# Patient Record
Sex: FEMALE | Race: WHITE | Employment: OTHER | URBAN - METROPOLITAN AREA
[De-identification: names, ages, dates, MRNs, and addresses within clinical notes are randomized per-mention and may not be internally consistent; named-entity substitution may affect disease eponyms.]

---

## 2024-01-03 NOTE — DISCHARGE INSTRUCTIONS
PT per TKA protocol  CPM 0-70 degrees increase as tolerated 2 hours/shift for 3 weeks  Weightbear as tolerated with walker  Keep incision clean and dry  Ok to shower with aquacel dressing  Dressing to remain on knee for 7 days after surgery then ok to leave incision open to air  Frequent icing and elevation of the lower leg  Aspirin 81 mg to be continued 1 tablet twice daily for 3 weeks then 1 tablet once daily for 3 weeks  Follow up in 2-3 weeks    Paula Hollingsworth PA-C  1/3/2024  6:07 AM

## 2024-01-12 NOTE — H&P
Chief Complaint   right knee pre-op   History of Present Illness   Enma returns for preoperative assessment of right knee. Pain is 4-5/10 and she takes no medication. Here today to discuss surgery. Date of last x-ray was 02/03/2023. Both knees hurt but her right is the more chronic and consistent source of pain. She limps. Her QOL has diminished. She has been treating lymphedema w/compression stockings, elevation and PT with Dana Bernal. Her sister who lives in [place] is prepared to come into town to help her for a while after surgery.   *Prior Hx DVT 1999 following GYN surgery   Past Medical History - reviewed   Arthritis   Asthma   Blood clots   Bleeding disease   Diabetes   Hypertension   Surgical History - reviewed   Arthroscopic knee   Social History - reviewed   Hand dominance: right   Occupation: retired   Risk Factors - reviewed   Patient had a flu shot in the last 12 months? no   The patient is 65 years or older and has had a pneumonia vaccine: no   Patient has an implant none   Tobacco status: never smoker   Alcohol use: non drinker   Does patient exercise? no   In the past 12 months, have you fallen? yes   Have you had cervical cancer screening in the last 12 months ? no   Have you had breast cancer screening in the last 12 months? no   Have you had colorectal cancer screening in the last 12 months? no   Current Medications (including meds started today):   rosuvastatin 10 mg tablet (rosuvastatin) TAKE ONE TABLET BY MOUTH EVERY DAY AT BEDTIME   torsemide 20 mg tablet (torsemide) TAKE TWO TABLETS BY MOUTH DAILY IN THE MORNING   sumatriptan succinate 100 mg tablet (sumatriptan succinate) TAKE 1 TABLET BY MOUTH ONSET OF HEADACHE; IF NO RELIEF MAY REPEAT 1 TABLET IN 2 HOURS. MAX OF 2 TABLETS IN 24 HOURS   furosemide 40 mg tablet (furosemide) TAKE ONE TABLET BY MOUTH DAILY   metoprolol succinate 25 mg tablet extended release 24 hr (metoprolol succinate) TAKE ONE TABLET BY MOUTH EVERY DAY AT BEDTIME FOR

## 2024-01-18 ENCOUNTER — ANESTHESIA EVENT (OUTPATIENT)
Dept: OPERATING ROOM | Age: 69
End: 2024-01-18
Payer: MEDICARE

## 2024-01-18 NOTE — ANESTHESIA PRE PROCEDURE
Department of Anesthesiology  Preprocedure Note       Name:  Enma Fischer   Age:  68 y.o.  :  1955                                          MRN:  [id number]         Date:  2024      Surgeon: Surgeon(s):  Alberto Campbell MD    Procedure: Procedure(s):  ROBOTIC LISA ASSISTED RIGHT KNEE TOTAL ARTHROPLASTY   +++ELOISA+++  ++PNB++    Medications prior to admission:   Prior to Admission medications    Medication Sig Start Date End Date Taking? Authorizing Provider   traMADol (ULTRAM) 50 MG tablet Take 1 tablet by mouth daily. 23   Luis Griffiths MD   rizatriptan (MAXALT) 10 MG tablet TAKE 1 TABLET BY MOUTH AT ONSET OF HEADACHE; IF NO RELIEF  REPEAT 1 TABLET IN 2 HOURS.  MAX=3 TABLETS/DAY 23   Luis Griffiths MD   calcium carbonate (OSCAL) 500 MG TABS tablet Take 1 tablet by mouth 2 times daily    Luis Griffiths MD   albuterol sulfate (PROAIR RESPICLICK) 108 (90 Base) MCG/ACT aerosol powder inhalation Inhale into the lungs as needed for Wheezing or Shortness of Breath    Luis Griffiths MD   meloxicam (MOBIC) 7.5 MG tablet TAKE ONE TABLET BY MOUTH EVERY DAY 22   Luis Griffiths MD   torsemide (DEMADEX) 20 MG tablet 1 tablet in the morning. 22   Luis Griffiths MD   diclofenac sodium (VOLTAREN) 1 % GEL APPLY 4 GRAMS TOPICALLY EVERY 6 HOURS AS NEEDED FOR KNEE PAIN. APPLY TO SINGLE KNEE  ANKLE  FOOT (INCLUDES SOLE  TOES  TOP OF FOOT) 22   Luis Griffiths MD   betamethasone valerate (VALISONE) 0.1 % ointment APPLY ONE APPLICATION TOPICALLY EVERY 12 HOURS 22   Luis Griffiths MD   spironolactone (ALDACTONE) 25 MG tablet TAKE ONE TABLET BY MOUTH DAILY 22   Timur Sanz DO   metoprolol succinate (TOPROL XL) 25 MG extended release tablet Take 1 tablet by mouth daily 3/23/21   Luis Griffiths MD   nystatin (MYCOSTATIN) 293088 UNIT/GM powder Apply topically 3 times daily as needed 21   Luis Griffiths MD

## 2024-01-22 ASSESSMENT — PROMIS GLOBAL HEALTH SCALE
IN GENERAL, HOW WOULD YOU RATE YOUR PHYSICAL HEALTH [ON A SCALE OF 1 (POOR) TO 5 (EXCELLENT)]?: 3
HOW IS THE PROMIS V1.1 BEING ADMINISTERED?: 2
SUM OF RESPONSES TO QUESTIONS 2, 4, 5, & 10: 12
WHO IS THE PERSON COMPLETING THE PROMIS V1.1 SURVEY?: 1
IN THE PAST 7 DAYS, HOW WOULD YOU RATE YOUR FATIGUE ON AVERAGE [ON A SCALE FROM 1 (NONE) TO 5 (VERY SEVERE)]?: 2
IN THE PAST 7 DAYS, HOW OFTEN HAVE YOU BEEN BOTHERED BY EMOTIONAL PROBLEMS, SUCH AS FEELING ANXIOUS, DEPRESSED, OR IRRITABLE [ON A SCALE FROM 1 (NEVER) TO 5 (ALWAYS)]?: 3
IN GENERAL, WOULD YOU SAY YOUR HEALTH IS...[ON A SCALE OF 1 (POOR) TO 5 (EXCELLENT)]: 3
SUM OF RESPONSES TO QUESTIONS 3, 6, 7, & 8: 15
IN GENERAL, WOULD YOU SAY YOUR QUALITY OF LIFE IS...[ON A SCALE OF 1 (POOR) TO 5 (EXCELLENT)]: 3
TO WHAT EXTENT ARE YOU ABLE TO CARRY OUT YOUR EVERYDAY PHYSICAL ACTIVITIES SUCH AS WALKING, CLIMBING STAIRS, CARRYING GROCERIES, OR MOVING A CHAIR [ON A SCALE OF 1 (NOT AT ALL) TO 5 (COMPLETELY)]?: 3
IN GENERAL, HOW WOULD YOU RATE YOUR MENTAL HEALTH, INCLUDING YOUR MOOD AND YOUR ABILITY TO THINK [ON A SCALE OF 1 (POOR) TO 5 (EXCELLENT)]?: 3
IN GENERAL, HOW WOULD YOU RATE YOUR SATISFACTION WITH YOUR SOCIAL ACTIVITIES AND RELATIONSHIPS [ON A SCALE OF 1 (POOR) TO 5 (EXCELLENT)]?: 3
IN GENERAL, PLEASE RATE HOW WELL YOU CARRY OUT YOUR USUAL SOCIAL ACTIVITIES (INCLUDES ACTIVITIES AT HOME, AT WORK, AND IN YOUR COMMUNITY, AND RESPONSIBILITIES AS A PARENT, CHILD, SPOUSE, EMPLOYEE, FRIEND, ETC) [ON A SCALE OF 1 (POOR) TO 5 (EXCELLENT)]?: 3
IN THE PAST 7 DAYS, HOW WOULD YOU RATE YOUR PAIN ON AVERAGE [ON A SCALE FROM 0 (NO PAIN) TO 10 (WORST IMAGINABLE PAIN)]?: 7

## 2024-01-22 ASSESSMENT — KOOS JR
GOING UP OR DOWN STAIRS: 2
KOOS JR TOTAL INTERVAL SCORE: 44.905
TWISING OR PIVOTING ON KNEE: 2
STANDING UPRIGHT: 2
HOW SEVERE IS YOUR KNEE STIFFNESS AFTER FIRST WAKING IN MORNING: 3
STRAIGHTENING KNEE FULLY: 2
RISING FROM SITTING: 2
BENDING TO THE FLOOR TO PICK UP OBJECT: 4

## 2024-01-30 ENCOUNTER — ANESTHESIA (OUTPATIENT)
Dept: OPERATING ROOM | Age: 69
End: 2024-01-30
Payer: MEDICARE

## 2024-01-30 ENCOUNTER — APPOINTMENT (OUTPATIENT)
Dept: GENERAL RADIOLOGY | Age: 69
End: 2024-01-30
Attending: ORTHOPAEDIC SURGERY
Payer: MEDICARE

## 2024-01-30 ENCOUNTER — HOSPITAL ENCOUNTER (OUTPATIENT)
Age: 69
Setting detail: OBSERVATION
Discharge: HOME HEALTH | End: 2024-01-31
Attending: ORTHOPAEDIC SURGERY | Admitting: ORTHOPAEDIC SURGERY
Payer: MEDICARE

## 2024-01-30 DIAGNOSIS — Z01.818 PREOPERATIVE TESTING: Primary | ICD-10-CM

## 2024-01-30 DIAGNOSIS — M17.11 OSTEOARTHRITIS OF RIGHT KNEE, UNSPECIFIED OSTEOARTHRITIS TYPE: ICD-10-CM

## 2024-01-30 DIAGNOSIS — G89.18 POST-OP PAIN: ICD-10-CM

## 2024-01-30 LAB
GLUCOSE BLD-MCNC: 133 MG/DL (ref 74–99)
GLUCOSE BLD-MCNC: 146 MG/DL (ref 74–99)
GLUCOSE BLD-MCNC: 220 MG/DL (ref 74–99)

## 2024-01-30 PROCEDURE — 6370000000 HC RX 637 (ALT 250 FOR IP): Performed by: PHYSICIAN ASSISTANT

## 2024-01-30 PROCEDURE — 2500000003 HC RX 250 WO HCPCS: Performed by: PHYSICIAN ASSISTANT

## 2024-01-30 PROCEDURE — 6360000002 HC RX W HCPCS: Performed by: ORTHOPAEDIC SURGERY

## 2024-01-30 PROCEDURE — 6360000002 HC RX W HCPCS: Performed by: NURSE ANESTHETIST, CERTIFIED REGISTERED

## 2024-01-30 PROCEDURE — 2709999900 HC NON-CHARGEABLE SUPPLY: Performed by: ORTHOPAEDIC SURGERY

## 2024-01-30 PROCEDURE — 6360000002 HC RX W HCPCS: Performed by: ANESTHESIOLOGY

## 2024-01-30 PROCEDURE — 2720000010 HC SURG SUPPLY STERILE: Performed by: ORTHOPAEDIC SURGERY

## 2024-01-30 PROCEDURE — 94640 AIRWAY INHALATION TREATMENT: CPT

## 2024-01-30 PROCEDURE — G0378 HOSPITAL OBSERVATION PER HR: HCPCS

## 2024-01-30 PROCEDURE — 3700000000 HC ANESTHESIA ATTENDED CARE: Performed by: ORTHOPAEDIC SURGERY

## 2024-01-30 PROCEDURE — 2580000003 HC RX 258: Performed by: PHYSICIAN ASSISTANT

## 2024-01-30 PROCEDURE — 2580000003 HC RX 258: Performed by: NURSE ANESTHETIST, CERTIFIED REGISTERED

## 2024-01-30 PROCEDURE — 2500000003 HC RX 250 WO HCPCS: Performed by: ORTHOPAEDIC SURGERY

## 2024-01-30 PROCEDURE — C1776 JOINT DEVICE (IMPLANTABLE): HCPCS | Performed by: ORTHOPAEDIC SURGERY

## 2024-01-30 PROCEDURE — 6370000000 HC RX 637 (ALT 250 FOR IP): Performed by: ORTHOPAEDIC SURGERY

## 2024-01-30 PROCEDURE — 2580000003 HC RX 258: Performed by: ORTHOPAEDIC SURGERY

## 2024-01-30 PROCEDURE — 3600000005 HC SURGERY LEVEL 5 BASE: Performed by: ORTHOPAEDIC SURGERY

## 2024-01-30 PROCEDURE — 6360000002 HC RX W HCPCS: Performed by: PHYSICIAN ASSISTANT

## 2024-01-30 PROCEDURE — 3700000001 HC ADD 15 MINUTES (ANESTHESIA): Performed by: ORTHOPAEDIC SURGERY

## 2024-01-30 PROCEDURE — 64447 NJX AA&/STRD FEMORAL NRV IMG: CPT | Performed by: ANESTHESIOLOGY

## 2024-01-30 PROCEDURE — 82962 GLUCOSE BLOOD TEST: CPT

## 2024-01-30 PROCEDURE — 7100000000 HC PACU RECOVERY - FIRST 15 MIN: Performed by: ORTHOPAEDIC SURGERY

## 2024-01-30 PROCEDURE — 7100000001 HC PACU RECOVERY - ADDTL 15 MIN: Performed by: ORTHOPAEDIC SURGERY

## 2024-01-30 PROCEDURE — C1713 ANCHOR/SCREW BN/BN,TIS/BN: HCPCS | Performed by: ORTHOPAEDIC SURGERY

## 2024-01-30 PROCEDURE — 3600000015 HC SURGERY LEVEL 5 ADDTL 15MIN: Performed by: ORTHOPAEDIC SURGERY

## 2024-01-30 PROCEDURE — 73560 X-RAY EXAM OF KNEE 1 OR 2: CPT

## 2024-01-30 PROCEDURE — 6370000000 HC RX 637 (ALT 250 FOR IP)

## 2024-01-30 DEVICE — FULL DOSE BONE CEMENT, 10 PACK CATALOG NUMBER IS 6191-1-010
Type: IMPLANTABLE DEVICE | Site: KNEE | Status: FUNCTIONAL
Brand: SIMPLEX

## 2024-01-30 DEVICE — PRIMARY TIBIAL BASEPLATE
Type: IMPLANTABLE DEVICE | Site: KNEE | Status: FUNCTIONAL
Brand: TRIATHLON

## 2024-01-30 DEVICE — POSTERIOR STABILIZED FEMORAL
Type: IMPLANTABLE DEVICE | Site: KNEE | Status: FUNCTIONAL
Brand: TRIATHLON

## 2024-01-30 DEVICE — ASYMMETRIC PATELLA
Type: IMPLANTABLE DEVICE | Site: KNEE | Status: FUNCTIONAL
Brand: TRIATHLON

## 2024-01-30 DEVICE — TIBIAL BEARING INSERT - PS
Type: IMPLANTABLE DEVICE | Site: KNEE | Status: FUNCTIONAL
Brand: TRIATHLON

## 2024-01-30 RX ORDER — DEXTROSE MONOHYDRATE 100 MG/ML
INJECTION, SOLUTION INTRAVENOUS CONTINUOUS PRN
Status: DISCONTINUED | OUTPATIENT
Start: 2024-01-30 | End: 2024-01-31 | Stop reason: HOSPADM

## 2024-01-30 RX ORDER — SODIUM CHLORIDE 9 MG/ML
INJECTION, SOLUTION INTRAVENOUS CONTINUOUS PRN
Status: DISCONTINUED | OUTPATIENT
Start: 2024-01-30 | End: 2024-01-30 | Stop reason: SDUPTHER

## 2024-01-30 RX ORDER — FENTANYL CITRATE 50 UG/ML
INJECTION, SOLUTION INTRAMUSCULAR; INTRAVENOUS PRN
Status: DISCONTINUED | OUTPATIENT
Start: 2024-01-30 | End: 2024-01-30 | Stop reason: SDUPTHER

## 2024-01-30 RX ORDER — HYDRALAZINE HYDROCHLORIDE 20 MG/ML
10 INJECTION INTRAMUSCULAR; INTRAVENOUS
Status: DISCONTINUED | OUTPATIENT
Start: 2024-01-30 | End: 2024-01-30 | Stop reason: HOSPADM

## 2024-01-30 RX ORDER — SODIUM CHLORIDE 0.9 % (FLUSH) 0.9 %
5-40 SYRINGE (ML) INJECTION EVERY 12 HOURS SCHEDULED
Status: DISCONTINUED | OUTPATIENT
Start: 2024-01-30 | End: 2024-01-30 | Stop reason: HOSPADM

## 2024-01-30 RX ORDER — ROSUVASTATIN CALCIUM 20 MG/1
20 TABLET, COATED ORAL DAILY
Status: DISCONTINUED | OUTPATIENT
Start: 2024-01-31 | End: 2024-01-31 | Stop reason: HOSPADM

## 2024-01-30 RX ORDER — OXYCODONE HYDROCHLORIDE 5 MG/1
2.5 TABLET ORAL EVERY 4 HOURS PRN
Status: DISCONTINUED | OUTPATIENT
Start: 2024-01-30 | End: 2024-01-31 | Stop reason: HOSPADM

## 2024-01-30 RX ORDER — BUPIVACAINE HYDROCHLORIDE 7.5 MG/ML
INJECTION, SOLUTION INTRASPINAL PRN
Status: DISCONTINUED | OUTPATIENT
Start: 2024-01-30 | End: 2024-01-30 | Stop reason: SDUPTHER

## 2024-01-30 RX ORDER — OXYCODONE HYDROCHLORIDE AND ACETAMINOPHEN 5; 325 MG/1; MG/1
1 TABLET ORAL EVERY 4 HOURS PRN
Status: DISCONTINUED | OUTPATIENT
Start: 2024-01-30 | End: 2024-01-31 | Stop reason: HOSPADM

## 2024-01-30 RX ORDER — SODIUM CHLORIDE 9 MG/ML
INJECTION, SOLUTION INTRAVENOUS PRN
Status: DISCONTINUED | OUTPATIENT
Start: 2024-01-30 | End: 2024-01-30 | Stop reason: HOSPADM

## 2024-01-30 RX ORDER — SODIUM CHLORIDE 9 MG/ML
INJECTION, SOLUTION INTRAVENOUS PRN
Status: DISCONTINUED | OUTPATIENT
Start: 2024-01-30 | End: 2024-01-31 | Stop reason: HOSPADM

## 2024-01-30 RX ORDER — MORPHINE SULFATE 2 MG/ML
2 INJECTION, SOLUTION INTRAMUSCULAR; INTRAVENOUS
Status: DISCONTINUED | OUTPATIENT
Start: 2024-01-30 | End: 2024-01-31 | Stop reason: HOSPADM

## 2024-01-30 RX ORDER — ONDANSETRON 2 MG/ML
4 INJECTION INTRAMUSCULAR; INTRAVENOUS
Status: DISCONTINUED | OUTPATIENT
Start: 2024-01-30 | End: 2024-01-30 | Stop reason: HOSPADM

## 2024-01-30 RX ORDER — DOCUSATE SODIUM 100 MG/1
100 CAPSULE, LIQUID FILLED ORAL 2 TIMES DAILY PRN
Status: DISCONTINUED | OUTPATIENT
Start: 2024-01-30 | End: 2024-01-31 | Stop reason: HOSPADM

## 2024-01-30 RX ORDER — ONDANSETRON 2 MG/ML
4 INJECTION INTRAMUSCULAR; INTRAVENOUS EVERY 6 HOURS PRN
Status: DISCONTINUED | OUTPATIENT
Start: 2024-01-30 | End: 2024-01-31 | Stop reason: HOSPADM

## 2024-01-30 RX ORDER — DEXAMETHASONE SODIUM PHOSPHATE 4 MG/ML
INJECTION, SOLUTION INTRA-ARTICULAR; INTRALESIONAL; INTRAMUSCULAR; INTRAVENOUS; SOFT TISSUE PRN
Status: DISCONTINUED | OUTPATIENT
Start: 2024-01-30 | End: 2024-01-30 | Stop reason: SDUPTHER

## 2024-01-30 RX ORDER — ACETAMINOPHEN 325 MG/1
650 TABLET ORAL EVERY 4 HOURS PRN
Status: DISCONTINUED | OUTPATIENT
Start: 2024-01-30 | End: 2024-01-31 | Stop reason: HOSPADM

## 2024-01-30 RX ORDER — ROPIVACAINE HYDROCHLORIDE 5 MG/ML
30 INJECTION, SOLUTION EPIDURAL; INFILTRATION; PERINEURAL
Status: COMPLETED | OUTPATIENT
Start: 2024-01-30 | End: 2024-01-30

## 2024-01-30 RX ORDER — DEXAMETHASONE SODIUM PHOSPHATE 10 MG/ML
8 INJECTION, SOLUTION INTRAMUSCULAR; INTRAVENOUS ONCE
Status: DISCONTINUED | OUTPATIENT
Start: 2024-01-30 | End: 2024-01-30 | Stop reason: HOSPADM

## 2024-01-30 RX ORDER — ALBUTEROL SULFATE 2.5 MG/3ML
2.5 SOLUTION RESPIRATORY (INHALATION)
Status: DISCONTINUED | OUTPATIENT
Start: 2024-01-30 | End: 2024-01-31 | Stop reason: HOSPADM

## 2024-01-30 RX ORDER — MEPERIDINE HYDROCHLORIDE 25 MG/ML
12.5 INJECTION INTRAMUSCULAR; INTRAVENOUS; SUBCUTANEOUS EVERY 5 MIN PRN
Status: DISCONTINUED | OUTPATIENT
Start: 2024-01-30 | End: 2024-01-30 | Stop reason: HOSPADM

## 2024-01-30 RX ORDER — SODIUM CHLORIDE 0.9 % (FLUSH) 0.9 %
5-40 SYRINGE (ML) INJECTION EVERY 12 HOURS SCHEDULED
Status: DISCONTINUED | OUTPATIENT
Start: 2024-01-30 | End: 2024-01-31 | Stop reason: HOSPADM

## 2024-01-30 RX ORDER — ONDANSETRON 2 MG/ML
INJECTION INTRAMUSCULAR; INTRAVENOUS PRN
Status: DISCONTINUED | OUTPATIENT
Start: 2024-01-30 | End: 2024-01-30 | Stop reason: SDUPTHER

## 2024-01-30 RX ORDER — METOPROLOL SUCCINATE 25 MG/1
25 TABLET, EXTENDED RELEASE ORAL DAILY
Status: DISCONTINUED | OUTPATIENT
Start: 2024-01-30 | End: 2024-01-31 | Stop reason: HOSPADM

## 2024-01-30 RX ORDER — SODIUM CHLORIDE 0.9 % (FLUSH) 0.9 %
5-40 SYRINGE (ML) INJECTION PRN
Status: DISCONTINUED | OUTPATIENT
Start: 2024-01-30 | End: 2024-01-30 | Stop reason: HOSPADM

## 2024-01-30 RX ORDER — INSULIN LISPRO 100 [IU]/ML
0-8 INJECTION, SOLUTION INTRAVENOUS; SUBCUTANEOUS
Status: DISCONTINUED | OUTPATIENT
Start: 2024-01-30 | End: 2024-01-31 | Stop reason: HOSPADM

## 2024-01-30 RX ORDER — LANOLIN ALCOHOL/MO/W.PET/CERES
3 CREAM (GRAM) TOPICAL NIGHTLY PRN
Status: DISCONTINUED | OUTPATIENT
Start: 2024-01-30 | End: 2024-01-31 | Stop reason: HOSPADM

## 2024-01-30 RX ORDER — LABETALOL HYDROCHLORIDE 5 MG/ML
10 INJECTION, SOLUTION INTRAVENOUS
Status: DISCONTINUED | OUTPATIENT
Start: 2024-01-30 | End: 2024-01-30 | Stop reason: HOSPADM

## 2024-01-30 RX ORDER — POLYETHYLENE GLYCOL 3350 17 G/17G
17 POWDER, FOR SOLUTION ORAL DAILY PRN
Status: DISCONTINUED | OUTPATIENT
Start: 2024-01-30 | End: 2024-01-31 | Stop reason: HOSPADM

## 2024-01-30 RX ORDER — CALCIUM CARBONATE 500(1250)
500 TABLET ORAL 2 TIMES DAILY
Status: DISCONTINUED | OUTPATIENT
Start: 2024-01-30 | End: 2024-01-31 | Stop reason: HOSPADM

## 2024-01-30 RX ORDER — PROPOFOL 10 MG/ML
INJECTION, EMULSION INTRAVENOUS CONTINUOUS PRN
Status: DISCONTINUED | OUTPATIENT
Start: 2024-01-30 | End: 2024-01-30 | Stop reason: SDUPTHER

## 2024-01-30 RX ORDER — MIDAZOLAM HYDROCHLORIDE 2 MG/2ML
2 INJECTION, SOLUTION INTRAMUSCULAR; INTRAVENOUS
Status: DISCONTINUED | OUTPATIENT
Start: 2024-01-30 | End: 2024-01-30 | Stop reason: HOSPADM

## 2024-01-30 RX ORDER — PROMETHAZINE HYDROCHLORIDE 25 MG/1
12.5 TABLET ORAL EVERY 6 HOURS PRN
Status: DISCONTINUED | OUTPATIENT
Start: 2024-01-30 | End: 2024-01-31 | Stop reason: HOSPADM

## 2024-01-30 RX ORDER — HYDRALAZINE HYDROCHLORIDE 20 MG/ML
10 INJECTION INTRAMUSCULAR; INTRAVENOUS EVERY 6 HOURS PRN
Status: DISCONTINUED | OUTPATIENT
Start: 2024-01-30 | End: 2024-01-31 | Stop reason: HOSPADM

## 2024-01-30 RX ORDER — SODIUM CHLORIDE 9 MG/ML
INJECTION, SOLUTION INTRAVENOUS CONTINUOUS
Status: DISCONTINUED | OUTPATIENT
Start: 2024-01-30 | End: 2024-01-31 | Stop reason: HOSPADM

## 2024-01-30 RX ORDER — ACETAMINOPHEN 500 MG
1000 TABLET ORAL ONCE
Status: COMPLETED | OUTPATIENT
Start: 2024-01-30 | End: 2024-01-30

## 2024-01-30 RX ORDER — IPRATROPIUM BROMIDE AND ALBUTEROL SULFATE 2.5; .5 MG/3ML; MG/3ML
1 SOLUTION RESPIRATORY (INHALATION)
Status: DISCONTINUED | OUTPATIENT
Start: 2024-01-30 | End: 2024-01-30 | Stop reason: HOSPADM

## 2024-01-30 RX ORDER — ASPIRIN 81 MG/1
81 TABLET ORAL 2 TIMES DAILY
Status: DISCONTINUED | OUTPATIENT
Start: 2024-01-31 | End: 2024-01-31 | Stop reason: HOSPADM

## 2024-01-30 RX ORDER — MIDAZOLAM HYDROCHLORIDE 2 MG/2ML
0.5 INJECTION, SOLUTION INTRAMUSCULAR; INTRAVENOUS PRN
Status: DISCONTINUED | OUTPATIENT
Start: 2024-01-30 | End: 2024-01-30 | Stop reason: HOSPADM

## 2024-01-30 RX ORDER — INSULIN LISPRO 100 [IU]/ML
0-4 INJECTION, SOLUTION INTRAVENOUS; SUBCUTANEOUS NIGHTLY
Status: DISCONTINUED | OUTPATIENT
Start: 2024-01-30 | End: 2024-01-31 | Stop reason: HOSPADM

## 2024-01-30 RX ORDER — FENTANYL CITRATE 50 UG/ML
25 INJECTION, SOLUTION INTRAMUSCULAR; INTRAVENOUS PRN
Status: DISCONTINUED | OUTPATIENT
Start: 2024-01-30 | End: 2024-01-30 | Stop reason: HOSPADM

## 2024-01-30 RX ORDER — GABAPENTIN 100 MG/1
100 CAPSULE ORAL 3 TIMES DAILY
Status: DISCONTINUED | OUTPATIENT
Start: 2024-01-30 | End: 2024-01-31 | Stop reason: HOSPADM

## 2024-01-30 RX ORDER — SODIUM CHLORIDE 0.9 % (FLUSH) 0.9 %
5-40 SYRINGE (ML) INJECTION PRN
Status: DISCONTINUED | OUTPATIENT
Start: 2024-01-30 | End: 2024-01-31 | Stop reason: HOSPADM

## 2024-01-30 RX ORDER — ROSUVASTATIN CALCIUM 10 MG/1
10 TABLET, COATED ORAL DAILY
Status: DISCONTINUED | OUTPATIENT
Start: 2024-01-30 | End: 2024-01-30

## 2024-01-30 RX ORDER — CALCIUM CHLORIDE 100 MG/ML
INJECTION INTRAVENOUS; INTRAVENTRICULAR PRN
Status: DISCONTINUED | OUTPATIENT
Start: 2024-01-30 | End: 2024-01-30 | Stop reason: ALTCHOICE

## 2024-01-30 RX ORDER — SODIUM CHLORIDE 9 MG/ML
INJECTION, SOLUTION INTRAVENOUS CONTINUOUS
Status: DISCONTINUED | OUTPATIENT
Start: 2024-01-30 | End: 2024-01-30

## 2024-01-30 RX ORDER — CELECOXIB 100 MG/1
100 CAPSULE ORAL ONCE
Status: COMPLETED | OUTPATIENT
Start: 2024-01-30 | End: 2024-01-30

## 2024-01-30 RX ADMIN — ACETAMINOPHEN 1000 MG: 500 TABLET ORAL at 13:15

## 2024-01-30 RX ADMIN — OXYCODONE 2.5 MG: 5 TABLET ORAL at 22:09

## 2024-01-30 RX ADMIN — TRANEXAMIC ACID 1000 MG: 100 INJECTION, SOLUTION INTRAVENOUS at 17:44

## 2024-01-30 RX ADMIN — ROPIVACAINE HYDROCHLORIDE 30 ML: 5 INJECTION EPIDURAL; INFILTRATION; PERINEURAL at 13:50

## 2024-01-30 RX ADMIN — OXYCODONE HYDROCHLORIDE AND ACETAMINOPHEN 1 TABLET: 5; 325 TABLET ORAL at 22:09

## 2024-01-30 RX ADMIN — GABAPENTIN 100 MG: 100 CAPSULE ORAL at 21:12

## 2024-01-30 RX ADMIN — BUPIVACAINE HYDROCHLORIDE IN DEXTROSE 1.5 ML: 7.5 INJECTION, SOLUTION SUBARACHNOID at 14:56

## 2024-01-30 RX ADMIN — MIDAZOLAM HYDROCHLORIDE 2 MG: 1 INJECTION, SOLUTION INTRAMUSCULAR; INTRAVENOUS at 13:44

## 2024-01-30 RX ADMIN — SODIUM CHLORIDE: 9 INJECTION, SOLUTION INTRAVENOUS at 15:48

## 2024-01-30 RX ADMIN — DEXAMETHASONE SODIUM PHOSPHATE 8 MG: 4 INJECTION, SOLUTION INTRAMUSCULAR; INTRAVENOUS at 14:49

## 2024-01-30 RX ADMIN — FENTANYL CITRATE 25 MCG: 50 INJECTION, SOLUTION INTRAMUSCULAR; INTRAVENOUS at 14:56

## 2024-01-30 RX ADMIN — ONDANSETRON 4 MG: 2 INJECTION INTRAMUSCULAR; INTRAVENOUS at 14:49

## 2024-01-30 RX ADMIN — TRANEXAMIC ACID 1000 MG: 100 INJECTION, SOLUTION INTRAVENOUS at 14:59

## 2024-01-30 RX ADMIN — METOPROLOL SUCCINATE 25 MG: 25 TABLET, EXTENDED RELEASE ORAL at 21:12

## 2024-01-30 RX ADMIN — PROPOFOL 40 MG: 10 INJECTION, EMULSION INTRAVENOUS at 15:00

## 2024-01-30 RX ADMIN — SODIUM CHLORIDE: 9 INJECTION, SOLUTION INTRAVENOUS at 14:32

## 2024-01-30 RX ADMIN — CELECOXIB 100 MG: 100 CAPSULE ORAL at 13:14

## 2024-01-30 RX ADMIN — PROPOFOL 100 MCG/KG/MIN: 10 INJECTION, EMULSION INTRAVENOUS at 14:59

## 2024-01-30 RX ADMIN — FENTANYL CITRATE 75 MCG: 50 INJECTION, SOLUTION INTRAMUSCULAR; INTRAVENOUS at 14:49

## 2024-01-30 RX ADMIN — ALBUTEROL SULFATE 2.5 MG: 2.5 SOLUTION RESPIRATORY (INHALATION) at 21:17

## 2024-01-30 RX ADMIN — MEPERIDINE HYDROCHLORIDE 12.5 MG: 25 INJECTION, SOLUTION INTRAMUSCULAR; INTRAVENOUS; SUBCUTANEOUS at 17:48

## 2024-01-30 RX ADMIN — SODIUM CHLORIDE, PRESERVATIVE FREE 10 ML: 5 INJECTION INTRAVENOUS at 21:15

## 2024-01-30 RX ADMIN — CALCIUM 500 MG: 500 TABLET ORAL at 21:12

## 2024-01-30 RX ADMIN — WATER 2000 MG: 1 INJECTION INTRAMUSCULAR; INTRAVENOUS; SUBCUTANEOUS at 14:49

## 2024-01-30 ASSESSMENT — PAIN SCALES - GENERAL: PAINLEVEL_OUTOF10: 9

## 2024-01-30 ASSESSMENT — PAIN - FUNCTIONAL ASSESSMENT
PAIN_FUNCTIONAL_ASSESSMENT: ACTIVITIES ARE NOT PREVENTED
PAIN_FUNCTIONAL_ASSESSMENT: ACTIVITIES ARE NOT PREVENTED
PAIN_FUNCTIONAL_ASSESSMENT: 0-10
PAIN_FUNCTIONAL_ASSESSMENT: NONE - DENIES PAIN
PAIN_FUNCTIONAL_ASSESSMENT: 0-10
PAIN_FUNCTIONAL_ASSESSMENT: NONE - DENIES PAIN

## 2024-01-30 ASSESSMENT — PAIN DESCRIPTION - ORIENTATION: ORIENTATION: RIGHT

## 2024-01-30 ASSESSMENT — PAIN DESCRIPTION - DESCRIPTORS
DESCRIPTORS: DISCOMFORT
DESCRIPTORS: ACHING;DISCOMFORT

## 2024-01-30 ASSESSMENT — PAIN DESCRIPTION - LOCATION: LOCATION: KNEE

## 2024-01-30 NOTE — ANESTHESIA PROCEDURE NOTES
Spinal Block    Patient location during procedure: OR  Reason for block: primary anesthetic and at surgeon's request  Staffing  Performed: resident/CRNA and other anesthesia staff   Resident/CRNA: Corey Robins APRN - JUVENCIO  Other anesthesia staff: Sunni Tyson RN  Performed by: Corey Robins APRN - CRNA  Authorized by: Dianne Lemus, DO    Spinal Block  Patient position: sitting  Prep: Betadine and site prepped and draped  Patient monitoring: cardiac monitor, continuous pulse ox, continuous capnometry and frequent blood pressure checks  Approach: midline  Location: L4/L5  Provider prep: mask, sterile gloves and sterile gown  Needle  Needle type: Pencan   Needle gauge: 25 G  Needle length: 3.5 in  Assessment  Sensory level: T6  Swirl obtained: Yes  CSF: clear  Attempts: 1  Hemodynamics: stable  Preanesthetic Checklist  Completed: patient identified, IV checked, site marked, risks and benefits discussed, surgical/procedural consents, equipment checked, pre-op evaluation, timeout performed, anesthesia consent given, oxygen available, monitors applied/VS acknowledged, fire risk safety assessment completed and verbalized and blood product R/B/A discussed and consented

## 2024-01-30 NOTE — ANESTHESIA POSTPROCEDURE EVALUATION
Department of Anesthesiology  Postprocedure Note    Patient: Mignon Fischer  MRN: [id number]  YOB: 1955  Date of evaluation: 1/30/2024    Procedure Summary       Date: 01/30/24 Room / Location: [address]    Anesthesia Start: 1432 Anesthesia Stop: 1721    Procedure: ROBOTIC LISA ASSISTED RIGHT KNEE TOTAL ARTHROPLASTY   +++ELOISA+++  ++PNB++ (Right: Knee) Diagnosis:       Osteoarthritis of right knee, unspecified osteoarthritis type      (Osteoarthritis of right knee, unspecified osteoarthritis type [M17.11])    Surgeons: Alberto Campbell MD Responsible Provider: Dianne Lemus DO    Anesthesia Type: general, regional, spinal ASA Status: 3            Anesthesia Type: No value filed.    Dominick Phase I: Dominick Score: 9    Dominick Phase II:      Anesthesia Post Evaluation    Patient location during evaluation: PACU  Patient participation: complete - patient participated  Level of consciousness: awake  Airway patency: patent  Nausea & Vomiting: no nausea and no vomiting  Cardiovascular status: hemodynamically stable  Respiratory status: acceptable  Hydration status: stable  Pain management: adequate    No notable events documented.

## 2024-01-30 NOTE — INTERVAL H&P NOTE
Update History & Physical    H&P Update    The patient's history and physical was reviewed with the patient and there were no significant changes.  Dr Campbell examined the patient and there were no significant changes from the previous history and physical.      Electronically signed by MARLEY Landry on 1/30/2024 at 12:51 PM  1/30/2024  12:51 PM

## 2024-01-31 VITALS
DIASTOLIC BLOOD PRESSURE: 55 MMHG | OXYGEN SATURATION: 98 % | RESPIRATION RATE: 16 BRPM | SYSTOLIC BLOOD PRESSURE: 112 MMHG | BODY MASS INDEX: 40.22 KG/M2 | HEART RATE: 68 BPM | WEIGHT: 227 LBS | TEMPERATURE: 98 F | HEIGHT: 63 IN

## 2024-01-31 LAB
ANION GAP SERPL CALCULATED.3IONS-SCNC: 12 MMOL/L (ref 7–16)
BASOPHILS # BLD: 0.01 K/UL (ref 0–0.2)
BASOPHILS NFR BLD: 0 % (ref 0–2)
BUN SERPL-MCNC: 18 MG/DL (ref 6–23)
CALCIUM SERPL-MCNC: 9.1 MG/DL (ref 8.6–10.2)
CHLORIDE SERPL-SCNC: 103 MMOL/L (ref 98–107)
CO2 SERPL-SCNC: 22 MMOL/L (ref 22–29)
CREAT SERPL-MCNC: 0.8 MG/DL (ref 0.5–1)
EOSINOPHIL # BLD: 0 K/UL (ref 0.05–0.5)
EOSINOPHILS RELATIVE PERCENT: 0 % (ref 0–6)
ERYTHROCYTE [DISTWIDTH] IN BLOOD BY AUTOMATED COUNT: 12.7 % (ref 11.5–15)
GFR SERPL CREATININE-BSD FRML MDRD: >60 ML/MIN/1.73M2
GLUCOSE BLD-MCNC: 193 MG/DL (ref 74–99)
GLUCOSE BLD-MCNC: 229 MG/DL (ref 74–99)
GLUCOSE SERPL-MCNC: 200 MG/DL (ref 74–99)
HBA1C MFR BLD: 6.7 % (ref 4–5.6)
HCT VFR BLD AUTO: 34.8 % (ref 34–48)
HGB BLD-MCNC: 11.3 G/DL (ref 11.5–15.5)
IMM GRANULOCYTES # BLD AUTO: 0.07 K/UL (ref 0–0.58)
IMM GRANULOCYTES NFR BLD: 1 % (ref 0–5)
LYMPHOCYTES NFR BLD: 0.65 K/UL (ref 1.5–4)
LYMPHOCYTES RELATIVE PERCENT: 6 % (ref 20–42)
MCH RBC QN AUTO: 30.3 PG (ref 26–35)
MCHC RBC AUTO-ENTMCNC: 32.5 G/DL (ref 32–34.5)
MCV RBC AUTO: 93.3 FL (ref 80–99.9)
MONOCYTES NFR BLD: 0.47 K/UL (ref 0.1–0.95)
MONOCYTES NFR BLD: 4 % (ref 2–12)
NEUTROPHILS NFR BLD: 90 % (ref 43–80)
NEUTS SEG NFR BLD: 10.58 K/UL (ref 1.8–7.3)
PLATELET # BLD AUTO: 263 K/UL (ref 130–450)
PMV BLD AUTO: 9.1 FL (ref 7–12)
POTASSIUM SERPL-SCNC: 4.9 MMOL/L (ref 3.5–5)
RBC # BLD AUTO: 3.73 M/UL (ref 3.5–5.5)
SODIUM SERPL-SCNC: 137 MMOL/L (ref 132–146)
WBC OTHER # BLD: 11.8 K/UL (ref 4.5–11.5)

## 2024-01-31 PROCEDURE — 2580000003 HC RX 258: Performed by: PHYSICIAN ASSISTANT

## 2024-01-31 PROCEDURE — 6360000002 HC RX W HCPCS: Performed by: PHYSICIAN ASSISTANT

## 2024-01-31 PROCEDURE — 83036 HEMOGLOBIN GLYCOSYLATED A1C: CPT

## 2024-01-31 PROCEDURE — 80048 BASIC METABOLIC PNL TOTAL CA: CPT

## 2024-01-31 PROCEDURE — 6370000000 HC RX 637 (ALT 250 FOR IP): Performed by: PHYSICIAN ASSISTANT

## 2024-01-31 PROCEDURE — 97530 THERAPEUTIC ACTIVITIES: CPT

## 2024-01-31 PROCEDURE — G0378 HOSPITAL OBSERVATION PER HR: HCPCS

## 2024-01-31 PROCEDURE — 94640 AIRWAY INHALATION TREATMENT: CPT

## 2024-01-31 PROCEDURE — 2700000000 HC OXYGEN THERAPY PER DAY

## 2024-01-31 PROCEDURE — 6370000000 HC RX 637 (ALT 250 FOR IP): Performed by: INTERNAL MEDICINE

## 2024-01-31 PROCEDURE — 97161 PT EVAL LOW COMPLEX 20 MIN: CPT

## 2024-01-31 PROCEDURE — 85025 COMPLETE CBC W/AUTO DIFF WBC: CPT

## 2024-01-31 PROCEDURE — 82962 GLUCOSE BLOOD TEST: CPT

## 2024-01-31 PROCEDURE — 97165 OT EVAL LOW COMPLEX 30 MIN: CPT

## 2024-01-31 PROCEDURE — 36415 COLL VENOUS BLD VENIPUNCTURE: CPT

## 2024-01-31 RX ORDER — ASPIRIN 81 MG/1
81 TABLET ORAL 2 TIMES DAILY
Qty: 60 TABLET | Refills: 0 | Status: SHIPPED | OUTPATIENT
Start: 2024-01-31 | End: 2024-01-31

## 2024-01-31 RX ORDER — ASPIRIN 81 MG/1
81 TABLET ORAL 2 TIMES DAILY
Qty: 60 TABLET | Refills: 0
Start: 2024-01-31

## 2024-01-31 RX ORDER — SPIRONOLACTONE 25 MG/1
25 TABLET ORAL DAILY
Status: DISCONTINUED | OUTPATIENT
Start: 2024-01-31 | End: 2024-01-31 | Stop reason: HOSPADM

## 2024-01-31 RX ORDER — ROSUVASTATIN CALCIUM 10 MG/1
20 TABLET, COATED ORAL DAILY
Qty: 30 TABLET | Refills: 0 | Status: SHIPPED
Start: 2024-01-31

## 2024-01-31 RX ORDER — OXYCODONE AND ACETAMINOPHEN 7.5; 325 MG/1; MG/1
1 TABLET ORAL
Qty: 42 TABLET | Refills: 0 | Status: SHIPPED | OUTPATIENT
Start: 2024-01-31 | End: 2024-02-07

## 2024-01-31 RX ORDER — LOSARTAN POTASSIUM 50 MG/1
50 TABLET ORAL DAILY
Status: DISCONTINUED | OUTPATIENT
Start: 2024-01-31 | End: 2024-01-31 | Stop reason: HOSPADM

## 2024-01-31 RX ORDER — TORSEMIDE 20 MG/1
20 TABLET ORAL DAILY
Status: DISCONTINUED | OUTPATIENT
Start: 2024-01-31 | End: 2024-01-31 | Stop reason: HOSPADM

## 2024-01-31 RX ORDER — PSEUDOEPHEDRINE HCL 30 MG
100 TABLET ORAL 2 TIMES DAILY PRN
Qty: 28 CAPSULE | Refills: 0 | Status: SHIPPED | OUTPATIENT
Start: 2024-01-31 | End: 2024-02-14

## 2024-01-31 RX ADMIN — ROSUVASTATIN CALCIUM 20 MG: 20 TABLET, FILM COATED ORAL at 10:10

## 2024-01-31 RX ADMIN — WATER 1000 MG: 1 INJECTION INTRAMUSCULAR; INTRAVENOUS; SUBCUTANEOUS at 06:37

## 2024-01-31 RX ADMIN — SPIRONOLACTONE 25 MG: 25 TABLET, FILM COATED ORAL at 10:15

## 2024-01-31 RX ADMIN — OXYCODONE 2.5 MG: 5 TABLET ORAL at 06:48

## 2024-01-31 RX ADMIN — TORSEMIDE 20 MG: 20 TABLET ORAL at 10:15

## 2024-01-31 RX ADMIN — OXYCODONE HYDROCHLORIDE AND ACETAMINOPHEN 1 TABLET: 5; 325 TABLET ORAL at 06:48

## 2024-01-31 RX ADMIN — ALBUTEROL SULFATE 2.5 MG: 2.5 SOLUTION RESPIRATORY (INHALATION) at 13:07

## 2024-01-31 RX ADMIN — LOSARTAN POTASSIUM 50 MG: 50 TABLET, FILM COATED ORAL at 10:15

## 2024-01-31 RX ADMIN — METFORMIN HYDROCHLORIDE 500 MG: 500 TABLET ORAL at 10:15

## 2024-01-31 RX ADMIN — INSULIN LISPRO 2 UNITS: 100 INJECTION, SOLUTION INTRAVENOUS; SUBCUTANEOUS at 11:17

## 2024-01-31 RX ADMIN — WATER 1000 MG: 1 INJECTION INTRAMUSCULAR; INTRAVENOUS; SUBCUTANEOUS at 01:10

## 2024-01-31 RX ADMIN — OXYCODONE HYDROCHLORIDE AND ACETAMINOPHEN 1 TABLET: 5; 325 TABLET ORAL at 02:22

## 2024-01-31 RX ADMIN — GABAPENTIN 100 MG: 100 CAPSULE ORAL at 10:10

## 2024-01-31 RX ADMIN — OXYCODONE 2.5 MG: 5 TABLET ORAL at 02:21

## 2024-01-31 RX ADMIN — CALCIUM 500 MG: 500 TABLET ORAL at 10:10

## 2024-01-31 RX ADMIN — ASPIRIN 81 MG: 81 TABLET, COATED ORAL at 10:10

## 2024-01-31 RX ADMIN — METOPROLOL SUCCINATE 25 MG: 25 TABLET, EXTENDED RELEASE ORAL at 10:10

## 2024-01-31 RX ADMIN — ALBUTEROL SULFATE 2.5 MG: 2.5 SOLUTION RESPIRATORY (INHALATION) at 10:00

## 2024-01-31 RX ADMIN — OXYCODONE HYDROCHLORIDE AND ACETAMINOPHEN 1 TABLET: 5; 325 TABLET ORAL at 11:18

## 2024-01-31 RX ADMIN — OXYCODONE 2.5 MG: 5 TABLET ORAL at 11:17

## 2024-01-31 RX ADMIN — ACETAMINOPHEN 650 MG: 325 TABLET ORAL at 12:59

## 2024-01-31 ASSESSMENT — PAIN DESCRIPTION - DESCRIPTORS
DESCRIPTORS: ACHING;DISCOMFORT;BURNING;SORE
DESCRIPTORS: ACHING;DISCOMFORT;SORE

## 2024-01-31 ASSESSMENT — PAIN DESCRIPTION - LOCATION
LOCATION: KNEE
LOCATION: KNEE

## 2024-01-31 ASSESSMENT — PAIN SCALES - GENERAL
PAINLEVEL_OUTOF10: 7
PAINLEVEL_OUTOF10: 9
PAINLEVEL_OUTOF10: 7

## 2024-01-31 ASSESSMENT — PAIN DESCRIPTION - ORIENTATION
ORIENTATION: RIGHT
ORIENTATION: RIGHT

## 2024-01-31 NOTE — DISCHARGE SUMMARY
Discharge Summary     Patient ID:  Mignon Fischer  [id number]  68 y.o.  1955    Admit date: 1/30/2024    Discharge date and time: 1/31/24    Admitting Physician: Alberto Campbell MD     Discharge Physician: same    Admission Diagnoses: right Knee Osteoarthritis    Discharge Diagnoses:     Admission Condition: good    Discharged Condition: good    Procedure and Date: right Total Knee Arthroplasty     Hospital Course: A right total knee arthroplasty was performed on 1/30/2024.  Following this procedure the patient was admitted for an overnight postoperative hospital stay.  During this admission, DVT prophylaxis was followed using bilateral ICDs, CHRISSIE hose, and daily aspirin. A regional block was used for post-operative pain control along with the use of IV/oral pain medications.  Patient was seen daily by PT.  Discharge plans were discussed with the patient with the aid of .     Consults: Patient's PCP was consulted for medical management.    Significant Diagnostic Studies: WBC 11.8   H&H 11.3/34.8    Discharge Exam:  Dressing c/d/i.  Calf soft, non-tender. Mild LE edema.  5+ DF, PF, EHL motor function.  2+ DP and PT pulses.    Disposition: home    Patient Instructions:   Per med rec, percocet for pain, 81 mg ASA For DVT prophylaxis  Activity: WBAT with walker  Diet: regular diet  Wound Care: aquacel dressing x 1 week    Follow-up with Dr. Alberto Campbell in 2 weeks.    Signed:  Paula Hollingsworth PA-C  1/31/2024  6:56 AM

## 2024-01-31 NOTE — PROGRESS NOTES
Received a call from patients sister Janet Sandhu, requesting for an update. She is not listed as any contacts. Patient was asked if this person can receive any information/updates, patient denied and only requested that Janet Sandhu was told that the patient was doing well.

## 2024-01-31 NOTE — PROGRESS NOTES
Paged MARLEY Vázquez of patients need of modification of Rouvastatin 10 mg to 20 mg. See new orders.

## 2024-01-31 NOTE — CARE COORDINATION
Met with patient about diagnosis and discharge plan of care. POD#1 right TKA. Pt seen in ortho class. Lives alone in ranch home, 4 steps in with rails. Has family to help. Pt has wheeled walker, tub shower and standard commode. CPM delivered by [employer]. [facility] following and orders completed. PCP is dr Leiva. Will follow-sonia

## 2024-01-31 NOTE — CARE COORDINATION
Internal Medicine On-call Care Coordination Note    I was notified of new consult by nursing staff for patient. Per conversation she is admitted for right total knee replacement, we are consulted for medical management    I placed home medication orders, with the exception of torsemide, spironolactone, losartan, and metformin pending AM labs tomorrow. Ordered SSI and PRN hydralazine for coverage in the meantime    Dr. Flores or his coverage will see the patient tomorrow for consult.    Electronically signed by Casey Adame PA-C on 1/30/2024 at 7:36 PM

## 2024-01-31 NOTE — PROGRESS NOTES
Department of Surgery   Surgical Service- Orthopaedics  Physician Assistant TKA Progress Note      Mignon Fischer  1/31/2024  6:23 AM    SUBJECTIVE:  POD #1 s/p right TKA. Patient is doing well, no complaints, pain controlled.     OBJECTIVE:      VITALS:  /75   Pulse 68   Temp 97.4 °F (36.3 °C) (Oral)   Resp 16   Ht 1.588 m (5' 2.5\")   Wt 103 kg (227 lb)   SpO2 99%   BMI 40.86 kg/m²     Physical Exam: Dressing c/d/i.  Calf soft, non-tender. Mild LE edema.  5+ DF, PF, EHL motor function.  2+ DP and PT pulses.     Data:  CBC with Differential:    Lab Results   Component Value Date/Time    WBC 11.8 01/31/2024 03:50 AM    RBC 3.73 01/31/2024 03:50 AM    HGB 11.3 01/31/2024 03:50 AM    HCT 34.8 01/31/2024 03:50 AM     01/31/2024 03:50 AM    MCV 93.3 01/31/2024 03:50 AM    MCH 30.3 01/31/2024 03:50 AM    MCHC 32.5 01/31/2024 03:50 AM    RDW 12.7 01/31/2024 03:50 AM    LYMPHOPCT 6 01/31/2024 03:50 AM    MONOPCT 4 01/31/2024 03:50 AM    EOSPCT 1 10/06/2010 04:15 PM    BASOPCT 0 01/31/2024 03:50 AM    MONOSABS 0.47 01/31/2024 03:50 AM    LYMPHSABS 0.65 01/31/2024 03:50 AM    EOSABS 0.00 01/31/2024 03:50 AM    BASOSABS 0.01 01/31/2024 03:50 AM     BMP:    Lab Results   Component Value Date/Time     01/31/2024 03:50 AM    K 4.9 01/31/2024 03:50 AM    K 4.3 08/07/2020 11:42 AM     01/31/2024 03:50 AM    CO2 22 01/31/2024 03:50 AM    BUN 18 01/31/2024 03:50 AM    LABALBU 4.1 08/07/2020 11:42 AM    LABALBU 4.6 10/06/2010 04:15 PM    CREATININE 0.8 01/31/2024 03:50 AM    CALCIUM 9.1 01/31/2024 03:50 AM    GFRAA >60 03/10/2021 08:47 AM    LABGLOM >60 01/31/2024 03:50 AM    GLUCOSE 200 01/31/2024 03:50 AM    GLUCOSE 97 10/06/2010 04:15 PM         ASSESSMENT: POD #1 s/p right TKA     PLAN:   Discharge home w/ home PT and HHC  Start 81 mg ASA for DVT prophylaxis this AM  Pain medications as ordered  Discharge instructions reviewed and entered into chart  Follow up at [facility] 2 weeks    Paula Hollingsworth

## 2024-01-31 NOTE — PROGRESS NOTES
Occupational Therapy    OCCUPATIONAL THERAPY INITIAL EVALUATION    [facility]   [address], [place]         Date:2024                                                  Patient Name: Mignon Fischer    MRN: [id number]    : 1955    Room: [address] [place] [address]      Evaluating OT: Jessica Winkler OTR/L   [id number]      Referring Provider:Paula Hollingsworth PA     Specific Provider Orders/Date:OT eval and treat 2024      Diagnosis:  Osteoarthritis of right knee, unspecified osteoarthritis type [M17.11]  Primary osteoarthritis of right knee [M17.11]    Surgery: R TKA 2024     Pertinent Medical History: brain concussion, DM, lymphedema,      Precautions:  Fall Risk, WBAT R LE     Assessment of current deficits    [x] Functional mobility  [x]ADLs  [x] Strength               []Cognition    [x] Functional transfers   [x] IADLs         [x] Safety Awareness   [x]Endurance    [] Fine Coordination              [x] Balance      [] Vision/perception   []Sensation     []Gross Motor Coordination  [] ROM  [] Delirium                   [] Motor Control     OT PLAN OF CARE   OT POC based on physician orders, patient diagnosis and results of clinical assessment    Frequency/Duration  1-2 days/wk for 2 weeks PRN   Specific OT Treatment Interventions to include:   ADL retraining/adapted techniques and AE recommendations to increase functional independence within precautions                    Energy conservation techniques to improve tolerance for selfcare routine   Functional transfer/mobility training/DME recommendations for increased independence, safety and fall prevention         Patient/family education to increase safety and functional independence             Environmental modifications for safe mobility and completion of ADLs                             Therapeutic activity to improve functional performance during ADLs.

## 2024-01-31 NOTE — OP NOTE
[place] [address]                                OPERATIVE REPORT    PATIENT NAME: MARLENI SESAY                 :        1955  MED REC NO:   [id number]                            ROOM:       [facility]  ACCOUNT NO:   [id number]                           ADMIT DATE: 2024  PROVIDER:     Alberto Campbell MD    DATE OF PROCEDURE:  2024    PREOPERATIVE DIAGNOSIS:  Right knee osteoarthritis with flexion  contracture.    POSTOPERATIVE DIAGNOSIS:  Right knee osteoarthritis with flexion  contracture.    OPERATIVE PROCEDURE:  Right total knee arthroplasty - Zay  robotic-assisted, [id number].    SURGEON:  Alberto Campbell MD.    FIRST ASSISTANT:  Paula Hollingsworth PA-C and _____.    ANESTHESIA:  Spinal plus regional.    OPERATIVE INDICATION:  This is a 68-year-old female who presents with  progressive right knee pain, motion restriction, and deterioration in  gait.  She has failed extended conservative management and now presents  for elective total knee arthroplasty.    DESCRIPTION OF PROCEDURE:  A spinal anesthetic was administered  following placement of a regional nerve block.  The patient was given a  single dose of IV antibiotic as well as tranexamic acid.  She was then  positioned supine and the affected leg was prepped and draped in the  usual standard fashion.  The knee was sealed with Ioban.  The leg was  then exsanguinated with Esmarch and a tourniquet inflated to 300 mmHg.   A straight midline skin incision was made followed by a standard median  parapatellar arthrotomy.  A portion of the fat pad was removed and a  soft tissue sleeve was lifted off the anteromedial tibia over to and  including release of the deep MCL and the upper half of the superficial  MCL to account for her varus deformity.  The patella was then everted  and the knee flexed up.  This revealed severe tricompartmental DJD with  _____

## 2024-01-31 NOTE — PLAN OF CARE
Problem: Chronic Conditions and Co-morbidities  Goal: Patient's chronic conditions and co-morbidity symptoms are monitored and maintained or improved  1/31/2024 0327 by Karen Manzo RN  Outcome: Progressing  1/30/2024 1843 by Millicent Perkins RN  Outcome: Progressing     Problem: Discharge Planning  Goal: Discharge to home or other facility with appropriate resources  1/31/2024 0327 by Karen Manzo RN  Outcome: Progressing  1/30/2024 1843 by Millicent Perkins RN  Outcome: Progressing     Problem: Pain  Goal: Verbalizes/displays adequate comfort level or baseline comfort level  1/31/2024 0327 by Karen Manzo RN  Outcome: Progressing  1/30/2024 1843 by Millicent Perkins RN  Outcome: Progressing     Problem: Safety - Adult  Goal: Free from fall injury  1/31/2024 0327 by Karen Manzo RN  Outcome: Progressing  1/30/2024 1843 by Millicent Perkins RN  Outcome: Progressing     Problem: ABCDS Injury Assessment  Goal: Absence of physical injury  Outcome: Progressing

## 2024-01-31 NOTE — ACP (ADVANCE CARE PLANNING)
Advance Care Planning   Healthcare Decision Maker:    Primary Decision Maker: Ricardo Fischer - Brother/Sister - [phone]    Click here to complete Healthcare Decision Makers including selection of the Healthcare Decision Maker Relationship (ie \"Primary\").

## 2024-01-31 NOTE — PROGRESS NOTES
P Quality Flow/Interdisciplinary Rounds Progress Note        Quality Flow Rounds held on January 31, 2024    Disciplines Attending:  Bedside Nurse, , , and Nursing Unit Leadership    Mignon Fischer was admitted on 1/30/2024 11:55 AM    Anticipated Discharge Date:       Disposition:    Braxton Score:  Braxton Scale Score: 20    Readmission Risk              Risk of Unplanned Readmission:  0           Discussed patient goal for the day, patient clinical progression, and barriers to discharge.  The following Goal(s) of the Day/Commitment(s) have been identified:   PT/OT, discharge planning - home today      Cezar Mckeon RN  January 31, 2024

## 2024-01-31 NOTE — PROGRESS NOTES
Physical Therapy  Facility/Department: [address]  Physical Therapy Initial Assessment    Name: Mignon Fischer  : 1955  MRN: [id number]  Date of Service: 2024      Patient Diagnosis(es): The primary encounter diagnosis was Preoperative testing. Diagnoses of Osteoarthritis of right knee, unspecified osteoarthritis type and Post-op pain were also pertinent to this visit.  Past Medical History:  has a past medical history of Brain concussion, Diabetes mellitus (HCC), Double vision with both eyes open, Dyspnea on exertion, Hx of blood clots, Hyperlipemia, Hypertension, Knee pain, Lymphedema, Pancreatitis, and Vitreous floaters of both eyes.  Past Surgical History:  has a past surgical history that includes Appendectomy; Cholecystectomy; Eye surgery; Colonoscopy (2016); Upper gastrointestinal endoscopy (10/2016); Gallbladder surgery (); Knee arthroscopy (Right, ); eye surgery; and Total knee arthroplasty (Right, 2024).         Requires PT Follow-Up: Yes     Evaluating Therapist: Kari De Leon PT     Referring Provider:      Paula Hollingsworth PA       PT order : PT eval and treat     Room #: 725   DIAGNOSIS: OA R knee s/p TKA     PRECAUTIONS: falls, WBAT with walker R LE     Social:  Pt lives alone  in a  1  floor plan  3  steps and  1  rails to enter.  Prior to admission pt walked with  no AD. Has walker and cane      Initial Evaluation  Date:  2024  Treatment      Short Term/ Long Term   Goals   Was pt agreeable to Eval/treatment?  Yes      Does pt have pain?  Manageable  R knee pain      Bed Mobility  Rolling:  NT   Supine to sit: S/ SBA   Sit to supine:  NT   Scooting:  independent in sit    Independent    Transfers Sit to stand:  Supervision to CGA , depending on surface   Stand to sit:  SBA   Stand pivot:  NT    S/I    Ambulation    200  feet  x 2 with  ww  with  S/SBA    200 feet with   ww  with  S/I        Stair negotiation: ascended and descended 4  steps

## 2024-01-31 NOTE — CONSULTS
Internal Medicine Consultation    Name: Mignon Fischer  : 1955  Primary Care Physician: Sandy Leiva MD  Admission date: 2024  Date of service: 2024   Reason for consultation: Post-op medical management    History of Present Illness  Mignon is a 68 y.o. female who presents for a right knee replacement. The surgery was 24. It well and there were no reported complications. The patient pain is controlled. The patient has worked with physical therapy. The plan is for the patient to go home with [facility]. The patient is using the incentive spirometer. She has no other complaints at this time including no abdominal pain, nausea, vomiting, chest pain, dyspnea.     There are no family or friends at bedside. The history is provided by the patient. She is felt to be a good historian.    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)    Vitreous floaters of both eyes        Past Surgical History:   Procedure Laterality Date    APPENDECTOMY      CHOLECYSTECTOMY      COLONOSCOPY  2016    EYE SURGERY      EYE SURGERY      GALLBLADDER SURGERY      KNEE ARTHROSCOPY Right 2008    UPPER GASTROINTESTINAL ENDOSCOPY  10/2016       Family History  Family History   Problem Relation Age of Onset    Heart Disease Mother         silent MI     Diabetes Mother     Heart Disease Father         3 coronary stents    Cancer Sister         breast     Hypertension Brother     Cancer Sister         breast         Social History  Patient lives at home with alone but her brother will help her.   Employment: none  Illicit drug use- denies  TOBACCO:   reports that she has never smoked. She has never used smokeless tobacco.  ETOH:   reports no

## 2024-02-03 NOTE — ANESTHESIA PROCEDURE NOTES
Peripheral Block    Patient location during procedure: procedure area  Reason for block: post-op pain management and at surgeon's request  Start time: 1/30/2024 1:42 PM  End time: 1/30/2024 1:50 PM  Staffing  Performed: anesthesiologist   Anesthesiologist: Dianne Lemus DO  Performed by: Dianne Lemus DO  Authorized by: Dianne Lemus DO    Preanesthetic Checklist  Completed: patient identified, IV checked, site marked, risks and benefits discussed, surgical/procedural consents, equipment checked, pre-op evaluation, timeout performed, anesthesia consent given, oxygen available and monitors applied/VS acknowledged  Peripheral Block   Patient position: supine  Prep: ChloraPrep  Provider prep: mask and sterile gloves  Patient monitoring: cardiac monitor, frequent blood pressure checks and IV access  Block type: Femoral  Adductor canal  Laterality: right  Injection technique: single-shot  Guidance: ultrasound guided  Local infiltration: ropivacaine  Infiltration strength: 0.5 %  Local infiltration: ropivacaine  Dose: 30 mL    Needle   Needle type: combined needle/nerve stimulator   Needle gauge: 22 G  Needle localization: ultrasound guidance  Needle length: 10 cm  Assessment   Injection assessment: negative aspiration for heme, no paresthesia on injection, local visualized surrounding nerve on ultrasound and no intravascular symptoms  Paresthesia pain: none  Slow fractionated injection: yes  Hemodynamics: stable  Real-time US image taken/store: yes  Outcomes: uncomplicated and patient tolerated procedure well

## 2024-02-03 NOTE — ADDENDUM NOTE
Addendum  created 02/03/24 1427 by Dianne Lemus DO    Child order released for a procedure order, Clinical Note Signed, Intraprocedure Blocks edited, SmartForm saved

## 2024-02-05 LAB — SURGICAL PATHOLOGY REPORT: NORMAL

## 2024-04-16 ENCOUNTER — APPOINTMENT (OUTPATIENT)
Dept: GENERAL RADIOLOGY | Age: 69
End: 2024-04-16
Payer: MEDICARE

## 2024-04-16 ENCOUNTER — HOSPITAL ENCOUNTER (EMERGENCY)
Age: 69
Discharge: HOME | End: 2024-04-16
Attending: EMERGENCY MEDICINE
Payer: MEDICARE

## 2024-04-16 ENCOUNTER — APPOINTMENT (OUTPATIENT)
Dept: CT IMAGING | Age: 69
End: 2024-04-16
Payer: MEDICARE

## 2024-04-16 VITALS
SYSTOLIC BLOOD PRESSURE: 120 MMHG | HEART RATE: 69 BPM | DIASTOLIC BLOOD PRESSURE: 80 MMHG | OXYGEN SATURATION: 97 % | WEIGHT: 228 LBS | RESPIRATION RATE: 16 BRPM | TEMPERATURE: 98.2 F | HEIGHT: 63 IN | BODY MASS INDEX: 40.4 KG/M2

## 2024-04-16 DIAGNOSIS — M25.512 ACUTE PAIN OF LEFT SHOULDER: ICD-10-CM
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
## 2024-04-16 DIAGNOSIS — S80.02XA CONTUSION OF LEFT KNEE, INITIAL ENCOUNTER: ICD-10-CM

## 2024-04-16 LAB
ALBUMIN SERPL-MCNC: 4.4 G/DL (ref 3.5–5.2)
ALP SERPL-CCNC: 84 U/L (ref 35–104)
ALT SERPL-CCNC: 17 U/L (ref 0–32)
ANION GAP SERPL CALCULATED.3IONS-SCNC: 13 MMOL/L (ref 7–16)
AST SERPL-CCNC: 23 U/L (ref 0–31)
BASOPHILS # BLD: 0.02 K/UL (ref 0–0.2)
BASOPHILS NFR BLD: 0 % (ref 0–2)
BILIRUB SERPL-MCNC: 0.4 MG/DL (ref 0–1.2)
BUN SERPL-MCNC: 28 MG/DL (ref 6–23)
CALCIUM SERPL-MCNC: 9.7 MG/DL (ref 8.6–10.2)
CHLORIDE SERPL-SCNC: 98 MMOL/L (ref 98–107)
CO2 SERPL-SCNC: 27 MMOL/L (ref 22–29)
CREAT SERPL-MCNC: 1 MG/DL (ref 0.5–1)
EKG ATRIAL RATE: 83 BPM
EKG P AXIS: 59 DEGREES
EKG P-R INTERVAL: 170 MS
EKG Q-T INTERVAL: 378 MS
EKG QRS DURATION: 78 MS
EKG QTC CALCULATION (BAZETT): 444 MS
EKG R AXIS: -6 DEGREES
EKG T AXIS: 15 DEGREES
EKG VENTRICULAR RATE: 83 BPM
EOSINOPHIL # BLD: 0.02 K/UL (ref 0.05–0.5)
EOSINOPHILS RELATIVE PERCENT: 0 % (ref 0–6)
ERYTHROCYTE [DISTWIDTH] IN BLOOD BY AUTOMATED COUNT: 13 % (ref 11.5–15)
GFR SERPL CREATININE-BSD FRML MDRD: 62 ML/MIN/1.73M2
GLUCOSE SERPL-MCNC: 169 MG/DL (ref 74–99)
HCT VFR BLD AUTO: 38.8 % (ref 34–48)
HGB BLD-MCNC: 12.8 G/DL (ref 11.5–15.5)
IMM GRANULOCYTES # BLD AUTO: 0.05 K/UL (ref 0–0.58)
IMM GRANULOCYTES NFR BLD: 1 % (ref 0–5)
LYMPHOCYTES NFR BLD: 1.29 K/UL (ref 1.5–4)
LYMPHOCYTES RELATIVE PERCENT: 13 % (ref 20–42)
MCH RBC QN AUTO: 30.1 PG (ref 26–35)
MCHC RBC AUTO-ENTMCNC: 33 G/DL (ref 32–34.5)
MCV RBC AUTO: 91.3 FL (ref 80–99.9)
MONOCYTES NFR BLD: 0.56 K/UL (ref 0.1–0.95)
MONOCYTES NFR BLD: 5 % (ref 2–12)
NEUTROPHILS NFR BLD: 81 % (ref 43–80)
NEUTS SEG NFR BLD: 8.38 K/UL (ref 1.8–7.3)
PLATELET # BLD AUTO: 286 K/UL (ref 130–450)
PMV BLD AUTO: 8.9 FL (ref 7–12)
POTASSIUM SERPL-SCNC: 4.2 MMOL/L (ref 3.5–5)
PROT SERPL-MCNC: 7.7 G/DL (ref 6.4–8.3)
RBC # BLD AUTO: 4.25 M/UL (ref 3.5–5.5)
SODIUM SERPL-SCNC: 138 MMOL/L (ref 132–146)
TROPONIN I SERPL HS-MCNC: 12 NG/L (ref 0–9)
TROPONIN I SERPL HS-MCNC: 12 NG/L (ref 0–9)
WBC OTHER # BLD: 10.3 K/UL (ref 4.5–11.5)

## 2024-04-16 PROCEDURE — 93005 ELECTROCARDIOGRAM TRACING: CPT | Performed by: STUDENT IN AN ORGANIZED HEALTH CARE EDUCATION/TRAINING PROGRAM

## 2024-04-16 PROCEDURE — 85025 COMPLETE CBC W/AUTO DIFF WBC: CPT

## 2024-04-16 PROCEDURE — 72125 CT NECK SPINE W/O DYE: CPT

## 2024-04-16 PROCEDURE — 80053 COMPREHEN METABOLIC PANEL: CPT

## 2024-04-16 PROCEDURE — 93010 ELECTROCARDIOGRAM REPORT: CPT | Performed by: INTERNAL MEDICINE

## 2024-04-16 PROCEDURE — 96374 THER/PROPH/DIAG INJ IV PUSH: CPT

## 2024-04-16 PROCEDURE — 6360000002 HC RX W HCPCS: Performed by: EMERGENCY MEDICINE

## 2024-04-16 PROCEDURE — 73030 X-RAY EXAM OF SHOULDER: CPT

## 2024-04-16 PROCEDURE — 73562 X-RAY EXAM OF KNEE 3: CPT

## 2024-04-16 PROCEDURE — 70450 CT HEAD/BRAIN W/O DYE: CPT

## 2024-04-16 PROCEDURE — 71045 X-RAY EXAM CHEST 1 VIEW: CPT

## 2024-04-16 PROCEDURE — 84484 ASSAY OF TROPONIN QUANT: CPT

## 2024-04-16 PROCEDURE — 6360000002 HC RX W HCPCS: Performed by: STUDENT IN AN ORGANIZED HEALTH CARE EDUCATION/TRAINING PROGRAM

## 2024-04-16 PROCEDURE — 99285 EMERGENCY DEPT VISIT HI MDM: CPT

## 2024-04-16 PROCEDURE — 96376 TX/PRO/DX INJ SAME DRUG ADON: CPT

## 2024-04-16 PROCEDURE — 2580000003 HC RX 258: Performed by: STUDENT IN AN ORGANIZED HEALTH CARE EDUCATION/TRAINING PROGRAM

## 2024-04-16 RX ORDER — MORPHINE SULFATE 2 MG/ML
2 INJECTION, SOLUTION INTRAMUSCULAR; INTRAVENOUS ONCE
Status: COMPLETED | OUTPATIENT
Start: 2024-04-16 | End: 2024-04-16

## 2024-04-16 RX ORDER — 0.9 % SODIUM CHLORIDE 0.9 %
1000 INTRAVENOUS SOLUTION INTRAVENOUS ONCE
Status: COMPLETED | OUTPATIENT
Start: 2024-04-16 | End: 2024-04-16

## 2024-04-16 RX ORDER — ONDANSETRON 2 MG/ML
4 INJECTION INTRAMUSCULAR; INTRAVENOUS PRN
Status: DISCONTINUED | OUTPATIENT
Start: 2024-04-16 | End: 2024-04-16 | Stop reason: HOSPADM

## 2024-04-16 RX ADMIN — MORPHINE SULFATE 2 MG: 2 INJECTION, SOLUTION INTRAMUSCULAR; INTRAVENOUS at 13:07

## 2024-04-16 RX ADMIN — SODIUM CHLORIDE 1000 ML: 9 INJECTION, SOLUTION INTRAVENOUS at 13:09

## 2024-04-16 RX ADMIN — MORPHINE SULFATE 2 MG: 2 INJECTION, SOLUTION INTRAMUSCULAR; INTRAVENOUS at 15:13

## 2024-04-16 ASSESSMENT — LIFESTYLE VARIABLES
HOW OFTEN DO YOU HAVE A DRINK CONTAINING ALCOHOL: NEVER
HOW MANY STANDARD DRINKS CONTAINING ALCOHOL DO YOU HAVE ON A TYPICAL DAY: PATIENT DOES NOT DRINK

## 2024-04-16 ASSESSMENT — PAIN SCALES - GENERAL
PAINLEVEL_OUTOF10: 8
PAINLEVEL_OUTOF10: 8
PAINLEVEL_OUTOF10: 3

## 2024-04-16 ASSESSMENT — PAIN DESCRIPTION - LOCATION: LOCATION: KNEE

## 2024-04-16 ASSESSMENT — PAIN DESCRIPTION - ORIENTATION: ORIENTATION: LEFT

## 2024-04-16 ASSESSMENT — PAIN DESCRIPTION - DESCRIPTORS: DESCRIPTORS: ACHING

## 2024-04-16 NOTE — ED PROVIDER NOTES
[facility] EMERGENCY DEPARTMENT  EMERGENCY DEPARTMENT ENCOUNTER      Pt Name: Mignon Fischer  MRN: [id number]  Birthdate 1955  Date of evaluation: 4/16/2024  Provider: Theron Grimes DO  PCP: Sandy Leiva MD  Note Started: 11:57 AM EDT 4/16/24    CHIEF COMPLAINT       Chief Complaint   Patient presents with    Fall     Reports dizzy spells ongoing for a few weeks, fell today, hit head, -thinners, -LOC    Shoulder Pain     Left shoulder pain from fall    Knee Pain     Left knee pain, ongoing for weeks but worse after the fall        HISTORY OF PRESENT ILLNESS: 1 or more Elements   History From: Patient  Limitations to history : None    Mignon Fischer is a 68 y.o. female who presents to the ED due to a fall.  Patient states that she has been dealing with intermittent lightheadedness over the past several weeks which has been attempting to be managed by her primary care physiatrist.  She says that she became lightheaded today and fell to the ground.  She denies any loss of consciousness during this incident.  She states that she did hit the front of her head and her bilateral knees.  She endorses some pain to her left shoulder as well.  She is neurovascular intact in all 4 extremities and has no focal deficits on examination.  Denies any chest pain or shortness of breath.  She is alert and oriented x 4.    Nursing Notes were all reviewed and agreed with or any disagreements were addressed in the HPI.    REVIEW OF SYSTEMS :    Positives and Pertinent negatives as per HPI.     PAST MEDICAL HISTORY/Chronic Conditions Affecting Care    has a past medical history of Brain concussion, Diabetes mellitus (HCC), Double vision with both eyes open, Dyspnea on exertion, blood clots, Hyperlipemia, Hypertension, Knee pain, Lymphedema, Pancreatitis (07/20/1999), and Vitreous floaters of both eyes.     SURGICAL HISTORY     Past Surgical History:   Procedure Laterality Date

## 2024-04-16 NOTE — ED NOTES
Patient presents to ED via EMS for a fall that happened today, reports ongoing dizziness for several weeks. She states she did hit her head today, denies LOC or thinners, reports left shoulder pain and bilateral knee pain from the fall. Patient denies chest pain, SOB, fevers/chills at this time. A&ox4.

## 2024-05-15 ENCOUNTER — APPOINTMENT (OUTPATIENT)
Dept: CT IMAGING | Age: 69
End: 2024-05-15
Payer: MEDICARE

## 2024-05-15 ENCOUNTER — HOSPITAL ENCOUNTER (EMERGENCY)
Age: 69
Discharge: ANOTHER HOSPITAL | End: 2024-05-17
Attending: EMERGENCY MEDICINE
Payer: MEDICARE

## 2024-05-15 ENCOUNTER — APPOINTMENT (OUTPATIENT)
Dept: GENERAL RADIOLOGY | Age: 69
End: 2024-05-15
Payer: MEDICARE

## 2024-05-15 ENCOUNTER — APPOINTMENT (OUTPATIENT)
Dept: MRI IMAGING | Age: 69
End: 2024-05-15
Payer: MEDICARE

## 2024-05-15 DIAGNOSIS — R26.2 UNABLE TO AMBULATE: ICD-10-CM

## 2024-05-15 DIAGNOSIS — R53.1 GENERAL WEAKNESS: Primary | ICD-10-CM

## 2024-05-15 DIAGNOSIS — R25.3 FASCICULATIONS OF MUSCLE: ICD-10-CM

## 2024-05-15 PROBLEM — R29.898 LOWER EXTREMITY WEAKNESS: Status: ACTIVE | Noted: 2024-05-15

## 2024-05-15 LAB
ALBUMIN SERPL-MCNC: 4.2 G/DL (ref 3.5–5.2)
ALP SERPL-CCNC: 87 U/L (ref 35–104)
ALT SERPL-CCNC: 20 U/L (ref 0–32)
ANION GAP SERPL CALCULATED.3IONS-SCNC: 11 MMOL/L (ref 7–16)
AST SERPL-CCNC: 20 U/L (ref 0–31)
BACTERIA URNS QL MICRO: ABNORMAL
BASOPHILS # BLD: 0.03 K/UL (ref 0–0.2)
BASOPHILS NFR BLD: 0 % (ref 0–2)
BILIRUB SERPL-MCNC: 0.3 MG/DL (ref 0–1.2)
BILIRUB UR QL STRIP: NEGATIVE
BUN SERPL-MCNC: 23 MG/DL (ref 6–23)
CALCIUM SERPL-MCNC: 9.5 MG/DL (ref 8.6–10.2)
CHLORIDE SERPL-SCNC: 101 MMOL/L (ref 98–107)
CK SERPL-CCNC: 119 U/L (ref 20–180)
CLARITY UR: CLEAR
CO2 SERPL-SCNC: 30 MMOL/L (ref 22–29)
COLOR UR: YELLOW
CREAT SERPL-MCNC: 1.2 MG/DL (ref 0.5–1)
EOSINOPHIL # BLD: 0.11 K/UL (ref 0.05–0.5)
EOSINOPHILS RELATIVE PERCENT: 2 % (ref 0–6)
ERYTHROCYTE [DISTWIDTH] IN BLOOD BY AUTOMATED COUNT: 13.3 % (ref 11.5–15)
GFR, ESTIMATED: 49 ML/MIN/1.73M2
GLUCOSE SERPL-MCNC: 136 MG/DL (ref 74–99)
GLUCOSE UR STRIP-MCNC: NEGATIVE MG/DL
HCT VFR BLD AUTO: 38.7 % (ref 34–48)
HGB BLD-MCNC: 12.6 G/DL (ref 11.5–15.5)
HGB UR QL STRIP.AUTO: NEGATIVE
IMM GRANULOCYTES # BLD AUTO: 0.03 K/UL (ref 0–0.58)
IMM GRANULOCYTES NFR BLD: 0 % (ref 0–5)
INR PPP: 1
KETONES UR STRIP-MCNC: NEGATIVE MG/DL
LACTATE BLDV-SCNC: 1.9 MMOL/L (ref 0.5–2.2)
LEUKOCYTE ESTERASE UR QL STRIP: ABNORMAL
LIPASE SERPL-CCNC: 16 U/L (ref 13–60)
LYMPHOCYTES NFR BLD: 1.86 K/UL (ref 1.5–4)
LYMPHOCYTES RELATIVE PERCENT: 28 % (ref 20–42)
MCH RBC QN AUTO: 30.2 PG (ref 26–35)
MCHC RBC AUTO-ENTMCNC: 32.6 G/DL (ref 32–34.5)
MCV RBC AUTO: 92.8 FL (ref 80–99.9)
MONOCYTES NFR BLD: 0.65 K/UL (ref 0.1–0.95)
MONOCYTES NFR BLD: 10 % (ref 2–12)
NEUTROPHILS NFR BLD: 60 % (ref 43–80)
NEUTS SEG NFR BLD: 4.02 K/UL (ref 1.8–7.3)
NITRITE UR QL STRIP: NEGATIVE
PH UR STRIP: 6 [PH] (ref 5–9)
PLATELET # BLD AUTO: 265 K/UL (ref 130–450)
PMV BLD AUTO: 9.1 FL (ref 7–12)
POTASSIUM SERPL-SCNC: 4.3 MMOL/L (ref 3.5–5)
PROT SERPL-MCNC: 7.2 G/DL (ref 6.4–8.3)
PROT UR STRIP-MCNC: NEGATIVE MG/DL
PROTHROMBIN TIME: 11 SEC (ref 9.3–12.4)
RBC # BLD AUTO: 4.17 M/UL (ref 3.5–5.5)
RBC #/AREA URNS HPF: ABNORMAL /HPF
SARS-COV-2 RDRP RESP QL NAA+PROBE: NOT DETECTED
SODIUM SERPL-SCNC: 142 MMOL/L (ref 132–146)
SP GR UR STRIP: 1.01 (ref 1–1.03)
SPECIMEN DESCRIPTION: NORMAL
TROPONIN I SERPL HS-MCNC: 14 NG/L (ref 0–9)
UROBILINOGEN UR STRIP-ACNC: 0.2 EU/DL (ref 0–1)
WBC #/AREA URNS HPF: ABNORMAL /HPF
WBC OTHER # BLD: 6.7 K/UL (ref 4.5–11.5)

## 2024-05-15 PROCEDURE — 87088 URINE BACTERIA CULTURE: CPT

## 2024-05-15 PROCEDURE — 99285 EMERGENCY DEPT VISIT HI MDM: CPT

## 2024-05-15 PROCEDURE — 73521 X-RAY EXAM HIPS BI 2 VIEWS: CPT

## 2024-05-15 PROCEDURE — 70553 MRI BRAIN STEM W/O & W/DYE: CPT

## 2024-05-15 PROCEDURE — 93005 ELECTROCARDIOGRAM TRACING: CPT | Performed by: EMERGENCY MEDICINE

## 2024-05-15 PROCEDURE — 73564 X-RAY EXAM KNEE 4 OR MORE: CPT

## 2024-05-15 PROCEDURE — 87086 URINE CULTURE/COLONY COUNT: CPT

## 2024-05-15 PROCEDURE — 80053 COMPREHEN METABOLIC PANEL: CPT

## 2024-05-15 PROCEDURE — 81001 URINALYSIS AUTO W/SCOPE: CPT

## 2024-05-15 PROCEDURE — 72131 CT LUMBAR SPINE W/O DYE: CPT

## 2024-05-15 PROCEDURE — 84484 ASSAY OF TROPONIN QUANT: CPT

## 2024-05-15 PROCEDURE — 83605 ASSAY OF LACTIC ACID: CPT

## 2024-05-15 PROCEDURE — 71045 X-RAY EXAM CHEST 1 VIEW: CPT

## 2024-05-15 PROCEDURE — 85025 COMPLETE CBC W/AUTO DIFF WBC: CPT

## 2024-05-15 PROCEDURE — 83690 ASSAY OF LIPASE: CPT

## 2024-05-15 PROCEDURE — 51701 INSERT BLADDER CATHETER: CPT

## 2024-05-15 PROCEDURE — 72158 MRI LUMBAR SPINE W/O & W/DYE: CPT

## 2024-05-15 PROCEDURE — A9579 GAD-BASE MR CONTRAST NOS,1ML: HCPCS | Performed by: RADIOLOGY

## 2024-05-15 PROCEDURE — 6360000004 HC RX CONTRAST MEDICATION: Performed by: RADIOLOGY

## 2024-05-15 PROCEDURE — 82550 ASSAY OF CK (CPK): CPT

## 2024-05-15 PROCEDURE — 72125 CT NECK SPINE W/O DYE: CPT

## 2024-05-15 PROCEDURE — 85610 PROTHROMBIN TIME: CPT

## 2024-05-15 PROCEDURE — 70450 CT HEAD/BRAIN W/O DYE: CPT

## 2024-05-15 PROCEDURE — 87635 SARS-COV-2 COVID-19 AMP PRB: CPT

## 2024-05-15 RX ORDER — TRAMADOL HYDROCHLORIDE 50 MG/1
50 TABLET ORAL 2 TIMES DAILY PRN
Status: CANCELLED | OUTPATIENT
Start: 2024-05-15

## 2024-05-15 RX ORDER — ROSUVASTATIN CALCIUM 20 MG/1
20 TABLET, COATED ORAL DAILY
Status: CANCELLED | OUTPATIENT
Start: 2024-05-16

## 2024-05-15 RX ORDER — DEXTROSE MONOHYDRATE 100 MG/ML
INJECTION, SOLUTION INTRAVENOUS CONTINUOUS PRN
Status: CANCELLED | OUTPATIENT
Start: 2024-05-15

## 2024-05-15 RX ORDER — LOSARTAN POTASSIUM 50 MG/1
50 TABLET ORAL DAILY
Status: CANCELLED | OUTPATIENT
Start: 2024-05-16

## 2024-05-15 RX ORDER — POTASSIUM CHLORIDE 20 MEQ/1
40 TABLET, EXTENDED RELEASE ORAL PRN
Status: CANCELLED | OUTPATIENT
Start: 2024-05-15

## 2024-05-15 RX ORDER — METOPROLOL SUCCINATE 25 MG/1
25 TABLET, EXTENDED RELEASE ORAL DAILY
Status: CANCELLED | OUTPATIENT
Start: 2024-05-16

## 2024-05-15 RX ORDER — SODIUM CHLORIDE 0.9 % (FLUSH) 0.9 %
10 SYRINGE (ML) INJECTION EVERY 12 HOURS SCHEDULED
Status: CANCELLED | OUTPATIENT
Start: 2024-05-15

## 2024-05-15 RX ORDER — MAGNESIUM SULFATE IN WATER 40 MG/ML
2000 INJECTION, SOLUTION INTRAVENOUS PRN
Status: CANCELLED | OUTPATIENT
Start: 2024-05-15

## 2024-05-15 RX ORDER — CYCLOBENZAPRINE HCL 10 MG
10 TABLET ORAL 3 TIMES DAILY PRN
Status: ON HOLD | COMMUNITY

## 2024-05-15 RX ORDER — TRAMADOL HYDROCHLORIDE 50 MG/1
50 TABLET ORAL 2 TIMES DAILY PRN
Status: ON HOLD | COMMUNITY

## 2024-05-15 RX ORDER — TORSEMIDE 20 MG/1
20 TABLET ORAL DAILY
Status: CANCELLED | OUTPATIENT
Start: 2024-05-16

## 2024-05-15 RX ORDER — SENNOSIDES A AND B 8.6 MG/1
1 TABLET, FILM COATED ORAL DAILY PRN
Status: CANCELLED | OUTPATIENT
Start: 2024-05-15

## 2024-05-15 RX ORDER — GLUCAGON 1 MG/ML
1 KIT INJECTION PRN
Status: CANCELLED | OUTPATIENT
Start: 2024-05-15

## 2024-05-15 RX ORDER — SPIRONOLACTONE 25 MG/1
25 TABLET ORAL DAILY
Status: CANCELLED | OUTPATIENT
Start: 2024-05-16

## 2024-05-15 RX ORDER — SUMATRIPTAN 50 MG/1
100 TABLET, FILM COATED ORAL ONCE
Status: CANCELLED | OUTPATIENT
Start: 2024-05-15 | End: 2024-05-15

## 2024-05-15 RX ORDER — INSULIN LISPRO 100 [IU]/ML
0-8 INJECTION, SOLUTION INTRAVENOUS; SUBCUTANEOUS
Status: CANCELLED | OUTPATIENT
Start: 2024-05-16

## 2024-05-15 RX ORDER — ALBUTEROL SULFATE 2.5 MG/3ML
2.5 SOLUTION RESPIRATORY (INHALATION) EVERY 4 HOURS PRN
Status: CANCELLED | OUTPATIENT
Start: 2024-05-15

## 2024-05-15 RX ORDER — SODIUM CHLORIDE 9 MG/ML
INJECTION, SOLUTION INTRAVENOUS PRN
Status: CANCELLED | OUTPATIENT
Start: 2024-05-15

## 2024-05-15 RX ORDER — PREGABALIN 75 MG/1
75 CAPSULE ORAL 2 TIMES DAILY
Status: CANCELLED | OUTPATIENT
Start: 2024-05-15

## 2024-05-15 RX ORDER — SODIUM CHLORIDE 0.9 % (FLUSH) 0.9 %
10 SYRINGE (ML) INJECTION PRN
Status: CANCELLED | OUTPATIENT
Start: 2024-05-15

## 2024-05-15 RX ORDER — ACETAMINOPHEN 325 MG/1
650 TABLET ORAL EVERY 6 HOURS PRN
Status: CANCELLED | OUTPATIENT
Start: 2024-05-15

## 2024-05-15 RX ORDER — POTASSIUM CHLORIDE 7.45 MG/ML
10 INJECTION INTRAVENOUS PRN
Status: CANCELLED | OUTPATIENT
Start: 2024-05-15

## 2024-05-15 RX ORDER — ENOXAPARIN SODIUM 100 MG/ML
30 INJECTION SUBCUTANEOUS 2 TIMES DAILY
Status: CANCELLED | OUTPATIENT
Start: 2024-05-15

## 2024-05-15 RX ORDER — ONDANSETRON 2 MG/ML
4 INJECTION INTRAMUSCULAR; INTRAVENOUS EVERY 6 HOURS PRN
Status: CANCELLED | OUTPATIENT
Start: 2024-05-15

## 2024-05-15 RX ORDER — CYCLOBENZAPRINE HCL 10 MG
10 TABLET ORAL 3 TIMES DAILY PRN
Status: CANCELLED | OUTPATIENT
Start: 2024-05-15

## 2024-05-15 RX ORDER — ACETAMINOPHEN 650 MG/1
650 SUPPOSITORY RECTAL EVERY 6 HOURS PRN
Status: CANCELLED | OUTPATIENT
Start: 2024-05-15

## 2024-05-15 RX ORDER — ASPIRIN 81 MG/1
81 TABLET ORAL 2 TIMES DAILY
Status: CANCELLED | OUTPATIENT
Start: 2024-05-15

## 2024-05-15 RX ORDER — ONDANSETRON 4 MG/1
4 TABLET, ORALLY DISINTEGRATING ORAL EVERY 8 HOURS PRN
Status: CANCELLED | OUTPATIENT
Start: 2024-05-15

## 2024-05-15 RX ORDER — INSULIN LISPRO 100 [IU]/ML
0-4 INJECTION, SOLUTION INTRAVENOUS; SUBCUTANEOUS NIGHTLY
Status: CANCELLED | OUTPATIENT
Start: 2024-05-15

## 2024-05-15 RX ORDER — PREGABALIN 75 MG/1
75 CAPSULE ORAL 2 TIMES DAILY
Status: ON HOLD | COMMUNITY

## 2024-05-15 RX ADMIN — GADOTERIDOL 20 ML: 279.3 INJECTION, SOLUTION INTRAVENOUS at 23:17

## 2024-05-15 ASSESSMENT — LIFESTYLE VARIABLES
HOW MANY STANDARD DRINKS CONTAINING ALCOHOL DO YOU HAVE ON A TYPICAL DAY: PATIENT DOES NOT DRINK
HOW OFTEN DO YOU HAVE A DRINK CONTAINING ALCOHOL: NEVER

## 2024-05-15 ASSESSMENT — ENCOUNTER SYMPTOMS: BACK PAIN: 1

## 2024-05-15 NOTE — ED PROVIDER NOTES
are moist.      Pharynx: Oropharynx is clear. No posterior oropharyngeal erythema.   Eyes:      Extraocular Movements: Extraocular movements intact.      Pupils: Pupils are equal, round, and reactive to light.   Cardiovascular:      Rate and Rhythm: Normal rate and regular rhythm.      Pulses: Normal pulses.      Heart sounds: No murmur heard.  Pulmonary:      Effort: Pulmonary effort is normal.      Breath sounds: No wheezing or rhonchi.   Chest:      Chest wall: No tenderness.   Abdominal:      General: Bowel sounds are normal.      Tenderness: There is no abdominal tenderness. There is no right CVA tenderness, left CVA tenderness or guarding.   Musculoskeletal:         General: No swelling or deformity.      Cervical back: Normal range of motion and neck supple. No muscular tenderness.   Skin:     General: Skin is warm and dry.      Capillary Refill: Capillary refill takes less than 2 seconds.   Neurological:      General: No focal deficit present.      Mental Status: She is alert and oriented to person, place, and time.      Motor: No weakness.      Comments: B/l 2/5 weakness lower leg reflexes decreased   Psychiatric:         Mood and Affect: Mood normal.             DIAGNOSTIC RESULTS   LABS:    Labs Reviewed   COMPREHENSIVE METABOLIC PANEL - Abnormal; Notable for the following components:       Result Value    CO2 30 (*)     Glucose 136 (*)     Creatinine 1.2 (*)     Est, Glom Filt Rate 49 (*)     All other components within normal limits   TROPONIN - Abnormal; Notable for the following components:    Troponin, High Sensitivity 14 (*)     All other components within normal limits   URINALYSIS WITH MICROSCOPIC - Abnormal; Notable for the following components:    Leukocyte Esterase, Urine SMALL (*)     WBC, UA 6 TO 9 (*)     RBC, UA 3 to 5 (*)     Bacteria, UA 1+ (*)     All other components within normal limits   COVID-19, RAPID   CULTURE, URINE   CBC WITH AUTO DIFFERENTIAL   LIPASE   LACTIC ACID   PROTIME-INR

## 2024-05-15 NOTE — ED TRIAGE NOTES
Department of Emergency Medicine    FIRST PROVIDER TRIAGE NOTE             Independent MLP           5/15/24  3:50 PM EDT    Date of Encounter: 5/15/24   MRN: [id number]    Vitals:    05/15/24 1545   BP: (!) 108/43   Pulse: 72   Resp: 16   Temp: 98.1 °F (36.7 °C)   TempSrc: Oral   SpO2: 100%      HPI: Mignon Fischer is a 68 y.o. female who presents to the ED for Extremity Weakness (Weakness x1 week, unable to ambulate and per home physical therapy said to come in) and Fall (Denies hitting head, on baby aspirin)     ROS: Negative for cp, sob, or headache.    Physical Exam:   Gen Appearance/Constitutional: alert  CV: regular rate     Initial Plan of Care: All treatment areas with department are currently occupied.     Plan to order/Initiate the following while awaiting opening in ED: Triage evaluation.    Provider-Patient relationship only established for Provider In Triage (PIT).  Full assessment, HPI and examination not performed, therefore, it is not yet possible to state whether or not an emergency medical condition exists    Initial Plan of Care: Initiate Treatment-Testing, Proceed toTreatment Area When Bed Available for ED Attending/MLP to Continue Care  Secondary to high volume, low staffing, and/or boarding- patient to await bed availability.    This ends my PIT-Patient relationship.  Care of patient relinquished after triage    Electronically signed by Maggie Valadez PA-C   DD: 5/15/24

## 2024-05-16 LAB
ANION GAP SERPL CALCULATED.3IONS-SCNC: 9 MMOL/L (ref 7–16)
BASOPHILS # BLD: 0.03 K/UL (ref 0–0.2)
BASOPHILS NFR BLD: 0 % (ref 0–2)
BUN SERPL-MCNC: 21 MG/DL (ref 6–23)
CALCIUM SERPL-MCNC: 9.5 MG/DL (ref 8.6–10.2)
CHLORIDE SERPL-SCNC: 103 MMOL/L (ref 98–107)
CO2 SERPL-SCNC: 31 MMOL/L (ref 22–29)
CREAT SERPL-MCNC: 0.9 MG/DL (ref 0.5–1)
EKG ATRIAL RATE: 68 BPM
EKG P AXIS: 27 DEGREES
EKG P-R INTERVAL: 162 MS
EKG Q-T INTERVAL: 384 MS
EKG QRS DURATION: 82 MS
EKG QTC CALCULATION (BAZETT): 408 MS
EKG R AXIS: -3 DEGREES
EKG T AXIS: 18 DEGREES
EKG VENTRICULAR RATE: 68 BPM
EOSINOPHIL # BLD: 0.18 K/UL (ref 0.05–0.5)
EOSINOPHILS RELATIVE PERCENT: 3 % (ref 0–6)
ERYTHROCYTE [DISTWIDTH] IN BLOOD BY AUTOMATED COUNT: 13.4 % (ref 11.5–15)
GFR, ESTIMATED: 68 ML/MIN/1.73M2
GLUCOSE BLD-MCNC: 102 MG/DL (ref 74–99)
GLUCOSE SERPL-MCNC: 103 MG/DL (ref 74–99)
HBA1C MFR BLD: 7.2 % (ref 4–5.6)
HCT VFR BLD AUTO: 36.1 % (ref 34–48)
HGB BLD-MCNC: 11.7 G/DL (ref 11.5–15.5)
IMM GRANULOCYTES # BLD AUTO: 0.06 K/UL (ref 0–0.58)
IMM GRANULOCYTES NFR BLD: 1 % (ref 0–5)
LYMPHOCYTES NFR BLD: 2.24 K/UL (ref 1.5–4)
LYMPHOCYTES RELATIVE PERCENT: 32 % (ref 20–42)
MCH RBC QN AUTO: 30.2 PG (ref 26–35)
MCHC RBC AUTO-ENTMCNC: 32.4 G/DL (ref 32–34.5)
MCV RBC AUTO: 93.3 FL (ref 80–99.9)
MONOCYTES NFR BLD: 0.66 K/UL (ref 0.1–0.95)
MONOCYTES NFR BLD: 10 % (ref 2–12)
NEUTROPHILS NFR BLD: 54 % (ref 43–80)
NEUTS SEG NFR BLD: 3.79 K/UL (ref 1.8–7.3)
PLATELET # BLD AUTO: 241 K/UL (ref 130–450)
PMV BLD AUTO: 9.4 FL (ref 7–12)
POTASSIUM SERPL-SCNC: 4.2 MMOL/L (ref 3.5–5)
RBC # BLD AUTO: 3.87 M/UL (ref 3.5–5.5)
SODIUM SERPL-SCNC: 143 MMOL/L (ref 132–146)
WBC OTHER # BLD: 7 K/UL (ref 4.5–11.5)

## 2024-05-16 PROCEDURE — 83036 HEMOGLOBIN GLYCOSYLATED A1C: CPT

## 2024-05-16 PROCEDURE — 82962 GLUCOSE BLOOD TEST: CPT

## 2024-05-16 PROCEDURE — 85025 COMPLETE CBC W/AUTO DIFF WBC: CPT

## 2024-05-16 PROCEDURE — 6370000000 HC RX 637 (ALT 250 FOR IP): Performed by: STUDENT IN AN ORGANIZED HEALTH CARE EDUCATION/TRAINING PROGRAM

## 2024-05-16 PROCEDURE — 80048 BASIC METABOLIC PNL TOTAL CA: CPT

## 2024-05-16 PROCEDURE — 93010 ELECTROCARDIOGRAM REPORT: CPT | Performed by: INTERNAL MEDICINE

## 2024-05-16 RX ORDER — SPIRONOLACTONE 25 MG/1
25 TABLET ORAL NIGHTLY
Status: ON HOLD | COMMUNITY

## 2024-05-16 RX ORDER — LOSARTAN POTASSIUM 50 MG/1
50 TABLET ORAL EVERY MORNING
Status: ON HOLD | COMMUNITY

## 2024-05-16 RX ORDER — SUMATRIPTAN 100 MG/1
100 TABLET, FILM COATED ORAL PRN
Status: ON HOLD | COMMUNITY
End: 2024-05-17

## 2024-05-16 RX ORDER — ASPIRIN 81 MG/1
81 TABLET, CHEWABLE ORAL DAILY
COMMUNITY
End: 2024-05-16 | Stop reason: ALTCHOICE

## 2024-05-16 RX ORDER — MECLIZINE HCL 12.5 MG/1
12.5 TABLET ORAL EVERY 8 HOURS PRN
Status: ON HOLD | COMMUNITY
Start: 2024-04-05

## 2024-05-16 RX ORDER — TRAMADOL HYDROCHLORIDE 50 MG/1
50 TABLET ORAL ONCE
Status: COMPLETED | OUTPATIENT
Start: 2024-05-16 | End: 2024-05-16

## 2024-05-16 RX ORDER — ONDANSETRON 4 MG/1
4 TABLET, FILM COATED ORAL EVERY 8 HOURS PRN
Status: ON HOLD | COMMUNITY
Start: 2024-04-05

## 2024-05-16 RX ORDER — ROSUVASTATIN CALCIUM 20 MG/1
20 TABLET, COATED ORAL NIGHTLY
Status: ON HOLD | COMMUNITY

## 2024-05-16 RX ADMIN — TRAMADOL HYDROCHLORIDE 50 MG: 50 TABLET, COATED ORAL at 06:50

## 2024-05-16 ASSESSMENT — PAIN DESCRIPTION - LOCATION: LOCATION: GENERALIZED

## 2024-05-16 ASSESSMENT — PAIN DESCRIPTION - DESCRIPTORS: DESCRIPTORS: ACHING

## 2024-05-16 ASSESSMENT — PAIN SCALES - GENERAL: PAINLEVEL_OUTOF10: 8

## 2024-05-16 NOTE — CARE COORDINATION
Internal Medicine On-call Care Coordination Note    I was called by the ED physician because they recommended admission for this patient and we cover their PCP.  The history as I understand it after discussion with the ED physician is as follows:    Presents with severe lower extremity weakness, muscle fasciculations and fall  Unable to ambulate  MRI brain and lumbar spine ordered    I placed admission orders.  Including:    Neurology consult  PT/OT  SSI     Dr. Berumen, or our coverage will see the patient tomorrow for H&P.    Electronically signed by DOMINICK Hanson CNP on 5/15/2024 at 9:43 PM

## 2024-05-17 ENCOUNTER — HOSPITAL ENCOUNTER (INPATIENT)
Age: 69
LOS: 17 days | Discharge: NURSING, REHAB OR LONG-TERM CARE | DRG: 472 | End: 2024-06-03
Attending: STUDENT IN AN ORGANIZED HEALTH CARE EDUCATION/TRAINING PROGRAM | Admitting: STUDENT IN AN ORGANIZED HEALTH CARE EDUCATION/TRAINING PROGRAM
Payer: MEDICARE

## 2024-05-17 VITALS
RESPIRATION RATE: 20 BRPM | OXYGEN SATURATION: 97 % | BODY MASS INDEX: 40.4 KG/M2 | TEMPERATURE: 98.1 F | DIASTOLIC BLOOD PRESSURE: 99 MMHG | SYSTOLIC BLOOD PRESSURE: 136 MMHG | HEIGHT: 63 IN | HEART RATE: 82 BPM | WEIGHT: 228 LBS

## 2024-05-17 DIAGNOSIS — M48.02 CERVICAL STENOSIS OF SPINAL CANAL: ICD-10-CM

## 2024-05-17 DIAGNOSIS — M50.20 CERVICAL HERNIATED DISC: Primary | ICD-10-CM

## 2024-05-17 PROBLEM — R29.898 BILATERAL LEG WEAKNESS: Status: ACTIVE | Noted: 2024-05-17

## 2024-05-17 LAB
ANION GAP SERPL CALCULATED.3IONS-SCNC: 7 MMOL/L (ref 7–16)
BASOPHILS # BLD: 0.03 K/UL (ref 0–0.2)
BASOPHILS NFR BLD: 0 % (ref 0–2)
BUN SERPL-MCNC: 18 MG/DL (ref 6–23)
CALCIUM SERPL-MCNC: 9.7 MG/DL (ref 8.6–10.2)
CHLORIDE SERPL-SCNC: 102 MMOL/L (ref 98–107)
CO2 SERPL-SCNC: 28 MMOL/L (ref 22–29)
CREAT SERPL-MCNC: 0.8 MG/DL (ref 0.5–1)
EOSINOPHIL # BLD: 0.22 K/UL (ref 0.05–0.5)
EOSINOPHILS RELATIVE PERCENT: 3 % (ref 0–6)
ERYTHROCYTE [DISTWIDTH] IN BLOOD BY AUTOMATED COUNT: 13.5 % (ref 11.5–15)
GFR, ESTIMATED: 79 ML/MIN/1.73M2
GLUCOSE BLD-MCNC: 120 MG/DL (ref 74–99)
GLUCOSE BLD-MCNC: 217 MG/DL (ref 74–99)
GLUCOSE SERPL-MCNC: 138 MG/DL (ref 74–99)
HCT VFR BLD AUTO: 39.1 % (ref 34–48)
HGB BLD-MCNC: 12.7 G/DL (ref 11.5–15.5)
IMM GRANULOCYTES # BLD AUTO: <0.03 K/UL (ref 0–0.58)
IMM GRANULOCYTES NFR BLD: 0 % (ref 0–5)
LYMPHOCYTES NFR BLD: 1.93 K/UL (ref 1.5–4)
LYMPHOCYTES RELATIVE PERCENT: 28 % (ref 20–42)
MCH RBC QN AUTO: 30.2 PG (ref 26–35)
MCHC RBC AUTO-ENTMCNC: 32.5 G/DL (ref 32–34.5)
MCV RBC AUTO: 93.1 FL (ref 80–99.9)
MONOCYTES NFR BLD: 0.65 K/UL (ref 0.1–0.95)
MONOCYTES NFR BLD: 10 % (ref 2–12)
NEUTROPHILS NFR BLD: 58 % (ref 43–80)
NEUTS SEG NFR BLD: 3.96 K/UL (ref 1.8–7.3)
PLATELET # BLD AUTO: 260 K/UL (ref 130–450)
PMV BLD AUTO: 9.2 FL (ref 7–12)
POTASSIUM SERPL-SCNC: 4.7 MMOL/L (ref 3.5–5)
RBC # BLD AUTO: 4.2 M/UL (ref 3.5–5.5)
SODIUM SERPL-SCNC: 137 MMOL/L (ref 132–146)
WBC OTHER # BLD: 6.8 K/UL (ref 4.5–11.5)

## 2024-05-17 PROCEDURE — 2580000003 HC RX 258: Performed by: NURSE PRACTITIONER

## 2024-05-17 PROCEDURE — 6360000002 HC RX W HCPCS: Performed by: NURSE PRACTITIONER

## 2024-05-17 PROCEDURE — 2060000000 HC ICU INTERMEDIATE R&B

## 2024-05-17 PROCEDURE — 6370000000 HC RX 637 (ALT 250 FOR IP): Performed by: EMERGENCY MEDICINE

## 2024-05-17 PROCEDURE — 80048 BASIC METABOLIC PNL TOTAL CA: CPT

## 2024-05-17 PROCEDURE — 6370000000 HC RX 637 (ALT 250 FOR IP): Performed by: NURSE PRACTITIONER

## 2024-05-17 PROCEDURE — 82962 GLUCOSE BLOOD TEST: CPT

## 2024-05-17 PROCEDURE — 85025 COMPLETE CBC W/AUTO DIFF WBC: CPT

## 2024-05-17 RX ORDER — SUMATRIPTAN 50 MG/1
100 TABLET, FILM COATED ORAL ONCE
Status: DISCONTINUED | OUTPATIENT
Start: 2024-05-17 | End: 2024-06-03 | Stop reason: HOSPADM

## 2024-05-17 RX ORDER — ENOXAPARIN SODIUM 100 MG/ML
30 INJECTION SUBCUTANEOUS 2 TIMES DAILY
Status: DISCONTINUED | OUTPATIENT
Start: 2024-05-17 | End: 2024-05-22

## 2024-05-17 RX ORDER — CYCLOBENZAPRINE HCL 10 MG
10 TABLET ORAL 3 TIMES DAILY PRN
Status: DISCONTINUED | OUTPATIENT
Start: 2024-05-17 | End: 2024-06-03 | Stop reason: HOSPADM

## 2024-05-17 RX ORDER — SENNOSIDES A AND B 8.6 MG/1
1 TABLET, FILM COATED ORAL DAILY PRN
Status: DISCONTINUED | OUTPATIENT
Start: 2024-05-17 | End: 2024-05-27

## 2024-05-17 RX ORDER — SODIUM CHLORIDE 0.9 % (FLUSH) 0.9 %
10 SYRINGE (ML) INJECTION EVERY 12 HOURS SCHEDULED
Status: DISCONTINUED | OUTPATIENT
Start: 2024-05-17 | End: 2024-05-25 | Stop reason: SDUPTHER

## 2024-05-17 RX ORDER — ASPIRIN 81 MG/1
81 TABLET ORAL 2 TIMES DAILY
Status: DISCONTINUED | OUTPATIENT
Start: 2024-05-17 | End: 2024-05-22

## 2024-05-17 RX ORDER — SODIUM CHLORIDE 9 MG/ML
INJECTION, SOLUTION INTRAVENOUS PRN
Status: DISCONTINUED | OUTPATIENT
Start: 2024-05-17 | End: 2024-06-03 | Stop reason: HOSPADM

## 2024-05-17 RX ORDER — ACETAMINOPHEN 650 MG/1
650 SUPPOSITORY RECTAL EVERY 6 HOURS PRN
Status: DISCONTINUED | OUTPATIENT
Start: 2024-05-17 | End: 2024-06-03 | Stop reason: HOSPADM

## 2024-05-17 RX ORDER — ROSUVASTATIN CALCIUM 20 MG/1
20 TABLET, COATED ORAL NIGHTLY
Status: DISCONTINUED | OUTPATIENT
Start: 2024-05-17 | End: 2024-06-03 | Stop reason: HOSPADM

## 2024-05-17 RX ORDER — INSULIN LISPRO 100 [IU]/ML
0-8 INJECTION, SOLUTION INTRAVENOUS; SUBCUTANEOUS
Status: DISCONTINUED | OUTPATIENT
Start: 2024-05-17 | End: 2024-05-20

## 2024-05-17 RX ORDER — TORSEMIDE 20 MG/1
20 TABLET ORAL DAILY
Status: DISCONTINUED | OUTPATIENT
Start: 2024-05-17 | End: 2024-06-03 | Stop reason: HOSPADM

## 2024-05-17 RX ORDER — PREGABALIN 75 MG/1
75 CAPSULE ORAL 2 TIMES DAILY
Status: DISCONTINUED | OUTPATIENT
Start: 2024-05-17 | End: 2024-06-03

## 2024-05-17 RX ORDER — ONDANSETRON 2 MG/ML
4 INJECTION INTRAMUSCULAR; INTRAVENOUS EVERY 6 HOURS PRN
Status: DISCONTINUED | OUTPATIENT
Start: 2024-05-17 | End: 2024-05-25 | Stop reason: SDUPTHER

## 2024-05-17 RX ORDER — TRAMADOL HYDROCHLORIDE 50 MG/1
50 TABLET ORAL ONCE
Status: COMPLETED | OUTPATIENT
Start: 2024-05-17 | End: 2024-05-17

## 2024-05-17 RX ORDER — SPIRONOLACTONE 25 MG/1
25 TABLET ORAL DAILY
Status: DISCONTINUED | OUTPATIENT
Start: 2024-05-17 | End: 2024-06-03 | Stop reason: HOSPADM

## 2024-05-17 RX ORDER — SODIUM CHLORIDE 0.9 % (FLUSH) 0.9 %
10 SYRINGE (ML) INJECTION PRN
Status: DISCONTINUED | OUTPATIENT
Start: 2024-05-17 | End: 2024-05-25 | Stop reason: SDUPTHER

## 2024-05-17 RX ORDER — GLUCAGON 1 MG/ML
1 KIT INJECTION PRN
Status: DISCONTINUED | OUTPATIENT
Start: 2024-05-17 | End: 2024-06-03 | Stop reason: HOSPADM

## 2024-05-17 RX ORDER — TRAMADOL HYDROCHLORIDE 50 MG/1
50 TABLET ORAL 2 TIMES DAILY PRN
Status: DISCONTINUED | OUTPATIENT
Start: 2024-05-17 | End: 2024-05-24

## 2024-05-17 RX ORDER — ALBUTEROL SULFATE 2.5 MG/3ML
2.5 SOLUTION RESPIRATORY (INHALATION) EVERY 4 HOURS PRN
Status: DISCONTINUED | OUTPATIENT
Start: 2024-05-17 | End: 2024-06-03 | Stop reason: HOSPADM

## 2024-05-17 RX ORDER — ONDANSETRON 4 MG/1
4 TABLET, ORALLY DISINTEGRATING ORAL EVERY 8 HOURS PRN
Status: DISCONTINUED | OUTPATIENT
Start: 2024-05-17 | End: 2024-05-25 | Stop reason: SDUPTHER

## 2024-05-17 RX ORDER — ACETAMINOPHEN 325 MG/1
650 TABLET ORAL EVERY 6 HOURS PRN
Status: DISCONTINUED | OUTPATIENT
Start: 2024-05-17 | End: 2024-06-03 | Stop reason: HOSPADM

## 2024-05-17 RX ORDER — METOPROLOL SUCCINATE 25 MG/1
25 TABLET, EXTENDED RELEASE ORAL DAILY
Status: DISCONTINUED | OUTPATIENT
Start: 2024-05-17 | End: 2024-06-03 | Stop reason: HOSPADM

## 2024-05-17 RX ORDER — DEXTROSE MONOHYDRATE 100 MG/ML
INJECTION, SOLUTION INTRAVENOUS CONTINUOUS PRN
Status: DISCONTINUED | OUTPATIENT
Start: 2024-05-17 | End: 2024-06-03 | Stop reason: HOSPADM

## 2024-05-17 RX ORDER — INSULIN LISPRO 100 [IU]/ML
0-4 INJECTION, SOLUTION INTRAVENOUS; SUBCUTANEOUS NIGHTLY
Status: DISCONTINUED | OUTPATIENT
Start: 2024-05-17 | End: 2024-05-20

## 2024-05-17 RX ORDER — POTASSIUM CHLORIDE 20 MEQ/1
40 TABLET, EXTENDED RELEASE ORAL PRN
Status: DISCONTINUED | OUTPATIENT
Start: 2024-05-17 | End: 2024-06-03 | Stop reason: HOSPADM

## 2024-05-17 RX ORDER — POTASSIUM CHLORIDE 7.45 MG/ML
10 INJECTION INTRAVENOUS PRN
Status: DISCONTINUED | OUTPATIENT
Start: 2024-05-17 | End: 2024-06-03 | Stop reason: HOSPADM

## 2024-05-17 RX ORDER — LOSARTAN POTASSIUM 50 MG/1
50 TABLET ORAL DAILY
Status: DISCONTINUED | OUTPATIENT
Start: 2024-05-17 | End: 2024-05-18

## 2024-05-17 RX ORDER — MAGNESIUM SULFATE IN WATER 40 MG/ML
2000 INJECTION, SOLUTION INTRAVENOUS PRN
Status: DISCONTINUED | OUTPATIENT
Start: 2024-05-17 | End: 2024-06-03 | Stop reason: HOSPADM

## 2024-05-17 RX ADMIN — TORSEMIDE 20 MG: 20 TABLET ORAL at 17:29

## 2024-05-17 RX ADMIN — ENOXAPARIN SODIUM 30 MG: 100 INJECTION SUBCUTANEOUS at 19:58

## 2024-05-17 RX ADMIN — SENNOSIDES 8.6 MG: 8.6 TABLET, FILM COATED ORAL at 19:58

## 2024-05-17 RX ADMIN — ROSUVASTATIN 20 MG: 20 TABLET, FILM COATED ORAL at 19:58

## 2024-05-17 RX ADMIN — LOSARTAN POTASSIUM 50 MG: 50 TABLET, FILM COATED ORAL at 17:29

## 2024-05-17 RX ADMIN — TRAMADOL HYDROCHLORIDE 50 MG: 50 TABLET, COATED ORAL at 01:29

## 2024-05-17 RX ADMIN — SODIUM CHLORIDE, PRESERVATIVE FREE 10 ML: 5 INJECTION INTRAVENOUS at 19:59

## 2024-05-17 RX ADMIN — ASPIRIN 81 MG: 81 TABLET, COATED ORAL at 19:59

## 2024-05-17 RX ADMIN — PREGABALIN 75 MG: 75 CAPSULE ORAL at 19:58

## 2024-05-17 RX ADMIN — SPIRONOLACTONE 25 MG: 25 TABLET ORAL at 17:29

## 2024-05-17 RX ADMIN — TRAMADOL HYDROCHLORIDE 50 MG: 50 TABLET, COATED ORAL at 11:17

## 2024-05-17 RX ADMIN — METOPROLOL SUCCINATE 25 MG: 25 TABLET, EXTENDED RELEASE ORAL at 17:29

## 2024-05-17 ASSESSMENT — PAIN SCALES - GENERAL
PAINLEVEL_OUTOF10: 4
PAINLEVEL_OUTOF10: 7
PAINLEVEL_OUTOF10: 8
PAINLEVEL_OUTOF10: 8
PAINLEVEL_OUTOF10: 0

## 2024-05-17 ASSESSMENT — PAIN DESCRIPTION - LOCATION
LOCATION: BACK

## 2024-05-17 NOTE — ED NOTES
[facility] bed requested from ext [phone] for patient comfort while she awaits for a bed down [facility].

## 2024-05-17 NOTE — PLAN OF CARE
Problem: Chronic Conditions and Co-morbidities  Goal: Patient's chronic conditions and co-morbidity symptoms are monitored and maintained or improved  Outcome: Progressing     Problem: Discharge Planning  Goal: Discharge to home or other facility with appropriate resources  Outcome: Progressing  Flowsheets (Taken 5/17/2024 5331)  Discharge to home or other facility with appropriate resources:   Identify barriers to discharge with patient and caregiver   Identify discharge learning needs (meds, wound care, etc)     Problem: Safety - Adult  Goal: Free from fall injury  Outcome: Progressing     Problem: Skin/Tissue Integrity  Goal: Absence of new skin breakdown  Description: 1.  Monitor for areas of redness and/or skin breakdown  2.  Assess vascular access sites hourly  3.  Every 4-6 hours minimum:  Change oxygen saturation probe site  4.  Every 4-6 hours:  If on nasal continuous positive airway pressure, respiratory therapy assess nares and determine need for appliance change or resting period.  Outcome: Progressing

## 2024-05-17 NOTE — ED NOTES
Assumed care of patient at 7:30. Introduced self to patient as RN. Patient awaiting transfer to [facility]. Complains of back pain /10. Call light within reach

## 2024-05-18 LAB
25(OH)D3 SERPL-MCNC: 33.3 NG/ML (ref 30–100)
ALBUMIN SERPL-MCNC: 3.6 G/DL (ref 3.5–5.2)
ALP SERPL-CCNC: 83 U/L (ref 35–104)
ALT SERPL-CCNC: 18 U/L (ref 0–32)
ANION GAP SERPL CALCULATED.3IONS-SCNC: 16 MMOL/L (ref 7–16)
AST SERPL-CCNC: 18 U/L (ref 0–31)
BASOPHILS # BLD: 0.03 K/UL (ref 0–0.2)
BASOPHILS NFR BLD: 0 % (ref 0–2)
BILIRUB SERPL-MCNC: 0.5 MG/DL (ref 0–1.2)
BUN SERPL-MCNC: 19 MG/DL (ref 6–23)
CALCIUM SERPL-MCNC: 9.3 MG/DL (ref 8.6–10.2)
CHLORIDE SERPL-SCNC: 100 MMOL/L (ref 98–107)
CO2 SERPL-SCNC: 21 MMOL/L (ref 22–29)
CREAT SERPL-MCNC: 1 MG/DL (ref 0.5–1)
EOSINOPHIL # BLD: 0.31 K/UL (ref 0.05–0.5)
EOSINOPHILS RELATIVE PERCENT: 4 % (ref 0–6)
ERYTHROCYTE [DISTWIDTH] IN BLOOD BY AUTOMATED COUNT: 13.7 % (ref 11.5–15)
FERRITIN SERPL-MCNC: 314 NG/ML
FOLATE SERPL-MCNC: 7.7 NG/ML (ref 4.8–24.2)
GFR, ESTIMATED: 61 ML/MIN/1.73M2
GLUCOSE BLD-MCNC: 122 MG/DL (ref 74–99)
GLUCOSE BLD-MCNC: 137 MG/DL (ref 74–99)
GLUCOSE BLD-MCNC: 153 MG/DL (ref 74–99)
GLUCOSE BLD-MCNC: 290 MG/DL (ref 74–99)
GLUCOSE SERPL-MCNC: 148 MG/DL (ref 74–99)
HBA1C MFR BLD: 7.3 % (ref 4–5.6)
HCT VFR BLD AUTO: 37.3 % (ref 34–48)
HGB BLD-MCNC: 12.3 G/DL (ref 11.5–15.5)
IMM GRANULOCYTES # BLD AUTO: 0.05 K/UL (ref 0–0.58)
IMM GRANULOCYTES NFR BLD: 1 % (ref 0–5)
IRON SATN MFR SERPL: 27 % (ref 15–50)
IRON SERPL-MCNC: 75 UG/DL (ref 37–145)
LYMPHOCYTES NFR BLD: 2.1 K/UL (ref 1.5–4)
LYMPHOCYTES RELATIVE PERCENT: 25 % (ref 20–42)
MAGNESIUM SERPL-MCNC: 1.7 MG/DL (ref 1.6–2.6)
MCH RBC QN AUTO: 30.4 PG (ref 26–35)
MCHC RBC AUTO-ENTMCNC: 33 G/DL (ref 32–34.5)
MCV RBC AUTO: 92.1 FL (ref 80–99.9)
MICROORGANISM SPEC CULT: ABNORMAL
MONOCYTES NFR BLD: 0.83 K/UL (ref 0.1–0.95)
MONOCYTES NFR BLD: 10 % (ref 2–12)
NEUTROPHILS NFR BLD: 60 % (ref 43–80)
NEUTS SEG NFR BLD: 5.04 K/UL (ref 1.8–7.3)
PLATELET # BLD AUTO: 299 K/UL (ref 130–450)
PMV BLD AUTO: 9.6 FL (ref 7–12)
POTASSIUM SERPL-SCNC: 3.9 MMOL/L (ref 3.5–5)
PROT SERPL-MCNC: 6.5 G/DL (ref 6.4–8.3)
RBC # BLD AUTO: 4.05 M/UL (ref 3.5–5.5)
RHEUMATOID FACT SER NEPH-ACNC: <10 IU/ML (ref 0–13)
SODIUM SERPL-SCNC: 137 MMOL/L (ref 132–146)
SPECIMEN DESCRIPTION: ABNORMAL
TIBC SERPL-MCNC: 282 UG/DL (ref 250–450)
TSH SERPL DL<=0.05 MIU/L-ACNC: 1.11 UIU/ML (ref 0.27–4.2)
VIT B12 SERPL-MCNC: 248 PG/ML (ref 211–946)
WBC OTHER # BLD: 8.4 K/UL (ref 4.5–11.5)

## 2024-05-18 PROCEDURE — 36415 COLL VENOUS BLD VENIPUNCTURE: CPT

## 2024-05-18 PROCEDURE — 82962 GLUCOSE BLOOD TEST: CPT

## 2024-05-18 PROCEDURE — 86431 RHEUMATOID FACTOR QUANT: CPT

## 2024-05-18 PROCEDURE — 80053 COMPREHEN METABOLIC PANEL: CPT

## 2024-05-18 PROCEDURE — 99222 1ST HOSP IP/OBS MODERATE 55: CPT | Performed by: PSYCHIATRY & NEUROLOGY

## 2024-05-18 PROCEDURE — 83036 HEMOGLOBIN GLYCOSYLATED A1C: CPT

## 2024-05-18 PROCEDURE — 82306 VITAMIN D 25 HYDROXY: CPT

## 2024-05-18 PROCEDURE — 86039 ANTINUCLEAR ANTIBODIES (ANA): CPT

## 2024-05-18 PROCEDURE — 85025 COMPLETE CBC W/AUTO DIFF WBC: CPT

## 2024-05-18 PROCEDURE — 83735 ASSAY OF MAGNESIUM: CPT

## 2024-05-18 PROCEDURE — 6360000002 HC RX W HCPCS: Performed by: NURSE PRACTITIONER

## 2024-05-18 PROCEDURE — 2580000003 HC RX 258: Performed by: NURSE PRACTITIONER

## 2024-05-18 PROCEDURE — 86038 ANTINUCLEAR ANTIBODIES: CPT

## 2024-05-18 PROCEDURE — 84443 ASSAY THYROID STIM HORMONE: CPT

## 2024-05-18 PROCEDURE — 83550 IRON BINDING TEST: CPT

## 2024-05-18 PROCEDURE — 97165 OT EVAL LOW COMPLEX 30 MIN: CPT

## 2024-05-18 PROCEDURE — 82607 VITAMIN B-12: CPT

## 2024-05-18 PROCEDURE — 83540 ASSAY OF IRON: CPT

## 2024-05-18 PROCEDURE — 2060000000 HC ICU INTERMEDIATE R&B

## 2024-05-18 PROCEDURE — 97530 THERAPEUTIC ACTIVITIES: CPT

## 2024-05-18 PROCEDURE — 82728 ASSAY OF FERRITIN: CPT

## 2024-05-18 PROCEDURE — 6370000000 HC RX 637 (ALT 250 FOR IP): Performed by: NURSE PRACTITIONER

## 2024-05-18 PROCEDURE — 2580000003 HC RX 258: Performed by: INTERNAL MEDICINE

## 2024-05-18 PROCEDURE — 82746 ASSAY OF FOLIC ACID SERUM: CPT

## 2024-05-18 RX ORDER — 0.9 % SODIUM CHLORIDE 0.9 %
250 INTRAVENOUS SOLUTION INTRAVENOUS ONCE
Status: COMPLETED | OUTPATIENT
Start: 2024-05-18 | End: 2024-05-18

## 2024-05-18 RX ORDER — LOSARTAN POTASSIUM 25 MG/1
25 TABLET ORAL DAILY
Status: DISCONTINUED | OUTPATIENT
Start: 2024-05-19 | End: 2024-06-03 | Stop reason: HOSPADM

## 2024-05-18 RX ORDER — LORAZEPAM 2 MG/ML
1 INJECTION INTRAMUSCULAR PRN
Status: DISCONTINUED | OUTPATIENT
Start: 2024-05-18 | End: 2024-05-24

## 2024-05-18 RX ADMIN — ROSUVASTATIN 20 MG: 20 TABLET, FILM COATED ORAL at 20:37

## 2024-05-18 RX ADMIN — SODIUM CHLORIDE, PRESERVATIVE FREE 10 ML: 5 INJECTION INTRAVENOUS at 09:01

## 2024-05-18 RX ADMIN — TORSEMIDE 20 MG: 20 TABLET ORAL at 09:01

## 2024-05-18 RX ADMIN — CYCLOBENZAPRINE 10 MG: 10 TABLET, FILM COATED ORAL at 03:20

## 2024-05-18 RX ADMIN — SODIUM CHLORIDE, PRESERVATIVE FREE 10 ML: 5 INJECTION INTRAVENOUS at 20:37

## 2024-05-18 RX ADMIN — PREGABALIN 75 MG: 75 CAPSULE ORAL at 20:37

## 2024-05-18 RX ADMIN — PREGABALIN 75 MG: 75 CAPSULE ORAL at 09:01

## 2024-05-18 RX ADMIN — METOPROLOL SUCCINATE 25 MG: 25 TABLET, EXTENDED RELEASE ORAL at 09:01

## 2024-05-18 RX ADMIN — TRAMADOL HYDROCHLORIDE 50 MG: 50 TABLET ORAL at 04:51

## 2024-05-18 RX ADMIN — LOSARTAN POTASSIUM 50 MG: 50 TABLET, FILM COATED ORAL at 09:01

## 2024-05-18 RX ADMIN — ENOXAPARIN SODIUM 30 MG: 100 INJECTION SUBCUTANEOUS at 09:01

## 2024-05-18 RX ADMIN — ASPIRIN 81 MG: 81 TABLET, COATED ORAL at 20:37

## 2024-05-18 RX ADMIN — ASPIRIN 81 MG: 81 TABLET, COATED ORAL at 09:01

## 2024-05-18 RX ADMIN — SODIUM CHLORIDE 250 ML: 9 INJECTION, SOLUTION INTRAVENOUS at 15:38

## 2024-05-18 RX ADMIN — ENOXAPARIN SODIUM 30 MG: 100 INJECTION SUBCUTANEOUS at 20:36

## 2024-05-18 RX ADMIN — SPIRONOLACTONE 25 MG: 25 TABLET ORAL at 09:01

## 2024-05-18 ASSESSMENT — PAIN DESCRIPTION - LOCATION
LOCATION: FOOT
LOCATION: FOOT;TOE (COMMENT WHICH ONE)

## 2024-05-18 ASSESSMENT — PAIN DESCRIPTION - ORIENTATION
ORIENTATION: RIGHT;LEFT
ORIENTATION: RIGHT;LEFT

## 2024-05-18 ASSESSMENT — PAIN SCALES - GENERAL
PAINLEVEL_OUTOF10: 0
PAINLEVEL_OUTOF10: 8
PAINLEVEL_OUTOF10: 0
PAINLEVEL_OUTOF10: 0
PAINLEVEL_OUTOF10: 6
PAINLEVEL_OUTOF10: 0

## 2024-05-18 ASSESSMENT — PAIN - FUNCTIONAL ASSESSMENT
PAIN_FUNCTIONAL_ASSESSMENT: ACTIVITIES ARE NOT PREVENTED
PAIN_FUNCTIONAL_ASSESSMENT: ACTIVITIES ARE NOT PREVENTED

## 2024-05-18 NOTE — PLAN OF CARE
Problem: Chronic Conditions and Co-morbidities  Goal: Patient's chronic conditions and co-morbidity symptoms are monitored and maintained or improved  5/18/2024 0146 by Sunni Maurice RN  Outcome: Progressing  5/17/2024 1855 by James Stratton RN  Outcome: Progressing     Problem: Discharge Planning  Goal: Discharge to home or other facility with appropriate resources  5/18/2024 0146 by Sunni Maurice RN  Outcome: Progressing  5/17/2024 1855 by James Stratton RN  Outcome: Progressing  Flowsheets (Taken 5/17/2024 1542)  Discharge to home or other facility with appropriate resources:   Identify barriers to discharge with patient and caregiver   Identify discharge learning needs (meds, wound care, etc)     Problem: Safety - Adult  Goal: Free from fall injury  5/18/2024 0146 by Sunni Maurice RN  Outcome: Progressing  5/17/2024 1855 by James Stratton RN  Outcome: Progressing     Problem: Skin/Tissue Integrity  Goal: Absence of new skin breakdown  Description: 1.  Monitor for areas of redness and/or skin breakdown  2.  Assess vascular access sites hourly  3.  Every 4-6 hours minimum:  Change oxygen saturation probe site  4.  Every 4-6 hours:  If on nasal continuous positive airway pressure, respiratory therapy assess nares and determine need for appliance change or resting period.  5/18/2024 0146 by Sunni Maurice RN  Outcome: Progressing  5/17/2024 1855 by James Stratton RN  Outcome: Progressing     Problem: ABCDS Injury Assessment  Goal: Absence of physical injury  Outcome: Progressing

## 2024-05-18 NOTE — H&P
Department of Internal Medicine  MD Mignon Mark Merarikira Maseun  [id number]  1955  CHIEF COMPLAINT:  Bilateral leg weakness   History Obtained From: Patient and EMR  HISTORY OF PRESENT ILLNESS:    The patient is a 68 y.o. female who presents with bilateral lower extremity weakness to [facility]..  She presented to [facility] on 5/15 with weakness.  She had right knee TKA done on January 30 and apparently said she needs on the left knee surgery as well.  She has been doing home physical therapy for the past few weeks and outpatient physical therapy before that.  She has history of DVTs.  This morning she states she is doing okay but she feels weak.  She has history of diabetes mellitus, hypertension, hyperlipidemia, lymphedema, pancreatitis, blood clots.  She denies any chest pain or palpitations.  No fever, cough or shortness of breath.  No nausea vomiting or abdominal pain.    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 ( 6.5 weeeks coma)    Vitreous floaters of both eyes      Past Surgical History:        Procedure Laterality Date    APPENDECTOMY      CHOLECYSTECTOMY      COLONOSCOPY  07/11/2016    EYE SURGERY      EYE SURGERY      GALLBLADDER SURGERY  1999    KNEE ARTHROSCOPY Right 2008    TOTAL KNEE ARTHROPLASTY Right 1/30/2024    ROBOTIC LISA ASSISTED RIGHT KNEE TOTAL ARTHROPLASTY   +++ELOISA+++  ++PNB++ performed by Alberto Campbell MD at [facility] OR    UPPER GASTROINTESTINAL ENDOSCOPY  10/2016     Meds at Home:  Medications Prior to Admission: rosuvastatin (CRESTOR) 20 MG tablet, Take 1 tablet by mouth nightly  spironolactone (ALDACTONE) 25 MG tablet, Take 1 tablet by mouth nightly  losartan (COZAAR) 50 MG tablet,

## 2024-05-18 NOTE — CONSULTS
[facility]  Neurology Consult    Date:  5/18/2024  Patient Name:  Mignon Fischer  YOB: 1955  MRN: [id number]     PCP:  Sandy Leiva MD   Referring:  Adrian Ignacio DO      Chief Complaint: Difficulty walking and leg weakness    History obtained from: Patient, brother, chart    Assessment  Mignon Fischer is a 68 y.o. female with a longstanding history of diabetes mellitus and neuropathy admitted with worsening gait and falls along with worsening numbness and weakness in her legs more than arms.     On exam she is noted to have a severe length dependent neuropathy which is involving her distal upper extremities.  This may be related to her longstanding diabetes, however, it does seem to have worsened subacutely more recently despite her hemoglobin A1c being relatively well-controlled. Additionally, she is noted to have mildly increased tone and brisk reflexes suspicious for an upper motor neuron process VS myeloneuropathy VS cervical myelopathy with superimposed length dependent polyneuropathy.    She also has symptoms suggestive of bilateral lumbosacral radiculopathy which is likely accounting for some of her pain and/or weakness in the lower extremities.  The weakness and numbness in her hands could relate to involvement by neuropathy VS bilateral carpal tunnel syndrome VS cervical spine disease.    Plan  Check following labs:  Vitamin D, vitamin B12, folate, TSH, RF, MARIO, iron panel  MRI cervical spine without contrast  The patient still admitted on May 20 would recommend tailored EMG/NCS of bilateral lower extremities and upper extremities to evaluate for neuropathy, radiculopathy, and possible bilateral carpal tunnel syndrome VS involvement of hands by neuropathy        History of Present Illness:  Mignon Fischer is a 68 y.o. right handed female presenting for evaluation of falls and gait difficulty.  The patient states that over the last s couple months

## 2024-05-19 LAB
ALBUMIN SERPL-MCNC: 3.5 G/DL (ref 3.5–5.2)
ALP SERPL-CCNC: 79 U/L (ref 35–104)
ALT SERPL-CCNC: 15 U/L (ref 0–32)
ANION GAP SERPL CALCULATED.3IONS-SCNC: 14 MMOL/L (ref 7–16)
AST SERPL-CCNC: 15 U/L (ref 0–31)
BASOPHILS # BLD: 0.04 K/UL (ref 0–0.2)
BASOPHILS NFR BLD: 0 % (ref 0–2)
BILIRUB SERPL-MCNC: 0.5 MG/DL (ref 0–1.2)
BUN SERPL-MCNC: 24 MG/DL (ref 6–23)
CALCIUM SERPL-MCNC: 9 MG/DL (ref 8.6–10.2)
CHLORIDE SERPL-SCNC: 102 MMOL/L (ref 98–107)
CO2 SERPL-SCNC: 23 MMOL/L (ref 22–29)
CREAT SERPL-MCNC: 1 MG/DL (ref 0.5–1)
EOSINOPHIL # BLD: 0.3 K/UL (ref 0.05–0.5)
EOSINOPHILS RELATIVE PERCENT: 3 % (ref 0–6)
ERYTHROCYTE [DISTWIDTH] IN BLOOD BY AUTOMATED COUNT: 13.6 % (ref 11.5–15)
GFR, ESTIMATED: 59 ML/MIN/1.73M2
GLUCOSE BLD-MCNC: 132 MG/DL (ref 74–99)
GLUCOSE BLD-MCNC: 139 MG/DL (ref 74–99)
GLUCOSE BLD-MCNC: 146 MG/DL (ref 74–99)
GLUCOSE BLD-MCNC: 179 MG/DL (ref 74–99)
GLUCOSE SERPL-MCNC: 124 MG/DL (ref 74–99)
HCT VFR BLD AUTO: 35.7 % (ref 34–48)
HGB BLD-MCNC: 11.6 G/DL (ref 11.5–15.5)
IMM GRANULOCYTES # BLD AUTO: 0.04 K/UL (ref 0–0.58)
IMM GRANULOCYTES NFR BLD: 0 % (ref 0–5)
LYMPHOCYTES NFR BLD: 2.79 K/UL (ref 1.5–4)
LYMPHOCYTES RELATIVE PERCENT: 29 % (ref 20–42)
MCH RBC QN AUTO: 30 PG (ref 26–35)
MCHC RBC AUTO-ENTMCNC: 32.5 G/DL (ref 32–34.5)
MCV RBC AUTO: 92.2 FL (ref 80–99.9)
MONOCYTES NFR BLD: 0.97 K/UL (ref 0.1–0.95)
MONOCYTES NFR BLD: 10 % (ref 2–12)
NEUTROPHILS NFR BLD: 56 % (ref 43–80)
NEUTS SEG NFR BLD: 5.36 K/UL (ref 1.8–7.3)
PLATELET # BLD AUTO: 257 K/UL (ref 130–450)
PMV BLD AUTO: 9.6 FL (ref 7–12)
POTASSIUM SERPL-SCNC: 4 MMOL/L (ref 3.5–5)
PROT SERPL-MCNC: 6.3 G/DL (ref 6.4–8.3)
RBC # BLD AUTO: 3.87 M/UL (ref 3.5–5.5)
SODIUM SERPL-SCNC: 139 MMOL/L (ref 132–146)
WBC OTHER # BLD: 9.5 K/UL (ref 4.5–11.5)

## 2024-05-19 PROCEDURE — 2580000003 HC RX 258: Performed by: INTERNAL MEDICINE

## 2024-05-19 PROCEDURE — 2580000003 HC RX 258: Performed by: STUDENT IN AN ORGANIZED HEALTH CARE EDUCATION/TRAINING PROGRAM

## 2024-05-19 PROCEDURE — 6370000000 HC RX 637 (ALT 250 FOR IP)

## 2024-05-19 PROCEDURE — 6360000002 HC RX W HCPCS: Performed by: NURSE PRACTITIONER

## 2024-05-19 PROCEDURE — 6360000002 HC RX W HCPCS: Performed by: STUDENT IN AN ORGANIZED HEALTH CARE EDUCATION/TRAINING PROGRAM

## 2024-05-19 PROCEDURE — 80053 COMPREHEN METABOLIC PANEL: CPT

## 2024-05-19 PROCEDURE — 36415 COLL VENOUS BLD VENIPUNCTURE: CPT

## 2024-05-19 PROCEDURE — 6360000002 HC RX W HCPCS: Performed by: INTERNAL MEDICINE

## 2024-05-19 PROCEDURE — 99232 SBSQ HOSP IP/OBS MODERATE 35: CPT

## 2024-05-19 PROCEDURE — 82962 GLUCOSE BLOOD TEST: CPT

## 2024-05-19 PROCEDURE — 2580000003 HC RX 258: Performed by: NURSE PRACTITIONER

## 2024-05-19 PROCEDURE — 85025 COMPLETE CBC W/AUTO DIFF WBC: CPT

## 2024-05-19 PROCEDURE — 6370000000 HC RX 637 (ALT 250 FOR IP): Performed by: INTERNAL MEDICINE

## 2024-05-19 PROCEDURE — 6370000000 HC RX 637 (ALT 250 FOR IP): Performed by: NURSE PRACTITIONER

## 2024-05-19 PROCEDURE — 2060000000 HC ICU INTERMEDIATE R&B

## 2024-05-19 RX ORDER — LANOLIN ALCOHOL/MO/W.PET/CERES
1000 CREAM (GRAM) TOPICAL DAILY
Status: DISCONTINUED | OUTPATIENT
Start: 2024-05-19 | End: 2024-06-03 | Stop reason: HOSPADM

## 2024-05-19 RX ADMIN — CEFEPIME 2000 MG: 2 INJECTION, POWDER, FOR SOLUTION INTRAVENOUS at 18:47

## 2024-05-19 RX ADMIN — PREGABALIN 75 MG: 75 CAPSULE ORAL at 20:16

## 2024-05-19 RX ADMIN — ASPIRIN 81 MG: 81 TABLET, COATED ORAL at 08:58

## 2024-05-19 RX ADMIN — ROSUVASTATIN 20 MG: 20 TABLET, FILM COATED ORAL at 20:16

## 2024-05-19 RX ADMIN — ENOXAPARIN SODIUM 30 MG: 100 INJECTION SUBCUTANEOUS at 08:56

## 2024-05-19 RX ADMIN — CYANOCOBALAMIN TAB 1000 MCG 1000 MCG: 1000 TAB at 13:40

## 2024-05-19 RX ADMIN — LOSARTAN POTASSIUM 25 MG: 25 TABLET, FILM COATED ORAL at 08:57

## 2024-05-19 RX ADMIN — METOPROLOL SUCCINATE 25 MG: 25 TABLET, EXTENDED RELEASE ORAL at 08:57

## 2024-05-19 RX ADMIN — ASPIRIN 81 MG: 81 TABLET, COATED ORAL at 20:16

## 2024-05-19 RX ADMIN — PREGABALIN 75 MG: 75 CAPSULE ORAL at 08:57

## 2024-05-19 RX ADMIN — TORSEMIDE 20 MG: 20 TABLET ORAL at 08:56

## 2024-05-19 RX ADMIN — SODIUM CHLORIDE, PRESERVATIVE FREE 10 ML: 5 INJECTION INTRAVENOUS at 20:17

## 2024-05-19 RX ADMIN — TRAMADOL HYDROCHLORIDE 50 MG: 50 TABLET ORAL at 08:57

## 2024-05-19 RX ADMIN — CEFEPIME 2000 MG: 2 INJECTION, POWDER, FOR SOLUTION INTRAVENOUS at 09:03

## 2024-05-19 RX ADMIN — ENOXAPARIN SODIUM 30 MG: 100 INJECTION SUBCUTANEOUS at 20:16

## 2024-05-19 RX ADMIN — SODIUM CHLORIDE, PRESERVATIVE FREE 10 ML: 5 INJECTION INTRAVENOUS at 08:58

## 2024-05-19 ASSESSMENT — PAIN DESCRIPTION - DESCRIPTORS
DESCRIPTORS: ACHING;DISCOMFORT;SORE
DESCRIPTORS: ACHING;DISCOMFORT;SORE

## 2024-05-19 ASSESSMENT — PAIN SCALES - GENERAL
PAINLEVEL_OUTOF10: 4
PAINLEVEL_OUTOF10: 6
PAINLEVEL_OUTOF10: 0
PAINLEVEL_OUTOF10: 3
PAINLEVEL_OUTOF10: 6

## 2024-05-19 ASSESSMENT — PAIN DESCRIPTION - LOCATION
LOCATION: NECK
LOCATION: NECK;BACK

## 2024-05-20 ENCOUNTER — APPOINTMENT (OUTPATIENT)
Dept: MRI IMAGING | Age: 69
DRG: 472 | End: 2024-05-20
Attending: STUDENT IN AN ORGANIZED HEALTH CARE EDUCATION/TRAINING PROGRAM
Payer: MEDICARE

## 2024-05-20 LAB
ALBUMIN SERPL-MCNC: 3.6 G/DL (ref 3.5–5.2)
ALP SERPL-CCNC: 76 U/L (ref 35–104)
ALT SERPL-CCNC: 12 U/L (ref 0–32)
ANA SER QL IA: NEGATIVE
ANION GAP SERPL CALCULATED.3IONS-SCNC: 15 MMOL/L (ref 7–16)
AST SERPL-CCNC: 14 U/L (ref 0–31)
BASOPHILS # BLD: 0.04 K/UL (ref 0–0.2)
BASOPHILS NFR BLD: 1 % (ref 0–2)
BILIRUB SERPL-MCNC: 0.4 MG/DL (ref 0–1.2)
BUN SERPL-MCNC: 26 MG/DL (ref 6–23)
CALCIUM SERPL-MCNC: 9.3 MG/DL (ref 8.6–10.2)
CHLORIDE SERPL-SCNC: 102 MMOL/L (ref 98–107)
CO2 SERPL-SCNC: 23 MMOL/L (ref 22–29)
CREAT SERPL-MCNC: 0.9 MG/DL (ref 0.5–1)
CRP SERPL HS-MCNC: 6 MG/L (ref 0–5)
EOSINOPHIL # BLD: 0.28 K/UL (ref 0.05–0.5)
EOSINOPHILS RELATIVE PERCENT: 4 % (ref 0–6)
ERYTHROCYTE [DISTWIDTH] IN BLOOD BY AUTOMATED COUNT: 13.5 % (ref 11.5–15)
ERYTHROCYTE [SEDIMENTATION RATE] IN BLOOD BY WESTERGREN METHOD: 57 MM/HR (ref 0–20)
GFR, ESTIMATED: 66 ML/MIN/1.73M2
GLUCOSE BLD-MCNC: 134 MG/DL (ref 74–99)
GLUCOSE BLD-MCNC: 154 MG/DL (ref 74–99)
GLUCOSE BLD-MCNC: 160 MG/DL (ref 74–99)
GLUCOSE BLD-MCNC: 182 MG/DL (ref 74–99)
GLUCOSE SERPL-MCNC: 139 MG/DL (ref 74–99)
HCT VFR BLD AUTO: 34.6 % (ref 34–48)
HCYS SERPL-SCNC: 17.2 UMOL/L (ref 0–15)
HGB BLD-MCNC: 11.4 G/DL (ref 11.5–15.5)
IMM GRANULOCYTES # BLD AUTO: 0.04 K/UL (ref 0–0.58)
IMM GRANULOCYTES NFR BLD: 1 % (ref 0–5)
LYMPHOCYTES NFR BLD: 0.9 K/UL (ref 1.5–4)
LYMPHOCYTES RELATIVE PERCENT: 13 % (ref 20–42)
MCH RBC QN AUTO: 30.3 PG (ref 26–35)
MCHC RBC AUTO-ENTMCNC: 32.9 G/DL (ref 32–34.5)
MCV RBC AUTO: 92 FL (ref 80–99.9)
MONOCYTES NFR BLD: 0.7 K/UL (ref 0.1–0.95)
MONOCYTES NFR BLD: 10 % (ref 2–12)
NEUTROPHILS NFR BLD: 71 % (ref 43–80)
NEUTS SEG NFR BLD: 4.86 K/UL (ref 1.8–7.3)
PLATELET # BLD AUTO: 224 K/UL (ref 130–450)
PMV BLD AUTO: 9.3 FL (ref 7–12)
POTASSIUM SERPL-SCNC: 3.9 MMOL/L (ref 3.5–5)
PROT SERPL-MCNC: 6.4 G/DL (ref 6.4–8.3)
RBC # BLD AUTO: 3.76 M/UL (ref 3.5–5.5)
SODIUM SERPL-SCNC: 140 MMOL/L (ref 132–146)
WBC OTHER # BLD: 6.8 K/UL (ref 4.5–11.5)

## 2024-05-20 PROCEDURE — 2580000003 HC RX 258: Performed by: NURSE PRACTITIONER

## 2024-05-20 PROCEDURE — 99232 SBSQ HOSP IP/OBS MODERATE 35: CPT | Performed by: NURSE PRACTITIONER

## 2024-05-20 PROCEDURE — 36415 COLL VENOUS BLD VENIPUNCTURE: CPT

## 2024-05-20 PROCEDURE — 72141 MRI NECK SPINE W/O DYE: CPT

## 2024-05-20 PROCEDURE — 2580000003 HC RX 258: Performed by: INTERNAL MEDICINE

## 2024-05-20 PROCEDURE — 82962 GLUCOSE BLOOD TEST: CPT

## 2024-05-20 PROCEDURE — 83921 ORGANIC ACID SINGLE QUANT: CPT

## 2024-05-20 PROCEDURE — 85652 RBC SED RATE AUTOMATED: CPT

## 2024-05-20 PROCEDURE — 6370000000 HC RX 637 (ALT 250 FOR IP): Performed by: INTERNAL MEDICINE

## 2024-05-20 PROCEDURE — 2060000000 HC ICU INTERMEDIATE R&B

## 2024-05-20 PROCEDURE — 80053 COMPREHEN METABOLIC PANEL: CPT

## 2024-05-20 PROCEDURE — 6360000002 HC RX W HCPCS: Performed by: PSYCHIATRY & NEUROLOGY

## 2024-05-20 PROCEDURE — 86140 C-REACTIVE PROTEIN: CPT

## 2024-05-20 PROCEDURE — 6370000000 HC RX 637 (ALT 250 FOR IP)

## 2024-05-20 PROCEDURE — 6370000000 HC RX 637 (ALT 250 FOR IP): Performed by: NURSE PRACTITIONER

## 2024-05-20 PROCEDURE — 6360000002 HC RX W HCPCS: Performed by: NURSE PRACTITIONER

## 2024-05-20 PROCEDURE — 6360000002 HC RX W HCPCS: Performed by: INTERNAL MEDICINE

## 2024-05-20 PROCEDURE — 83090 ASSAY OF HOMOCYSTEINE: CPT

## 2024-05-20 PROCEDURE — 85025 COMPLETE CBC W/AUTO DIFF WBC: CPT

## 2024-05-20 RX ORDER — INSULIN LISPRO 100 [IU]/ML
0-8 INJECTION, SOLUTION INTRAVENOUS; SUBCUTANEOUS
Status: DISCONTINUED | OUTPATIENT
Start: 2024-05-20 | End: 2024-06-03 | Stop reason: HOSPADM

## 2024-05-20 RX ORDER — PREDNISONE 20 MG/1
20 TABLET ORAL 2 TIMES DAILY
Status: DISCONTINUED | OUTPATIENT
Start: 2024-05-20 | End: 2024-05-22

## 2024-05-20 RX ADMIN — TRAMADOL HYDROCHLORIDE 50 MG: 50 TABLET ORAL at 10:36

## 2024-05-20 RX ADMIN — MAGNESIUM 64 MG (MAGNESIUM CHLORIDE) TABLET,DELAYED RELEASE 64 MG: at 08:51

## 2024-05-20 RX ADMIN — METOPROLOL SUCCINATE 25 MG: 25 TABLET, EXTENDED RELEASE ORAL at 08:52

## 2024-05-20 RX ADMIN — SODIUM CHLORIDE, PRESERVATIVE FREE 10 ML: 5 INJECTION INTRAVENOUS at 08:52

## 2024-05-20 RX ADMIN — CYANOCOBALAMIN TAB 1000 MCG 1000 MCG: 1000 TAB at 08:52

## 2024-05-20 RX ADMIN — ACETAMINOPHEN 650 MG: 325 TABLET ORAL at 04:09

## 2024-05-20 RX ADMIN — TORSEMIDE 20 MG: 20 TABLET ORAL at 08:51

## 2024-05-20 RX ADMIN — ASPIRIN 81 MG: 81 TABLET, COATED ORAL at 08:52

## 2024-05-20 RX ADMIN — ENOXAPARIN SODIUM 30 MG: 100 INJECTION SUBCUTANEOUS at 08:52

## 2024-05-20 RX ADMIN — SODIUM CHLORIDE, PRESERVATIVE FREE 10 ML: 5 INJECTION INTRAVENOUS at 21:07

## 2024-05-20 RX ADMIN — ENOXAPARIN SODIUM 30 MG: 100 INJECTION SUBCUTANEOUS at 21:06

## 2024-05-20 RX ADMIN — ASPIRIN 81 MG: 81 TABLET, COATED ORAL at 21:06

## 2024-05-20 RX ADMIN — PREGABALIN 75 MG: 75 CAPSULE ORAL at 08:51

## 2024-05-20 RX ADMIN — PREGABALIN 75 MG: 75 CAPSULE ORAL at 21:06

## 2024-05-20 RX ADMIN — CEFEPIME 2000 MG: 2 INJECTION, POWDER, FOR SOLUTION INTRAVENOUS at 06:33

## 2024-05-20 RX ADMIN — LORAZEPAM 1 MG: 2 INJECTION INTRAMUSCULAR; INTRAVENOUS at 19:23

## 2024-05-20 RX ADMIN — ROSUVASTATIN 20 MG: 20 TABLET, FILM COATED ORAL at 21:06

## 2024-05-20 RX ADMIN — PREDNISONE 20 MG: 20 TABLET ORAL at 21:06

## 2024-05-20 ASSESSMENT — PAIN - FUNCTIONAL ASSESSMENT: PAIN_FUNCTIONAL_ASSESSMENT: ACTIVITIES ARE NOT PREVENTED

## 2024-05-20 ASSESSMENT — PAIN SCALES - GENERAL
PAINLEVEL_OUTOF10: 0
PAINLEVEL_OUTOF10: 9
PAINLEVEL_OUTOF10: 3

## 2024-05-20 ASSESSMENT — PAIN DESCRIPTION - LOCATION: LOCATION: KNEE

## 2024-05-20 ASSESSMENT — PAIN DESCRIPTION - ORIENTATION: ORIENTATION: RIGHT

## 2024-05-20 ASSESSMENT — PAIN DESCRIPTION - DESCRIPTORS: DESCRIPTORS: CRAMPING;DISCOMFORT;THROBBING

## 2024-05-20 NOTE — ACP (ADVANCE CARE PLANNING)
Advance Care Planning   Healthcare Decision Maker:    Primary Decision Maker: Meliton Fischer - Brother/Sister - [phone]    Click here to complete Healthcare Decision Makers including selection of the Healthcare Decision Maker Relationship (ie \"Primary\").

## 2024-05-21 PROBLEM — M50.20 CERVICAL HERNIATED DISC: Status: ACTIVE | Noted: 2024-05-21

## 2024-05-21 LAB
ALBUMIN SERPL-MCNC: 3.8 G/DL (ref 3.5–5.2)
ALP SERPL-CCNC: 82 U/L (ref 35–104)
ALT SERPL-CCNC: 13 U/L (ref 0–32)
ANION GAP SERPL CALCULATED.3IONS-SCNC: 15 MMOL/L (ref 7–16)
AST SERPL-CCNC: 15 U/L (ref 0–31)
BASOPHILS # BLD: 0.02 K/UL (ref 0–0.2)
BASOPHILS NFR BLD: 0 % (ref 0–2)
BILIRUB SERPL-MCNC: 0.3 MG/DL (ref 0–1.2)
BUN SERPL-MCNC: 23 MG/DL (ref 6–23)
CALCIUM SERPL-MCNC: 9.5 MG/DL (ref 8.6–10.2)
CHLORIDE SERPL-SCNC: 98 MMOL/L (ref 98–107)
CO2 SERPL-SCNC: 23 MMOL/L (ref 22–29)
CREAT SERPL-MCNC: 0.8 MG/DL (ref 0.5–1)
EOSINOPHIL # BLD: 0.01 K/UL (ref 0.05–0.5)
EOSINOPHILS RELATIVE PERCENT: 0 % (ref 0–6)
ERYTHROCYTE [DISTWIDTH] IN BLOOD BY AUTOMATED COUNT: 13.1 % (ref 11.5–15)
GFR, ESTIMATED: 85 ML/MIN/1.73M2
GLUCOSE BLD-MCNC: 192 MG/DL (ref 74–99)
GLUCOSE BLD-MCNC: 214 MG/DL (ref 74–99)
GLUCOSE BLD-MCNC: 216 MG/DL (ref 74–99)
GLUCOSE BLD-MCNC: 260 MG/DL (ref 74–99)
GLUCOSE SERPL-MCNC: 196 MG/DL (ref 74–99)
HCT VFR BLD AUTO: 35.4 % (ref 34–48)
HGB BLD-MCNC: 12.1 G/DL (ref 11.5–15.5)
IMM GRANULOCYTES # BLD AUTO: 0.07 K/UL (ref 0–0.58)
IMM GRANULOCYTES NFR BLD: 1 % (ref 0–5)
LYMPHOCYTES NFR BLD: 0.6 K/UL (ref 1.5–4)
LYMPHOCYTES RELATIVE PERCENT: 9 % (ref 20–42)
MCH RBC QN AUTO: 30.7 PG (ref 26–35)
MCHC RBC AUTO-ENTMCNC: 34.2 G/DL (ref 32–34.5)
MCV RBC AUTO: 89.8 FL (ref 80–99.9)
MONOCYTES NFR BLD: 0.18 K/UL (ref 0.1–0.95)
MONOCYTES NFR BLD: 3 % (ref 2–12)
NEUTROPHILS NFR BLD: 87 % (ref 43–80)
NEUTS SEG NFR BLD: 5.73 K/UL (ref 1.8–7.3)
PLATELET # BLD AUTO: 264 K/UL (ref 130–450)
PMV BLD AUTO: 9.5 FL (ref 7–12)
POTASSIUM SERPL-SCNC: 4.1 MMOL/L (ref 3.5–5)
PROT SERPL-MCNC: 6.7 G/DL (ref 6.4–8.3)
RBC # BLD AUTO: 3.94 M/UL (ref 3.5–5.5)
SODIUM SERPL-SCNC: 136 MMOL/L (ref 132–146)
WBC OTHER # BLD: 6.6 K/UL (ref 4.5–11.5)

## 2024-05-21 PROCEDURE — 6370000000 HC RX 637 (ALT 250 FOR IP): Performed by: INTERNAL MEDICINE

## 2024-05-21 PROCEDURE — 6360000002 HC RX W HCPCS: Performed by: NURSE PRACTITIONER

## 2024-05-21 PROCEDURE — 6370000000 HC RX 637 (ALT 250 FOR IP): Performed by: NURSE PRACTITIONER

## 2024-05-21 PROCEDURE — 6370000000 HC RX 637 (ALT 250 FOR IP)

## 2024-05-21 PROCEDURE — 2580000003 HC RX 258: Performed by: NURSE PRACTITIONER

## 2024-05-21 PROCEDURE — 85025 COMPLETE CBC W/AUTO DIFF WBC: CPT

## 2024-05-21 PROCEDURE — 36415 COLL VENOUS BLD VENIPUNCTURE: CPT

## 2024-05-21 PROCEDURE — 80053 COMPREHEN METABOLIC PANEL: CPT

## 2024-05-21 PROCEDURE — 1200000000 HC SEMI PRIVATE

## 2024-05-21 PROCEDURE — 99222 1ST HOSP IP/OBS MODERATE 55: CPT | Performed by: NEUROLOGICAL SURGERY

## 2024-05-21 PROCEDURE — 82962 GLUCOSE BLOOD TEST: CPT

## 2024-05-21 RX ADMIN — INSULIN LISPRO 2 UNITS: 100 INJECTION, SOLUTION INTRAVENOUS; SUBCUTANEOUS at 17:32

## 2024-05-21 RX ADMIN — PREDNISONE 20 MG: 20 TABLET ORAL at 09:38

## 2024-05-21 RX ADMIN — ENOXAPARIN SODIUM 30 MG: 100 INJECTION SUBCUTANEOUS at 21:10

## 2024-05-21 RX ADMIN — ROSUVASTATIN 20 MG: 20 TABLET, FILM COATED ORAL at 21:11

## 2024-05-21 RX ADMIN — SODIUM CHLORIDE, PRESERVATIVE FREE 10 ML: 5 INJECTION INTRAVENOUS at 21:16

## 2024-05-21 RX ADMIN — TORSEMIDE 20 MG: 20 TABLET ORAL at 10:30

## 2024-05-21 RX ADMIN — PREDNISONE 20 MG: 20 TABLET ORAL at 21:11

## 2024-05-21 RX ADMIN — ENOXAPARIN SODIUM 30 MG: 100 INJECTION SUBCUTANEOUS at 09:37

## 2024-05-21 RX ADMIN — TRAMADOL HYDROCHLORIDE 50 MG: 50 TABLET ORAL at 10:30

## 2024-05-21 RX ADMIN — INSULIN LISPRO 2 UNITS: 100 INJECTION, SOLUTION INTRAVENOUS; SUBCUTANEOUS at 21:10

## 2024-05-21 RX ADMIN — MAGNESIUM 64 MG (MAGNESIUM CHLORIDE) TABLET,DELAYED RELEASE 64 MG: at 10:31

## 2024-05-21 RX ADMIN — PREGABALIN 75 MG: 75 CAPSULE ORAL at 09:38

## 2024-05-21 RX ADMIN — ASPIRIN 81 MG: 81 TABLET, COATED ORAL at 21:12

## 2024-05-21 RX ADMIN — TRAMADOL HYDROCHLORIDE 50 MG: 50 TABLET ORAL at 00:22

## 2024-05-21 RX ADMIN — METOPROLOL SUCCINATE 25 MG: 25 TABLET, EXTENDED RELEASE ORAL at 09:38

## 2024-05-21 RX ADMIN — PREGABALIN 75 MG: 75 CAPSULE ORAL at 21:11

## 2024-05-21 RX ADMIN — LOSARTAN POTASSIUM 25 MG: 25 TABLET, FILM COATED ORAL at 09:38

## 2024-05-21 RX ADMIN — ACETAMINOPHEN 650 MG: 325 TABLET ORAL at 01:47

## 2024-05-21 RX ADMIN — INSULIN LISPRO 2 UNITS: 100 INJECTION, SOLUTION INTRAVENOUS; SUBCUTANEOUS at 11:42

## 2024-05-21 RX ADMIN — CYANOCOBALAMIN TAB 1000 MCG 1000 MCG: 1000 TAB at 10:31

## 2024-05-21 RX ADMIN — ASPIRIN 81 MG: 81 TABLET, COATED ORAL at 09:38

## 2024-05-21 ASSESSMENT — PAIN DESCRIPTION - ORIENTATION
ORIENTATION: LOWER

## 2024-05-21 ASSESSMENT — PAIN DESCRIPTION - LOCATION
LOCATION: BACK

## 2024-05-21 ASSESSMENT — PAIN SCALES - GENERAL
PAINLEVEL_OUTOF10: 9
PAINLEVEL_OUTOF10: 0
PAINLEVEL_OUTOF10: 9
PAINLEVEL_OUTOF10: 8

## 2024-05-21 ASSESSMENT — PAIN - FUNCTIONAL ASSESSMENT
PAIN_FUNCTIONAL_ASSESSMENT: ACTIVITIES ARE NOT PREVENTED
PAIN_FUNCTIONAL_ASSESSMENT: PREVENTS OR INTERFERES SOME ACTIVE ACTIVITIES AND ADLS
PAIN_FUNCTIONAL_ASSESSMENT: ACTIVITIES ARE NOT PREVENTED

## 2024-05-21 ASSESSMENT — PAIN DESCRIPTION - DESCRIPTORS
DESCRIPTORS: ACHING;CRAMPING;SHARP;STABBING
DESCRIPTORS: ACHING;DISCOMFORT

## 2024-05-21 NOTE — CONSULTS
Neurosurgery Consult Note      CHIEF COMPLAINT: Reason for neurosurgical consultation is weakness and numbness of the lower extremities    HPI: Patient is a 68-year-old female with multiple medical problems including diabetes morbid obesity history of severe peripheral neuropathy who for the past 6 months has had slow steady progression of numbness and weakness in the lower extremities to the point that she struggles to ambulate in the last couple weeks approximately 6 months ago she had a right total knee replacement she denied any recent trauma, MRIs of the cervical and lumbar spine document multilevel stenosis history and physical reviewed consultations reviewed patient was counseled extensively greater than 50% of the encounter time.    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)    Vitreous floaters of both eyes      Past Surgical History:   Procedure Laterality Date    APPENDECTOMY      CHOLECYSTECTOMY      COLONOSCOPY  07/11/2016    EYE SURGERY      EYE SURGERY      GALLBLADDER SURGERY  1999    KNEE ARTHROSCOPY Right 2008    TOTAL KNEE ARTHROPLASTY Right 1/30/2024    ROBOTIC LISA ASSISTED RIGHT KNEE TOTAL ARTHROPLASTY   +++ELOISA+++  ++PNB++ performed by Alberto Campbell MD at [facility] OR    UPPER GASTROINTESTINAL ENDOSCOPY  10/2016     Lipitor [atorvastatin], Megace [megestrol acetate], Vytorin [ezetimibe-simvastatin], and Zocor [simvastatin]  Prior to Admission medications    Medication Sig Start Date End Date Taking? Authorizing Provider   rosuvastatin (CRESTOR) 20 MG tablet Take 1 tablet by mouth nightly    Provider, MD Luis   spironolactone (ALDACTONE) 25 MG tablet Take 1 tablet by mouth nightly    Provider,

## 2024-05-21 NOTE — CONSULTS
NEUROSURGERY INPATIENT CONSULT NOTE    Chief Complaint: Difficulty walking, neck and low back pain    HPI:   Mignon Fischer is a 68 y.o.  female who presents for evaluation of difficulty walking and leg weakness. Patient states she had a right knee replacement at the beginning of the year and was doing well working with therapy until about 2 weeks ago when she states she \"started to feel weaker in her legs\". She states her leg kept giving out and she started to have frequent falls. She admits to associated numbness and weakness in her arms and legs. She admits to associated low back and neck soreness, but denies any radiation of this pain. She denies any bowel or bladder incontinence.     MRI Cervical spine with severe stenosis at C6-C7 and moderate stenosis at C5-C6 which is why Neurosurgery was consulted.     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)    Vitreous floaters of both eyes      Past Surgical History:   Procedure Laterality Date    APPENDECTOMY      CHOLECYSTECTOMY      COLONOSCOPY  07/11/2016    EYE SURGERY      EYE SURGERY      GALLBLADDER SURGERY  1999    KNEE ARTHROSCOPY Right 2008    TOTAL KNEE ARTHROPLASTY Right 1/30/2024    ROBOTIC LISA ASSISTED RIGHT KNEE TOTAL ARTHROPLASTY   +++ELOISA+++  ++PNB++ performed by Alberto Campbell MD at [facility] OR    UPPER GASTROINTESTINAL ENDOSCOPY  10/2016      Family History   Problem Relation Age of Onset    Heart Disease Mother         silent MI     Diabetes Mother     Heart Disease Father         3 coronary stents    Cancer Sister         breast     Hypertension Brother     Cancer Sister         breast        Medications:   Current Facility-Administered Medications

## 2024-05-22 PROBLEM — Z01.818 PRE-OP EVALUATION: Status: ACTIVE | Noted: 2024-05-22

## 2024-05-22 LAB
ALBUMIN SERPL-MCNC: 3.8 G/DL (ref 3.5–5.2)
ALP SERPL-CCNC: 80 U/L (ref 35–104)
ALT SERPL-CCNC: 13 U/L (ref 0–32)
ANION GAP SERPL CALCULATED.3IONS-SCNC: 15 MMOL/L (ref 7–16)
AST SERPL-CCNC: 14 U/L (ref 0–31)
BASOPHILS # BLD: 0.02 K/UL (ref 0–0.2)
BASOPHILS NFR BLD: 0 % (ref 0–2)
BILIRUB SERPL-MCNC: 0.2 MG/DL (ref 0–1.2)
BUN SERPL-MCNC: 24 MG/DL (ref 6–23)
CALCIUM SERPL-MCNC: 9.2 MG/DL (ref 8.6–10.2)
CHLORIDE SERPL-SCNC: 100 MMOL/L (ref 98–107)
CO2 SERPL-SCNC: 23 MMOL/L (ref 22–29)
CREAT SERPL-MCNC: 0.8 MG/DL (ref 0.5–1)
EKG ATRIAL RATE: 68 BPM
EKG P AXIS: 4 DEGREES
EKG P-R INTERVAL: 176 MS
EKG Q-T INTERVAL: 394 MS
EKG QRS DURATION: 86 MS
EKG QTC CALCULATION (BAZETT): 418 MS
EKG R AXIS: -13 DEGREES
EKG T AXIS: 6 DEGREES
EKG VENTRICULAR RATE: 68 BPM
EOSINOPHIL # BLD: 0.01 K/UL (ref 0.05–0.5)
EOSINOPHILS RELATIVE PERCENT: 0 % (ref 0–6)
ERYTHROCYTE [DISTWIDTH] IN BLOOD BY AUTOMATED COUNT: 13.2 % (ref 11.5–15)
GFR, ESTIMATED: 83 ML/MIN/1.73M2
GLUCOSE BLD-MCNC: 224 MG/DL (ref 74–99)
GLUCOSE BLD-MCNC: 237 MG/DL (ref 74–99)
GLUCOSE BLD-MCNC: 250 MG/DL (ref 74–99)
GLUCOSE BLD-MCNC: 255 MG/DL (ref 74–99)
GLUCOSE SERPL-MCNC: 204 MG/DL (ref 74–99)
HCT VFR BLD AUTO: 35.2 % (ref 34–48)
HGB BLD-MCNC: 12.1 G/DL (ref 11.5–15.5)
IMM GRANULOCYTES # BLD AUTO: 0.08 K/UL (ref 0–0.58)
IMM GRANULOCYTES NFR BLD: 1 % (ref 0–5)
LYMPHOCYTES NFR BLD: 0.9 K/UL (ref 1.5–4)
LYMPHOCYTES RELATIVE PERCENT: 11 % (ref 20–42)
MAGNESIUM SERPL-MCNC: 2.2 MG/DL (ref 1.6–2.6)
MCH RBC QN AUTO: 30.9 PG (ref 26–35)
MCHC RBC AUTO-ENTMCNC: 34.4 G/DL (ref 32–34.5)
MCV RBC AUTO: 90 FL (ref 80–99.9)
MONOCYTES NFR BLD: 0.36 K/UL (ref 0.1–0.95)
MONOCYTES NFR BLD: 4 % (ref 2–12)
NEUTROPHILS NFR BLD: 84 % (ref 43–80)
NEUTS SEG NFR BLD: 7.2 K/UL (ref 1.8–7.3)
PLATELET # BLD AUTO: 283 K/UL (ref 130–450)
PMV BLD AUTO: 9.5 FL (ref 7–12)
POTASSIUM SERPL-SCNC: 3.9 MMOL/L (ref 3.5–5)
PROT SERPL-MCNC: 6.9 G/DL (ref 6.4–8.3)
RBC # BLD AUTO: 3.91 M/UL (ref 3.5–5.5)
SODIUM SERPL-SCNC: 138 MMOL/L (ref 132–146)
WBC OTHER # BLD: 8.6 K/UL (ref 4.5–11.5)

## 2024-05-22 PROCEDURE — 85025 COMPLETE CBC W/AUTO DIFF WBC: CPT

## 2024-05-22 PROCEDURE — 83735 ASSAY OF MAGNESIUM: CPT

## 2024-05-22 PROCEDURE — APPSS60 APP SPLIT SHARED TIME 46-60 MINUTES: Performed by: CLINICAL NURSE SPECIALIST

## 2024-05-22 PROCEDURE — 2580000003 HC RX 258: Performed by: NURSE PRACTITIONER

## 2024-05-22 PROCEDURE — 99222 1ST HOSP IP/OBS MODERATE 55: CPT | Performed by: INTERNAL MEDICINE

## 2024-05-22 PROCEDURE — 6370000000 HC RX 637 (ALT 250 FOR IP): Performed by: NURSE PRACTITIONER

## 2024-05-22 PROCEDURE — 6370000000 HC RX 637 (ALT 250 FOR IP)

## 2024-05-22 PROCEDURE — 36415 COLL VENOUS BLD VENIPUNCTURE: CPT

## 2024-05-22 PROCEDURE — 6370000000 HC RX 637 (ALT 250 FOR IP): Performed by: INTERNAL MEDICINE

## 2024-05-22 PROCEDURE — 93005 ELECTROCARDIOGRAM TRACING: CPT | Performed by: CLINICAL NURSE SPECIALIST

## 2024-05-22 PROCEDURE — 93010 ELECTROCARDIOGRAM REPORT: CPT | Performed by: INTERNAL MEDICINE

## 2024-05-22 PROCEDURE — 1200000000 HC SEMI PRIVATE

## 2024-05-22 PROCEDURE — 82962 GLUCOSE BLOOD TEST: CPT

## 2024-05-22 PROCEDURE — 80053 COMPREHEN METABOLIC PANEL: CPT

## 2024-05-22 RX ORDER — SODIUM CHLORIDE 9 MG/ML
INJECTION, SOLUTION INTRAVENOUS CONTINUOUS
Status: DISCONTINUED | OUTPATIENT
Start: 2024-05-24 | End: 2024-06-03 | Stop reason: HOSPADM

## 2024-05-22 RX ADMIN — ACETAMINOPHEN 650 MG: 325 TABLET ORAL at 06:39

## 2024-05-22 RX ADMIN — LOSARTAN POTASSIUM 25 MG: 25 TABLET, FILM COATED ORAL at 09:21

## 2024-05-22 RX ADMIN — INSULIN LISPRO 4 UNITS: 100 INJECTION, SOLUTION INTRAVENOUS; SUBCUTANEOUS at 20:32

## 2024-05-22 RX ADMIN — INSULIN LISPRO 4 UNITS: 100 INJECTION, SOLUTION INTRAVENOUS; SUBCUTANEOUS at 12:04

## 2024-05-22 RX ADMIN — TORSEMIDE 20 MG: 20 TABLET ORAL at 09:22

## 2024-05-22 RX ADMIN — ACETAMINOPHEN 650 MG: 325 TABLET ORAL at 20:33

## 2024-05-22 RX ADMIN — CYCLOBENZAPRINE 10 MG: 10 TABLET, FILM COATED ORAL at 20:32

## 2024-05-22 RX ADMIN — SODIUM CHLORIDE, PRESERVATIVE FREE 10 ML: 5 INJECTION INTRAVENOUS at 20:33

## 2024-05-22 RX ADMIN — ROSUVASTATIN 20 MG: 20 TABLET, FILM COATED ORAL at 20:32

## 2024-05-22 RX ADMIN — METOPROLOL SUCCINATE 25 MG: 25 TABLET, EXTENDED RELEASE ORAL at 09:21

## 2024-05-22 RX ADMIN — TRAMADOL HYDROCHLORIDE 50 MG: 50 TABLET ORAL at 00:50

## 2024-05-22 RX ADMIN — INSULIN LISPRO 2 UNITS: 100 INJECTION, SOLUTION INTRAVENOUS; SUBCUTANEOUS at 17:11

## 2024-05-22 RX ADMIN — PREGABALIN 75 MG: 75 CAPSULE ORAL at 20:32

## 2024-05-22 RX ADMIN — CYANOCOBALAMIN TAB 1000 MCG 1000 MCG: 1000 TAB at 09:21

## 2024-05-22 RX ADMIN — TRAMADOL HYDROCHLORIDE 50 MG: 50 TABLET ORAL at 14:31

## 2024-05-22 RX ADMIN — SODIUM CHLORIDE, PRESERVATIVE FREE 10 ML: 5 INJECTION INTRAVENOUS at 09:22

## 2024-05-22 RX ADMIN — MAGNESIUM 64 MG (MAGNESIUM CHLORIDE) TABLET,DELAYED RELEASE 64 MG: at 09:22

## 2024-05-22 RX ADMIN — INSULIN LISPRO 2 UNITS: 100 INJECTION, SOLUTION INTRAVENOUS; SUBCUTANEOUS at 09:21

## 2024-05-22 RX ADMIN — PREGABALIN 75 MG: 75 CAPSULE ORAL at 09:22

## 2024-05-22 ASSESSMENT — PAIN - FUNCTIONAL ASSESSMENT: PAIN_FUNCTIONAL_ASSESSMENT: PREVENTS OR INTERFERES SOME ACTIVE ACTIVITIES AND ADLS

## 2024-05-22 ASSESSMENT — PAIN SCALES - GENERAL
PAINLEVEL_OUTOF10: 3
PAINLEVEL_OUTOF10: 8
PAINLEVEL_OUTOF10: 9

## 2024-05-22 ASSESSMENT — PAIN DESCRIPTION - LOCATION
LOCATION: BACK
LOCATION: HEAD
LOCATION: GENERALIZED

## 2024-05-22 ASSESSMENT — PAIN DESCRIPTION - DESCRIPTORS
DESCRIPTORS: ACHING;SORE;DULL
DESCRIPTORS: SPASM;ACHING;SORE

## 2024-05-22 ASSESSMENT — PAIN DESCRIPTION - ORIENTATION: ORIENTATION: LOWER

## 2024-05-22 NOTE — CONSULTS
Inpatient Cardiology Consultation      Reason for Consult:  Cardiac Clearance for C4-C7 ACDF    Consulting Physician: Dr Huerta     Requesting Physician:  Dr. Gomez     Date of Consultation: 5/22/2024    HISTORY OF PRESENT ILLNESS:   Mignon Fischer  is a 68 y.o.  female known to [facility] cardiology and followed by Dr. Sanz.  Seen last in office for preoperative clearance prior to a knee replacement 12/22/2023    PMH: see below    [facility] ED 5/15/24 fell  \"felt dizzy & legs gave out\" 4/16/2024 went to ER for evaluation discharged home and has been doing outpatient physical therapy for the past few weeks.  Increased extremity weakness arms and legs bilaterally, falls due to \"legs giving out\", difficulty ambulating - using a walker intermittently   Transferred to [facility] 5/17/24 to be seen by neurology  Neurology evaluated patient felt weakness and numbness in hands secondary to neuropathy versus bilateral carpal tunnel syndrome versus cervical spine disease.  MRI Cervical spine with severe stenosis at C6-C7 and moderate stenosis at C5-C6 -- consult neurosurgery Dr. Murphy plans for OR Friday for C4-C7 ACDF, r/b/a   Second opinion from neurosurgery Dr. Steel recommends holding off on neurosurgical intervention and awaiting EMG nerve conduction test  Patient wants to have surgery  Significant recent Labs: 5/18/2024 mag 1.7 today K3.9, sodium 138 BUN 24 CR 0.8 CRP 6.0 homocystine 17.2 albumin 3.8 LFT WNL HG A1c 7.3 TSH 1.11 WBC 8.6 Hgb 12.1  iron levels WNL.  Rheumatoid factor less than 10.  MARIO negative.  RAD: MRI of the cervical spine without contrast:Severe spinal stenosis at C6-7 associated with moderate myelomalacic   change extending from the C6-7 disc space through the mid C7 level.   2. Moderate spinal stenosis at C5-6 from mild broad disc bulge and moderate ligamentum flavum hypertrophy.  Mild myelomalacia inferior to the C5-6 disc space. 3. Moderate bilateral foraminal stenosis at C3-4, C4-5, and

## 2024-05-22 NOTE — ADT AUTH CERT
5/18/24  by Mehnaz Cody RN   Last Updated by Mehnaz Cody RN on 5/21/2024 1129     Review Status Created By   In Primary Mehnaz Cody RN       Review Type   --      Criteria Review   DATE: 5/18/24  TYPE OF BED: Intermediate     Patient has or is expected to exceed 2 midnights of medically necessary care.         RELEVANT BASELINES: (lab values, vitals, o2 amount/delivery, etc.)  RA     PERTINENT UPDATES:  IV fluids  Bilateral lower extremity weakness  NIH 5     VITALS:  97.6, 20, 81/41, 89 and 93%     ABNL/PERTINENT LABS/RADIOLOGY/DIAGNOSTIC STUDIES:  CARBON DIOXIDE: 21 (L)  Glucose: 148 (H)  Hemoglobin A1C: 7.3 (H)     POC Glucose: 153 (H)  POC Glucose: 122 (H)  POC Glucose: 137 (H)  POC Glucose: 290 (H)        PHYSICAL EXAM:  CONSTITUTIONAL:  awake, alert, not in apparent acute distress.    EYES:  No pallor or icterus.   ENT:  Normocephalic, without obvious abnormality, atraumatic, tongue moist  NECK:  supple.  JVD not appreciated  LUNGS:  No increased work of breathing, clear to auscultation bilaterally  CHEST: unremarkable  CARDIOVASCULAR:  S1 S2 regular rate and rhythm  ABDOMEN:  Normal bowel sounds, soft, non-tender   MUSCULOSKELETAL:  Moves all extremeties .  Bilateral lower extremity weakness  NEUROLOGIC:  Awake, alert and oriented. No gross focal deficit noted.  SKIN:  unremarkable  EXTREMETIES: Edema present  Rectal: deferred  Genitalia:  deferred        MD CONSULTS/ASSESSMENT AND PLAN:  IM  H&P note:  ASSESSMENT:  *Bilateral lower extremity weakness  *Osteoarthritis s/p right TKA  *Back pain  *Hypertension  *Diabetes mellitus  *Hyperlipidemia  *Neuropathy     PLAN:  Medications reviewed.  PT and OT consulted  Neurology has been consulted  Monitor blood sugars  Monitor vitals  DVT  prophylaxis on Lovenox     Neuro note:  Plan  ·           Check following labs:  ?          Vitamin D, vitamin B12, folate, TSH, RF, MARIO, iron panel  ·           MRI cervical spine without contrast  ·

## 2024-05-23 LAB
ALBUMIN SERPL-MCNC: 3.7 G/DL (ref 3.5–5.2)
ALP SERPL-CCNC: 77 U/L (ref 35–104)
ALT SERPL-CCNC: 19 U/L (ref 0–32)
ANION GAP SERPL CALCULATED.3IONS-SCNC: 14 MMOL/L (ref 7–16)
AST SERPL-CCNC: 22 U/L (ref 0–31)
BASOPHILS # BLD: 0.06 K/UL (ref 0–0.2)
BASOPHILS NFR BLD: 1 % (ref 0–2)
BILIRUB SERPL-MCNC: 0.2 MG/DL (ref 0–1.2)
BUN SERPL-MCNC: 32 MG/DL (ref 6–23)
CALCIUM SERPL-MCNC: 9.3 MG/DL (ref 8.6–10.2)
CHLORIDE SERPL-SCNC: 98 MMOL/L (ref 98–107)
CO2 SERPL-SCNC: 26 MMOL/L (ref 22–29)
CREAT SERPL-MCNC: 0.9 MG/DL (ref 0.5–1)
EOSINOPHIL # BLD: 0.29 K/UL (ref 0.05–0.5)
EOSINOPHILS RELATIVE PERCENT: 3 % (ref 0–6)
ERYTHROCYTE [DISTWIDTH] IN BLOOD BY AUTOMATED COUNT: 13.6 % (ref 11.5–15)
GFR, ESTIMATED: 72 ML/MIN/1.73M2
GLUCOSE BLD-MCNC: 124 MG/DL (ref 74–99)
GLUCOSE BLD-MCNC: 172 MG/DL (ref 74–99)
GLUCOSE BLD-MCNC: 172 MG/DL (ref 74–99)
GLUCOSE BLD-MCNC: 217 MG/DL (ref 74–99)
GLUCOSE SERPL-MCNC: 123 MG/DL (ref 74–99)
HCT VFR BLD AUTO: 35.8 % (ref 34–48)
HGB BLD-MCNC: 12.2 G/DL (ref 11.5–15.5)
IMM GRANULOCYTES # BLD AUTO: 0.14 K/UL (ref 0–0.58)
IMM GRANULOCYTES NFR BLD: 2 % (ref 0–5)
LYMPHOCYTES NFR BLD: 3.2 K/UL (ref 1.5–4)
LYMPHOCYTES RELATIVE PERCENT: 36 % (ref 20–42)
MAGNESIUM SERPL-MCNC: 2.1 MG/DL (ref 1.6–2.6)
MCH RBC QN AUTO: 31.2 PG (ref 26–35)
MCHC RBC AUTO-ENTMCNC: 34.1 G/DL (ref 32–34.5)
MCV RBC AUTO: 91.6 FL (ref 80–99.9)
MONOCYTES NFR BLD: 0.82 K/UL (ref 0.1–0.95)
MONOCYTES NFR BLD: 9 % (ref 2–12)
NEUTROPHILS NFR BLD: 50 % (ref 43–80)
NEUTS SEG NFR BLD: 4.47 K/UL (ref 1.8–7.3)
PLATELET # BLD AUTO: 297 K/UL (ref 130–450)
PMV BLD AUTO: 9.4 FL (ref 7–12)
POTASSIUM SERPL-SCNC: 3.2 MMOL/L (ref 3.5–5)
PROT SERPL-MCNC: 6.6 G/DL (ref 6.4–8.3)
RBC # BLD AUTO: 3.91 M/UL (ref 3.5–5.5)
SODIUM SERPL-SCNC: 138 MMOL/L (ref 132–146)
WBC OTHER # BLD: 9 K/UL (ref 4.5–11.5)

## 2024-05-23 PROCEDURE — 85025 COMPLETE CBC W/AUTO DIFF WBC: CPT

## 2024-05-23 PROCEDURE — L0172 CERV COL SR FOAM 2PC PRE OTS: HCPCS

## 2024-05-23 PROCEDURE — 36415 COLL VENOUS BLD VENIPUNCTURE: CPT

## 2024-05-23 PROCEDURE — 82962 GLUCOSE BLOOD TEST: CPT

## 2024-05-23 PROCEDURE — 80053 COMPREHEN METABOLIC PANEL: CPT

## 2024-05-23 PROCEDURE — 83735 ASSAY OF MAGNESIUM: CPT

## 2024-05-23 PROCEDURE — 6370000000 HC RX 637 (ALT 250 FOR IP): Performed by: NURSE PRACTITIONER

## 2024-05-23 PROCEDURE — 6370000000 HC RX 637 (ALT 250 FOR IP): Performed by: INTERNAL MEDICINE

## 2024-05-23 PROCEDURE — 2580000003 HC RX 258: Performed by: NURSE PRACTITIONER

## 2024-05-23 PROCEDURE — 1200000000 HC SEMI PRIVATE

## 2024-05-23 PROCEDURE — 6370000000 HC RX 637 (ALT 250 FOR IP)

## 2024-05-23 RX ADMIN — TORSEMIDE 20 MG: 20 TABLET ORAL at 08:08

## 2024-05-23 RX ADMIN — SODIUM CHLORIDE, PRESERVATIVE FREE 10 ML: 5 INJECTION INTRAVENOUS at 08:09

## 2024-05-23 RX ADMIN — POTASSIUM BICARBONATE 40 MEQ: 782 TABLET, EFFERVESCENT ORAL at 08:08

## 2024-05-23 RX ADMIN — ROSUVASTATIN 20 MG: 20 TABLET, FILM COATED ORAL at 20:32

## 2024-05-23 RX ADMIN — SODIUM CHLORIDE, PRESERVATIVE FREE 10 ML: 5 INJECTION INTRAVENOUS at 20:32

## 2024-05-23 RX ADMIN — TRAMADOL HYDROCHLORIDE 50 MG: 50 TABLET ORAL at 20:32

## 2024-05-23 RX ADMIN — PREGABALIN 75 MG: 75 CAPSULE ORAL at 08:08

## 2024-05-23 RX ADMIN — LOSARTAN POTASSIUM 25 MG: 25 TABLET, FILM COATED ORAL at 08:08

## 2024-05-23 RX ADMIN — INSULIN LISPRO 2 UNITS: 100 INJECTION, SOLUTION INTRAVENOUS; SUBCUTANEOUS at 17:44

## 2024-05-23 RX ADMIN — ACETAMINOPHEN 650 MG: 325 TABLET ORAL at 17:50

## 2024-05-23 RX ADMIN — TRAMADOL HYDROCHLORIDE 50 MG: 50 TABLET ORAL at 03:16

## 2024-05-23 RX ADMIN — MAGNESIUM 64 MG (MAGNESIUM CHLORIDE) TABLET,DELAYED RELEASE 64 MG: at 08:08

## 2024-05-23 RX ADMIN — CYCLOBENZAPRINE 10 MG: 10 TABLET, FILM COATED ORAL at 05:53

## 2024-05-23 RX ADMIN — CYANOCOBALAMIN TAB 1000 MCG 1000 MCG: 1000 TAB at 08:08

## 2024-05-23 RX ADMIN — METOPROLOL SUCCINATE 25 MG: 25 TABLET, EXTENDED RELEASE ORAL at 08:09

## 2024-05-23 RX ADMIN — ACETAMINOPHEN 650 MG: 325 TABLET ORAL at 05:53

## 2024-05-23 RX ADMIN — PREGABALIN 75 MG: 75 CAPSULE ORAL at 20:32

## 2024-05-23 RX ADMIN — CYCLOBENZAPRINE 10 MG: 10 TABLET, FILM COATED ORAL at 17:51

## 2024-05-23 ASSESSMENT — PAIN DESCRIPTION - ONSET: ONSET: ON-GOING

## 2024-05-23 ASSESSMENT — PAIN DESCRIPTION - LOCATION: LOCATION: BACK;LEG

## 2024-05-23 ASSESSMENT — PAIN - FUNCTIONAL ASSESSMENT: PAIN_FUNCTIONAL_ASSESSMENT: PREVENTS OR INTERFERES SOME ACTIVE ACTIVITIES AND ADLS

## 2024-05-23 ASSESSMENT — PAIN SCALES - GENERAL
PAINLEVEL_OUTOF10: 4
PAINLEVEL_OUTOF10: 7
PAINLEVEL_OUTOF10: 7

## 2024-05-23 ASSESSMENT — PAIN DESCRIPTION - DESCRIPTORS: DESCRIPTORS: SPASM;ACHING;DISCOMFORT

## 2024-05-23 ASSESSMENT — PAIN DESCRIPTION - ORIENTATION: ORIENTATION: UPPER;LOWER

## 2024-05-23 ASSESSMENT — PAIN DESCRIPTION - FREQUENCY: FREQUENCY: CONTINUOUS

## 2024-05-23 ASSESSMENT — PAIN DESCRIPTION - PAIN TYPE: TYPE: ACUTE PAIN

## 2024-05-24 ENCOUNTER — ANESTHESIA EVENT (OUTPATIENT)
Dept: OPERATING ROOM | Age: 69
End: 2024-05-24
Payer: MEDICARE

## 2024-05-24 ENCOUNTER — ANESTHESIA (OUTPATIENT)
Dept: OPERATING ROOM | Age: 69
End: 2024-05-24
Payer: MEDICARE

## 2024-05-24 ENCOUNTER — APPOINTMENT (OUTPATIENT)
Dept: GENERAL RADIOLOGY | Age: 69
DRG: 472 | End: 2024-05-24
Attending: STUDENT IN AN ORGANIZED HEALTH CARE EDUCATION/TRAINING PROGRAM
Payer: MEDICARE

## 2024-05-24 LAB
ALBUMIN SERPL-MCNC: 3.8 G/DL (ref 3.5–5.2)
ALP SERPL-CCNC: 86 U/L (ref 35–104)
ALT SERPL-CCNC: 23 U/L (ref 0–32)
ANION GAP SERPL CALCULATED.3IONS-SCNC: 13 MMOL/L (ref 7–16)
AST SERPL-CCNC: 21 U/L (ref 0–31)
BASOPHILS # BLD: 0.07 K/UL (ref 0–0.2)
BASOPHILS NFR BLD: 1 % (ref 0–2)
BILIRUB SERPL-MCNC: 0.2 MG/DL (ref 0–1.2)
BUN SERPL-MCNC: 30 MG/DL (ref 6–23)
CALCIUM SERPL-MCNC: 9.5 MG/DL (ref 8.6–10.2)
CHLORIDE SERPL-SCNC: 96 MMOL/L (ref 98–107)
CO2 SERPL-SCNC: 27 MMOL/L (ref 22–29)
CREAT SERPL-MCNC: 0.9 MG/DL (ref 0.5–1)
EOSINOPHIL # BLD: 0.32 K/UL (ref 0.05–0.5)
EOSINOPHILS RELATIVE PERCENT: 3 % (ref 0–6)
ERYTHROCYTE [DISTWIDTH] IN BLOOD BY AUTOMATED COUNT: 13.6 % (ref 11.5–15)
GFR, ESTIMATED: 72 ML/MIN/1.73M2
GLUCOSE BLD-MCNC: 124 MG/DL (ref 74–99)
GLUCOSE BLD-MCNC: 134 MG/DL (ref 74–99)
GLUCOSE BLD-MCNC: 222 MG/DL (ref 74–99)
GLUCOSE SERPL-MCNC: 157 MG/DL (ref 74–99)
HCT VFR BLD AUTO: 37.5 % (ref 34–48)
HGB BLD-MCNC: 12.7 G/DL (ref 11.5–15.5)
IMM GRANULOCYTES # BLD AUTO: 0.12 K/UL (ref 0–0.58)
IMM GRANULOCYTES NFR BLD: 1 % (ref 0–5)
LYMPHOCYTES NFR BLD: 3.38 K/UL (ref 1.5–4)
LYMPHOCYTES RELATIVE PERCENT: 36 % (ref 20–42)
MCH RBC QN AUTO: 31 PG (ref 26–35)
MCHC RBC AUTO-ENTMCNC: 33.9 G/DL (ref 32–34.5)
MCV RBC AUTO: 91.5 FL (ref 80–99.9)
METHYLMALONATE SERPL-SCNC: 0.49 UMOL/L (ref 0–0.4)
MONOCYTES NFR BLD: 0.87 K/UL (ref 0.1–0.95)
MONOCYTES NFR BLD: 9 % (ref 2–12)
NEUTROPHILS NFR BLD: 50 % (ref 43–80)
NEUTS SEG NFR BLD: 4.73 K/UL (ref 1.8–7.3)
PLATELET # BLD AUTO: 313 K/UL (ref 130–450)
PMV BLD AUTO: 9.1 FL (ref 7–12)
POTASSIUM SERPL-SCNC: 3.6 MMOL/L (ref 3.5–5)
PROT SERPL-MCNC: 6.8 G/DL (ref 6.4–8.3)
RBC # BLD AUTO: 4.1 M/UL (ref 3.5–5.5)
SODIUM SERPL-SCNC: 136 MMOL/L (ref 132–146)
WBC OTHER # BLD: 9.5 K/UL (ref 4.5–11.5)

## 2024-05-24 PROCEDURE — C1889 IMPLANT/INSERT DEVICE, NOC: HCPCS | Performed by: NEUROLOGICAL SURGERY

## 2024-05-24 PROCEDURE — 6370000000 HC RX 637 (ALT 250 FOR IP)

## 2024-05-24 PROCEDURE — 22558 ARTHRD ANT NTRBD MIN DSC LUM: CPT | Performed by: PHYSICIAN ASSISTANT

## 2024-05-24 PROCEDURE — 22846 INSERT SPINE FIXATION DEVICE: CPT | Performed by: NEUROLOGICAL SURGERY

## 2024-05-24 PROCEDURE — 3700000000 HC ANESTHESIA ATTENDED CARE: Performed by: NEUROLOGICAL SURGERY

## 2024-05-24 PROCEDURE — 2580000003 HC RX 258: Performed by: PHYSICIAN ASSISTANT

## 2024-05-24 PROCEDURE — 6360000002 HC RX W HCPCS: Performed by: ANESTHESIOLOGY

## 2024-05-24 PROCEDURE — 22558 ARTHRD ANT NTRBD MIN DSC LUM: CPT | Performed by: NEUROLOGICAL SURGERY

## 2024-05-24 PROCEDURE — 6360000002 HC RX W HCPCS

## 2024-05-24 PROCEDURE — 80053 COMPREHEN METABOLIC PANEL: CPT

## 2024-05-24 PROCEDURE — 20931 SP BONE ALGRFT STRUCT ADD-ON: CPT | Performed by: NEUROLOGICAL SURGERY

## 2024-05-24 PROCEDURE — 3600000004 HC SURGERY LEVEL 4 BASE: Performed by: NEUROLOGICAL SURGERY

## 2024-05-24 PROCEDURE — 6370000000 HC RX 637 (ALT 250 FOR IP): Performed by: NURSE PRACTITIONER

## 2024-05-24 PROCEDURE — 85025 COMPLETE CBC W/AUTO DIFF WBC: CPT

## 2024-05-24 PROCEDURE — 2580000003 HC RX 258: Performed by: STUDENT IN AN ORGANIZED HEALTH CARE EDUCATION/TRAINING PROGRAM

## 2024-05-24 PROCEDURE — A4217 STERILE WATER/SALINE, 500 ML: HCPCS | Performed by: NEUROLOGICAL SURGERY

## 2024-05-24 PROCEDURE — 2580000003 HC RX 258: Performed by: NEUROLOGICAL SURGERY

## 2024-05-24 PROCEDURE — 7100000000 HC PACU RECOVERY - FIRST 15 MIN: Performed by: NEUROLOGICAL SURGERY

## 2024-05-24 PROCEDURE — 1200000000 HC SEMI PRIVATE

## 2024-05-24 PROCEDURE — 2500000003 HC RX 250 WO HCPCS

## 2024-05-24 PROCEDURE — 88304 TISSUE EXAM BY PATHOLOGIST: CPT

## 2024-05-24 PROCEDURE — 2500000003 HC RX 250 WO HCPCS: Performed by: NEUROLOGICAL SURGERY

## 2024-05-24 PROCEDURE — 36415 COLL VENOUS BLD VENIPUNCTURE: CPT

## 2024-05-24 PROCEDURE — 7100000001 HC PACU RECOVERY - ADDTL 15 MIN: Performed by: NEUROLOGICAL SURGERY

## 2024-05-24 PROCEDURE — 2500000003 HC RX 250 WO HCPCS: Performed by: ANESTHESIOLOGY

## 2024-05-24 PROCEDURE — 22585 ARTHRD ANT NTRBD MIN DSC EA: CPT | Performed by: NEUROLOGICAL SURGERY

## 2024-05-24 PROCEDURE — 2709999900 HC NON-CHARGEABLE SUPPLY: Performed by: NEUROLOGICAL SURGERY

## 2024-05-24 PROCEDURE — 0RB30ZZ EXCISION OF CERVICAL VERTEBRAL DISC, OPEN APPROACH: ICD-10-PCS | Performed by: NEUROLOGICAL SURGERY

## 2024-05-24 PROCEDURE — 6370000000 HC RX 637 (ALT 250 FOR IP): Performed by: NEUROLOGICAL SURGERY

## 2024-05-24 PROCEDURE — C1713 ANCHOR/SCREW BN/BN,TIS/BN: HCPCS | Performed by: NEUROLOGICAL SURGERY

## 2024-05-24 PROCEDURE — 6360000002 HC RX W HCPCS: Performed by: NEUROLOGICAL SURGERY

## 2024-05-24 PROCEDURE — 2720000010 HC SURG SUPPLY STERILE: Performed by: NEUROLOGICAL SURGERY

## 2024-05-24 PROCEDURE — 6370000000 HC RX 637 (ALT 250 FOR IP): Performed by: INTERNAL MEDICINE

## 2024-05-24 PROCEDURE — 3600000014 HC SURGERY LEVEL 4 ADDTL 15MIN: Performed by: NEUROLOGICAL SURGERY

## 2024-05-24 PROCEDURE — 0RG20A0 FUSION OF 2 OR MORE CERVICAL VERTEBRAL JOINTS WITH INTERBODY FUSION DEVICE, ANTERIOR APPROACH, ANTERIOR COLUMN, OPEN APPROACH: ICD-10-PCS | Performed by: NEUROLOGICAL SURGERY

## 2024-05-24 PROCEDURE — 3700000001 HC ADD 15 MINUTES (ANESTHESIA): Performed by: NEUROLOGICAL SURGERY

## 2024-05-24 PROCEDURE — 82962 GLUCOSE BLOOD TEST: CPT

## 2024-05-24 PROCEDURE — 22585 ARTHRD ANT NTRBD MIN DSC EA: CPT | Performed by: PHYSICIAN ASSISTANT

## 2024-05-24 DEVICE — XTEND ANTERIOR CERVICAL PLATE, 3-LEVEL, 54MM
Type: IMPLANTABLE DEVICE | Site: SPINE CERVICAL | Status: FUNCTIONAL
Brand: XTEND

## 2024-05-24 DEVICE — XTEND 4.2MM VARIABLE ANGLE SCREW, SELF-DRILLING, 14MM
Type: IMPLANTABLE DEVICE | Site: SPINE CERVICAL | Status: FUNCTIONAL
Brand: XTEND

## 2024-05-24 DEVICE — XTEND 4.2MM VARIABLE ANGLE SCREW, SELF-DRILLING, 16MM
Type: IMPLANTABLE DEVICE | Site: SPINE CERVICAL | Status: FUNCTIONAL
Brand: XTEND

## 2024-05-24 DEVICE — GRAFT BNE SUB W12XH7XL14MM CANC CORT ANT CERV INTBDY FUS: Type: IMPLANTABLE DEVICE | Site: SPINE CERVICAL | Status: FUNCTIONAL

## 2024-05-24 RX ORDER — SENNA AND DOCUSATE SODIUM 50; 8.6 MG/1; MG/1
1 TABLET, FILM COATED ORAL 2 TIMES DAILY
Status: DISCONTINUED | OUTPATIENT
Start: 2024-05-24 | End: 2024-06-03 | Stop reason: HOSPADM

## 2024-05-24 RX ORDER — IPRATROPIUM BROMIDE AND ALBUTEROL SULFATE 2.5; .5 MG/3ML; MG/3ML
1 SOLUTION RESPIRATORY (INHALATION)
Status: DISCONTINUED | OUTPATIENT
Start: 2024-05-24 | End: 2024-05-24 | Stop reason: HOSPADM

## 2024-05-24 RX ORDER — LABETALOL HYDROCHLORIDE 5 MG/ML
5 INJECTION, SOLUTION INTRAVENOUS
Status: DISCONTINUED | OUTPATIENT
Start: 2024-05-24 | End: 2024-05-24 | Stop reason: HOSPADM

## 2024-05-24 RX ORDER — DIPHENHYDRAMINE HYDROCHLORIDE 50 MG/ML
12.5 INJECTION INTRAMUSCULAR; INTRAVENOUS
Status: DISCONTINUED | OUTPATIENT
Start: 2024-05-24 | End: 2024-05-24 | Stop reason: HOSPADM

## 2024-05-24 RX ORDER — SODIUM CHLORIDE 9 MG/ML
INJECTION, SOLUTION INTRAVENOUS PRN
Status: DISCONTINUED | OUTPATIENT
Start: 2024-05-24 | End: 2024-05-24 | Stop reason: HOSPADM

## 2024-05-24 RX ORDER — SODIUM CHLORIDE 0.9 % (FLUSH) 0.9 %
5-40 SYRINGE (ML) INJECTION EVERY 12 HOURS SCHEDULED
Status: DISCONTINUED | OUTPATIENT
Start: 2024-05-24 | End: 2024-05-25 | Stop reason: SDUPTHER

## 2024-05-24 RX ORDER — MEPERIDINE HYDROCHLORIDE 25 MG/ML
12.5 INJECTION INTRAMUSCULAR; INTRAVENOUS; SUBCUTANEOUS EVERY 5 MIN PRN
Status: DISCONTINUED | OUTPATIENT
Start: 2024-05-24 | End: 2024-05-24 | Stop reason: HOSPADM

## 2024-05-24 RX ORDER — SODIUM CHLORIDE 9 MG/ML
INJECTION, SOLUTION INTRAVENOUS CONTINUOUS
Status: DISCONTINUED | OUTPATIENT
Start: 2024-05-24 | End: 2024-06-03 | Stop reason: HOSPADM

## 2024-05-24 RX ORDER — DEXAMETHASONE SODIUM PHOSPHATE 4 MG/ML
4 INJECTION, SOLUTION INTRA-ARTICULAR; INTRALESIONAL; INTRAMUSCULAR; INTRAVENOUS; SOFT TISSUE EVERY 6 HOURS
Status: COMPLETED | OUTPATIENT
Start: 2024-05-24 | End: 2024-05-26

## 2024-05-24 RX ORDER — SODIUM CHLORIDE 0.9 % (FLUSH) 0.9 %
5-40 SYRINGE (ML) INJECTION PRN
Status: DISCONTINUED | OUTPATIENT
Start: 2024-05-24 | End: 2024-05-25 | Stop reason: SDUPTHER

## 2024-05-24 RX ORDER — HYDRALAZINE HYDROCHLORIDE 20 MG/ML
5 INJECTION INTRAMUSCULAR; INTRAVENOUS
Status: DISCONTINUED | OUTPATIENT
Start: 2024-05-24 | End: 2024-05-24 | Stop reason: HOSPADM

## 2024-05-24 RX ORDER — ACETAMINOPHEN 325 MG/1
650 TABLET ORAL
Status: DISCONTINUED | OUTPATIENT
Start: 2024-05-24 | End: 2024-05-24 | Stop reason: HOSPADM

## 2024-05-24 RX ORDER — SODIUM CHLORIDE 9 MG/ML
INJECTION, SOLUTION INTRAVENOUS PRN
Status: DISCONTINUED | OUTPATIENT
Start: 2024-05-24 | End: 2024-05-25 | Stop reason: SDUPTHER

## 2024-05-24 RX ORDER — CEFAZOLIN SODIUM 1 G/3ML
INJECTION, POWDER, FOR SOLUTION INTRAMUSCULAR; INTRAVENOUS PRN
Status: DISCONTINUED | OUTPATIENT
Start: 2024-05-24 | End: 2024-05-24 | Stop reason: SDUPTHER

## 2024-05-24 RX ORDER — HYDROMORPHONE HYDROCHLORIDE 1 MG/ML
0.5 INJECTION, SOLUTION INTRAMUSCULAR; INTRAVENOUS; SUBCUTANEOUS EVERY 5 MIN PRN
Status: DISCONTINUED | OUTPATIENT
Start: 2024-05-24 | End: 2024-05-24 | Stop reason: HOSPADM

## 2024-05-24 RX ORDER — MIDAZOLAM HYDROCHLORIDE 1 MG/ML
1 INJECTION INTRAMUSCULAR; INTRAVENOUS
Status: DISCONTINUED | OUTPATIENT
Start: 2024-05-24 | End: 2024-05-24

## 2024-05-24 RX ORDER — ROCURONIUM BROMIDE 10 MG/ML
INJECTION, SOLUTION INTRAVENOUS PRN
Status: DISCONTINUED | OUTPATIENT
Start: 2024-05-24 | End: 2024-05-24 | Stop reason: SDUPTHER

## 2024-05-24 RX ORDER — SODIUM CHLORIDE 0.9 % (FLUSH) 0.9 %
5-40 SYRINGE (ML) INJECTION EVERY 12 HOURS SCHEDULED
Status: DISCONTINUED | OUTPATIENT
Start: 2024-05-24 | End: 2024-05-24 | Stop reason: HOSPADM

## 2024-05-24 RX ORDER — VANCOMYCIN HYDROCHLORIDE 500 MG/10ML
INJECTION, POWDER, LYOPHILIZED, FOR SOLUTION INTRAVENOUS PRN
Status: DISCONTINUED | OUTPATIENT
Start: 2024-05-24 | End: 2024-05-24 | Stop reason: ALTCHOICE

## 2024-05-24 RX ORDER — OXYCODONE HYDROCHLORIDE 10 MG/1
10 TABLET ORAL EVERY 4 HOURS PRN
Status: DISCONTINUED | OUTPATIENT
Start: 2024-05-24 | End: 2024-06-03 | Stop reason: HOSPADM

## 2024-05-24 RX ORDER — MIDAZOLAM HYDROCHLORIDE 1 MG/ML
2 INJECTION INTRAMUSCULAR; INTRAVENOUS
Status: ACTIVE | OUTPATIENT
Start: 2024-05-24 | End: 2024-05-25

## 2024-05-24 RX ORDER — SODIUM CHLORIDE 9 MG/ML
INJECTION, SOLUTION INTRAVENOUS PRN
Status: DISCONTINUED | OUTPATIENT
Start: 2024-05-24 | End: 2024-06-03 | Stop reason: HOSPADM

## 2024-05-24 RX ORDER — DEXAMETHASONE SODIUM PHOSPHATE 10 MG/ML
INJECTION INTRAMUSCULAR; INTRAVENOUS PRN
Status: DISCONTINUED | OUTPATIENT
Start: 2024-05-24 | End: 2024-05-24 | Stop reason: SDUPTHER

## 2024-05-24 RX ORDER — SODIUM CHLORIDE 0.9 % (FLUSH) 0.9 %
5-40 SYRINGE (ML) INJECTION EVERY 12 HOURS SCHEDULED
Status: DISCONTINUED | OUTPATIENT
Start: 2024-05-24 | End: 2024-06-03 | Stop reason: HOSPADM

## 2024-05-24 RX ORDER — HYDROMORPHONE HYDROCHLORIDE 1 MG/ML
0.25 INJECTION, SOLUTION INTRAMUSCULAR; INTRAVENOUS; SUBCUTANEOUS EVERY 5 MIN PRN
Status: DISCONTINUED | OUTPATIENT
Start: 2024-05-24 | End: 2024-05-24 | Stop reason: HOSPADM

## 2024-05-24 RX ORDER — NALOXONE HYDROCHLORIDE 0.4 MG/ML
INJECTION, SOLUTION INTRAMUSCULAR; INTRAVENOUS; SUBCUTANEOUS PRN
Status: DISCONTINUED | OUTPATIENT
Start: 2024-05-24 | End: 2024-05-24 | Stop reason: HOSPADM

## 2024-05-24 RX ORDER — DROPERIDOL 2.5 MG/ML
0.62 INJECTION, SOLUTION INTRAMUSCULAR; INTRAVENOUS
Status: DISCONTINUED | OUTPATIENT
Start: 2024-05-24 | End: 2024-05-24 | Stop reason: HOSPADM

## 2024-05-24 RX ORDER — PROPOFOL 10 MG/ML
INJECTION, EMULSION INTRAVENOUS PRN
Status: DISCONTINUED | OUTPATIENT
Start: 2024-05-24 | End: 2024-05-24 | Stop reason: SDUPTHER

## 2024-05-24 RX ORDER — SODIUM CHLORIDE 0.9 % (FLUSH) 0.9 %
5-40 SYRINGE (ML) INJECTION PRN
Status: DISCONTINUED | OUTPATIENT
Start: 2024-05-24 | End: 2024-05-24 | Stop reason: HOSPADM

## 2024-05-24 RX ORDER — SODIUM CHLORIDE 0.9 % (FLUSH) 0.9 %
5-40 SYRINGE (ML) INJECTION PRN
Status: DISCONTINUED | OUTPATIENT
Start: 2024-05-24 | End: 2024-06-03 | Stop reason: HOSPADM

## 2024-05-24 RX ORDER — OXYCODONE HYDROCHLORIDE 5 MG/1
5 TABLET ORAL EVERY 4 HOURS PRN
Status: DISCONTINUED | OUTPATIENT
Start: 2024-05-24 | End: 2024-06-03 | Stop reason: HOSPADM

## 2024-05-24 RX ORDER — ONDANSETRON 2 MG/ML
4 INJECTION INTRAMUSCULAR; INTRAVENOUS
Status: DISCONTINUED | OUTPATIENT
Start: 2024-05-24 | End: 2024-05-24 | Stop reason: HOSPADM

## 2024-05-24 RX ORDER — ONDANSETRON 2 MG/ML
INJECTION INTRAMUSCULAR; INTRAVENOUS PRN
Status: DISCONTINUED | OUTPATIENT
Start: 2024-05-24 | End: 2024-05-24 | Stop reason: SDUPTHER

## 2024-05-24 RX ORDER — ACETAMINOPHEN 325 MG/1
650 TABLET ORAL EVERY 6 HOURS
Status: DISCONTINUED | OUTPATIENT
Start: 2024-05-24 | End: 2024-06-03 | Stop reason: HOSPADM

## 2024-05-24 RX ORDER — MORPHINE SULFATE 2 MG/ML
2 INJECTION, SOLUTION INTRAMUSCULAR; INTRAVENOUS
Status: DISCONTINUED | OUTPATIENT
Start: 2024-05-24 | End: 2024-06-03 | Stop reason: HOSPADM

## 2024-05-24 RX ORDER — MORPHINE SULFATE 4 MG/ML
4 INJECTION, SOLUTION INTRAMUSCULAR; INTRAVENOUS
Status: DISCONTINUED | OUTPATIENT
Start: 2024-05-24 | End: 2024-06-03 | Stop reason: HOSPADM

## 2024-05-24 RX ORDER — PHENYLEPHRINE HCL IN 0.9% NACL 1 MG/10 ML
SYRINGE (ML) INTRAVENOUS PRN
Status: DISCONTINUED | OUTPATIENT
Start: 2024-05-24 | End: 2024-05-24 | Stop reason: SDUPTHER

## 2024-05-24 RX ORDER — POLYETHYLENE GLYCOL 3350 17 G/17G
17 POWDER, FOR SOLUTION ORAL DAILY
Status: DISCONTINUED | OUTPATIENT
Start: 2024-05-24 | End: 2024-06-03 | Stop reason: HOSPADM

## 2024-05-24 RX ORDER — BISACODYL 5 MG/1
5 TABLET, DELAYED RELEASE ORAL DAILY
Status: DISCONTINUED | OUTPATIENT
Start: 2024-05-24 | End: 2024-06-03 | Stop reason: HOSPADM

## 2024-05-24 RX ORDER — MIDAZOLAM HYDROCHLORIDE 2 MG/2ML
2 INJECTION, SOLUTION INTRAMUSCULAR; INTRAVENOUS
Status: COMPLETED | OUTPATIENT
Start: 2024-05-24 | End: 2024-05-24

## 2024-05-24 RX ORDER — FENTANYL CITRATE 50 UG/ML
INJECTION, SOLUTION INTRAMUSCULAR; INTRAVENOUS PRN
Status: DISCONTINUED | OUTPATIENT
Start: 2024-05-24 | End: 2024-05-24 | Stop reason: SDUPTHER

## 2024-05-24 RX ORDER — MIDAZOLAM HYDROCHLORIDE 1 MG/ML
INJECTION INTRAMUSCULAR; INTRAVENOUS PRN
Status: DISCONTINUED | OUTPATIENT
Start: 2024-05-24 | End: 2024-05-24 | Stop reason: SDUPTHER

## 2024-05-24 RX ORDER — ONDANSETRON 4 MG/1
4 TABLET, ORALLY DISINTEGRATING ORAL EVERY 8 HOURS PRN
Status: DISCONTINUED | OUTPATIENT
Start: 2024-05-24 | End: 2024-06-03 | Stop reason: HOSPADM

## 2024-05-24 RX ORDER — ONDANSETRON 2 MG/ML
4 INJECTION INTRAMUSCULAR; INTRAVENOUS EVERY 6 HOURS PRN
Status: DISCONTINUED | OUTPATIENT
Start: 2024-05-24 | End: 2024-06-03 | Stop reason: HOSPADM

## 2024-05-24 RX ADMIN — MIDAZOLAM 2 MG: 1 INJECTION INTRAMUSCULAR; INTRAVENOUS at 14:19

## 2024-05-24 RX ADMIN — PROPOFOL 100 MG: 10 INJECTION, EMULSION INTRAVENOUS at 14:29

## 2024-05-24 RX ADMIN — CYANOCOBALAMIN TAB 1000 MCG 1000 MCG: 1000 TAB at 08:58

## 2024-05-24 RX ADMIN — FENTANYL CITRATE 50 MCG: 0.05 INJECTION, SOLUTION INTRAMUSCULAR; INTRAVENOUS at 17:15

## 2024-05-24 RX ADMIN — ONDANSETRON 4 MG: 2 INJECTION INTRAMUSCULAR; INTRAVENOUS at 17:04

## 2024-05-24 RX ADMIN — OXYCODONE HYDROCHLORIDE 10 MG: 10 TABLET ORAL at 23:42

## 2024-05-24 RX ADMIN — PREGABALIN 75 MG: 75 CAPSULE ORAL at 20:09

## 2024-05-24 RX ADMIN — TRAMADOL HYDROCHLORIDE 50 MG: 50 TABLET ORAL at 08:58

## 2024-05-24 RX ADMIN — WATER 2000 MG: 1 INJECTION INTRAMUSCULAR; INTRAVENOUS; SUBCUTANEOUS at 23:43

## 2024-05-24 RX ADMIN — SODIUM CHLORIDE: 9 INJECTION, SOLUTION INTRAVENOUS at 23:47

## 2024-05-24 RX ADMIN — CYCLOBENZAPRINE 10 MG: 10 TABLET, FILM COATED ORAL at 02:15

## 2024-05-24 RX ADMIN — Medication 100 MCG: at 14:36

## 2024-05-24 RX ADMIN — LOSARTAN POTASSIUM 25 MG: 25 TABLET, FILM COATED ORAL at 08:58

## 2024-05-24 RX ADMIN — METOPROLOL SUCCINATE 25 MG: 25 TABLET, EXTENDED RELEASE ORAL at 08:58

## 2024-05-24 RX ADMIN — ACETAMINOPHEN 650 MG: 325 TABLET ORAL at 00:03

## 2024-05-24 RX ADMIN — ACETAMINOPHEN 650 MG: 325 TABLET ORAL at 06:18

## 2024-05-24 RX ADMIN — SODIUM CHLORIDE: 9 INJECTION, SOLUTION INTRAVENOUS at 14:19

## 2024-05-24 RX ADMIN — DEXAMETHASONE SODIUM PHOSPHATE 10 MG: 10 INJECTION INTRAMUSCULAR; INTRAVENOUS at 14:53

## 2024-05-24 RX ADMIN — SODIUM CHLORIDE: 9 INJECTION, SOLUTION INTRAVENOUS at 00:06

## 2024-05-24 RX ADMIN — ROCURONIUM BROMIDE 10 MG: 10 INJECTION, SOLUTION INTRAVENOUS at 14:51

## 2024-05-24 RX ADMIN — FENTANYL CITRATE 150 MCG: 0.05 INJECTION, SOLUTION INTRAMUSCULAR; INTRAVENOUS at 14:29

## 2024-05-24 RX ADMIN — TORSEMIDE 20 MG: 20 TABLET ORAL at 08:58

## 2024-05-24 RX ADMIN — ROSUVASTATIN 20 MG: 20 TABLET, FILM COATED ORAL at 20:09

## 2024-05-24 RX ADMIN — SODIUM CHLORIDE, PRESERVATIVE FREE 10 ML: 5 INJECTION INTRAVENOUS at 20:20

## 2024-05-24 RX ADMIN — SENNOSIDES AND DOCUSATE SODIUM 1 TABLET: 50; 8.6 TABLET ORAL at 20:09

## 2024-05-24 RX ADMIN — MAGNESIUM 64 MG (MAGNESIUM CHLORIDE) TABLET,DELAYED RELEASE 64 MG: at 08:58

## 2024-05-24 RX ADMIN — SODIUM CHLORIDE: 9 INJECTION, SOLUTION INTRAVENOUS at 15:51

## 2024-05-24 RX ADMIN — DEXAMETHASONE SODIUM PHOSPHATE 4 MG: 4 INJECTION, SOLUTION INTRAMUSCULAR; INTRAVENOUS at 20:09

## 2024-05-24 RX ADMIN — OXYCODONE HYDROCHLORIDE 10 MG: 10 TABLET ORAL at 20:09

## 2024-05-24 RX ADMIN — ACETAMINOPHEN 650 MG: 325 TABLET ORAL at 20:08

## 2024-05-24 RX ADMIN — HYDROMORPHONE HYDROCHLORIDE 0.5 MG: 1 INJECTION, SOLUTION INTRAMUSCULAR; INTRAVENOUS; SUBCUTANEOUS at 18:03

## 2024-05-24 RX ADMIN — MIDAZOLAM 2 MG: 1 INJECTION INTRAMUSCULAR; INTRAVENOUS at 17:51

## 2024-05-24 RX ADMIN — FENTANYL CITRATE 50 MCG: 0.05 INJECTION, SOLUTION INTRAMUSCULAR; INTRAVENOUS at 15:12

## 2024-05-24 RX ADMIN — SUGAMMADEX 200 MG: 100 INJECTION, SOLUTION INTRAVENOUS at 17:16

## 2024-05-24 RX ADMIN — ROCURONIUM BROMIDE 50 MG: 10 INJECTION, SOLUTION INTRAVENOUS at 14:29

## 2024-05-24 RX ADMIN — CYCLOBENZAPRINE 10 MG: 10 TABLET, FILM COATED ORAL at 20:06

## 2024-05-24 RX ADMIN — PREGABALIN 75 MG: 75 CAPSULE ORAL at 08:58

## 2024-05-24 RX ADMIN — CEFAZOLIN 2 G: 1 INJECTION, POWDER, FOR SOLUTION INTRAMUSCULAR; INTRAVENOUS at 14:35

## 2024-05-24 ASSESSMENT — PAIN DESCRIPTION - DESCRIPTORS
DESCRIPTORS: ACHING;DISCOMFORT;SORE
DESCRIPTORS: ACHING;DISCOMFORT
DESCRIPTORS: ACHING;DISCOMFORT;SORE
DESCRIPTORS: ACHING;DISCOMFORT;SORE;NAGGING
DESCRIPTORS: ACHING;DISCOMFORT
DESCRIPTORS: SHARP;SORE;DISCOMFORT

## 2024-05-24 ASSESSMENT — PAIN DESCRIPTION - ORIENTATION
ORIENTATION: POSTERIOR
ORIENTATION: LEFT
ORIENTATION: UPPER

## 2024-05-24 ASSESSMENT — PAIN SCALES - GENERAL
PAINLEVEL_OUTOF10: 10
PAINLEVEL_OUTOF10: 7
PAINLEVEL_OUTOF10: 5
PAINLEVEL_OUTOF10: 10
PAINLEVEL_OUTOF10: 10
PAINLEVEL_OUTOF10: 4

## 2024-05-24 ASSESSMENT — PAIN DESCRIPTION - LOCATION
LOCATION: BACK;NECK
LOCATION: BACK
LOCATION: NECK
LOCATION: KNEE
LOCATION: NECK

## 2024-05-24 ASSESSMENT — PAIN - FUNCTIONAL ASSESSMENT
PAIN_FUNCTIONAL_ASSESSMENT: ACTIVITIES ARE NOT PREVENTED
PAIN_FUNCTIONAL_ASSESSMENT: NONE - DENIES PAIN

## 2024-05-24 ASSESSMENT — PAIN DESCRIPTION - FREQUENCY: FREQUENCY: INTERMITTENT

## 2024-05-24 ASSESSMENT — PAIN DESCRIPTION - PAIN TYPE: TYPE: ACUTE PAIN

## 2024-05-24 ASSESSMENT — PAIN DESCRIPTION - ONSET: ONSET: SUDDEN

## 2024-05-24 NOTE — DISCHARGE INSTRUCTIONS
Discharge Instructions:     1. No lifting more than 10 pounds   2. Walking is encouraged, slowly increase time distance   3. Refrain from excessive bending and twisting of the neck   4. All stitches are under the skin and will dissolve in time  5. Patient may shower. DO NOT soak or scrub at the incision site.   6. Hard cervical (neck) collar is to be worn for the next 3 months  7. Take medications as prescribed. Continue taking stool softener while taking narcotic pain medications.  8. No sexual activity for one (1) month after surgery   9. Follow-up in the office in 4 weeks in the Neurosurgery clinic with repeat upright AP and lateral cervical x-rays. Call with any questions/concerns, pain control issues, or medication refills.             Call Dr. Carbajal/Jim/Meenu's office for an appointment in 3 to 4 weeks--[phone].  [address] rehab staff are to remove the [place] catheter once the patient is fully ambulatory

## 2024-05-24 NOTE — BRIEF OP NOTE
Brief Postoperative Note      Patient: Mignon Fischer  YOB: 1955  MRN: [id number]    Date of Procedure: 5/24/2024    Pre-Op Diagnosis Codes:     * Cervical stenosis of spinal canal [M48.02]    Post-Op Diagnosis: Same       Procedure(s):  C-4 -C5, C5-C6, and C6- C7 ANTERIOR CERVICAL DISCECTOMY AND  FUSION    Surgeon(s):  Kalyani Murphy MD    Assistant:  Physician Assistant: Mary Jo Ramsey PA-C    Anesthesia: General    Estimated Blood Loss (mL): less than 50     Complications: None    Specimens:   ID Type Source Tests Collected by Time Destination   A : C4-C5, C5-C6, C6-C7 Cervical Disc Tissue Tissue SURGICAL PATHOLOGY Kalyani Murphy MD 5/24/2024 1518        Implants:  Implant Name Type Inv. Item Serial No.  Lot No. LRB No. Used Action   GRAFT BNE SUB [id number] CANC JOSE L ANT CERV INTBDY FUS - [id number]  GRAFT BNE SUB [id number] CANC JOSE L ANT CERV INTBDY FUS [id number] [employer] [facility]  Right 1 Implanted   GRAFT BNE SUB [id number] CANC JOSE L ANT CERV INTBDY FUS - [id number]  GRAFT BNE SUB [id number] CANC JOSE L ANT CERV INTBDY FUS  [employer] [facility] [id number] Right 1 Implanted   GRAFT BNE SUB [id number] CANC JOSE L ANT CERV INTBDY FUS - [id number]  GRAFT BNE SUB [id number] CANC JOSE L ANT CERV INTBDY FUS [id number] [employer] [facility]  Right 1 Implanted   PLATE SPNL L54MM ANT CERV TI ALLY LEV 3 XTEND - [id number]  PLATE SPNL L54MM ANT CERV TI ALLY LEV 3 XTEND  [employer]  Right 1 Implanted   SCREW SPNL L14MM DIA4.2MM ANT CERV TI ALLY SELF DRL PAVITHRA ANG - [id number]  SCREW SPNL L14MM DIA4.2MM ANT CERV TI ALLY SELF DRL PAVITHRA ANG  [employer] [facility]  Right 4 Implanted   SCREW SPNL L16MM DIA4.2MM ANT CERV TI ALLY SELF DRL PAVITHRA ANG - [id number]  SCREW SPNL L16MM DIA4.2MM ANT CERV TI ALLY SELF DRL PAVITHRA ANG  [employer]  Right 4 Implanted   GRAFT BNE SUB [id number] CANC JOSE L ANT CERV INTBDY FUS - [id number]  GRAFT BNE SUB [id number] CANC JOSE L ANT

## 2024-05-24 NOTE — ANESTHESIA PRE PROCEDURE
0.2 05/24/2024 05:01 AM    ALKPHOS 86 05/24/2024 05:01 AM    AST 21 05/24/2024 05:01 AM    ALT 23 05/24/2024 05:01 AM       POC Tests:   Recent Labs     05/24/24  1129   POCGLU 124*       Coags:   Lab Results   Component Value Date/Time    PROTIME 11.0 05/15/2024 05:15 PM    PROTIME 11.0 10/06/2010 04:15 PM    INR 1.0 05/15/2024 05:15 PM    APTT 27.9 10/06/2010 04:15 PM       HCG (If Applicable): No results found for: \"PREGTESTUR\", \"PREGSERUM\", \"HCG\", \"HCGQUANT\"     ABGs:   Lab Results   Component Value Date/Time    M8UVZERJ 35.5 10/06/2010 04:27 PM        Type & Screen (If Applicable):  No results found for: \"LABABO\"    Drug/Infectious Status (If Applicable):  No results found for: \"HIV\", \"HEPCAB\"    COVID-19 Screening (If Applicable):   Lab Results   Component Value Date/Time    COVID19 Not Detected 05/15/2024 06:20 PM           Anesthesia Evaluation  Patient summary reviewed and Nursing notes reviewed   no history of anesthetic complications:   Airway: Mallampati: III  TM distance: <3 FB   Neck ROM: limited  Mouth opening: > = 3 FB   Dental:      Comment: Pt denies loose teeth    Pulmonary:   (+)   shortness of breath:   sleep apnea:   decreased breath sounds: bilateral    asthma (has not used in 9 months):     (-) not a current smoker                           Cardiovascular:    (+) hypertension: moderate, COLE: after ambulating 1 flight of stairs, hyperlipidemia      ECG reviewed  Rhythm: regular  Rate: normal  Echocardiogram reviewed  Stress test reviewed       Beta Blocker:  Dose within 24 Hrs      ROS comment: EKG 5/22/24:  Normal sinus rhythm  Minimal voltage criteria for LVH, may be normal variant    CXR 05/15/24:  No acute process.  Cardiomegaly.    CAROTID US BILATERAL 1/9/19:  No evidence to suggest hemodynamically significant stenosis.         Neuro/Psych:   (+) neuromuscular disease:, headaches: migraine headaches             ROS comment: Bilateral leg weakness  Lower extremity weakness  Binocular

## 2024-05-25 LAB
GLUCOSE BLD-MCNC: 256 MG/DL (ref 74–99)
GLUCOSE BLD-MCNC: 304 MG/DL (ref 74–99)
GLUCOSE BLD-MCNC: 332 MG/DL (ref 74–99)
GLUCOSE BLD-MCNC: 369 MG/DL (ref 74–99)

## 2024-05-25 PROCEDURE — 2580000003 HC RX 258: Performed by: NEUROLOGICAL SURGERY

## 2024-05-25 PROCEDURE — 1200000000 HC SEMI PRIVATE

## 2024-05-25 PROCEDURE — 6370000000 HC RX 637 (ALT 250 FOR IP): Performed by: NEUROLOGICAL SURGERY

## 2024-05-25 PROCEDURE — 6360000002 HC RX W HCPCS: Performed by: NEUROLOGICAL SURGERY

## 2024-05-25 PROCEDURE — 82962 GLUCOSE BLOOD TEST: CPT

## 2024-05-25 PROCEDURE — 6370000000 HC RX 637 (ALT 250 FOR IP): Performed by: INTERNAL MEDICINE

## 2024-05-25 RX ORDER — INSULIN GLARGINE 100 [IU]/ML
10 INJECTION, SOLUTION SUBCUTANEOUS 2 TIMES DAILY
Status: DISCONTINUED | OUTPATIENT
Start: 2024-05-25 | End: 2024-05-26

## 2024-05-25 RX ADMIN — PREGABALIN 75 MG: 75 CAPSULE ORAL at 20:56

## 2024-05-25 RX ADMIN — INSULIN GLARGINE 10 UNITS: 100 INJECTION, SOLUTION SUBCUTANEOUS at 20:55

## 2024-05-25 RX ADMIN — INSULIN GLARGINE 10 UNITS: 100 INJECTION, SOLUTION SUBCUTANEOUS at 12:44

## 2024-05-25 RX ADMIN — SODIUM CHLORIDE: 9 INJECTION, SOLUTION INTRAVENOUS at 23:49

## 2024-05-25 RX ADMIN — CYANOCOBALAMIN TAB 1000 MCG 1000 MCG: 1000 TAB at 09:02

## 2024-05-25 RX ADMIN — INSULIN LISPRO 8 UNITS: 100 INJECTION, SOLUTION INTRAVENOUS; SUBCUTANEOUS at 11:42

## 2024-05-25 RX ADMIN — DEXAMETHASONE SODIUM PHOSPHATE 4 MG: 4 INJECTION, SOLUTION INTRAMUSCULAR; INTRAVENOUS at 02:43

## 2024-05-25 RX ADMIN — DEXAMETHASONE SODIUM PHOSPHATE 4 MG: 4 INJECTION, SOLUTION INTRAMUSCULAR; INTRAVENOUS at 12:44

## 2024-05-25 RX ADMIN — METOPROLOL SUCCINATE 25 MG: 25 TABLET, EXTENDED RELEASE ORAL at 09:01

## 2024-05-25 RX ADMIN — INSULIN LISPRO 6 UNITS: 100 INJECTION, SOLUTION INTRAVENOUS; SUBCUTANEOUS at 16:55

## 2024-05-25 RX ADMIN — DEXAMETHASONE SODIUM PHOSPHATE 4 MG: 4 INJECTION, SOLUTION INTRAMUSCULAR; INTRAVENOUS at 20:56

## 2024-05-25 RX ADMIN — OXYCODONE HYDROCHLORIDE 10 MG: 10 TABLET ORAL at 04:23

## 2024-05-25 RX ADMIN — ACETAMINOPHEN 650 MG: 325 TABLET ORAL at 02:42

## 2024-05-25 RX ADMIN — ACETAMINOPHEN 650 MG: 325 TABLET ORAL at 09:01

## 2024-05-25 RX ADMIN — OXYCODONE HYDROCHLORIDE 10 MG: 10 TABLET ORAL at 11:46

## 2024-05-25 RX ADMIN — DEXAMETHASONE SODIUM PHOSPHATE 4 MG: 4 INJECTION, SOLUTION INTRAMUSCULAR; INTRAVENOUS at 09:03

## 2024-05-25 RX ADMIN — SENNOSIDES AND DOCUSATE SODIUM 1 TABLET: 50; 8.6 TABLET ORAL at 20:56

## 2024-05-25 RX ADMIN — TORSEMIDE 20 MG: 20 TABLET ORAL at 09:02

## 2024-05-25 RX ADMIN — LOSARTAN POTASSIUM 25 MG: 25 TABLET, FILM COATED ORAL at 09:01

## 2024-05-25 RX ADMIN — SENNOSIDES AND DOCUSATE SODIUM 1 TABLET: 50; 8.6 TABLET ORAL at 09:02

## 2024-05-25 RX ADMIN — ACETAMINOPHEN 650 MG: 325 TABLET ORAL at 12:43

## 2024-05-25 RX ADMIN — INSULIN LISPRO 6 UNITS: 100 INJECTION, SOLUTION INTRAVENOUS; SUBCUTANEOUS at 20:55

## 2024-05-25 RX ADMIN — OXYCODONE HYDROCHLORIDE 10 MG: 10 TABLET ORAL at 20:56

## 2024-05-25 RX ADMIN — WATER 2000 MG: 1 INJECTION INTRAMUSCULAR; INTRAVENOUS; SUBCUTANEOUS at 09:02

## 2024-05-25 RX ADMIN — ROSUVASTATIN 20 MG: 20 TABLET, FILM COATED ORAL at 20:56

## 2024-05-25 RX ADMIN — WATER 2000 MG: 1 INJECTION INTRAMUSCULAR; INTRAVENOUS; SUBCUTANEOUS at 23:44

## 2024-05-25 RX ADMIN — INSULIN LISPRO 4 UNITS: 100 INJECTION, SOLUTION INTRAVENOUS; SUBCUTANEOUS at 09:02

## 2024-05-25 RX ADMIN — MAGNESIUM 64 MG (MAGNESIUM CHLORIDE) TABLET,DELAYED RELEASE 64 MG: at 09:03

## 2024-05-25 RX ADMIN — POLYETHYLENE GLYCOL 3350 17 G: 17 POWDER, FOR SOLUTION ORAL at 09:02

## 2024-05-25 RX ADMIN — PREGABALIN 75 MG: 75 CAPSULE ORAL at 09:01

## 2024-05-25 RX ADMIN — WATER 2000 MG: 1 INJECTION INTRAMUSCULAR; INTRAVENOUS; SUBCUTANEOUS at 16:54

## 2024-05-25 RX ADMIN — BISACODYL 5 MG: 5 TABLET, COATED ORAL at 09:02

## 2024-05-25 RX ADMIN — ACETAMINOPHEN 650 MG: 325 TABLET ORAL at 20:55

## 2024-05-25 ASSESSMENT — PAIN SCALES - GENERAL
PAINLEVEL_OUTOF10: 7
PAINLEVEL_OUTOF10: 6
PAINLEVEL_OUTOF10: 7

## 2024-05-25 ASSESSMENT — PAIN DESCRIPTION - ORIENTATION: ORIENTATION: LOWER

## 2024-05-25 ASSESSMENT — PAIN DESCRIPTION - LOCATION
LOCATION: BACK;ARM;NECK
LOCATION: BACK

## 2024-05-25 ASSESSMENT — PAIN DESCRIPTION - FREQUENCY: FREQUENCY: INTERMITTENT

## 2024-05-25 ASSESSMENT — PAIN - FUNCTIONAL ASSESSMENT: PAIN_FUNCTIONAL_ASSESSMENT: PREVENTS OR INTERFERES SOME ACTIVE ACTIVITIES AND ADLS

## 2024-05-25 ASSESSMENT — PAIN DESCRIPTION - ONSET: ONSET: ON-GOING

## 2024-05-25 ASSESSMENT — PAIN DESCRIPTION - DESCRIPTORS
DESCRIPTORS: ACHING;DISCOMFORT
DESCRIPTORS: ACHING;DISCOMFORT;TENDER

## 2024-05-25 ASSESSMENT — PAIN DESCRIPTION - PAIN TYPE: TYPE: ACUTE PAIN

## 2024-05-25 NOTE — OP NOTE
05/24/2024 22:21:15     T:  05/25/2024 01:54:14     ROBERT/TEOFILO  Job #:  [id number]     Doc#:  [id number]

## 2024-05-25 NOTE — PLAN OF CARE

## 2024-05-25 NOTE — PLAN OF CARE
Problem: Chronic Conditions and Co-morbidities  Goal: Patient's chronic conditions and co-morbidity symptoms are monitored and maintained or improved  5/25/2024 1021 by Puja Coon RN  Outcome: Progressing  5/25/2024 0150 by Rafia Sanchez RN  Outcome: Progressing     Problem: Discharge Planning  Goal: Discharge to home or other facility with appropriate resources  5/25/2024 1021 by Puja Coon RN  Outcome: Progressing  5/25/2024 0150 by Rafia Sanchez RN  Outcome: Progressing     Problem: Safety - Adult  Goal: Free from fall injury  5/25/2024 1021 by Puja Coon RN  Outcome: Progressing  5/25/2024 0150 by Rafai Sanchez RN  Outcome: Progressing     Problem: Skin/Tissue Integrity  Goal: Absence of new skin breakdown  Description: 1.  Monitor for areas of redness and/or skin breakdown  2.  Assess vascular access sites hourly  3.  Every 4-6 hours minimum:  Change oxygen saturation probe site  4.  Every 4-6 hours:  If on nasal continuous positive airway pressure, respiratory therapy assess nares and determine need for appliance change or resting period.  5/25/2024 1021 by Puja Coon RN  Outcome: Progressing  5/25/2024 0150 by Rafia Sanchez RN  Outcome: Progressing     Problem: ABCDS Injury Assessment  Goal: Absence of physical injury  5/25/2024 1021 by Puja Coon RN  Outcome: Progressing  5/25/2024 0150 by Rafia Sanchez RN  Outcome: Progressing     Problem: Pain  Goal: Verbalizes/displays adequate comfort level or baseline comfort level  5/25/2024 1021 by Puja Coon RN  Outcome: Progressing  5/25/2024 0150 by Rafia Sanchez RN  Outcome: Progressing     Problem: Nutrition Deficit:  Goal: Optimize nutritional status  5/25/2024 1021 by Puja Coon RN  Outcome: Progressing  5/25/2024 0150 by Rafia Sanchez RN  Outcome: Progressing

## 2024-05-26 LAB
ALBUMIN SERPL-MCNC: 3.2 G/DL (ref 3.5–5.2)
ALP SERPL-CCNC: 92 U/L (ref 35–104)
ALT SERPL-CCNC: 15 U/L (ref 0–32)
ANION GAP SERPL CALCULATED.3IONS-SCNC: 11 MMOL/L (ref 7–16)
AST SERPL-CCNC: 25 U/L (ref 0–31)
BASOPHILS # BLD: 0.03 K/UL (ref 0–0.2)
BASOPHILS NFR BLD: 0 % (ref 0–2)
BILIRUB SERPL-MCNC: <0.2 MG/DL (ref 0–1.2)
BUN SERPL-MCNC: 22 MG/DL (ref 6–23)
CALCIUM SERPL-MCNC: 8.7 MG/DL (ref 8.6–10.2)
CHLORIDE SERPL-SCNC: 100 MMOL/L (ref 98–107)
CO2 SERPL-SCNC: 25 MMOL/L (ref 22–29)
CREAT SERPL-MCNC: 0.7 MG/DL (ref 0.5–1)
EOSINOPHIL # BLD: 0 K/UL (ref 0.05–0.5)
EOSINOPHILS RELATIVE PERCENT: 0 % (ref 0–6)
ERYTHROCYTE [DISTWIDTH] IN BLOOD BY AUTOMATED COUNT: 13.6 % (ref 11.5–15)
GFR, ESTIMATED: >90 ML/MIN/1.73M2
GLUCOSE BLD-MCNC: 268 MG/DL (ref 74–99)
GLUCOSE BLD-MCNC: 277 MG/DL (ref 74–99)
GLUCOSE BLD-MCNC: 299 MG/DL (ref 74–99)
GLUCOSE BLD-MCNC: 324 MG/DL (ref 74–99)
GLUCOSE SERPL-MCNC: 186 MG/DL (ref 74–99)
HCT VFR BLD AUTO: 33.6 % (ref 34–48)
HGB BLD-MCNC: 11.1 G/DL (ref 11.5–15.5)
IMM GRANULOCYTES # BLD AUTO: 0.22 K/UL (ref 0–0.58)
IMM GRANULOCYTES NFR BLD: 1 % (ref 0–5)
LYMPHOCYTES NFR BLD: 1.05 K/UL (ref 1.5–4)
LYMPHOCYTES RELATIVE PERCENT: 5 % (ref 20–42)
MCH RBC QN AUTO: 30 PG (ref 26–35)
MCHC RBC AUTO-ENTMCNC: 33 G/DL (ref 32–34.5)
MCV RBC AUTO: 90.8 FL (ref 80–99.9)
MONOCYTES NFR BLD: 1.05 K/UL (ref 0.1–0.95)
MONOCYTES NFR BLD: 5 % (ref 2–12)
NEUTROPHILS NFR BLD: 89 % (ref 43–80)
NEUTS SEG NFR BLD: 19.25 K/UL (ref 1.8–7.3)
PLATELET # BLD AUTO: 302 K/UL (ref 130–450)
PMV BLD AUTO: 9.5 FL (ref 7–12)
POTASSIUM SERPL-SCNC: 4.1 MMOL/L (ref 3.5–5)
PROT SERPL-MCNC: 6.2 G/DL (ref 6.4–8.3)
RBC # BLD AUTO: 3.7 M/UL (ref 3.5–5.5)
SODIUM SERPL-SCNC: 136 MMOL/L (ref 132–146)
WBC OTHER # BLD: 21.6 K/UL (ref 4.5–11.5)

## 2024-05-26 PROCEDURE — 1200000000 HC SEMI PRIVATE

## 2024-05-26 PROCEDURE — 80053 COMPREHEN METABOLIC PANEL: CPT

## 2024-05-26 PROCEDURE — 97161 PT EVAL LOW COMPLEX 20 MIN: CPT

## 2024-05-26 PROCEDURE — 82962 GLUCOSE BLOOD TEST: CPT

## 2024-05-26 PROCEDURE — 6370000000 HC RX 637 (ALT 250 FOR IP): Performed by: INTERNAL MEDICINE

## 2024-05-26 PROCEDURE — 6370000000 HC RX 637 (ALT 250 FOR IP): Performed by: NEUROLOGICAL SURGERY

## 2024-05-26 PROCEDURE — 97530 THERAPEUTIC ACTIVITIES: CPT

## 2024-05-26 PROCEDURE — 2580000003 HC RX 258: Performed by: NEUROLOGICAL SURGERY

## 2024-05-26 PROCEDURE — 85025 COMPLETE CBC W/AUTO DIFF WBC: CPT

## 2024-05-26 PROCEDURE — 36415 COLL VENOUS BLD VENIPUNCTURE: CPT

## 2024-05-26 PROCEDURE — 97168 OT RE-EVAL EST PLAN CARE: CPT

## 2024-05-26 PROCEDURE — 6360000002 HC RX W HCPCS: Performed by: NEUROLOGICAL SURGERY

## 2024-05-26 PROCEDURE — 97535 SELF CARE MNGMENT TRAINING: CPT

## 2024-05-26 RX ORDER — ENOXAPARIN SODIUM 100 MG/ML
30 INJECTION SUBCUTANEOUS 2 TIMES DAILY
Status: DISCONTINUED | OUTPATIENT
Start: 2024-05-26 | End: 2024-06-03 | Stop reason: HOSPADM

## 2024-05-26 RX ORDER — INSULIN GLARGINE 100 [IU]/ML
14 INJECTION, SOLUTION SUBCUTANEOUS 2 TIMES DAILY
Status: DISCONTINUED | OUTPATIENT
Start: 2024-05-26 | End: 2024-05-27

## 2024-05-26 RX ADMIN — SODIUM CHLORIDE: 9 INJECTION, SOLUTION INTRAVENOUS at 21:39

## 2024-05-26 RX ADMIN — OXYCODONE 5 MG: 5 TABLET ORAL at 05:29

## 2024-05-26 RX ADMIN — ACETAMINOPHEN 650 MG: 325 TABLET ORAL at 02:55

## 2024-05-26 RX ADMIN — PREGABALIN 75 MG: 75 CAPSULE ORAL at 08:32

## 2024-05-26 RX ADMIN — ACETAMINOPHEN 650 MG: 325 TABLET ORAL at 21:32

## 2024-05-26 RX ADMIN — OXYCODONE HYDROCHLORIDE 10 MG: 10 TABLET ORAL at 21:31

## 2024-05-26 RX ADMIN — INSULIN LISPRO 6 UNITS: 100 INJECTION, SOLUTION INTRAVENOUS; SUBCUTANEOUS at 11:37

## 2024-05-26 RX ADMIN — DEXAMETHASONE SODIUM PHOSPHATE 4 MG: 4 INJECTION, SOLUTION INTRAMUSCULAR; INTRAVENOUS at 08:33

## 2024-05-26 RX ADMIN — TORSEMIDE 20 MG: 20 TABLET ORAL at 08:32

## 2024-05-26 RX ADMIN — MAGNESIUM 64 MG (MAGNESIUM CHLORIDE) TABLET,DELAYED RELEASE 64 MG: at 08:41

## 2024-05-26 RX ADMIN — SENNOSIDES AND DOCUSATE SODIUM 1 TABLET: 50; 8.6 TABLET ORAL at 08:32

## 2024-05-26 RX ADMIN — LOSARTAN POTASSIUM 25 MG: 25 TABLET, FILM COATED ORAL at 08:32

## 2024-05-26 RX ADMIN — ENOXAPARIN SODIUM 30 MG: 100 INJECTION SUBCUTANEOUS at 09:19

## 2024-05-26 RX ADMIN — WATER 2000 MG: 1 INJECTION INTRAMUSCULAR; INTRAVENOUS; SUBCUTANEOUS at 16:17

## 2024-05-26 RX ADMIN — PREGABALIN 75 MG: 75 CAPSULE ORAL at 21:32

## 2024-05-26 RX ADMIN — POLYETHYLENE GLYCOL 3350 17 G: 17 POWDER, FOR SOLUTION ORAL at 08:32

## 2024-05-26 RX ADMIN — ROSUVASTATIN 20 MG: 20 TABLET, FILM COATED ORAL at 21:32

## 2024-05-26 RX ADMIN — DEXAMETHASONE SODIUM PHOSPHATE 4 MG: 4 INJECTION, SOLUTION INTRAMUSCULAR; INTRAVENOUS at 02:55

## 2024-05-26 RX ADMIN — INSULIN GLARGINE 14 UNITS: 100 INJECTION, SOLUTION SUBCUTANEOUS at 21:31

## 2024-05-26 RX ADMIN — SENNOSIDES AND DOCUSATE SODIUM 1 TABLET: 50; 8.6 TABLET ORAL at 21:32

## 2024-05-26 RX ADMIN — DEXAMETHASONE SODIUM PHOSPHATE 4 MG: 4 INJECTION, SOLUTION INTRAMUSCULAR; INTRAVENOUS at 13:26

## 2024-05-26 RX ADMIN — ENOXAPARIN SODIUM 30 MG: 100 INJECTION SUBCUTANEOUS at 21:31

## 2024-05-26 RX ADMIN — INSULIN LISPRO 4 UNITS: 100 INJECTION, SOLUTION INTRAVENOUS; SUBCUTANEOUS at 21:31

## 2024-05-26 RX ADMIN — METOPROLOL SUCCINATE 25 MG: 25 TABLET, EXTENDED RELEASE ORAL at 08:32

## 2024-05-26 RX ADMIN — CYANOCOBALAMIN TAB 1000 MCG 1000 MCG: 1000 TAB at 08:41

## 2024-05-26 RX ADMIN — INSULIN GLARGINE 10 UNITS: 100 INJECTION, SOLUTION SUBCUTANEOUS at 08:33

## 2024-05-26 RX ADMIN — ACETAMINOPHEN 650 MG: 325 TABLET ORAL at 13:26

## 2024-05-26 RX ADMIN — INSULIN LISPRO 4 UNITS: 100 INJECTION, SOLUTION INTRAVENOUS; SUBCUTANEOUS at 16:17

## 2024-05-26 RX ADMIN — BISACODYL 5 MG: 5 TABLET, COATED ORAL at 08:32

## 2024-05-26 RX ADMIN — ACETAMINOPHEN 650 MG: 325 TABLET ORAL at 08:32

## 2024-05-26 RX ADMIN — WATER 2000 MG: 1 INJECTION INTRAMUSCULAR; INTRAVENOUS; SUBCUTANEOUS at 08:33

## 2024-05-26 RX ADMIN — INSULIN LISPRO 4 UNITS: 100 INJECTION, SOLUTION INTRAVENOUS; SUBCUTANEOUS at 08:32

## 2024-05-26 ASSESSMENT — PAIN DESCRIPTION - DESCRIPTORS: DESCRIPTORS: ACHING;DISCOMFORT;TENDER

## 2024-05-26 ASSESSMENT — PAIN SCALES - GENERAL
PAINLEVEL_OUTOF10: 7
PAINLEVEL_OUTOF10: 2
PAINLEVEL_OUTOF10: 7
PAINLEVEL_OUTOF10: 6
PAINLEVEL_OUTOF10: 6

## 2024-05-26 ASSESSMENT — PAIN - FUNCTIONAL ASSESSMENT: PAIN_FUNCTIONAL_ASSESSMENT: PREVENTS OR INTERFERES SOME ACTIVE ACTIVITIES AND ADLS

## 2024-05-26 ASSESSMENT — PAIN DESCRIPTION - LOCATION: LOCATION: NECK;BACK

## 2024-05-26 ASSESSMENT — PAIN DESCRIPTION - PAIN TYPE: TYPE: ACUTE PAIN

## 2024-05-26 ASSESSMENT — PAIN DESCRIPTION - ORIENTATION: ORIENTATION: LOWER

## 2024-05-26 NOTE — PLAN OF CARE
Problem: Chronic Conditions and Co-morbidities  Goal: Patient's chronic conditions and co-morbidity symptoms are monitored and maintained or improved  5/26/2024 1001 by Puja Coon RN  Outcome: Progressing  5/25/2024 2242 by Joo Kwon RN  Outcome: Progressing     Problem: Discharge Planning  Goal: Discharge to home or other facility with appropriate resources  5/26/2024 1001 by Puja Coon RN  Outcome: Progressing  5/25/2024 2242 by Joo Kwon RN  Outcome: Progressing     Problem: Safety - Adult  Goal: Free from fall injury  5/26/2024 1001 by Puja Coon RN  Outcome: Progressing  5/25/2024 2242 by Joo Kwon RN  Outcome: Progressing     Problem: Skin/Tissue Integrity  Goal: Absence of new skin breakdown  Description: 1.  Monitor for areas of redness and/or skin breakdown  2.  Assess vascular access sites hourly  3.  Every 4-6 hours minimum:  Change oxygen saturation probe site  4.  Every 4-6 hours:  If on nasal continuous positive airway pressure, respiratory therapy assess nares and determine need for appliance change or resting period.  Outcome: Progressing     Problem: ABCDS Injury Assessment  Goal: Absence of physical injury  Outcome: Progressing     Problem: Pain  Goal: Verbalizes/displays adequate comfort level or baseline comfort level  5/26/2024 1001 by Puja Coon RN  Outcome: Progressing  5/25/2024 2242 by Joo Kwon RN  Outcome: Progressing     Problem: Nutrition Deficit:  Goal: Optimize nutritional status  5/26/2024 1001 by Puja Coon RN  Outcome: Progressing  5/25/2024 2242 by Joo Kwon RN  Outcome: Progressing

## 2024-05-26 NOTE — PLAN OF CARE
Problem: Chronic Conditions and Co-morbidities  Goal: Patient's chronic conditions and co-morbidity symptoms are monitored and maintained or improved  5/25/2024 2242 by Joo Kwon RN  Outcome: Progressing  5/25/2024 1021 by Puja Coon RN  Outcome: Progressing     Problem: Discharge Planning  Goal: Discharge to home or other facility with appropriate resources  5/25/2024 2242 by Joo Kwon RN  Outcome: Progressing  5/25/2024 1021 by Puja Coon RN  Outcome: Progressing     Problem: Safety - Adult  Goal: Free from fall injury  5/25/2024 2242 by Joo Kwon RN  Outcome: Progressing  5/25/2024 1021 by Puja Coon RN  Outcome: Progressing     Problem: Pain  Goal: Verbalizes/displays adequate comfort level or baseline comfort level  5/25/2024 2242 by Joo Kwon RN  Outcome: Progressing  5/25/2024 1021 by Puja Coon RN  Outcome: Progressing

## 2024-05-27 LAB
ALBUMIN SERPL-MCNC: 3.5 G/DL (ref 3.5–5.2)
ALP SERPL-CCNC: 100 U/L (ref 35–104)
ALT SERPL-CCNC: 40 U/L (ref 0–32)
ANION GAP SERPL CALCULATED.3IONS-SCNC: 15 MMOL/L (ref 7–16)
AST SERPL-CCNC: 55 U/L (ref 0–31)
BASOPHILS # BLD: 0.02 K/UL (ref 0–0.2)
BASOPHILS NFR BLD: 0 % (ref 0–2)
BILIRUB SERPL-MCNC: 0.2 MG/DL (ref 0–1.2)
BUN SERPL-MCNC: 24 MG/DL (ref 6–23)
CALCIUM SERPL-MCNC: 8.7 MG/DL (ref 8.6–10.2)
CHLORIDE SERPL-SCNC: 102 MMOL/L (ref 98–107)
CO2 SERPL-SCNC: 23 MMOL/L (ref 22–29)
CREAT SERPL-MCNC: 0.8 MG/DL (ref 0.5–1)
EOSINOPHIL # BLD: 0 K/UL (ref 0.05–0.5)
EOSINOPHILS RELATIVE PERCENT: 0 % (ref 0–6)
ERYTHROCYTE [DISTWIDTH] IN BLOOD BY AUTOMATED COUNT: 13.5 % (ref 11.5–15)
GFR, ESTIMATED: 86 ML/MIN/1.73M2
GLUCOSE BLD-MCNC: 158 MG/DL (ref 74–99)
GLUCOSE BLD-MCNC: 199 MG/DL (ref 74–99)
GLUCOSE BLD-MCNC: 238 MG/DL (ref 74–99)
GLUCOSE BLD-MCNC: 287 MG/DL (ref 74–99)
GLUCOSE SERPL-MCNC: 170 MG/DL (ref 74–99)
HCT VFR BLD AUTO: 35.2 % (ref 34–48)
HGB BLD-MCNC: 11.6 G/DL (ref 11.5–15.5)
IMM GRANULOCYTES # BLD AUTO: 0.28 K/UL (ref 0–0.58)
IMM GRANULOCYTES NFR BLD: 2 % (ref 0–5)
LYMPHOCYTES NFR BLD: 1.52 K/UL (ref 1.5–4)
LYMPHOCYTES RELATIVE PERCENT: 9 % (ref 20–42)
MAGNESIUM SERPL-MCNC: 2.1 MG/DL (ref 1.6–2.6)
MCH RBC QN AUTO: 30 PG (ref 26–35)
MCHC RBC AUTO-ENTMCNC: 33 G/DL (ref 32–34.5)
MCV RBC AUTO: 91 FL (ref 80–99.9)
MONOCYTES NFR BLD: 1.31 K/UL (ref 0.1–0.95)
MONOCYTES NFR BLD: 7 % (ref 2–12)
NEUTROPHILS NFR BLD: 83 % (ref 43–80)
NEUTS SEG NFR BLD: 14.79 K/UL (ref 1.8–7.3)
PLATELET # BLD AUTO: 300 K/UL (ref 130–450)
PMV BLD AUTO: 9.3 FL (ref 7–12)
POTASSIUM SERPL-SCNC: 3.5 MMOL/L (ref 3.5–5)
PROT SERPL-MCNC: 6.6 G/DL (ref 6.4–8.3)
RBC # BLD AUTO: 3.87 M/UL (ref 3.5–5.5)
SODIUM SERPL-SCNC: 140 MMOL/L (ref 132–146)
WBC OTHER # BLD: 17.9 K/UL (ref 4.5–11.5)

## 2024-05-27 PROCEDURE — 6370000000 HC RX 637 (ALT 250 FOR IP): Performed by: NEUROLOGICAL SURGERY

## 2024-05-27 PROCEDURE — 1200000000 HC SEMI PRIVATE

## 2024-05-27 PROCEDURE — 83735 ASSAY OF MAGNESIUM: CPT

## 2024-05-27 PROCEDURE — 2580000003 HC RX 258: Performed by: NEUROLOGICAL SURGERY

## 2024-05-27 PROCEDURE — 51798 US URINE CAPACITY MEASURE: CPT

## 2024-05-27 PROCEDURE — 82962 GLUCOSE BLOOD TEST: CPT

## 2024-05-27 PROCEDURE — 85025 COMPLETE CBC W/AUTO DIFF WBC: CPT

## 2024-05-27 PROCEDURE — 6370000000 HC RX 637 (ALT 250 FOR IP): Performed by: NURSE PRACTITIONER

## 2024-05-27 PROCEDURE — 6370000000 HC RX 637 (ALT 250 FOR IP): Performed by: INTERNAL MEDICINE

## 2024-05-27 PROCEDURE — 80053 COMPREHEN METABOLIC PANEL: CPT

## 2024-05-27 PROCEDURE — 6360000002 HC RX W HCPCS: Performed by: NEUROLOGICAL SURGERY

## 2024-05-27 PROCEDURE — 36415 COLL VENOUS BLD VENIPUNCTURE: CPT

## 2024-05-27 RX ORDER — SENNOSIDES A AND B 8.6 MG/1
1 TABLET, FILM COATED ORAL 2 TIMES DAILY
Status: DISCONTINUED | OUTPATIENT
Start: 2024-05-27 | End: 2024-06-03 | Stop reason: HOSPADM

## 2024-05-27 RX ORDER — INSULIN GLARGINE 100 [IU]/ML
18 INJECTION, SOLUTION SUBCUTANEOUS 2 TIMES DAILY
Status: DISCONTINUED | OUTPATIENT
Start: 2024-05-27 | End: 2024-05-28

## 2024-05-27 RX ADMIN — MAGNESIUM 64 MG (MAGNESIUM CHLORIDE) TABLET,DELAYED RELEASE 64 MG: at 08:39

## 2024-05-27 RX ADMIN — INSULIN GLARGINE 18 UNITS: 100 INJECTION, SOLUTION SUBCUTANEOUS at 08:39

## 2024-05-27 RX ADMIN — CYCLOBENZAPRINE 10 MG: 10 TABLET, FILM COATED ORAL at 06:54

## 2024-05-27 RX ADMIN — PREGABALIN 75 MG: 75 CAPSULE ORAL at 08:39

## 2024-05-27 RX ADMIN — ENOXAPARIN SODIUM 30 MG: 100 INJECTION SUBCUTANEOUS at 21:15

## 2024-05-27 RX ADMIN — LOSARTAN POTASSIUM 25 MG: 25 TABLET, FILM COATED ORAL at 08:39

## 2024-05-27 RX ADMIN — ONDANSETRON 4 MG: 2 INJECTION INTRAMUSCULAR; INTRAVENOUS at 05:56

## 2024-05-27 RX ADMIN — INSULIN LISPRO 2 UNITS: 100 INJECTION, SOLUTION INTRAVENOUS; SUBCUTANEOUS at 17:07

## 2024-05-27 RX ADMIN — SODIUM CHLORIDE: 9 INJECTION, SOLUTION INTRAVENOUS at 06:00

## 2024-05-27 RX ADMIN — INSULIN GLARGINE 18 UNITS: 100 INJECTION, SOLUTION SUBCUTANEOUS at 21:15

## 2024-05-27 RX ADMIN — OXYCODONE HYDROCHLORIDE 10 MG: 10 TABLET ORAL at 05:56

## 2024-05-27 RX ADMIN — CYANOCOBALAMIN TAB 1000 MCG 1000 MCG: 1000 TAB at 08:39

## 2024-05-27 RX ADMIN — SODIUM CHLORIDE, PRESERVATIVE FREE 10 ML: 5 INJECTION INTRAVENOUS at 21:17

## 2024-05-27 RX ADMIN — SENNOSIDES 8.6 MG: 8.6 TABLET, FILM COATED ORAL at 08:39

## 2024-05-27 RX ADMIN — TORSEMIDE 20 MG: 20 TABLET ORAL at 08:39

## 2024-05-27 RX ADMIN — CYCLOBENZAPRINE 10 MG: 10 TABLET, FILM COATED ORAL at 21:15

## 2024-05-27 RX ADMIN — ROSUVASTATIN 20 MG: 20 TABLET, FILM COATED ORAL at 21:15

## 2024-05-27 RX ADMIN — INSULIN LISPRO 4 UNITS: 100 INJECTION, SOLUTION INTRAVENOUS; SUBCUTANEOUS at 12:13

## 2024-05-27 RX ADMIN — BISACODYL 5 MG: 5 TABLET, COATED ORAL at 08:39

## 2024-05-27 RX ADMIN — OXYCODONE HYDROCHLORIDE 10 MG: 10 TABLET ORAL at 23:20

## 2024-05-27 RX ADMIN — POLYETHYLENE GLYCOL 3350 17 G: 17 POWDER, FOR SOLUTION ORAL at 08:39

## 2024-05-27 RX ADMIN — ACETAMINOPHEN 650 MG: 325 TABLET ORAL at 21:15

## 2024-05-27 RX ADMIN — ACETAMINOPHEN 650 MG: 325 TABLET ORAL at 05:51

## 2024-05-27 RX ADMIN — ACETAMINOPHEN 650 MG: 325 TABLET ORAL at 14:11

## 2024-05-27 RX ADMIN — SENNOSIDES AND DOCUSATE SODIUM 1 TABLET: 50; 8.6 TABLET ORAL at 21:15

## 2024-05-27 RX ADMIN — ACETAMINOPHEN 650 MG: 325 TABLET ORAL at 08:45

## 2024-05-27 RX ADMIN — SODIUM CHLORIDE, PRESERVATIVE FREE 10 ML: 5 INJECTION INTRAVENOUS at 08:40

## 2024-05-27 RX ADMIN — SENNOSIDES AND DOCUSATE SODIUM 1 TABLET: 50; 8.6 TABLET ORAL at 08:39

## 2024-05-27 RX ADMIN — PREGABALIN 75 MG: 75 CAPSULE ORAL at 21:15

## 2024-05-27 RX ADMIN — METOPROLOL SUCCINATE 25 MG: 25 TABLET, EXTENDED RELEASE ORAL at 08:39

## 2024-05-27 RX ADMIN — ENOXAPARIN SODIUM 30 MG: 100 INJECTION SUBCUTANEOUS at 08:38

## 2024-05-27 RX ADMIN — SENNOSIDES 8.6 MG: 8.6 TABLET, FILM COATED ORAL at 21:15

## 2024-05-27 ASSESSMENT — PAIN SCALES - GENERAL
PAINLEVEL_OUTOF10: 8
PAINLEVEL_OUTOF10: 8
PAINLEVEL_OUTOF10: 6
PAINLEVEL_OUTOF10: 4
PAINLEVEL_OUTOF10: 8
PAINLEVEL_OUTOF10: 4

## 2024-05-27 ASSESSMENT — PAIN DESCRIPTION - DESCRIPTORS
DESCRIPTORS: ACHING;DISCOMFORT
DESCRIPTORS: ACHING;DISCOMFORT
DESCRIPTORS: ACHING;SORE;DISCOMFORT

## 2024-05-27 ASSESSMENT — PAIN DESCRIPTION - LOCATION
LOCATION: NECK
LOCATION: BACK
LOCATION: NECK

## 2024-05-27 ASSESSMENT — PAIN - FUNCTIONAL ASSESSMENT: PAIN_FUNCTIONAL_ASSESSMENT: PREVENTS OR INTERFERES SOME ACTIVE ACTIVITIES AND ADLS

## 2024-05-27 ASSESSMENT — PAIN DESCRIPTION - ORIENTATION: ORIENTATION: UPPER

## 2024-05-27 NOTE — PLAN OF CARE
Problem: Chronic Conditions and Co-morbidities  Goal: Patient's chronic conditions and co-morbidity symptoms are monitored and maintained or improved  5/26/2024 2305 by Joo Kwon RN  Outcome: Progressing  5/26/2024 1001 by Puja Coon RN  Outcome: Progressing     Problem: Discharge Planning  Goal: Discharge to home or other facility with appropriate resources  5/26/2024 2305 by Joo Kwon RN  Outcome: Progressing  5/26/2024 1001 by Puja Coon RN  Outcome: Progressing     Problem: Safety - Adult  Goal: Free from fall injury  5/26/2024 2305 by Joo Kwon RN  Outcome: Progressing  5/26/2024 1001 by Puja Coon RN  Outcome: Progressing     Problem: ABCDS Injury Assessment  Goal: Absence of physical injury  5/26/2024 1001 by Puja Coon RN  Outcome: Progressing     Problem: Pain  Goal: Verbalizes/displays adequate comfort level or baseline comfort level  5/26/2024 2305 by Joo Kwon RN  Outcome: Progressing  5/26/2024 1001 by Puja Coon RN  Outcome: Progressing

## 2024-05-27 NOTE — PLAN OF CARE
Problem: Chronic Conditions and Co-morbidities  Goal: Patient's chronic conditions and co-morbidity symptoms are monitored and maintained or improved  5/27/2024 0946 by Ivanna Wood RN  Outcome: Progressing     Problem: Discharge Planning  Goal: Discharge to home or other facility with appropriate resources  5/27/2024 0946 by Ivanna Wood RN  Outcome: Progressing     Problem: Safety - Adult  Goal: Free from fall injury  5/27/2024 0946 by Ivanna Wood RN  Outcome: Progressing     Problem: Skin/Tissue Integrity  Goal: Absence of new skin breakdown  Description: 1.  Monitor for areas of redness and/or skin breakdown  2.  Assess vascular access sites hourly  3.  Every 4-6 hours minimum:  Change oxygen saturation probe site  4.  Every 4-6 hours:  If on nasal continuous positive airway pressure, respiratory therapy assess nares and determine need for appliance change or resting period.  Outcome: Progressing     Problem: ABCDS Injury Assessment  Goal: Absence of physical injury  Outcome: Progressing     Problem: Pain  Goal: Verbalizes/displays adequate comfort level or baseline comfort level  5/27/2024 0946 by Ivanna Wood RN  Outcome: Progressing

## 2024-05-28 ENCOUNTER — HOSPITAL ENCOUNTER (OUTPATIENT)
Dept: PREADMISSION TESTING | Age: 69
Discharge: HOME | End: 2024-05-28

## 2024-05-28 LAB
ALBUMIN SERPL-MCNC: 3.1 G/DL (ref 3.5–5.2)
ALBUMIN SERPL-MCNC: 3.2 G/DL (ref 3.5–5.2)
ALP SERPL-CCNC: 216 U/L (ref 35–104)
ALP SERPL-CCNC: 224 U/L (ref 35–104)
ALT SERPL-CCNC: 186 U/L (ref 0–32)
ALT SERPL-CCNC: 191 U/L (ref 0–32)
ANION GAP SERPL CALCULATED.3IONS-SCNC: 11 MMOL/L (ref 7–16)
AST SERPL-CCNC: 393 U/L (ref 0–31)
AST SERPL-CCNC: 403 U/L (ref 0–31)
BASOPHILS # BLD: 0.03 K/UL (ref 0–0.2)
BASOPHILS NFR BLD: 0 % (ref 0–2)
BILIRUB DIRECT SERPL-MCNC: <0.2 MG/DL (ref 0–0.3)
BILIRUB INDIRECT SERPL-MCNC: ABNORMAL MG/DL (ref 0–1)
BILIRUB SERPL-MCNC: 0.3 MG/DL (ref 0–1.2)
BILIRUB SERPL-MCNC: 0.4 MG/DL (ref 0–1.2)
BUN SERPL-MCNC: 25 MG/DL (ref 6–23)
CALCIUM SERPL-MCNC: 8.4 MG/DL (ref 8.6–10.2)
CHLORIDE SERPL-SCNC: 104 MMOL/L (ref 98–107)
CO2 SERPL-SCNC: 25 MMOL/L (ref 22–29)
CREAT SERPL-MCNC: 0.8 MG/DL (ref 0.5–1)
EOSINOPHIL # BLD: 0.05 K/UL (ref 0.05–0.5)
EOSINOPHILS RELATIVE PERCENT: 1 % (ref 0–6)
ERYTHROCYTE [DISTWIDTH] IN BLOOD BY AUTOMATED COUNT: 13.6 % (ref 11.5–15)
GFR, ESTIMATED: 85 ML/MIN/1.73M2
GLUCOSE BLD-MCNC: 128 MG/DL (ref 74–99)
GLUCOSE BLD-MCNC: 154 MG/DL (ref 74–99)
GLUCOSE BLD-MCNC: 254 MG/DL (ref 74–99)
GLUCOSE BLD-MCNC: 71 MG/DL (ref 74–99)
GLUCOSE BLD-MCNC: 74 MG/DL (ref 74–99)
GLUCOSE SERPL-MCNC: 73 MG/DL (ref 74–99)
HCT VFR BLD AUTO: 34.7 % (ref 34–48)
HGB BLD-MCNC: 11.2 G/DL (ref 11.5–15.5)
IMM GRANULOCYTES # BLD AUTO: 0.15 K/UL (ref 0–0.58)
IMM GRANULOCYTES NFR BLD: 1 % (ref 0–5)
LYMPHOCYTES NFR BLD: 2.76 K/UL (ref 1.5–4)
LYMPHOCYTES RELATIVE PERCENT: 25 % (ref 20–42)
MCH RBC QN AUTO: 30.3 PG (ref 26–35)
MCHC RBC AUTO-ENTMCNC: 32.3 G/DL (ref 32–34.5)
MCV RBC AUTO: 93.8 FL (ref 80–99.9)
MONOCYTES NFR BLD: 0.91 K/UL (ref 0.1–0.95)
MONOCYTES NFR BLD: 8 % (ref 2–12)
NEUTROPHILS NFR BLD: 64 % (ref 43–80)
NEUTS SEG NFR BLD: 7.01 K/UL (ref 1.8–7.3)
PLATELET # BLD AUTO: 239 K/UL (ref 130–450)
PMV BLD AUTO: 9.3 FL (ref 7–12)
POTASSIUM SERPL-SCNC: 3.8 MMOL/L (ref 3.5–5)
PROT SERPL-MCNC: 5.8 G/DL (ref 6.4–8.3)
PROT SERPL-MCNC: 5.8 G/DL (ref 6.4–8.3)
RBC # BLD AUTO: 3.7 M/UL (ref 3.5–5.5)
SODIUM SERPL-SCNC: 140 MMOL/L (ref 132–146)
WBC OTHER # BLD: 10.9 K/UL (ref 4.5–11.5)

## 2024-05-28 PROCEDURE — 2580000003 HC RX 258: Performed by: NEUROLOGICAL SURGERY

## 2024-05-28 PROCEDURE — 1200000000 HC SEMI PRIVATE

## 2024-05-28 PROCEDURE — 6370000000 HC RX 637 (ALT 250 FOR IP): Performed by: NEUROLOGICAL SURGERY

## 2024-05-28 PROCEDURE — 82962 GLUCOSE BLOOD TEST: CPT

## 2024-05-28 PROCEDURE — 6360000002 HC RX W HCPCS: Performed by: NEUROLOGICAL SURGERY

## 2024-05-28 PROCEDURE — 85025 COMPLETE CBC W/AUTO DIFF WBC: CPT

## 2024-05-28 PROCEDURE — 80053 COMPREHEN METABOLIC PANEL: CPT

## 2024-05-28 PROCEDURE — 6370000000 HC RX 637 (ALT 250 FOR IP): Performed by: INTERNAL MEDICINE

## 2024-05-28 PROCEDURE — 80076 HEPATIC FUNCTION PANEL: CPT

## 2024-05-28 PROCEDURE — 36415 COLL VENOUS BLD VENIPUNCTURE: CPT

## 2024-05-28 PROCEDURE — 97530 THERAPEUTIC ACTIVITIES: CPT

## 2024-05-28 RX ORDER — INSULIN GLARGINE 100 [IU]/ML
18 INJECTION, SOLUTION SUBCUTANEOUS NIGHTLY
Status: DISCONTINUED | OUTPATIENT
Start: 2024-05-28 | End: 2024-06-03 | Stop reason: HOSPADM

## 2024-05-28 RX ADMIN — MORPHINE SULFATE 2 MG: 2 INJECTION, SOLUTION INTRAMUSCULAR; INTRAVENOUS at 02:03

## 2024-05-28 RX ADMIN — ACETAMINOPHEN 650 MG: 325 TABLET ORAL at 13:35

## 2024-05-28 RX ADMIN — INSULIN LISPRO 2 UNITS: 100 INJECTION, SOLUTION INTRAVENOUS; SUBCUTANEOUS at 20:36

## 2024-05-28 RX ADMIN — ENOXAPARIN SODIUM 30 MG: 100 INJECTION SUBCUTANEOUS at 09:00

## 2024-05-28 RX ADMIN — PREGABALIN 75 MG: 75 CAPSULE ORAL at 08:59

## 2024-05-28 RX ADMIN — PREGABALIN 75 MG: 75 CAPSULE ORAL at 20:09

## 2024-05-28 RX ADMIN — ENOXAPARIN SODIUM 30 MG: 100 INJECTION SUBCUTANEOUS at 20:09

## 2024-05-28 RX ADMIN — ACETAMINOPHEN 650 MG: 325 TABLET ORAL at 02:03

## 2024-05-28 RX ADMIN — OXYCODONE HYDROCHLORIDE 10 MG: 10 TABLET ORAL at 05:57

## 2024-05-28 RX ADMIN — MAGNESIUM 64 MG (MAGNESIUM CHLORIDE) TABLET,DELAYED RELEASE 64 MG: at 09:00

## 2024-05-28 RX ADMIN — SENNOSIDES 8.6 MG: 8.6 TABLET, FILM COATED ORAL at 08:59

## 2024-05-28 RX ADMIN — INSULIN GLARGINE 18 UNITS: 100 INJECTION, SOLUTION SUBCUTANEOUS at 20:10

## 2024-05-28 RX ADMIN — OXYCODONE HYDROCHLORIDE 10 MG: 10 TABLET ORAL at 23:12

## 2024-05-28 RX ADMIN — MORPHINE SULFATE 4 MG: 4 INJECTION, SOLUTION INTRAMUSCULAR; INTRAVENOUS at 15:01

## 2024-05-28 RX ADMIN — SENNOSIDES AND DOCUSATE SODIUM 1 TABLET: 50; 8.6 TABLET ORAL at 20:09

## 2024-05-28 RX ADMIN — POLYETHYLENE GLYCOL 3350 17 G: 17 POWDER, FOR SOLUTION ORAL at 08:58

## 2024-05-28 RX ADMIN — SENNOSIDES 8.6 MG: 8.6 TABLET, FILM COATED ORAL at 20:09

## 2024-05-28 RX ADMIN — SENNOSIDES AND DOCUSATE SODIUM 1 TABLET: 50; 8.6 TABLET ORAL at 08:59

## 2024-05-28 RX ADMIN — ACETAMINOPHEN 650 MG: 325 TABLET ORAL at 08:59

## 2024-05-28 RX ADMIN — LOSARTAN POTASSIUM 25 MG: 25 TABLET, FILM COATED ORAL at 08:59

## 2024-05-28 RX ADMIN — SODIUM CHLORIDE, PRESERVATIVE FREE 10 ML: 5 INJECTION INTRAVENOUS at 20:11

## 2024-05-28 RX ADMIN — TORSEMIDE 20 MG: 20 TABLET ORAL at 09:00

## 2024-05-28 RX ADMIN — CYANOCOBALAMIN TAB 1000 MCG 1000 MCG: 1000 TAB at 08:59

## 2024-05-28 RX ADMIN — BISACODYL 5 MG: 5 TABLET, COATED ORAL at 09:00

## 2024-05-28 RX ADMIN — OXYCODONE HYDROCHLORIDE 10 MG: 10 TABLET ORAL at 18:59

## 2024-05-28 RX ADMIN — METOPROLOL SUCCINATE 25 MG: 25 TABLET, EXTENDED RELEASE ORAL at 09:00

## 2024-05-28 RX ADMIN — SODIUM CHLORIDE, PRESERVATIVE FREE 10 ML: 5 INJECTION INTRAVENOUS at 09:00

## 2024-05-28 RX ADMIN — ACETAMINOPHEN 650 MG: 325 TABLET ORAL at 20:08

## 2024-05-28 ASSESSMENT — PAIN DESCRIPTION - LOCATION
LOCATION: BACK;NECK
LOCATION: OTHER (COMMENT)
LOCATION: BACK;NECK
LOCATION: GENERALIZED
LOCATION: SHOULDER
LOCATION: GENERALIZED

## 2024-05-28 ASSESSMENT — PAIN SCALES - GENERAL
PAINLEVEL_OUTOF10: 8
PAINLEVEL_OUTOF10: 5
PAINLEVEL_OUTOF10: 8
PAINLEVEL_OUTOF10: 7
PAINLEVEL_OUTOF10: 9
PAINLEVEL_OUTOF10: 8
PAINLEVEL_OUTOF10: 3

## 2024-05-28 ASSESSMENT — PAIN DESCRIPTION - ORIENTATION
ORIENTATION: RIGHT;LEFT
ORIENTATION: RIGHT

## 2024-05-28 ASSESSMENT — PAIN DESCRIPTION - DESCRIPTORS
DESCRIPTORS: ACHING
DESCRIPTORS: ACHING;DISCOMFORT;DULL
DESCRIPTORS: ACHING
DESCRIPTORS: ACHING;DULL;DISCOMFORT
DESCRIPTORS: ACHING;SHARP

## 2024-05-28 ASSESSMENT — PAIN - FUNCTIONAL ASSESSMENT: PAIN_FUNCTIONAL_ASSESSMENT: ACTIVITIES ARE NOT PREVENTED

## 2024-05-28 NOTE — PLAN OF CARE
Problem: Chronic Conditions and Co-morbidities  Goal: Patient's chronic conditions and co-morbidity symptoms are monitored and maintained or improved  5/27/2024 2236 by Dacia Kirk RN  Outcome: Progressing  5/27/2024 0946 by Ivanna Wood RN  Outcome: Progressing     Problem: Discharge Planning  Goal: Discharge to home or other facility with appropriate resources  5/27/2024 2236 by Dacia Kirk RN  Outcome: Progressing  5/27/2024 0946 by Ivanna Wood RN  Outcome: Progressing     Problem: Safety - Adult  Goal: Free from fall injury  5/27/2024 2236 by Dacia Kirk RN  Outcome: Progressing  5/27/2024 0946 by Ivanna Wood RN  Outcome: Progressing     Problem: Skin/Tissue Integrity  Goal: Absence of new skin breakdown  Description: 1.  Monitor for areas of redness and/or skin breakdown  2.  Assess vascular access sites hourly  3.  Every 4-6 hours minimum:  Change oxygen saturation probe site  4.  Every 4-6 hours:  If on nasal continuous positive airway pressure, respiratory therapy assess nares and determine need for appliance change or resting period.  5/27/2024 2236 by Dacia Kirk RN  Outcome: Progressing  5/27/2024 0946 by Ivanna Wood RN  Outcome: Progressing     Problem: ABCDS Injury Assessment  Goal: Absence of physical injury  5/27/2024 2236 by Dacia Kirk RN  Outcome: Progressing  5/27/2024 0946 by Ivanna Wood RN  Outcome: Progressing     Problem: Pain  Goal: Verbalizes/displays adequate comfort level or baseline comfort level  5/27/2024 2236 by Dacia Kirk RN  Outcome: Progressing  5/27/2024 0946 by Ivanna Wood RN  Outcome: Progressing     Problem: Nutrition Deficit:  Goal: Optimize nutritional status  Outcome: Progressing

## 2024-05-28 NOTE — ANESTHESIA POSTPROCEDURE EVALUATION
Department of Anesthesiology  Postprocedure Note    Patient: Mignon Fischer  MRN: [id number]  YOB: 1955  Date of evaluation: 5/28/2024    Procedure Summary       Date: 05/24/24 Room / Location: [address]    Anesthesia Start: 1418 Anesthesia Stop: 1734    Procedure: C-4 -C5, C5-C6, and C6- C7 ANTERIOR CERVICAL DISCECTOMY AND  FUSION (Right: Neck) Diagnosis:       Cervical stenosis of spinal canal      (Cervical stenosis of spinal canal [M48.02])    Surgeons: Kalyani Murphy MD Responsible Provider: Radha Kirkpatrick MD    Anesthesia Type: General ASA Status: 3            Anesthesia Type: General    Domiinck Phase I: Dominick Score: 9    Dominick Phase II:      Anesthesia Post Evaluation    Patient location during evaluation: floor  Patient participation: complete - patient participated  Level of consciousness: awake and alert  Airway patency: patent  Nausea & Vomiting: no nausea and no vomiting  Cardiovascular status: blood pressure returned to baseline and hemodynamically stable  Respiratory status: acceptable and spontaneous ventilation  Hydration status: euvolemic  Multimodal analgesia pain management approach  Pain management: adequate    No notable events documented.

## 2024-05-28 NOTE — PLAN OF CARE
Problem: Chronic Conditions and Co-morbidities  Goal: Patient's chronic conditions and co-morbidity symptoms are monitored and maintained or improved  5/28/2024 1128 by Tanvi Carrizales RN  Outcome: Progressing  5/27/2024 2236 by Dacia Kirk RN  Outcome: Progressing     Problem: Discharge Planning  Goal: Discharge to home or other facility with appropriate resources  5/28/2024 1128 by Tanvi Carrizales RN  Outcome: Progressing  5/27/2024 2236 by Dacia Kirk RN  Outcome: Progressing     Problem: Safety - Adult  Goal: Free from fall injury  5/28/2024 1128 by Tanvi Carrizales RN  Outcome: Progressing  5/27/2024 2236 by Dacia Kirk RN  Outcome: Progressing     Problem: Skin/Tissue Integrity  Goal: Absence of new skin breakdown  Description: 1.  Monitor for areas of redness and/or skin breakdown  2.  Assess vascular access sites hourly  3.  Every 4-6 hours minimum:  Change oxygen saturation probe site  4.  Every 4-6 hours:  If on nasal continuous positive airway pressure, respiratory therapy assess nares and determine need for appliance change or resting period.  5/28/2024 1128 by Tanvi Carrizales RN  Outcome: Progressing  5/27/2024 2236 by Dacia Kirk RN  Outcome: Progressing     Problem: ABCDS Injury Assessment  Goal: Absence of physical injury  5/28/2024 1128 by Tanvi Carrizales RN  Outcome: Progressing  5/27/2024 2236 by Dacia Kirk RN  Outcome: Progressing     Problem: Pain  Goal: Verbalizes/displays adequate comfort level or baseline comfort level  5/28/2024 1128 by Tanvi Carrizales RN  Outcome: Progressing  5/27/2024 2236 by Dacia Kirk RN  Outcome: Progressing     Problem: Nutrition Deficit:  Goal: Optimize nutritional status  5/28/2024 1128 by Tanvi Carrizales RN  Outcome: Progressing  5/27/2024 2236 by Dacia Kirk RN  Outcome: Progressing

## 2024-05-29 LAB
ALBUMIN SERPL-MCNC: 3.1 G/DL (ref 3.5–5.2)
ALP SERPL-CCNC: 241 U/L (ref 35–104)
ALT SERPL-CCNC: 179 U/L (ref 0–32)
ANION GAP SERPL CALCULATED.3IONS-SCNC: 10 MMOL/L (ref 7–16)
AST SERPL-CCNC: 151 U/L (ref 0–31)
BASOPHILS # BLD: 0.03 K/UL (ref 0–0.2)
BASOPHILS NFR BLD: 0 % (ref 0–2)
BILIRUB SERPL-MCNC: 0.3 MG/DL (ref 0–1.2)
BUN SERPL-MCNC: 18 MG/DL (ref 6–23)
CALCIUM SERPL-MCNC: 8.7 MG/DL (ref 8.6–10.2)
CHLORIDE SERPL-SCNC: 98 MMOL/L (ref 98–107)
CO2 SERPL-SCNC: 28 MMOL/L (ref 22–29)
CREAT SERPL-MCNC: 0.7 MG/DL (ref 0.5–1)
EOSINOPHIL # BLD: 0.29 K/UL (ref 0.05–0.5)
EOSINOPHILS RELATIVE PERCENT: 3 % (ref 0–6)
ERYTHROCYTE [DISTWIDTH] IN BLOOD BY AUTOMATED COUNT: 13.5 % (ref 11.5–15)
GFR, ESTIMATED: >90 ML/MIN/1.73M2
GLUCOSE BLD-MCNC: 166 MG/DL (ref 74–99)
GLUCOSE BLD-MCNC: 198 MG/DL (ref 74–99)
GLUCOSE BLD-MCNC: 210 MG/DL (ref 74–99)
GLUCOSE BLD-MCNC: 98 MG/DL (ref 74–99)
GLUCOSE SERPL-MCNC: 104 MG/DL (ref 74–99)
HCT VFR BLD AUTO: 37.4 % (ref 34–48)
HGB BLD-MCNC: 12 G/DL (ref 11.5–15.5)
IMM GRANULOCYTES # BLD AUTO: 0.16 K/UL (ref 0–0.58)
IMM GRANULOCYTES NFR BLD: 2 % (ref 0–5)
LYMPHOCYTES NFR BLD: 2.87 K/UL (ref 1.5–4)
LYMPHOCYTES RELATIVE PERCENT: 29 % (ref 20–42)
MCH RBC QN AUTO: 29.9 PG (ref 26–35)
MCHC RBC AUTO-ENTMCNC: 32.1 G/DL (ref 32–34.5)
MCV RBC AUTO: 93 FL (ref 80–99.9)
MONOCYTES NFR BLD: 0.81 K/UL (ref 0.1–0.95)
MONOCYTES NFR BLD: 8 % (ref 2–12)
NEUTROPHILS NFR BLD: 58 % (ref 43–80)
NEUTS SEG NFR BLD: 5.75 K/UL (ref 1.8–7.3)
PLATELET # BLD AUTO: 232 K/UL (ref 130–450)
PMV BLD AUTO: 9.4 FL (ref 7–12)
POTASSIUM SERPL-SCNC: 4 MMOL/L (ref 3.5–5)
PROT SERPL-MCNC: 6.3 G/DL (ref 6.4–8.3)
RBC # BLD AUTO: 4.02 M/UL (ref 3.5–5.5)
SODIUM SERPL-SCNC: 136 MMOL/L (ref 132–146)
WBC OTHER # BLD: 9.9 K/UL (ref 4.5–11.5)

## 2024-05-29 PROCEDURE — 97530 THERAPEUTIC ACTIVITIES: CPT

## 2024-05-29 PROCEDURE — 6370000000 HC RX 637 (ALT 250 FOR IP): Performed by: NEUROLOGICAL SURGERY

## 2024-05-29 PROCEDURE — 36415 COLL VENOUS BLD VENIPUNCTURE: CPT

## 2024-05-29 PROCEDURE — 6370000000 HC RX 637 (ALT 250 FOR IP): Performed by: INTERNAL MEDICINE

## 2024-05-29 PROCEDURE — 6360000002 HC RX W HCPCS: Performed by: NEUROLOGICAL SURGERY

## 2024-05-29 PROCEDURE — 1200000000 HC SEMI PRIVATE

## 2024-05-29 PROCEDURE — 97535 SELF CARE MNGMENT TRAINING: CPT

## 2024-05-29 PROCEDURE — 82962 GLUCOSE BLOOD TEST: CPT

## 2024-05-29 PROCEDURE — 85025 COMPLETE CBC W/AUTO DIFF WBC: CPT

## 2024-05-29 PROCEDURE — 80053 COMPREHEN METABOLIC PANEL: CPT

## 2024-05-29 PROCEDURE — 2580000003 HC RX 258: Performed by: NEUROLOGICAL SURGERY

## 2024-05-29 RX ADMIN — ACETAMINOPHEN 650 MG: 325 TABLET ORAL at 16:06

## 2024-05-29 RX ADMIN — OXYCODONE HYDROCHLORIDE 10 MG: 10 TABLET ORAL at 22:04

## 2024-05-29 RX ADMIN — ACETAMINOPHEN 650 MG: 325 TABLET ORAL at 08:53

## 2024-05-29 RX ADMIN — SENNOSIDES AND DOCUSATE SODIUM 1 TABLET: 50; 8.6 TABLET ORAL at 21:55

## 2024-05-29 RX ADMIN — INSULIN LISPRO 2 UNITS: 100 INJECTION, SOLUTION INTRAVENOUS; SUBCUTANEOUS at 17:15

## 2024-05-29 RX ADMIN — MAGNESIUM 64 MG (MAGNESIUM CHLORIDE) TABLET,DELAYED RELEASE 64 MG: at 08:53

## 2024-05-29 RX ADMIN — LOSARTAN POTASSIUM 25 MG: 25 TABLET, FILM COATED ORAL at 08:52

## 2024-05-29 RX ADMIN — METOPROLOL SUCCINATE 25 MG: 25 TABLET, EXTENDED RELEASE ORAL at 08:52

## 2024-05-29 RX ADMIN — POLYETHYLENE GLYCOL 3350 17 G: 17 POWDER, FOR SOLUTION ORAL at 08:54

## 2024-05-29 RX ADMIN — PREGABALIN 75 MG: 75 CAPSULE ORAL at 08:52

## 2024-05-29 RX ADMIN — ENOXAPARIN SODIUM 30 MG: 100 INJECTION SUBCUTANEOUS at 08:54

## 2024-05-29 RX ADMIN — MORPHINE SULFATE 4 MG: 4 INJECTION, SOLUTION INTRAMUSCULAR; INTRAVENOUS at 03:30

## 2024-05-29 RX ADMIN — PREGABALIN 75 MG: 75 CAPSULE ORAL at 21:55

## 2024-05-29 RX ADMIN — SENNOSIDES AND DOCUSATE SODIUM 1 TABLET: 50; 8.6 TABLET ORAL at 08:52

## 2024-05-29 RX ADMIN — BISACODYL 5 MG: 5 TABLET, COATED ORAL at 08:52

## 2024-05-29 RX ADMIN — ENOXAPARIN SODIUM 30 MG: 100 INJECTION SUBCUTANEOUS at 22:07

## 2024-05-29 RX ADMIN — CYANOCOBALAMIN TAB 1000 MCG 1000 MCG: 1000 TAB at 08:52

## 2024-05-29 RX ADMIN — ACETAMINOPHEN 650 MG: 325 TABLET ORAL at 21:55

## 2024-05-29 RX ADMIN — INSULIN GLARGINE 18 UNITS: 100 INJECTION, SOLUTION SUBCUTANEOUS at 21:55

## 2024-05-29 RX ADMIN — SENNOSIDES 8.6 MG: 8.6 TABLET, FILM COATED ORAL at 21:55

## 2024-05-29 RX ADMIN — SENNOSIDES 8.6 MG: 8.6 TABLET, FILM COATED ORAL at 08:52

## 2024-05-29 RX ADMIN — SODIUM CHLORIDE, PRESERVATIVE FREE 10 ML: 5 INJECTION INTRAVENOUS at 21:56

## 2024-05-29 RX ADMIN — SODIUM CHLORIDE, PRESERVATIVE FREE 10 ML: 5 INJECTION INTRAVENOUS at 08:54

## 2024-05-29 RX ADMIN — TORSEMIDE 20 MG: 20 TABLET ORAL at 08:52

## 2024-05-29 RX ADMIN — MORPHINE SULFATE 4 MG: 4 INJECTION, SOLUTION INTRAMUSCULAR; INTRAVENOUS at 12:58

## 2024-05-29 RX ADMIN — OXYCODONE HYDROCHLORIDE 10 MG: 10 TABLET ORAL at 11:25

## 2024-05-29 ASSESSMENT — PAIN DESCRIPTION - DESCRIPTORS
DESCRIPTORS: ACHING
DESCRIPTORS: SORE;SHARP
DESCRIPTORS: ACHING;SORE;SHARP
DESCRIPTORS: ACHING;SHARP;SORE
DESCRIPTORS: ACHING

## 2024-05-29 ASSESSMENT — PAIN SCALES - GENERAL
PAINLEVEL_OUTOF10: 10
PAINLEVEL_OUTOF10: 7
PAINLEVEL_OUTOF10: 8
PAINLEVEL_OUTOF10: 8
PAINLEVEL_OUTOF10: 10
PAINLEVEL_OUTOF10: 10

## 2024-05-29 ASSESSMENT — PAIN DESCRIPTION - LOCATION
LOCATION: NECK;SHOULDER
LOCATION: SHOULDER
LOCATION: NECK;SHOULDER
LOCATION: BACK;NECK
LOCATION: BACK;NECK;HEAD
LOCATION: BACK;NECK

## 2024-05-29 ASSESSMENT — PAIN DESCRIPTION - ORIENTATION
ORIENTATION: RIGHT
ORIENTATION: RIGHT

## 2024-05-29 ASSESSMENT — PAIN - FUNCTIONAL ASSESSMENT
PAIN_FUNCTIONAL_ASSESSMENT: PREVENTS OR INTERFERES WITH MANY ACTIVE NOT PASSIVE ACTIVITIES
PAIN_FUNCTIONAL_ASSESSMENT: ACTIVITIES ARE NOT PREVENTED

## 2024-05-29 NOTE — PLAN OF CARE
Problem: Chronic Conditions and Co-morbidities  Goal: Patient's chronic conditions and co-morbidity symptoms are monitored and maintained or improved  Outcome: Progressing     Problem: Discharge Planning  Goal: Discharge to home or other facility with appropriate resources  Outcome: Progressing     Problem: Skin/Tissue Integrity  Goal: Absence of new skin breakdown  Description: 1.  Monitor for areas of redness and/or skin breakdown  2.  Assess vascular access sites hourly  3.  Every 4-6 hours minimum:  Change oxygen saturation probe site  4.  Every 4-6 hours:  If on nasal continuous positive airway pressure, respiratory therapy assess nares and determine need for appliance change or resting period.  Outcome: Progressing     Problem: ABCDS Injury Assessment  Goal: Absence of physical injury  Outcome: Progressing     Problem: Pain  Goal: Verbalizes/displays adequate comfort level or baseline comfort level  Outcome: Progressing     Problem: Nutrition Deficit:  Goal: Optimize nutritional status  Outcome: Progressing

## 2024-05-29 NOTE — DISCHARGE INSTR - COC
Continuity of Care Form    Patient Name: Mignon Fischer   :  1955  MRN:  [id number]    Admit date:  2024  Discharge date:  6/3/24    Code Status Order: Full Code   Advance Directives:   Advance Care Flowsheet Documentation       Date/Time Healthcare Directive Type of Healthcare Directive Copy in Chart Healthcare Agent Appointed Healthcare Agent's Name Healthcare Agent's Phone Number    24 0051 No, patient does not have an advance directive for healthcare treatment -- -- -- -- --            Admitting Physician:  Davidson Rivera MD  PCP: Sandy Leiva MD    Discharging Nurse: Yook  [facility] Hospital Unit/Room#: 8410/8410-A  Discharging Unit Phone Number:     Emergency Contact:   Extended Emergency Contact Information  Primary Emergency Contact: Meliton Fischer  Address: [address] [place] [address]  Home Phone: [phone]  Work Phone: [phone]  Mobile Phone: [phone]  Relation: Brother/Sister  Preferred language: English  Secondary Emergency Contact: Roger Fischer \"Ricardo\"  Address: [address] [place] [address]  Home Phone: [phone]  Mobile Phone: [phone]  Relation: Brother/Sister  Preferred language: English   needed? No    Past Surgical History:  Past Surgical History:   Procedure Laterality Date    APPENDECTOMY      CERVICAL FUSION Right 2024    C-4 -C5, C5-C6, and C6- C7 ANTERIOR CERVICAL DISCECTOMY AND  FUSION performed by Kalyani Murphy MD at [facility] OR    CHOLECYSTECTOMY      COLONOSCOPY  2016    EYE SURGERY      EYE SURGERY      GALLBLADDER SURGERY      KNEE ARTHROSCOPY Right     TOTAL KNEE ARTHROPLASTY Right 2024    ROBOTIC LISA ASSISTED RIGHT KNEE TOTAL ARTHROPLASTY   +++ELOISA+++  ++PNB++ performed by Alberto Campbell MD at [facility] OR    UPPER GASTROINTESTINAL ENDOSCOPY  10/2016       Immunization History:   Immunization History

## 2024-05-29 NOTE — CONSULTS
5/29/2024 2:36 PM  Mignon Fischer  [id number]     Chief Complaint:    Urinary retention      History of Present Illness:      The patient is a 68 y.o. female patient who presented to the hospital 12 days ago with complaints of difficulty walking with numbness and tingling in her arms and legs. She had an MRI that showed severe stenosis at c6-c7 and Neurosurgery was consulted. She underwent anterior cervical diskectomy and fusion on 5/24. Urology is asked to evaluate for urinary retention. She failed an inpatient voiding trial and had a garcias inserted for 1300cc of urine 2 days ago. She denies any trouble voiding prior to arrival. She has never seen a Urologist in the past. She does not report feelings of retention or relief when he garcias was inserted during this admission.         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)    Vitreous floaters of both eyes          Past Surgical History:   Procedure Laterality Date    APPENDECTOMY      CERVICAL FUSION Right 5/24/2024    C-4 -C5, C5-C6, and C6- C7 ANTERIOR CERVICAL DISCECTOMY AND  FUSION performed by Kalyani Murphy MD at [facility] [place]    CHOLECYSTECTOMY      COLONOSCOPY  07/11/2016    EYE SURGERY      EYE SURGERY      GALLBLADDER SURGERY  1999    KNEE ARTHROSCOPY Right 2008    TOTAL KNEE ARTHROPLASTY Right 1/30/2024    ROBOTIC LISA ASSISTED RIGHT KNEE TOTAL ARTHROPLASTY   +++ELOISA+++  ++PNB++ performed by Alberto Campbell MD at [facility] OR    UPPER GASTROINTESTINAL ENDOSCOPY  10/2016       Medications Prior to Admission:    Medications Prior to Admission: rosuvastatin (CRESTOR) 20 MG tablet, Take 1 tablet by mouth nightly  spironolactone (ALDACTONE) 25 MG tablet, Take 1 tablet by mouth

## 2024-05-30 LAB
ALBUMIN SERPL-MCNC: 3.3 G/DL (ref 3.5–5.2)
ALP SERPL-CCNC: 195 U/L (ref 35–104)
ALT SERPL-CCNC: 110 U/L (ref 0–32)
ANION GAP SERPL CALCULATED.3IONS-SCNC: 16 MMOL/L (ref 7–16)
AST SERPL-CCNC: 41 U/L (ref 0–31)
BASOPHILS # BLD: 0.02 K/UL (ref 0–0.2)
BASOPHILS NFR BLD: 0 % (ref 0–2)
BILIRUB SERPL-MCNC: 0.4 MG/DL (ref 0–1.2)
BUN SERPL-MCNC: 18 MG/DL (ref 6–23)
CALCIUM SERPL-MCNC: 9.2 MG/DL (ref 8.6–10.2)
CHLORIDE SERPL-SCNC: 96 MMOL/L (ref 98–107)
CO2 SERPL-SCNC: 26 MMOL/L (ref 22–29)
CREAT SERPL-MCNC: 0.7 MG/DL (ref 0.5–1)
EOSINOPHIL # BLD: 0.32 K/UL (ref 0.05–0.5)
EOSINOPHILS RELATIVE PERCENT: 3 % (ref 0–6)
ERYTHROCYTE [DISTWIDTH] IN BLOOD BY AUTOMATED COUNT: 13.7 % (ref 11.5–15)
GFR, ESTIMATED: >90 ML/MIN/1.73M2
GLUCOSE BLD-MCNC: 137 MG/DL (ref 74–99)
GLUCOSE BLD-MCNC: 142 MG/DL (ref 74–99)
GLUCOSE BLD-MCNC: 175 MG/DL (ref 74–99)
GLUCOSE BLD-MCNC: 186 MG/DL (ref 74–99)
GLUCOSE SERPL-MCNC: 143 MG/DL (ref 74–99)
HCT VFR BLD AUTO: 37.6 % (ref 34–48)
HGB BLD-MCNC: 12.3 G/DL (ref 11.5–15.5)
IMM GRANULOCYTES # BLD AUTO: 0.16 K/UL (ref 0–0.58)
IMM GRANULOCYTES NFR BLD: 2 % (ref 0–5)
LYMPHOCYTES NFR BLD: 3.03 K/UL (ref 1.5–4)
LYMPHOCYTES RELATIVE PERCENT: 29 % (ref 20–42)
MCH RBC QN AUTO: 29.9 PG (ref 26–35)
MCHC RBC AUTO-ENTMCNC: 32.7 G/DL (ref 32–34.5)
MCV RBC AUTO: 91.3 FL (ref 80–99.9)
MONOCYTES NFR BLD: 0.84 K/UL (ref 0.1–0.95)
MONOCYTES NFR BLD: 8 % (ref 2–12)
NEUTROPHILS NFR BLD: 58 % (ref 43–80)
NEUTS SEG NFR BLD: 5.97 K/UL (ref 1.8–7.3)
PLATELET # BLD AUTO: 262 K/UL (ref 130–450)
PMV BLD AUTO: 9.3 FL (ref 7–12)
POTASSIUM SERPL-SCNC: 3.9 MMOL/L (ref 3.5–5)
PROT SERPL-MCNC: 6.3 G/DL (ref 6.4–8.3)
RBC # BLD AUTO: 4.12 M/UL (ref 3.5–5.5)
SODIUM SERPL-SCNC: 138 MMOL/L (ref 132–146)
WBC OTHER # BLD: 10.3 K/UL (ref 4.5–11.5)

## 2024-05-30 PROCEDURE — 6370000000 HC RX 637 (ALT 250 FOR IP): Performed by: NEUROLOGICAL SURGERY

## 2024-05-30 PROCEDURE — 97110 THERAPEUTIC EXERCISES: CPT

## 2024-05-30 PROCEDURE — 36415 COLL VENOUS BLD VENIPUNCTURE: CPT

## 2024-05-30 PROCEDURE — 85025 COMPLETE CBC W/AUTO DIFF WBC: CPT

## 2024-05-30 PROCEDURE — 97535 SELF CARE MNGMENT TRAINING: CPT

## 2024-05-30 PROCEDURE — 97530 THERAPEUTIC ACTIVITIES: CPT

## 2024-05-30 PROCEDURE — 80053 COMPREHEN METABOLIC PANEL: CPT

## 2024-05-30 PROCEDURE — 2580000003 HC RX 258: Performed by: NEUROLOGICAL SURGERY

## 2024-05-30 PROCEDURE — 6360000002 HC RX W HCPCS: Performed by: NEUROLOGICAL SURGERY

## 2024-05-30 PROCEDURE — 6370000000 HC RX 637 (ALT 250 FOR IP): Performed by: INTERNAL MEDICINE

## 2024-05-30 PROCEDURE — 1200000000 HC SEMI PRIVATE

## 2024-05-30 PROCEDURE — 82962 GLUCOSE BLOOD TEST: CPT

## 2024-05-30 RX ADMIN — ACETAMINOPHEN 650 MG: 325 TABLET ORAL at 06:28

## 2024-05-30 RX ADMIN — INSULIN GLARGINE 18 UNITS: 100 INJECTION, SOLUTION SUBCUTANEOUS at 20:43

## 2024-05-30 RX ADMIN — POLYETHYLENE GLYCOL 3350 17 G: 17 POWDER, FOR SOLUTION ORAL at 09:39

## 2024-05-30 RX ADMIN — MAGNESIUM 64 MG (MAGNESIUM CHLORIDE) TABLET,DELAYED RELEASE 64 MG: at 06:28

## 2024-05-30 RX ADMIN — PREGABALIN 75 MG: 75 CAPSULE ORAL at 09:39

## 2024-05-30 RX ADMIN — SENNOSIDES AND DOCUSATE SODIUM 1 TABLET: 50; 8.6 TABLET ORAL at 09:40

## 2024-05-30 RX ADMIN — PREGABALIN 75 MG: 75 CAPSULE ORAL at 20:35

## 2024-05-30 RX ADMIN — TORSEMIDE 20 MG: 20 TABLET ORAL at 09:40

## 2024-05-30 RX ADMIN — CYANOCOBALAMIN TAB 1000 MCG 1000 MCG: 1000 TAB at 09:40

## 2024-05-30 RX ADMIN — METOPROLOL SUCCINATE 25 MG: 25 TABLET, EXTENDED RELEASE ORAL at 09:39

## 2024-05-30 RX ADMIN — SENNOSIDES 8.6 MG: 8.6 TABLET, FILM COATED ORAL at 09:39

## 2024-05-30 RX ADMIN — SENNOSIDES AND DOCUSATE SODIUM 1 TABLET: 50; 8.6 TABLET ORAL at 20:38

## 2024-05-30 RX ADMIN — BISACODYL 5 MG: 5 TABLET, COATED ORAL at 09:40

## 2024-05-30 RX ADMIN — ENOXAPARIN SODIUM 30 MG: 100 INJECTION SUBCUTANEOUS at 20:35

## 2024-05-30 RX ADMIN — ACETAMINOPHEN 650 MG: 325 TABLET ORAL at 20:34

## 2024-05-30 RX ADMIN — SODIUM CHLORIDE, PRESERVATIVE FREE 10 ML: 5 INJECTION INTRAVENOUS at 20:35

## 2024-05-30 RX ADMIN — ENOXAPARIN SODIUM 30 MG: 100 INJECTION SUBCUTANEOUS at 09:40

## 2024-05-30 RX ADMIN — ACETAMINOPHEN 650 MG: 325 TABLET ORAL at 15:34

## 2024-05-30 RX ADMIN — LOSARTAN POTASSIUM 25 MG: 25 TABLET, FILM COATED ORAL at 09:40

## 2024-05-30 RX ADMIN — OXYCODONE HYDROCHLORIDE 10 MG: 10 TABLET ORAL at 20:35

## 2024-05-30 RX ADMIN — OXYCODONE HYDROCHLORIDE 10 MG: 10 TABLET ORAL at 15:35

## 2024-05-30 RX ADMIN — SENNOSIDES 8.6 MG: 8.6 TABLET, FILM COATED ORAL at 20:35

## 2024-05-30 RX ADMIN — SODIUM CHLORIDE, PRESERVATIVE FREE 10 ML: 5 INJECTION INTRAVENOUS at 09:41

## 2024-05-30 RX ADMIN — OXYCODONE HYDROCHLORIDE 10 MG: 10 TABLET ORAL at 10:50

## 2024-05-30 ASSESSMENT — PAIN DESCRIPTION - ORIENTATION
ORIENTATION: RIGHT;MID;LOWER
ORIENTATION: RIGHT;LOWER;MID
ORIENTATION: RIGHT;LEFT

## 2024-05-30 ASSESSMENT — PAIN DESCRIPTION - DESCRIPTORS
DESCRIPTORS: ACHING;DISCOMFORT;BURNING
DESCRIPTORS: BURNING;DISCOMFORT;THROBBING

## 2024-05-30 ASSESSMENT — PAIN SCALES - GENERAL
PAINLEVEL_OUTOF10: 7
PAINLEVEL_OUTOF10: 5
PAINLEVEL_OUTOF10: 8
PAINLEVEL_OUTOF10: 8
PAINLEVEL_OUTOF10: 6
PAINLEVEL_OUTOF10: 7

## 2024-05-30 ASSESSMENT — PAIN DESCRIPTION - LOCATION
LOCATION: BACK;HEAD;SHOULDER;NECK
LOCATION: NECK;BACK;SHOULDER
LOCATION: BACK;SHOULDER;NECK

## 2024-05-30 ASSESSMENT — PAIN SCALES - WONG BAKER
WONGBAKER_NUMERICALRESPONSE: HURTS WHOLE LOT
WONGBAKER_NUMERICALRESPONSE: HURTS WHOLE LOT
WONGBAKER_NUMERICALRESPONSE: HURTS A LITTLE BIT

## 2024-05-30 NOTE — PLAN OF CARE
Problem: Chronic Conditions and Co-morbidities  Goal: Patient's chronic conditions and co-morbidity symptoms are monitored and maintained or improved  5/30/2024 0124 by Bernice Barakat RN  Outcome: Progressing  5/29/2024 1942 by Tanvi Carrizales RN  Outcome: Progressing     Problem: Discharge Planning  Goal: Discharge to home or other facility with appropriate resources  5/30/2024 0124 by Bernice Barakat RN  Outcome: Progressing  5/29/2024 1942 by Tanvi Carrizales RN  Outcome: Progressing     Problem: Safety - Adult  Goal: Free from fall injury  5/30/2024 0124 by Bernice Barakat RN  Outcome: Progressing  5/29/2024 1942 by Tanvi Carrizales RN  Outcome: Progressing     Problem: Skin/Tissue Integrity  Goal: Absence of new skin breakdown  Description: 1.  Monitor for areas of redness and/or skin breakdown  2.  Assess vascular access sites hourly  3.  Every 4-6 hours minimum:  Change oxygen saturation probe site  4.  Every 4-6 hours:  If on nasal continuous positive airway pressure, respiratory therapy assess nares and determine need for appliance change or resting period.  5/30/2024 0124 by Bernice Barakat RN  Outcome: Progressing  5/29/2024 1942 by Tanvi Carrizales RN  Outcome: Progressing     Problem: ABCDS Injury Assessment  Goal: Absence of physical injury  5/30/2024 0124 by Bernice Barakat RN  Outcome: Progressing  5/29/2024 1942 by Tanvi Carrizales RN  Outcome: Progressing     Problem: Pain  Goal: Verbalizes/displays adequate comfort level or baseline comfort level  5/30/2024 0124 by Bernice Barakat RN  Outcome: Progressing  5/29/2024 1942 by Tanvi Carrizales RN  Outcome: Progressing     Problem: Nutrition Deficit:  Goal: Optimize nutritional status  5/30/2024 0124 by Bernice Barakat RN  Outcome: Progressing  5/29/2024 1942 by Tanvi Carrizales RN  Outcome: Progressing

## 2024-05-30 NOTE — PLAN OF CARE
Problem: Chronic Conditions and Co-morbidities  Goal: Patient's chronic conditions and co-morbidity symptoms are monitored and maintained or improved  5/30/2024 0953 by Niya Alvarez RN  Outcome: Progressing  5/30/2024 0124 by Bernice Barakat RN  Outcome: Progressing     Problem: Discharge Planning  Goal: Discharge to home or other facility with appropriate resources  5/30/2024 0953 by Niya Alvarez RN  Outcome: Progressing  5/30/2024 0124 by Bernice Barakat RN  Outcome: Progressing     Problem: Safety - Adult  Goal: Free from fall injury  5/30/2024 0953 by Niya Alvarez RN  Outcome: Progressing  5/30/2024 0124 by Bernice Barakat RN  Outcome: Progressing     Problem: Skin/Tissue Integrity  Goal: Absence of new skin breakdown  Description: 1.  Monitor for areas of redness and/or skin breakdown  2.  Assess vascular access sites hourly  3.  Every 4-6 hours minimum:  Change oxygen saturation probe site  4.  Every 4-6 hours:  If on nasal continuous positive airway pressure, respiratory therapy assess nares and determine need for appliance change or resting period.  5/30/2024 0953 by Niya Alvarez RN  Outcome: Progressing  5/30/2024 0124 by Bernice Barakat RN  Outcome: Progressing     Problem: ABCDS Injury Assessment  Goal: Absence of physical injury  5/30/2024 0953 by Niya Alvarez RN  Outcome: Progressing  5/30/2024 0124 by Bernice Barakat RN  Outcome: Progressing     Problem: Pain  Goal: Verbalizes/displays adequate comfort level or baseline comfort level  5/30/2024 0953 by Niya Alvarez RN  Outcome: Progressing  5/30/2024 0124 by Bernice Barakat RN  Outcome: Progressing     Problem: Nutrition Deficit:  Goal: Optimize nutritional status  5/30/2024 0953 by Niya Alvarez RN  Outcome: Progressing  5/30/2024 0124 by Bernice Barakat RN  Outcome: Progressing

## 2024-05-31 LAB
ALBUMIN SERPL-MCNC: 3 G/DL (ref 3.5–5.2)
ALP SERPL-CCNC: 174 U/L (ref 35–104)
ALT SERPL-CCNC: 68 U/L (ref 0–32)
ANION GAP SERPL CALCULATED.3IONS-SCNC: 11 MMOL/L (ref 7–16)
AST SERPL-CCNC: 20 U/L (ref 0–31)
BASOPHILS # BLD: 0.04 K/UL (ref 0–0.2)
BASOPHILS NFR BLD: 0 % (ref 0–2)
BILIRUB SERPL-MCNC: 0.4 MG/DL (ref 0–1.2)
BUN SERPL-MCNC: 19 MG/DL (ref 6–23)
CALCIUM SERPL-MCNC: 9.2 MG/DL (ref 8.6–10.2)
CHLORIDE SERPL-SCNC: 96 MMOL/L (ref 98–107)
CO2 SERPL-SCNC: 27 MMOL/L (ref 22–29)
CREAT SERPL-MCNC: 0.7 MG/DL (ref 0.5–1)
EOSINOPHIL # BLD: 0.39 K/UL (ref 0.05–0.5)
EOSINOPHILS RELATIVE PERCENT: 3 % (ref 0–6)
ERYTHROCYTE [DISTWIDTH] IN BLOOD BY AUTOMATED COUNT: 13.9 % (ref 11.5–15)
GFR, ESTIMATED: >90 ML/MIN/1.73M2
GLUCOSE BLD-MCNC: 148 MG/DL (ref 74–99)
GLUCOSE BLD-MCNC: 160 MG/DL (ref 74–99)
GLUCOSE BLD-MCNC: 173 MG/DL (ref 74–99)
GLUCOSE BLD-MCNC: 250 MG/DL (ref 74–99)
GLUCOSE SERPL-MCNC: 151 MG/DL (ref 74–99)
HCT VFR BLD AUTO: 39.5 % (ref 34–48)
HGB BLD-MCNC: 12.9 G/DL (ref 11.5–15.5)
IMM GRANULOCYTES # BLD AUTO: 0.19 K/UL (ref 0–0.58)
IMM GRANULOCYTES NFR BLD: 1 % (ref 0–5)
LYMPHOCYTES NFR BLD: 2.47 K/UL (ref 1.5–4)
LYMPHOCYTES RELATIVE PERCENT: 18 % (ref 20–42)
MCH RBC QN AUTO: 30.3 PG (ref 26–35)
MCHC RBC AUTO-ENTMCNC: 32.7 G/DL (ref 32–34.5)
MCV RBC AUTO: 92.7 FL (ref 80–99.9)
MONOCYTES NFR BLD: 1.21 K/UL (ref 0.1–0.95)
MONOCYTES NFR BLD: 9 % (ref 2–12)
NEUTROPHILS NFR BLD: 69 % (ref 43–80)
NEUTS SEG NFR BLD: 9.77 K/UL (ref 1.8–7.3)
PLATELET # BLD AUTO: 265 K/UL (ref 130–450)
PMV BLD AUTO: 9.8 FL (ref 7–12)
POTASSIUM SERPL-SCNC: 4.3 MMOL/L (ref 3.5–5)
PROT SERPL-MCNC: 6.6 G/DL (ref 6.4–8.3)
RBC # BLD AUTO: 4.26 M/UL (ref 3.5–5.5)
SODIUM SERPL-SCNC: 134 MMOL/L (ref 132–146)
WBC OTHER # BLD: 14.1 K/UL (ref 4.5–11.5)

## 2024-05-31 PROCEDURE — 6360000002 HC RX W HCPCS: Performed by: NEUROLOGICAL SURGERY

## 2024-05-31 PROCEDURE — 36415 COLL VENOUS BLD VENIPUNCTURE: CPT

## 2024-05-31 PROCEDURE — 82962 GLUCOSE BLOOD TEST: CPT

## 2024-05-31 PROCEDURE — 6370000000 HC RX 637 (ALT 250 FOR IP): Performed by: NEUROLOGICAL SURGERY

## 2024-05-31 PROCEDURE — 97535 SELF CARE MNGMENT TRAINING: CPT

## 2024-05-31 PROCEDURE — 97530 THERAPEUTIC ACTIVITIES: CPT

## 2024-05-31 PROCEDURE — 6370000000 HC RX 637 (ALT 250 FOR IP): Performed by: INTERNAL MEDICINE

## 2024-05-31 PROCEDURE — 1200000000 HC SEMI PRIVATE

## 2024-05-31 PROCEDURE — 80053 COMPREHEN METABOLIC PANEL: CPT

## 2024-05-31 PROCEDURE — 2580000003 HC RX 258: Performed by: NEUROLOGICAL SURGERY

## 2024-05-31 PROCEDURE — 85025 COMPLETE CBC W/AUTO DIFF WBC: CPT

## 2024-05-31 RX ADMIN — PREGABALIN 75 MG: 75 CAPSULE ORAL at 08:48

## 2024-05-31 RX ADMIN — TORSEMIDE 20 MG: 20 TABLET ORAL at 08:48

## 2024-05-31 RX ADMIN — SENNOSIDES AND DOCUSATE SODIUM 1 TABLET: 50; 8.6 TABLET ORAL at 08:48

## 2024-05-31 RX ADMIN — INSULIN LISPRO 4 UNITS: 100 INJECTION, SOLUTION INTRAVENOUS; SUBCUTANEOUS at 22:50

## 2024-05-31 RX ADMIN — SENNOSIDES 8.6 MG: 8.6 TABLET, FILM COATED ORAL at 08:48

## 2024-05-31 RX ADMIN — ACETAMINOPHEN 650 MG: 325 TABLET ORAL at 01:33

## 2024-05-31 RX ADMIN — CYCLOBENZAPRINE 10 MG: 10 TABLET, FILM COATED ORAL at 16:29

## 2024-05-31 RX ADMIN — ENOXAPARIN SODIUM 30 MG: 100 INJECTION SUBCUTANEOUS at 08:48

## 2024-05-31 RX ADMIN — ENOXAPARIN SODIUM 30 MG: 100 INJECTION SUBCUTANEOUS at 21:24

## 2024-05-31 RX ADMIN — OXYCODONE HYDROCHLORIDE 10 MG: 10 TABLET ORAL at 21:23

## 2024-05-31 RX ADMIN — PREGABALIN 75 MG: 75 CAPSULE ORAL at 21:23

## 2024-05-31 RX ADMIN — LOSARTAN POTASSIUM 25 MG: 25 TABLET, FILM COATED ORAL at 08:48

## 2024-05-31 RX ADMIN — MAGNESIUM 64 MG (MAGNESIUM CHLORIDE) TABLET,DELAYED RELEASE 64 MG: at 08:49

## 2024-05-31 RX ADMIN — OXYCODONE 5 MG: 5 TABLET ORAL at 01:34

## 2024-05-31 RX ADMIN — METOPROLOL SUCCINATE 25 MG: 25 TABLET, EXTENDED RELEASE ORAL at 08:48

## 2024-05-31 RX ADMIN — OXYCODONE HYDROCHLORIDE 10 MG: 10 TABLET ORAL at 16:29

## 2024-05-31 RX ADMIN — SODIUM CHLORIDE, PRESERVATIVE FREE 10 ML: 5 INJECTION INTRAVENOUS at 08:48

## 2024-05-31 RX ADMIN — POLYETHYLENE GLYCOL 3350 17 G: 17 POWDER, FOR SOLUTION ORAL at 08:48

## 2024-05-31 RX ADMIN — CYANOCOBALAMIN TAB 1000 MCG 1000 MCG: 1000 TAB at 08:48

## 2024-05-31 RX ADMIN — SENNOSIDES AND DOCUSATE SODIUM 1 TABLET: 50; 8.6 TABLET ORAL at 21:22

## 2024-05-31 RX ADMIN — SENNOSIDES 8.6 MG: 8.6 TABLET, FILM COATED ORAL at 21:24

## 2024-05-31 RX ADMIN — CYCLOBENZAPRINE 10 MG: 10 TABLET, FILM COATED ORAL at 21:23

## 2024-05-31 RX ADMIN — ACETAMINOPHEN 650 MG: 325 TABLET ORAL at 16:30

## 2024-05-31 RX ADMIN — SODIUM CHLORIDE, PRESERVATIVE FREE 10 ML: 5 INJECTION INTRAVENOUS at 21:23

## 2024-05-31 RX ADMIN — INSULIN GLARGINE 18 UNITS: 100 INJECTION, SOLUTION SUBCUTANEOUS at 21:24

## 2024-05-31 RX ADMIN — OXYCODONE 5 MG: 5 TABLET ORAL at 06:18

## 2024-05-31 RX ADMIN — ACETAMINOPHEN 650 MG: 325 TABLET ORAL at 08:47

## 2024-05-31 RX ADMIN — BISACODYL 5 MG: 5 TABLET, COATED ORAL at 08:48

## 2024-05-31 RX ADMIN — CYCLOBENZAPRINE 10 MG: 10 TABLET, FILM COATED ORAL at 06:19

## 2024-05-31 RX ADMIN — ACETAMINOPHEN 650 MG: 325 TABLET ORAL at 21:22

## 2024-05-31 ASSESSMENT — PAIN DESCRIPTION - ORIENTATION
ORIENTATION: RIGHT
ORIENTATION: LOWER;MID;LEFT;RIGHT
ORIENTATION: POSTERIOR;RIGHT
ORIENTATION: RIGHT

## 2024-05-31 ASSESSMENT — PAIN DESCRIPTION - DESCRIPTORS
DESCRIPTORS: ACHING;BURNING;SORE
DESCRIPTORS: BURNING;TINGLING
DESCRIPTORS: ACHING;DISCOMFORT;BURNING;SORE
DESCRIPTORS: SHOOTING

## 2024-05-31 ASSESSMENT — PAIN SCALES - GENERAL
PAINLEVEL_OUTOF10: 10
PAINLEVEL_OUTOF10: 7
PAINLEVEL_OUTOF10: 7
PAINLEVEL_OUTOF10: 6
PAINLEVEL_OUTOF10: 5

## 2024-05-31 ASSESSMENT — PAIN SCALES - WONG BAKER
WONGBAKER_NUMERICALRESPONSE: HURTS WHOLE LOT
WONGBAKER_NUMERICALRESPONSE: HURTS WHOLE LOT

## 2024-05-31 ASSESSMENT — PAIN DESCRIPTION - LOCATION
LOCATION: SHOULDER;HEAD
LOCATION: LEG;KNEE;HIP
LOCATION: HEAD;NECK
LOCATION: SHOULDER;HEAD

## 2024-05-31 NOTE — PLAN OF CARE
Problem: Chronic Conditions and Co-morbidities  Goal: Patient's chronic conditions and co-morbidity symptoms are monitored and maintained or improved  5/30/2024 2334 by Rafia Sanchez RN  Outcome: Progressing     Problem: Discharge Planning  Goal: Discharge to home or other facility with appropriate resources  5/30/2024 2334 by Rafia Sanchez RN  Outcome: Progressing     Problem: Safety - Adult  Goal: Free from fall injury  5/30/2024 2334 by Rafia Sanchez RN  Outcome: Progressing     Problem: Skin/Tissue Integrity  Goal: Absence of new skin breakdown  Description: 1.  Monitor for areas of redness and/or skin breakdown  2.  Assess vascular access sites hourly  3.  Every 4-6 hours minimum:  Change oxygen saturation probe site  4.  Every 4-6 hours:  If on nasal continuous positive airway pressure, respiratory therapy assess nares and determine need for appliance change or resting period.  5/30/2024 2334 by Rafia Sanchez RN  Outcome: Progressing     Problem: ABCDS Injury Assessment  Goal: Absence of physical injury  5/30/2024 2334 by Rafia Sanchez RN  Outcome: Progressing     Problem: Pain  Goal: Verbalizes/displays adequate comfort level or baseline comfort level  5/30/2024 2334 by Rafia Sanchez RN  Outcome: Progressing     Problem: Nutrition Deficit:  Goal: Optimize nutritional status  5/30/2024 2334 by Rafia Sanchez RN  Outcome: Progressing      Abdomen soft, non-tender, no guarding.

## 2024-05-31 NOTE — PLAN OF CARE
Problem: Chronic Conditions and Co-morbidities  Goal: Patient's chronic conditions and co-morbidity symptoms are monitored and maintained or improved  5/31/2024 0849 by Niya Alvarez RN  Outcome: Progressing  5/30/2024 2334 by Rafia Sanchez RN  Outcome: Progressing     Problem: Discharge Planning  Goal: Discharge to home or other facility with appropriate resources  5/31/2024 0849 by Niya Alvarez RN  Outcome: Progressing  5/30/2024 2334 by Rafia Sanchez RN  Outcome: Progressing     Problem: Safety - Adult  Goal: Free from fall injury  5/31/2024 0849 by Niya Alvarez RN  Outcome: Progressing  5/30/2024 2334 by Rafia Sanchez RN  Outcome: Progressing     Problem: Skin/Tissue Integrity  Goal: Absence of new skin breakdown  Description: 1.  Monitor for areas of redness and/or skin breakdown  2.  Assess vascular access sites hourly  3.  Every 4-6 hours minimum:  Change oxygen saturation probe site  4.  Every 4-6 hours:  If on nasal continuous positive airway pressure, respiratory therapy assess nares and determine need for appliance change or resting period.  5/31/2024 0849 by Niya Alvarez RN  Outcome: Progressing  5/30/2024 2334 by Rafia Sanchez RN  Outcome: Progressing     Problem: ABCDS Injury Assessment  Goal: Absence of physical injury  5/31/2024 0849 by Niya Alvarez RN  Outcome: Progressing  5/30/2024 2334 by Rafia Sanchez RN  Outcome: Progressing     Problem: Pain  Goal: Verbalizes/displays adequate comfort level or baseline comfort level  5/31/2024 0849 by Niya Alvarez RN  Outcome: Progressing  5/30/2024 2334 by Rafia Sanchez RN  Outcome: Progressing     Problem: Nutrition Deficit:  Goal: Optimize nutritional status  5/31/2024 0849 by Niya Alvarez RN  Outcome: Progressing  5/30/2024 2334 by Rafia Sanchez RN  Outcome: Progressing

## 2024-06-01 LAB
ALBUMIN SERPL-MCNC: 3.3 G/DL (ref 3.5–5.2)
ALP SERPL-CCNC: 164 U/L (ref 35–104)
ALT SERPL-CCNC: 47 U/L (ref 0–32)
ANION GAP SERPL CALCULATED.3IONS-SCNC: 17 MMOL/L (ref 7–16)
AST SERPL-CCNC: 14 U/L (ref 0–31)
BASOPHILS # BLD: 0.03 K/UL (ref 0–0.2)
BASOPHILS NFR BLD: 0 % (ref 0–2)
BILIRUB SERPL-MCNC: 0.4 MG/DL (ref 0–1.2)
BUN SERPL-MCNC: 19 MG/DL (ref 6–23)
CALCIUM SERPL-MCNC: 9.5 MG/DL (ref 8.6–10.2)
CHLORIDE SERPL-SCNC: 95 MMOL/L (ref 98–107)
CO2 SERPL-SCNC: 25 MMOL/L (ref 22–29)
CREAT SERPL-MCNC: 0.8 MG/DL (ref 0.5–1)
EOSINOPHIL # BLD: 0.34 K/UL (ref 0.05–0.5)
EOSINOPHILS RELATIVE PERCENT: 3 % (ref 0–6)
ERYTHROCYTE [DISTWIDTH] IN BLOOD BY AUTOMATED COUNT: 13.6 % (ref 11.5–15)
GFR, ESTIMATED: 80 ML/MIN/1.73M2
GLUCOSE BLD-MCNC: 156 MG/DL (ref 74–99)
GLUCOSE BLD-MCNC: 194 MG/DL (ref 74–99)
GLUCOSE BLD-MCNC: 201 MG/DL (ref 74–99)
GLUCOSE BLD-MCNC: 307 MG/DL (ref 74–99)
GLUCOSE SERPL-MCNC: 175 MG/DL (ref 74–99)
HCT VFR BLD AUTO: 38.3 % (ref 34–48)
HGB BLD-MCNC: 13 G/DL (ref 11.5–15.5)
IMM GRANULOCYTES # BLD AUTO: 0.18 K/UL (ref 0–0.58)
IMM GRANULOCYTES NFR BLD: 1 % (ref 0–5)
LYMPHOCYTES NFR BLD: 2.91 K/UL (ref 1.5–4)
LYMPHOCYTES RELATIVE PERCENT: 23 % (ref 20–42)
MCH RBC QN AUTO: 31 PG (ref 26–35)
MCHC RBC AUTO-ENTMCNC: 33.9 G/DL (ref 32–34.5)
MCV RBC AUTO: 91.2 FL (ref 80–99.9)
MONOCYTES NFR BLD: 1.21 K/UL (ref 0.1–0.95)
MONOCYTES NFR BLD: 9 % (ref 2–12)
NEUTROPHILS NFR BLD: 64 % (ref 43–80)
NEUTS SEG NFR BLD: 8.23 K/UL (ref 1.8–7.3)
PLATELET # BLD AUTO: 293 K/UL (ref 130–450)
PMV BLD AUTO: 9.3 FL (ref 7–12)
POTASSIUM SERPL-SCNC: 3.8 MMOL/L (ref 3.5–5)
PROT SERPL-MCNC: 6.9 G/DL (ref 6.4–8.3)
RBC # BLD AUTO: 4.2 M/UL (ref 3.5–5.5)
SODIUM SERPL-SCNC: 137 MMOL/L (ref 132–146)
WBC OTHER # BLD: 12.9 K/UL (ref 4.5–11.5)

## 2024-06-01 PROCEDURE — 2580000003 HC RX 258: Performed by: NURSE PRACTITIONER

## 2024-06-01 PROCEDURE — 6370000000 HC RX 637 (ALT 250 FOR IP): Performed by: INTERNAL MEDICINE

## 2024-06-01 PROCEDURE — 6360000002 HC RX W HCPCS: Performed by: NEUROLOGICAL SURGERY

## 2024-06-01 PROCEDURE — 80053 COMPREHEN METABOLIC PANEL: CPT

## 2024-06-01 PROCEDURE — 36415 COLL VENOUS BLD VENIPUNCTURE: CPT

## 2024-06-01 PROCEDURE — 1200000000 HC SEMI PRIVATE

## 2024-06-01 PROCEDURE — 6370000000 HC RX 637 (ALT 250 FOR IP): Performed by: NEUROLOGICAL SURGERY

## 2024-06-01 PROCEDURE — 99231 SBSQ HOSP IP/OBS SF/LOW 25: CPT | Performed by: PHYSICIAN ASSISTANT

## 2024-06-01 PROCEDURE — 6360000002 HC RX W HCPCS: Performed by: NURSE PRACTITIONER

## 2024-06-01 PROCEDURE — 2580000003 HC RX 258: Performed by: NEUROLOGICAL SURGERY

## 2024-06-01 PROCEDURE — 82962 GLUCOSE BLOOD TEST: CPT

## 2024-06-01 PROCEDURE — 85025 COMPLETE CBC W/AUTO DIFF WBC: CPT

## 2024-06-01 RX ORDER — DIVALPROEX SODIUM 125 MG/1
125 CAPSULE, COATED PELLETS ORAL EVERY 12 HOURS SCHEDULED
Status: DISCONTINUED | OUTPATIENT
Start: 2024-06-01 | End: 2024-06-03 | Stop reason: HOSPADM

## 2024-06-01 RX ADMIN — ACETAMINOPHEN 650 MG: 325 TABLET ORAL at 20:57

## 2024-06-01 RX ADMIN — SODIUM CHLORIDE, PRESERVATIVE FREE 10 ML: 5 INJECTION INTRAVENOUS at 20:58

## 2024-06-01 RX ADMIN — DIVALPROEX SODIUM 125 MG: 125 CAPSULE ORAL at 20:58

## 2024-06-01 RX ADMIN — CYANOCOBALAMIN TAB 1000 MCG 1000 MCG: 1000 TAB at 08:24

## 2024-06-01 RX ADMIN — SENNOSIDES 8.6 MG: 8.6 TABLET, FILM COATED ORAL at 20:57

## 2024-06-01 RX ADMIN — OXYCODONE HYDROCHLORIDE 10 MG: 10 TABLET ORAL at 03:06

## 2024-06-01 RX ADMIN — ACETAMINOPHEN 650 MG: 325 TABLET ORAL at 02:01

## 2024-06-01 RX ADMIN — OXYCODONE HYDROCHLORIDE 10 MG: 10 TABLET ORAL at 19:23

## 2024-06-01 RX ADMIN — POLYETHYLENE GLYCOL 3350 17 G: 17 POWDER, FOR SOLUTION ORAL at 08:25

## 2024-06-01 RX ADMIN — BISACODYL 5 MG: 5 TABLET, COATED ORAL at 08:25

## 2024-06-01 RX ADMIN — SENNOSIDES AND DOCUSATE SODIUM 1 TABLET: 50; 8.6 TABLET ORAL at 08:24

## 2024-06-01 RX ADMIN — PREGABALIN 75 MG: 75 CAPSULE ORAL at 08:25

## 2024-06-01 RX ADMIN — INSULIN LISPRO 2 UNITS: 100 INJECTION, SOLUTION INTRAVENOUS; SUBCUTANEOUS at 17:35

## 2024-06-01 RX ADMIN — OXYCODONE HYDROCHLORIDE 10 MG: 10 TABLET ORAL at 23:49

## 2024-06-01 RX ADMIN — CYCLOBENZAPRINE 10 MG: 10 TABLET, FILM COATED ORAL at 22:50

## 2024-06-01 RX ADMIN — ACETAMINOPHEN 650 MG: 325 TABLET ORAL at 13:53

## 2024-06-01 RX ADMIN — MAGNESIUM 64 MG (MAGNESIUM CHLORIDE) TABLET,DELAYED RELEASE 64 MG: at 08:25

## 2024-06-01 RX ADMIN — ENOXAPARIN SODIUM 30 MG: 100 INJECTION SUBCUTANEOUS at 08:24

## 2024-06-01 RX ADMIN — INSULIN LISPRO 6 UNITS: 100 INJECTION, SOLUTION INTRAVENOUS; SUBCUTANEOUS at 21:05

## 2024-06-01 RX ADMIN — SENNOSIDES 8.6 MG: 8.6 TABLET, FILM COATED ORAL at 08:25

## 2024-06-01 RX ADMIN — ENOXAPARIN SODIUM 30 MG: 100 INJECTION SUBCUTANEOUS at 20:56

## 2024-06-01 RX ADMIN — ACETAMINOPHEN 650 MG: 325 TABLET ORAL at 08:24

## 2024-06-01 RX ADMIN — ZIPRASIDONE MESYLATE 10 MG: 20 INJECTION, POWDER, LYOPHILIZED, FOR SOLUTION INTRAMUSCULAR at 02:08

## 2024-06-01 RX ADMIN — SODIUM CHLORIDE, PRESERVATIVE FREE 10 ML: 5 INJECTION INTRAVENOUS at 08:25

## 2024-06-01 RX ADMIN — PREGABALIN 75 MG: 75 CAPSULE ORAL at 20:57

## 2024-06-01 RX ADMIN — SENNOSIDES AND DOCUSATE SODIUM 1 TABLET: 50; 8.6 TABLET ORAL at 20:57

## 2024-06-01 RX ADMIN — INSULIN GLARGINE 18 UNITS: 100 INJECTION, SOLUTION SUBCUTANEOUS at 20:55

## 2024-06-01 RX ADMIN — METOPROLOL SUCCINATE 25 MG: 25 TABLET, EXTENDED RELEASE ORAL at 08:24

## 2024-06-01 RX ADMIN — TORSEMIDE 20 MG: 20 TABLET ORAL at 08:25

## 2024-06-01 RX ADMIN — LOSARTAN POTASSIUM 25 MG: 25 TABLET, FILM COATED ORAL at 08:24

## 2024-06-01 ASSESSMENT — PAIN DESCRIPTION - LOCATION
LOCATION: SHOULDER
LOCATION: HIP;LEG;KNEE
LOCATION: GENERALIZED;HEAD
LOCATION: LEG
LOCATION: LEG
LOCATION: HIP;LEG;SHOULDER
LOCATION: LEG;GENERALIZED;BACK

## 2024-06-01 ASSESSMENT — PAIN DESCRIPTION - DESCRIPTORS
DESCRIPTORS: SHOOTING
DESCRIPTORS: ACHING;SORE;SHARP
DESCRIPTORS: SHOOTING
DESCRIPTORS: ACHING;SORE
DESCRIPTORS: BURNING
DESCRIPTORS: ACHING;SHARP

## 2024-06-01 ASSESSMENT — PAIN SCALES - GENERAL
PAINLEVEL_OUTOF10: 8
PAINLEVEL_OUTOF10: 5
PAINLEVEL_OUTOF10: 8
PAINLEVEL_OUTOF10: 9
PAINLEVEL_OUTOF10: 9
PAINLEVEL_OUTOF10: 4

## 2024-06-01 ASSESSMENT — PAIN DESCRIPTION - ORIENTATION
ORIENTATION: LEFT
ORIENTATION: RIGHT
ORIENTATION: LEFT
ORIENTATION: RIGHT
ORIENTATION: LEFT;RIGHT

## 2024-06-01 ASSESSMENT — PAIN SCALES - WONG BAKER: WONGBAKER_NUMERICALRESPONSE: HURTS WHOLE LOT

## 2024-06-02 LAB
ALBUMIN SERPL-MCNC: 3.2 G/DL (ref 3.5–5.2)
ALP SERPL-CCNC: 142 U/L (ref 35–104)
ALT SERPL-CCNC: 33 U/L (ref 0–32)
ANION GAP SERPL CALCULATED.3IONS-SCNC: 15 MMOL/L (ref 7–16)
AST SERPL-CCNC: 12 U/L (ref 0–31)
BASOPHILS # BLD: 0.03 K/UL (ref 0–0.2)
BASOPHILS NFR BLD: 0 % (ref 0–2)
BILIRUB SERPL-MCNC: 0.4 MG/DL (ref 0–1.2)
BUN SERPL-MCNC: 21 MG/DL (ref 6–23)
CALCIUM SERPL-MCNC: 9.6 MG/DL (ref 8.6–10.2)
CHLORIDE SERPL-SCNC: 94 MMOL/L (ref 98–107)
CO2 SERPL-SCNC: 26 MMOL/L (ref 22–29)
CREAT SERPL-MCNC: 0.7 MG/DL (ref 0.5–1)
EOSINOPHIL # BLD: 0.48 K/UL (ref 0.05–0.5)
EOSINOPHILS RELATIVE PERCENT: 4 % (ref 0–6)
ERYTHROCYTE [DISTWIDTH] IN BLOOD BY AUTOMATED COUNT: 13.7 % (ref 11.5–15)
GFR, ESTIMATED: >90 ML/MIN/1.73M2
GLUCOSE BLD-MCNC: 129 MG/DL (ref 74–99)
GLUCOSE BLD-MCNC: 169 MG/DL (ref 74–99)
GLUCOSE BLD-MCNC: 188 MG/DL (ref 74–99)
GLUCOSE BLD-MCNC: 192 MG/DL (ref 74–99)
GLUCOSE SERPL-MCNC: 124 MG/DL (ref 74–99)
HCT VFR BLD AUTO: 39.2 % (ref 34–48)
HGB BLD-MCNC: 12.7 G/DL (ref 11.5–15.5)
IMM GRANULOCYTES # BLD AUTO: 0.16 K/UL (ref 0–0.58)
IMM GRANULOCYTES NFR BLD: 1 % (ref 0–5)
LYMPHOCYTES NFR BLD: 2.87 K/UL (ref 1.5–4)
LYMPHOCYTES RELATIVE PERCENT: 25 % (ref 20–42)
MCH RBC QN AUTO: 29.8 PG (ref 26–35)
MCHC RBC AUTO-ENTMCNC: 32.4 G/DL (ref 32–34.5)
MCV RBC AUTO: 92 FL (ref 80–99.9)
MONOCYTES NFR BLD: 1.48 K/UL (ref 0.1–0.95)
MONOCYTES NFR BLD: 13 % (ref 2–12)
NEUTROPHILS NFR BLD: 56 % (ref 43–80)
NEUTS SEG NFR BLD: 6.42 K/UL (ref 1.8–7.3)
PLATELET # BLD AUTO: 338 K/UL (ref 130–450)
PMV BLD AUTO: 9.4 FL (ref 7–12)
POTASSIUM SERPL-SCNC: 3.9 MMOL/L (ref 3.5–5)
PROT SERPL-MCNC: 7 G/DL (ref 6.4–8.3)
RBC # BLD AUTO: 4.26 M/UL (ref 3.5–5.5)
SODIUM SERPL-SCNC: 135 MMOL/L (ref 132–146)
WBC OTHER # BLD: 11.4 K/UL (ref 4.5–11.5)

## 2024-06-02 PROCEDURE — 6370000000 HC RX 637 (ALT 250 FOR IP): Performed by: NEUROLOGICAL SURGERY

## 2024-06-02 PROCEDURE — 85025 COMPLETE CBC W/AUTO DIFF WBC: CPT

## 2024-06-02 PROCEDURE — 6360000002 HC RX W HCPCS: Performed by: NEUROLOGICAL SURGERY

## 2024-06-02 PROCEDURE — 2580000003 HC RX 258: Performed by: NEUROLOGICAL SURGERY

## 2024-06-02 PROCEDURE — 36415 COLL VENOUS BLD VENIPUNCTURE: CPT

## 2024-06-02 PROCEDURE — 80053 COMPREHEN METABOLIC PANEL: CPT

## 2024-06-02 PROCEDURE — 6370000000 HC RX 637 (ALT 250 FOR IP): Performed by: INTERNAL MEDICINE

## 2024-06-02 PROCEDURE — 99231 SBSQ HOSP IP/OBS SF/LOW 25: CPT | Performed by: PHYSICIAN ASSISTANT

## 2024-06-02 PROCEDURE — 82962 GLUCOSE BLOOD TEST: CPT

## 2024-06-02 PROCEDURE — 1200000000 HC SEMI PRIVATE

## 2024-06-02 RX ADMIN — SENNOSIDES AND DOCUSATE SODIUM 1 TABLET: 50; 8.6 TABLET ORAL at 08:30

## 2024-06-02 RX ADMIN — SENNOSIDES 8.6 MG: 8.6 TABLET, FILM COATED ORAL at 08:30

## 2024-06-02 RX ADMIN — SODIUM CHLORIDE, PRESERVATIVE FREE 10 ML: 5 INJECTION INTRAVENOUS at 08:30

## 2024-06-02 RX ADMIN — INSULIN GLARGINE 18 UNITS: 100 INJECTION, SOLUTION SUBCUTANEOUS at 21:17

## 2024-06-02 RX ADMIN — DIVALPROEX SODIUM 125 MG: 125 CAPSULE ORAL at 21:18

## 2024-06-02 RX ADMIN — MORPHINE SULFATE 2 MG: 2 INJECTION, SOLUTION INTRAMUSCULAR; INTRAVENOUS at 14:36

## 2024-06-02 RX ADMIN — MORPHINE SULFATE 4 MG: 4 INJECTION, SOLUTION INTRAMUSCULAR; INTRAVENOUS at 18:32

## 2024-06-02 RX ADMIN — ACETAMINOPHEN 650 MG: 325 TABLET ORAL at 08:30

## 2024-06-02 RX ADMIN — SODIUM CHLORIDE, PRESERVATIVE FREE 10 ML: 5 INJECTION INTRAVENOUS at 21:18

## 2024-06-02 RX ADMIN — CYCLOBENZAPRINE 10 MG: 10 TABLET, FILM COATED ORAL at 08:31

## 2024-06-02 RX ADMIN — ACETAMINOPHEN 650 MG: 325 TABLET ORAL at 14:36

## 2024-06-02 RX ADMIN — OXYCODONE HYDROCHLORIDE 10 MG: 10 TABLET ORAL at 16:51

## 2024-06-02 RX ADMIN — BISACODYL 5 MG: 5 TABLET, COATED ORAL at 08:31

## 2024-06-02 RX ADMIN — ENOXAPARIN SODIUM 30 MG: 100 INJECTION SUBCUTANEOUS at 21:17

## 2024-06-02 RX ADMIN — ACETAMINOPHEN 650 MG: 325 TABLET ORAL at 21:17

## 2024-06-02 RX ADMIN — CYANOCOBALAMIN TAB 1000 MCG 1000 MCG: 1000 TAB at 08:31

## 2024-06-02 RX ADMIN — ENOXAPARIN SODIUM 30 MG: 100 INJECTION SUBCUTANEOUS at 08:30

## 2024-06-02 RX ADMIN — METOPROLOL SUCCINATE 25 MG: 25 TABLET, EXTENDED RELEASE ORAL at 08:30

## 2024-06-02 RX ADMIN — SENNOSIDES AND DOCUSATE SODIUM 1 TABLET: 50; 8.6 TABLET ORAL at 21:18

## 2024-06-02 RX ADMIN — PREGABALIN 75 MG: 75 CAPSULE ORAL at 08:31

## 2024-06-02 RX ADMIN — PREGABALIN 75 MG: 75 CAPSULE ORAL at 21:18

## 2024-06-02 RX ADMIN — TORSEMIDE 20 MG: 20 TABLET ORAL at 08:30

## 2024-06-02 RX ADMIN — MAGNESIUM 64 MG (MAGNESIUM CHLORIDE) TABLET,DELAYED RELEASE 64 MG: at 08:32

## 2024-06-02 RX ADMIN — OXYCODONE HYDROCHLORIDE 10 MG: 10 TABLET ORAL at 12:33

## 2024-06-02 RX ADMIN — OXYCODONE 5 MG: 5 TABLET ORAL at 08:32

## 2024-06-02 RX ADMIN — DIVALPROEX SODIUM 125 MG: 125 CAPSULE ORAL at 08:31

## 2024-06-02 RX ADMIN — POLYETHYLENE GLYCOL 3350 17 G: 17 POWDER, FOR SOLUTION ORAL at 08:32

## 2024-06-02 RX ADMIN — CYCLOBENZAPRINE 10 MG: 10 TABLET, FILM COATED ORAL at 16:51

## 2024-06-02 RX ADMIN — SENNOSIDES 8.6 MG: 8.6 TABLET, FILM COATED ORAL at 21:17

## 2024-06-02 RX ADMIN — LOSARTAN POTASSIUM 25 MG: 25 TABLET, FILM COATED ORAL at 08:31

## 2024-06-02 ASSESSMENT — PAIN DESCRIPTION - LOCATION
LOCATION: BACK;LEG
LOCATION: BACK;LEG
LOCATION: LEG;SHOULDER
LOCATION: BACK;LEG
LOCATION: BACK;LEG;HEAD
LOCATION: BACK;LEG

## 2024-06-02 ASSESSMENT — PAIN DESCRIPTION - DESCRIPTORS
DESCRIPTORS: SORE
DESCRIPTORS: ACHING;SHOOTING;SHARP
DESCRIPTORS: ACHING;SHOOTING

## 2024-06-02 ASSESSMENT — PAIN DESCRIPTION - ORIENTATION
ORIENTATION: LEFT;RIGHT
ORIENTATION: RIGHT;LEFT
ORIENTATION: RIGHT;LEFT
ORIENTATION: LEFT;RIGHT
ORIENTATION: RIGHT;LEFT

## 2024-06-02 ASSESSMENT — PAIN SCALES - GENERAL
PAINLEVEL_OUTOF10: 10
PAINLEVEL_OUTOF10: 10
PAINLEVEL_OUTOF10: 4
PAINLEVEL_OUTOF10: 8
PAINLEVEL_OUTOF10: 9
PAINLEVEL_OUTOF10: 8
PAINLEVEL_OUTOF10: 8

## 2024-06-02 NOTE — PLAN OF CARE
Problem: Chronic Conditions and Co-morbidities  Goal: Patient's chronic conditions and co-morbidity symptoms are monitored and maintained or improved  Outcome: Progressing     Problem: Discharge Planning  Goal: Discharge to home or other facility with appropriate resources  Outcome: Progressing     Problem: Safety - Adult  Goal: Free from fall injury  Outcome: Progressing     Problem: Skin/Tissue Integrity  Goal: Absence of new skin breakdown  Description: 1.  Monitor for areas of redness and/or skin breakdown  2.  Assess vascular access sites hourly  3.  Every 4-6 hours minimum:  Change oxygen saturation probe site  4.  Every 4-6 hours:  If on nasal continuous positive airway pressure, respiratory therapy assess nares and determine need for appliance change or resting period.  Outcome: Progressing     Problem: Pain  Goal: Verbalizes/displays adequate comfort level or baseline comfort level  Outcome: Progressing     Problem: Nutrition Deficit:  Goal: Optimize nutritional status  Outcome: Progressing

## 2024-06-02 NOTE — PLAN OF CARE
Problem: Chronic Conditions and Co-morbidities  Goal: Patient's chronic conditions and co-morbidity symptoms are monitored and maintained or improved  6/2/2024 0726 by Tanvi Carrizales RN  Outcome: Progressing  6/2/2024 0514 by Dana Toure RN  Outcome: Progressing  6/2/2024 0053 by Dana Toure RN  Outcome: Progressing     Problem: Discharge Planning  Goal: Discharge to home or other facility with appropriate resources  6/2/2024 0726 by Tanvi Carrizales RN  Outcome: Progressing  6/2/2024 0514 by Dana Toure RN  Outcome: Progressing  6/2/2024 0053 by Dana Toure RN  Outcome: Progressing     Problem: Safety - Adult  Goal: Free from fall injury  6/2/2024 0726 by Tanvi Carrizales RN  Outcome: Progressing  6/2/2024 0514 by Dana Toure RN  Outcome: Progressing  6/2/2024 0053 by Dana Toure RN  Outcome: Progressing     Problem: Skin/Tissue Integrity  Goal: Absence of new skin breakdown  Description: 1.  Monitor for areas of redness and/or skin breakdown  2.  Assess vascular access sites hourly  3.  Every 4-6 hours minimum:  Change oxygen saturation probe site  4.  Every 4-6 hours:  If on nasal continuous positive airway pressure, respiratory therapy assess nares and determine need for appliance change or resting period.  6/2/2024 0726 by Tanvi Carrizales RN  Outcome: Progressing  6/2/2024 0514 by Dana Toure RN  Outcome: Progressing  6/2/2024 0053 by Dana Toure RN  Outcome: Progressing     Problem: Pain  Goal: Verbalizes/displays adequate comfort level or baseline comfort level  6/2/2024 0726 by Tanvi Carrizales RN  Outcome: Progressing  6/2/2024 0514 by Dana Toure RN  Outcome: Progressing  6/2/2024 0053 by Dana Toure RN  Outcome: Progressing     Problem: Nutrition Deficit:  Goal: Optimize nutritional status  6/2/2024 0726 by Grahovac, Tanvi, RN  Outcome: Progressing  6/2/2024 0514 by

## 2024-06-02 NOTE — PLAN OF CARE
Problem: Chronic Conditions and Co-morbidities  Goal: Patient's chronic conditions and co-morbidity symptoms are monitored and maintained or improved  6/2/2024 0514 by Dana Toure RN  Outcome: Progressing  6/2/2024 0053 by Dana Toure RN  Outcome: Progressing     Problem: Discharge Planning  Goal: Discharge to home or other facility with appropriate resources  6/2/2024 0514 by Dana Toure RN  Outcome: Progressing  6/2/2024 0053 by Dana Toure RN  Outcome: Progressing     Problem: Safety - Adult  Goal: Free from fall injury  6/2/2024 0514 by Dana Toure RN  Outcome: Progressing  6/2/2024 0053 by Dana Toure RN  Outcome: Progressing     Problem: Skin/Tissue Integrity  Goal: Absence of new skin breakdown  Description: 1.  Monitor for areas of redness and/or skin breakdown  2.  Assess vascular access sites hourly  3.  Every 4-6 hours minimum:  Change oxygen saturation probe site  4.  Every 4-6 hours:  If on nasal continuous positive airway pressure, respiratory therapy assess nares and determine need for appliance change or resting period.  6/2/2024 0514 by Dana Toure RN  Outcome: Progressing  6/2/2024 0053 by Dana Toure RN  Outcome: Progressing     Problem: ABCDS Injury Assessment  Goal: Absence of physical injury  Outcome: Progressing     Problem: Pain  Goal: Verbalizes/displays adequate comfort level or baseline comfort level  6/2/2024 0514 by Dana Toure RN  Outcome: Progressing  6/2/2024 0053 by Dana Toure RN  Outcome: Progressing     Problem: Nutrition Deficit:  Goal: Optimize nutritional status  6/2/2024 0514 by Dana Toure RN  Outcome: Progressing  6/2/2024 0053 by Dana Toure RN  Outcome: Progressing

## 2024-06-03 VITALS
RESPIRATION RATE: 18 BRPM | HEIGHT: 63 IN | SYSTOLIC BLOOD PRESSURE: 127 MMHG | DIASTOLIC BLOOD PRESSURE: 69 MMHG | BODY MASS INDEX: 39.69 KG/M2 | OXYGEN SATURATION: 95 % | HEART RATE: 94 BPM | TEMPERATURE: 97 F | WEIGHT: 224 LBS

## 2024-06-03 LAB
ALBUMIN SERPL-MCNC: 3.3 G/DL (ref 3.5–5.2)
ALP SERPL-CCNC: 131 U/L (ref 35–104)
ALT SERPL-CCNC: 25 U/L (ref 0–32)
ANION GAP SERPL CALCULATED.3IONS-SCNC: 15 MMOL/L (ref 7–16)
AST SERPL-CCNC: 13 U/L (ref 0–31)
BASOPHILS # BLD: 0.02 K/UL (ref 0–0.2)
BASOPHILS NFR BLD: 0 % (ref 0–2)
BILIRUB SERPL-MCNC: 0.4 MG/DL (ref 0–1.2)
BUN SERPL-MCNC: 19 MG/DL (ref 6–23)
CALCIUM SERPL-MCNC: 9.7 MG/DL (ref 8.6–10.2)
CHLORIDE SERPL-SCNC: 94 MMOL/L (ref 98–107)
CO2 SERPL-SCNC: 27 MMOL/L (ref 22–29)
CREAT SERPL-MCNC: 0.7 MG/DL (ref 0.5–1)
EOSINOPHIL # BLD: 0.38 K/UL (ref 0.05–0.5)
EOSINOPHILS RELATIVE PERCENT: 3 % (ref 0–6)
ERYTHROCYTE [DISTWIDTH] IN BLOOD BY AUTOMATED COUNT: 13.6 % (ref 11.5–15)
GFR, ESTIMATED: >90 ML/MIN/1.73M2
GLUCOSE BLD-MCNC: 155 MG/DL (ref 74–99)
GLUCOSE BLD-MCNC: 203 MG/DL (ref 74–99)
GLUCOSE SERPL-MCNC: 148 MG/DL (ref 74–99)
HCT VFR BLD AUTO: 37.3 % (ref 34–48)
HGB BLD-MCNC: 11.9 G/DL (ref 11.5–15.5)
IMM GRANULOCYTES # BLD AUTO: 0.1 K/UL (ref 0–0.58)
IMM GRANULOCYTES NFR BLD: 1 % (ref 0–5)
LYMPHOCYTES NFR BLD: 2.4 K/UL (ref 1.5–4)
LYMPHOCYTES RELATIVE PERCENT: 21 % (ref 20–42)
MCH RBC QN AUTO: 29.2 PG (ref 26–35)
MCHC RBC AUTO-ENTMCNC: 31.9 G/DL (ref 32–34.5)
MCV RBC AUTO: 91.6 FL (ref 80–99.9)
MONOCYTES NFR BLD: 1.22 K/UL (ref 0.1–0.95)
MONOCYTES NFR BLD: 10 % (ref 2–12)
NEUTROPHILS NFR BLD: 65 % (ref 43–80)
NEUTS SEG NFR BLD: 7.6 K/UL (ref 1.8–7.3)
PLATELET # BLD AUTO: 323 K/UL (ref 130–450)
PMV BLD AUTO: 9.3 FL (ref 7–12)
POTASSIUM SERPL-SCNC: 3.7 MMOL/L (ref 3.5–5)
PROT SERPL-MCNC: 6.9 G/DL (ref 6.4–8.3)
RBC # BLD AUTO: 4.07 M/UL (ref 3.5–5.5)
SODIUM SERPL-SCNC: 136 MMOL/L (ref 132–146)
WBC OTHER # BLD: 11.7 K/UL (ref 4.5–11.5)

## 2024-06-03 PROCEDURE — 6360000002 HC RX W HCPCS: Performed by: NEUROLOGICAL SURGERY

## 2024-06-03 PROCEDURE — 6370000000 HC RX 637 (ALT 250 FOR IP): Performed by: NEUROLOGICAL SURGERY

## 2024-06-03 PROCEDURE — 2580000003 HC RX 258: Performed by: NEUROLOGICAL SURGERY

## 2024-06-03 PROCEDURE — 85025 COMPLETE CBC W/AUTO DIFF WBC: CPT

## 2024-06-03 PROCEDURE — 6370000000 HC RX 637 (ALT 250 FOR IP): Performed by: INTERNAL MEDICINE

## 2024-06-03 PROCEDURE — 80053 COMPREHEN METABOLIC PANEL: CPT

## 2024-06-03 PROCEDURE — 36415 COLL VENOUS BLD VENIPUNCTURE: CPT

## 2024-06-03 PROCEDURE — 6370000000 HC RX 637 (ALT 250 FOR IP): Performed by: PHYSICIAN ASSISTANT

## 2024-06-03 PROCEDURE — 82962 GLUCOSE BLOOD TEST: CPT

## 2024-06-03 RX ORDER — INSULIN GLARGINE 100 [IU]/ML
18 INJECTION, SOLUTION SUBCUTANEOUS NIGHTLY
Qty: 10 ML | Refills: 3 | Status: SHIPPED | OUTPATIENT
Start: 2024-06-03

## 2024-06-03 RX ORDER — PREGABALIN 75 MG/1
75 CAPSULE ORAL 3 TIMES DAILY
Qty: 9 CAPSULE | Refills: 0 | Status: SHIPPED | OUTPATIENT
Start: 2024-06-03 | End: 2024-06-06

## 2024-06-03 RX ORDER — SUMATRIPTAN 100 MG/1
100 TABLET, FILM COATED ORAL
Qty: 1 TABLET | Refills: 0 | Status: SHIPPED | OUTPATIENT
Start: 2024-06-03 | End: 2024-06-03

## 2024-06-03 RX ORDER — DIVALPROEX SODIUM 125 MG/1
125 CAPSULE, COATED PELLETS ORAL EVERY 12 HOURS SCHEDULED
Qty: 60 CAPSULE | Refills: 3 | Status: SHIPPED | OUTPATIENT
Start: 2024-06-03

## 2024-06-03 RX ORDER — PREGABALIN 75 MG/1
75 CAPSULE ORAL 3 TIMES DAILY
Status: DISCONTINUED | OUTPATIENT
Start: 2024-06-03 | End: 2024-06-03 | Stop reason: HOSPADM

## 2024-06-03 RX ORDER — ALBUTEROL SULFATE 90 UG/1
1 AEROSOL, METERED RESPIRATORY (INHALATION) EVERY 4 HOURS PRN
Qty: 18 G | Refills: 3 | Status: SHIPPED | OUTPATIENT
Start: 2024-06-03

## 2024-06-03 RX ORDER — POLYETHYLENE GLYCOL 3350 17 G/17G
17 POWDER, FOR SOLUTION ORAL DAILY
Qty: 527 G | Refills: 0 | Status: SHIPPED | OUTPATIENT
Start: 2024-06-04 | End: 2024-07-05

## 2024-06-03 RX ORDER — LOSARTAN POTASSIUM 25 MG/1
25 TABLET ORAL DAILY
Qty: 30 TABLET | Refills: 3 | Status: SHIPPED | OUTPATIENT
Start: 2024-06-04

## 2024-06-03 RX ORDER — BISACODYL 5 MG/1
5 TABLET, DELAYED RELEASE ORAL DAILY
Qty: 30 TABLET | Refills: 0 | Status: SHIPPED | OUTPATIENT
Start: 2024-06-04

## 2024-06-03 RX ORDER — SENNOSIDES A AND B 8.6 MG/1
1 TABLET, FILM COATED ORAL 2 TIMES DAILY
Qty: 60 TABLET | Refills: 0 | Status: SHIPPED | OUTPATIENT
Start: 2024-06-03 | End: 2024-07-03

## 2024-06-03 RX ORDER — OXYCODONE HYDROCHLORIDE 10 MG/1
10 TABLET ORAL EVERY 4 HOURS PRN
Qty: 18 TABLET | Refills: 0 | Status: SHIPPED | OUTPATIENT
Start: 2024-06-03 | End: 2024-06-06

## 2024-06-03 RX ORDER — INSULIN LISPRO 100 [IU]/ML
0-8 INJECTION, SOLUTION INTRAVENOUS; SUBCUTANEOUS
Qty: 15 ML | Refills: 0 | Status: SHIPPED | OUTPATIENT
Start: 2024-06-03

## 2024-06-03 RX ADMIN — BISACODYL 5 MG: 5 TABLET, COATED ORAL at 09:12

## 2024-06-03 RX ADMIN — SODIUM CHLORIDE, PRESERVATIVE FREE 10 ML: 5 INJECTION INTRAVENOUS at 09:13

## 2024-06-03 RX ADMIN — SENNOSIDES 8.6 MG: 8.6 TABLET, FILM COATED ORAL at 09:10

## 2024-06-03 RX ADMIN — CYANOCOBALAMIN TAB 1000 MCG 1000 MCG: 1000 TAB at 09:12

## 2024-06-03 RX ADMIN — ACETAMINOPHEN 650 MG: 325 TABLET ORAL at 14:24

## 2024-06-03 RX ADMIN — OXYCODONE HYDROCHLORIDE 10 MG: 10 TABLET ORAL at 09:12

## 2024-06-03 RX ADMIN — POLYETHYLENE GLYCOL 3350 17 G: 17 POWDER, FOR SOLUTION ORAL at 09:09

## 2024-06-03 RX ADMIN — CYCLOBENZAPRINE 10 MG: 10 TABLET, FILM COATED ORAL at 06:25

## 2024-06-03 RX ADMIN — PREGABALIN 75 MG: 75 CAPSULE ORAL at 09:13

## 2024-06-03 RX ADMIN — DIVALPROEX SODIUM 125 MG: 125 CAPSULE ORAL at 09:11

## 2024-06-03 RX ADMIN — TORSEMIDE 20 MG: 20 TABLET ORAL at 09:12

## 2024-06-03 RX ADMIN — METOPROLOL SUCCINATE 25 MG: 25 TABLET, EXTENDED RELEASE ORAL at 09:12

## 2024-06-03 RX ADMIN — OXYCODONE HYDROCHLORIDE 10 MG: 10 TABLET ORAL at 05:02

## 2024-06-03 RX ADMIN — ENOXAPARIN SODIUM 30 MG: 100 INJECTION SUBCUTANEOUS at 09:10

## 2024-06-03 RX ADMIN — PREGABALIN 75 MG: 75 CAPSULE ORAL at 14:23

## 2024-06-03 RX ADMIN — SENNOSIDES AND DOCUSATE SODIUM 1 TABLET: 50; 8.6 TABLET ORAL at 09:12

## 2024-06-03 RX ADMIN — OXYCODONE 5 MG: 5 TABLET ORAL at 14:24

## 2024-06-03 RX ADMIN — ACETAMINOPHEN 650 MG: 325 TABLET ORAL at 09:10

## 2024-06-03 RX ADMIN — LOSARTAN POTASSIUM 25 MG: 25 TABLET, FILM COATED ORAL at 09:12

## 2024-06-03 RX ADMIN — MAGNESIUM 64 MG (MAGNESIUM CHLORIDE) TABLET,DELAYED RELEASE 64 MG: at 09:13

## 2024-06-03 ASSESSMENT — PAIN DESCRIPTION - ORIENTATION
ORIENTATION: RIGHT
ORIENTATION: RIGHT

## 2024-06-03 ASSESSMENT — PAIN SCALES - GENERAL
PAINLEVEL_OUTOF10: 9
PAINLEVEL_OUTOF10: 8

## 2024-06-03 ASSESSMENT — PAIN DESCRIPTION - LOCATION
LOCATION: LEG;KNEE;HIP
LOCATION: NECK;SHOULDER;BACK
LOCATION: LEG;HIP;KNEE

## 2024-06-03 ASSESSMENT — PAIN DESCRIPTION - DESCRIPTORS
DESCRIPTORS: DISCOMFORT
DESCRIPTORS: SHOOTING
DESCRIPTORS: SHOOTING

## 2024-06-03 ASSESSMENT — PAIN SCALES - WONG BAKER: WONGBAKER_NUMERICALRESPONSE: HURTS WHOLE LOT

## 2024-06-03 NOTE — PROGRESS NOTES
Discussed MRI C spine and L spine.  NSGY has been consulted and pt waiting to see and what recommendations.  Addressed questions and concerns.  Advised likely the EMG will show what MRI C and L spine.      No further Neuro workup at this time.    Neurology will sign off   
  [facility] neurology follow-up     Mignon Fischer is a 68 y.o. right handed female     Neurology following for difficulty walking and leg weakness    PMH of diabetes, history of blood clots, hyperlipidemia, hypertension, lymphedema, pancreatitis    Assessment:     Severe length dependent neuropathy  Involving her distal upper extremities to be related to her longstanding diabetes    Bilateral lumbosacral radiculopathy    Lateral weakness and numbness in her hands secondary to neuropathy versus bilateral carpal tunnel syndrome versus cervical spine disease  Experiencing increased tone and brisk reflexes    Plan:     Continue vitamin B12 1000 mcg daily  Continue magnesium chloride (MagDelay) daily   MRI cervical spine without contrast  MMA and homocystine in the  EMG of  of bilateral lower extremities and upper extremities to evaluate for neuropathy, radiculopathy, and possible bilateral carpal tunnel syndrome VS involvement of hands by neuropathy  Neurosurgery consult for recommendations on abnormal MRI L-spine  Neurology will follow    History of Present Illness:     Patient initially presented to [place] [facility] on 5/15/2024 after experiencing extremity weakness and headache and ability to ambulate.  Patient had a right knee replacement on January 30 and also needs a left knee replacement in the future.  She fell on April 16 and went to the ER for evaluation.  She was discharged home and has been doing outpatient physical therapy for the past few weeks.  Patient has been experiencing profound weakness in her lower extremities.  Her physical therapist advised her to come into the ER for evaluation.    Patient stated over the past couple months she had progressive difficulty walking with fatigue.  She is only able to walk 15-20 steps before she feels her legs will give out beneath her.  She does have any warning when this is going to happen.    She has a 20-year history of diabetes and her last A1c was 7.3 she 
 Mignon Fischer is a 68 y.o. right handed female     Neurology following for difficulty walking and leg weakness    PMH of diabetes, history of blood clots, hyperlipidemia, hypertension, lymphedema, pancreatitis    Assessment:     Severe length dependent neuropathy  Involving her distal upper extremities to be related to her longstanding diabetes    Bilateral lumbosacral radiculopathy      Lateral weakness and numbness in her hands secondary to neuropathy versus bilateral carpal tunnel syndrome versus cervical spine disease  Experiencing increased tone and brisk reflexes      Plan:     Start vitamin B12 1000 mcg daily  Start magnesium chloride (MagDelay) daily   MRI cervical spine without contrast  MMA and homocystine in the  EMG of  of bilateral lower extremities and upper extremities to evaluate for neuropathy, radiculopathy, and possible bilateral carpal tunnel syndrome VS involvement of hands by neuropathy  Neurology will follow    History of Present Illness:     Patient initially presented to [place] [facility] on 5/15/2024 after experiencing extremity weakness and headache and ability to ambulate.  Patient had a right knee replacement on January 30 and also needs a left knee replacement in the future.  She fell on April 16 and went to the ER for evaluation.  She was discharged home and has been doing outpatient physical therapy for the past few weeks.  Patient has been experiencing profound weakness in her lower extremities.  Her physical therapist advised her to come into the ER for evaluation.    Patient stated over the past couple months she had progressive difficulty walking with fatigue.  She is only able to walk 15-20 steps before she feels her legs will give out beneath her.  She does have any warning when this is going to happen.    She has a 20-year history of diabetes and her last A1c was 7.3 she describes this neuropathy as a numbness, tingling, electrical type pain in her legs which shoot up to her 
4 Eyes Skin Assessment     NAME:  Mignon Fischer  YOB: 1955  MEDICAL RECORD NUMBER:  [id number]    The patient is being assessed for  Admission    I agree that at least one RN has performed a thorough Head to Toe Skin Assessment on the patient. ALL assessment sites listed below have been assessed.      Areas assessed by both nurses:    Head, Face, Ears, Shoulders, Back, Chest, Arms, Elbows, Hands, Sacrum. Buttock, Coccyx, Ischium, Legs. Feet and Heels, and Under Medical Devices         Does the Patient have a Wound? No noted wound(s)       Braxton Prevention initiated by RN: Yes  Wound Care Orders initiated by RN: No    Pressure Injury (Stage 3,4, Unstageable, DTI, NWPT, and Complex wounds) if present, place Wound referral order by RN under : No    New Ostomies, if present place, Ostomy referral order under : No     Nurse 1 eSignature: Electronically signed by James Stratton RN on 5/17/24 at 6:08 PM EDT    **SHARE this note so that the co-signing nurse can place an eSignature**    Nurse 2 eSignature: Electronically signed by Amber Luo RN on 5/17/24 at 6:09 PM EDT    
4 Eyes Skin Assessment     NAME:  Mignon Fischer  YOB: 1955  MEDICAL RECORD NUMBER:  [id number]    The patient is being assessed for  Admission - transfer     I agree that at least one RN has performed a thorough Head to Toe Skin Assessment on the patient. ALL assessment sites listed below have been assessed.      Areas assessed by both nurses:    Head, Face, Ears, Shoulders, Back, Chest, Arms, Elbows, Hands, Sacrum. Buttock, Coccyx, Ischium, Legs. Feet and Heels, and Under Medical Devices     BUE/Abdomen/ buttocks bruising   Redness (blanchable) -- buttocks coccyx         Does the Patient have a Wound? No noted wound(s)       Braxton Prevention initiated by RN: Yes  Wound Care Orders initiated by RN: No    Pressure Injury (Stage 3,4, Unstageable, DTI, NWPT, and Complex wounds) if present, place Wound referral order by RN under : No    New Ostomies, if present place, Ostomy referral order under : No     Nurse 1 eSignature: Electronically signed by Janet Boston on 5/21/24 at 11:12 PM EDT    **SHARE this note so that the co-signing nurse can place an eSignature**    Nurse 2 eSignature: Electronically signed by Teresa Hoover LPN on 5/21/24 at 11:13 PM EDT  
Attempted x3 to call report to floor no answer   
Comprehensive Nutrition Assessment    Type and Reason for Visit:  Initial, RD Nutrition Re-Screen/LOS    Nutrition Recommendations/Plan:   Pt. NPO for OR today. Recommend to start ONS: Gelatein 20 once daily to promote post op healing. Will continue to monitor.       Nutrition Assessment:    Pt. admitted d/t difficulty walking, neck and low back pain. Found to have cervical myelopathy Secondary to severe stenosis at C6-C7 and moderate stenosis at C5-C6. Plan is for for a C4-C7 ACDF today 5/24. PMHx of DM, lymphedema, pancreatitis. PO intake stable-pt. eating % at meals this admit. Will start ONS when able to promote post op wound healing.    Nutrition Related Findings:    A&Ox4, -I/O, rounded/obese abd, +BS, hyperglycemia, elevated BUN, Wound Type: None       Current Nutrition Intake & Therapies:    Average Meal Intake: %  Average Supplements Intake: None Ordered  Diet NPO Exceptions are: Sips of Water with Meds    Anthropometric Measures:  Height: 160 cm (5' 2.99\")  Ideal Body Weight (IBW): 115 lbs (52 kg)    Admission Body Weight: 107 kg (235 lb 14.3 oz) (5/17 no method first measured)  Current Body Weight: 103.9 kg (229 lb 0.9 oz) (5/24 BS), 199.2 % IBW. Weight Source: Bed Scale  Current BMI (kg/m2): 40.6  Usual Body Weight: 101.9 kg (224 lb 10.4 oz) (12/22/23)  % Weight Change (Calculated): 2  Weight Adjustment For: No Adjustment                 BMI Categories: Obese Class 3 (BMI 40.0 or greater)    Nutrition Diagnosis:   Increased nutrient needs related to increase demand for energy/nutrients (post operative) as evidenced by wounds    Nutrition Interventions:   Food and/or Nutrient Delivery: Continue Current Diet, Start Oral Nutrition Supplement (start ONS post op; gelatein 20 once daily)  Nutrition Education/Counseling: Education not indicated  Coordination of Nutrition Care: Continue to monitor while inpatient       Goals:     Goals: PO intake 75% or greater, by next RD assessment       Nutrition 
DVT Prophylaxis Adjustment Policy (DVT Prophylaxis)     This patient is on DVT Prophylaxis medication that requires a dose adjustment      Date Body Weight IBW  Adjusted BW SCr  CrCl Dialysis status   5/26/2024 109.5 kg (241 lb 6.5 oz) Ideal body weight: 52.4 kg (115 lb 7.7 oz)  Adjusted ideal body weight: 75.2 kg (165 lb 13.6 oz) Serum creatinine: 0.7 mg/dL 05/26/24 0431  Estimated creatinine clearance: 90 mL/min N/a       Pharmacy has dose-adjusted the DVT Prophylaxis regimen to match   the recommendations from the following table        Ordered Medication:Lovenox 40mg daily    Order Changed/converted to: Lovenox 30mg BID      These changes were made per protocol according to the [facility] Pharmacist   Review for Appropriate Use and Automatic Dose Adjustments of   Subcutaneous Anticoagulants Policy     *Please note this dose may need readjusted if patient's condition changes.    Please contact pharmacy with any questions regarding these changes.    Jorge Anne PharmD, BCPS 5/26/2024 9:08 AM      
Department of Neurosurgery  Attending Progress Note    CHIEF COMPLAINT:    SUBJECTIVE:  no new events    ROS:    OBJECTIVE  Physical  VITALS:  /68   Pulse 72   Temp 97.8 °F (36.6 °C) (Temporal)   Resp 16   Ht 1.6 m (5' 2.99\")   Wt 109.5 kg (241 lb 6.5 oz)   SpO2 96%   BMI 42.77 kg/m²   NEUROLOGIC:  Mental Status Exam:  Level of Alertness:   awake  Orientation:   person, place, time  Motor Exam:  CALDERA 4-/5  Sensory:  Sensory intact    Data  CBC:   Lab Results   Component Value Date/Time    WBC 21.6 05/26/2024 04:31 AM    RBC 3.70 05/26/2024 04:31 AM    HGB 11.1 05/26/2024 04:31 AM    HCT 33.6 05/26/2024 04:31 AM    MCV 90.8 05/26/2024 04:31 AM    MCH 30.0 05/26/2024 04:31 AM    MCHC 33.0 05/26/2024 04:31 AM    RDW 13.6 05/26/2024 04:31 AM     05/26/2024 04:31 AM    MPV 9.5 05/26/2024 04:31 AM     BMP:    Lab Results   Component Value Date/Time     05/26/2024 04:31 AM    K 4.1 05/26/2024 04:31 AM    K 4.3 08/07/2020 11:42 AM     05/26/2024 04:31 AM    CO2 25 05/26/2024 04:31 AM    BUN 22 05/26/2024 04:31 AM    CREATININE 0.7 05/26/2024 04:31 AM    CALCIUM 8.7 05/26/2024 04:31 AM    GFRAA >60 03/10/2021 08:47 AM    LABGLOM >90 05/26/2024 04:31 AM    LABGLOM 62 04/16/2024 01:05 PM    GLUCOSE 186 05/26/2024 04:31 AM    GLUCOSE 97 10/06/2010 04:15 PM     Current Inpatient Medications  Current Facility-Administered Medications: insulin glargine (LANTUS) injection vial 10 Units, 10 Units, SubCUTAneous, BID  0.9 % sodium chloride infusion, , IntraVENous, Continuous  sodium chloride flush 0.9 % injection 5-40 mL, 5-40 mL, IntraVENous, 2 times per day  sodium chloride flush 0.9 % injection 5-40 mL, 5-40 mL, IntraVENous, PRN  0.9 % sodium chloride infusion, , IntraVENous, PRN  acetaminophen (TYLENOL) tablet 650 mg, 650 mg, Oral, Q6H  ondansetron (ZOFRAN-ODT) disintegrating tablet 4 mg, 4 mg, Oral, Q8H PRN **OR** ondansetron (ZOFRAN) injection 4 mg, 4 mg, IntraVENous, Q6H PRN  ceFAZolin (ANCEF) 
Department of Neurosurgery  Attending Progress Note    CHIEF COMPLAINT:    SUBJECTIVE:  no new events.    ROS:    OBJECTIVE  Physical  VITALS:  BP (!) 141/78   Pulse 74   Temp 97.4 °F (36.3 °C) (Temporal)   Resp 18   Ht 1.6 m (5' 2.99\")   Wt 109.7 kg (241 lb 13.5 oz)   SpO2 95%   BMI 42.85 kg/m²   NEUROLOGIC:  Mental Status Exam:  Level of Alertness:   awake  Orientation:   person, place, time  Motor Exam:  4-/5 in all ext  Sensory:  Sensory intact    Data  CBC:   Lab Results   Component Value Date/Time    WBC 9.5 05/24/2024 05:01 AM    RBC 4.10 05/24/2024 05:01 AM    HGB 12.7 05/24/2024 05:01 AM    HCT 37.5 05/24/2024 05:01 AM    MCV 91.5 05/24/2024 05:01 AM    MCH 31.0 05/24/2024 05:01 AM    MCHC 33.9 05/24/2024 05:01 AM    RDW 13.6 05/24/2024 05:01 AM     05/24/2024 05:01 AM    MPV 9.1 05/24/2024 05:01 AM     BMP:    Lab Results   Component Value Date/Time     05/24/2024 05:01 AM    K 3.6 05/24/2024 05:01 AM    K 4.3 08/07/2020 11:42 AM    CL 96 05/24/2024 05:01 AM    CO2 27 05/24/2024 05:01 AM    BUN 30 05/24/2024 05:01 AM    CREATININE 0.9 05/24/2024 05:01 AM    CALCIUM 9.5 05/24/2024 05:01 AM    GFRAA >60 03/10/2021 08:47 AM    LABGLOM 72 05/24/2024 05:01 AM    LABGLOM 62 04/16/2024 01:05 PM    GLUCOSE 157 05/24/2024 05:01 AM    GLUCOSE 97 10/06/2010 04:15 PM     Current Inpatient Medications  Current Facility-Administered Medications: sodium chloride flush 0.9 % injection 5-40 mL, 5-40 mL, IntraVENous, 2 times per day  sodium chloride flush 0.9 % injection 5-40 mL, 5-40 mL, IntraVENous, PRN  0.9 % sodium chloride infusion, , IntraVENous, PRN  0.9 % sodium chloride infusion, , IntraVENous, Continuous  sodium chloride flush 0.9 % injection 5-40 mL, 5-40 mL, IntraVENous, 2 times per day  sodium chloride flush 0.9 % injection 5-40 mL, 5-40 mL, IntraVENous, PRN  0.9 % sodium chloride infusion, , IntraVENous, PRN  sodium chloride flush 0.9 % injection 5-40 mL, 5-40 mL, IntraVENous, 2 times per 
Department of Neurosurgery  Progress Note    CHIEF COMPLAINT: s/p C4-C7 ACDF 5/24    SUBJECTIVE:  Lamberto op site pain ok    REVIEW OF SYSTEMS :  Constitutional: Negative for chills and fever.    Neurological: Negative for dizziness, tremors and speech change.     OBJECTIVE:   VITALS:  BP (!) 154/79   Pulse 72   Temp 97.7 °F (36.5 °C) (Temporal)   Resp 20   Ht 1.6 m (5' 2.99\")   Wt 115.6 kg (254 lb 13.6 oz)   SpO2 94%   BMI 45.16 kg/m²   PHYSICAL:  CONSTITUTIONAL:  awake, alert, cooperative, no apparent distress, and appears stated age    DATA:  CBC:   Lab Results   Component Value Date/Time    WBC 9.9 05/29/2024 07:04 AM    RBC 4.02 05/29/2024 07:04 AM    HGB 12.0 05/29/2024 07:04 AM    HCT 37.4 05/29/2024 07:04 AM    MCV 93.0 05/29/2024 07:04 AM    MCH 29.9 05/29/2024 07:04 AM    MCHC 32.1 05/29/2024 07:04 AM    RDW 13.5 05/29/2024 07:04 AM     05/29/2024 07:04 AM    MPV 9.4 05/29/2024 07:04 AM     BMP:    Lab Results   Component Value Date/Time     05/29/2024 07:04 AM    K 4.0 05/29/2024 07:04 AM    K 4.3 08/07/2020 11:42 AM    CL 98 05/29/2024 07:04 AM    CO2 28 05/29/2024 07:04 AM    BUN 18 05/29/2024 07:04 AM    CREATININE 0.7 05/29/2024 07:04 AM    CALCIUM 8.7 05/29/2024 07:04 AM    GFRAA >60 03/10/2021 08:47 AM    LABGLOM >90 05/29/2024 07:04 AM    LABGLOM 62 04/16/2024 01:05 PM    GLUCOSE 104 05/29/2024 07:04 AM    GLUCOSE 97 10/06/2010 04:15 PM     PT/INR:    Lab Results   Component Value Date/Time    PROTIME 11.0 05/15/2024 05:15 PM    PROTIME 11.0 10/06/2010 04:15 PM    INR 1.0 05/15/2024 05:15 PM     PTT:    Lab Results   Component Value Date/Time    APTT 27.9 10/06/2010 04:15 PM   [APTT}    Current Inpatient Medications  Current Facility-Administered Medications: insulin glargine (LANTUS) injection vial 18 Units, 18 Units, SubCUTAneous, Nightly  senna (SENOKOT) tablet 8.6 mg, 1 tablet, Oral, BID  enoxaparin Sodium (LOVENOX) injection 30 mg, 30 mg, SubCUTAneous, BID  0.9 % sodium 
Department of Neurosurgery  Progress Note    CHIEF COMPLAINT: s/p C4-C7 ACDF 5/24    SUBJECTIVE:  Lamberto op site pain well. Arm weakness and numbness improving    REVIEW OF SYSTEMS :  Constitutional: Negative for chills and fever.    Neurological: Negative for dizziness, tremors and speech change.     OBJECTIVE:   VITALS:  /60   Pulse 60   Temp 97.7 °F (36.5 °C) (Temporal)   Resp 18   Ht 1.6 m (5' 2.99\")   Wt 108 kg (238 lb 1.6 oz)   SpO2 97%   BMI 42.19 kg/m²   PHYSICAL:  CONSTITUTIONAL:  awake, alert, cooperative, no apparent distress, and appears stated age    DATA:  CBC:   Lab Results   Component Value Date/Time    WBC 10.9 05/28/2024 04:40 AM    RBC 3.70 05/28/2024 04:40 AM    HGB 11.2 05/28/2024 04:40 AM    HCT 34.7 05/28/2024 04:40 AM    MCV 93.8 05/28/2024 04:40 AM    MCH 30.3 05/28/2024 04:40 AM    MCHC 32.3 05/28/2024 04:40 AM    RDW 13.6 05/28/2024 04:40 AM     05/28/2024 04:40 AM    MPV 9.3 05/28/2024 04:40 AM     BMP:    Lab Results   Component Value Date/Time     05/28/2024 04:40 AM    K 3.8 05/28/2024 04:40 AM    K 4.3 08/07/2020 11:42 AM     05/28/2024 04:40 AM    CO2 25 05/28/2024 04:40 AM    BUN 25 05/28/2024 04:40 AM    CREATININE 0.8 05/28/2024 04:40 AM    CALCIUM 8.4 05/28/2024 04:40 AM    GFRAA >60 03/10/2021 08:47 AM    LABGLOM 85 05/28/2024 04:40 AM    LABGLOM 62 04/16/2024 01:05 PM    GLUCOSE 73 05/28/2024 04:40 AM    GLUCOSE 97 10/06/2010 04:15 PM     PT/INR:    Lab Results   Component Value Date/Time    PROTIME 11.0 05/15/2024 05:15 PM    PROTIME 11.0 10/06/2010 04:15 PM    INR 1.0 05/15/2024 05:15 PM     PTT:    Lab Results   Component Value Date/Time    APTT 27.9 10/06/2010 04:15 PM   [APTT}    Current Inpatient Medications  Current Facility-Administered Medications: insulin glargine (LANTUS) injection vial 18 Units, 18 Units, SubCUTAneous, BID  senna (SENOKOT) tablet 8.6 mg, 1 tablet, Oral, BID  enoxaparin Sodium (LOVENOX) injection 30 mg, 30 mg, 
Department of Neurosurgery  Progress Note    CHIEF COMPLAINT: s/p C4-C7 ACDF 5/24    SUBJECTIVE: States she is having vivid dreams and was anxious overnight.     REVIEW OF SYSTEMS :  Constitutional: Negative for chills and fever.    Neurological: Negative for dizziness, tremors and speech change.     OBJECTIVE:   VITALS:  /78   Pulse 90   Temp 97 °F (36.1 °C) (Temporal)   Resp 18   Ht 1.6 m (5' 2.99\")   Wt 101.6 kg (224 lb) Comment: without pump  SpO2 96%   BMI 39.69 kg/m²     PHYSICAL:  Alert, oriented  Appears stated age  PERRL  EOMI  Strength full  Sensation intact to light touch  Dressing c/d/i    DATA:  CBC:   Lab Results   Component Value Date/Time    WBC 11.4 06/02/2024 05:05 AM    RBC 4.26 06/02/2024 05:05 AM    HGB 12.7 06/02/2024 05:05 AM    HCT 39.2 06/02/2024 05:05 AM    MCV 92.0 06/02/2024 05:05 AM    MCH 29.8 06/02/2024 05:05 AM    MCHC 32.4 06/02/2024 05:05 AM    RDW 13.7 06/02/2024 05:05 AM     06/02/2024 05:05 AM    MPV 9.4 06/02/2024 05:05 AM     BMP:    Lab Results   Component Value Date/Time     06/02/2024 05:05 AM    K 3.9 06/02/2024 05:05 AM    K 4.3 08/07/2020 11:42 AM    CL 94 06/02/2024 05:05 AM    CO2 26 06/02/2024 05:05 AM    BUN 21 06/02/2024 05:05 AM    CREATININE 0.7 06/02/2024 05:05 AM    CALCIUM 9.6 06/02/2024 05:05 AM    GFRAA >60 03/10/2021 08:47 AM    LABGLOM >90 06/02/2024 05:05 AM    LABGLOM 62 04/16/2024 01:05 PM    GLUCOSE 124 06/02/2024 05:05 AM    GLUCOSE 97 10/06/2010 04:15 PM     PT/INR:    Lab Results   Component Value Date/Time    PROTIME 11.0 05/15/2024 05:15 PM    PROTIME 11.0 10/06/2010 04:15 PM    INR 1.0 05/15/2024 05:15 PM     PTT:    Lab Results   Component Value Date/Time    APTT 27.9 10/06/2010 04:15 PM   [APTT}    Current Inpatient Medications  Current Facility-Administered Medications: divalproex (DEPAKOTE SPRINKLE) DR capsule 125 mg, 125 mg, Oral, 2 times per day  insulin glargine (LANTUS) injection vial 18 Units, 18 Units, 
Department of Neurosurgery  Progress Note    CHIEF COMPLAINT: s/p C4-C7 ACDF 5/24    SUBJECTIVE: c/o severe right leg and moderate right arm pain.  Lamberto op site pain ok    REVIEW OF SYSTEMS :  Constitutional: Negative for chills and fever.    Neurological: Negative for dizziness, tremors and speech change.     OBJECTIVE:   VITALS:  /69   Pulse 94   Temp 97 °F (36.1 °C) (Temporal)   Resp 18   Ht 1.6 m (5' 2.99\")   Wt 101.6 kg (224 lb) Comment: no SCD pump  SpO2 95%   BMI 39.69 kg/m²     PHYSICAL:  Alert, oriented  Appears stated age  PERRL  EOMI  Strength full  Sensation intact to light touch  Dressing c/d/i    DATA:  CBC:   Lab Results   Component Value Date/Time    WBC 11.7 06/03/2024 04:48 AM    RBC 4.07 06/03/2024 04:48 AM    HGB 11.9 06/03/2024 04:48 AM    HCT 37.3 06/03/2024 04:48 AM    MCV 91.6 06/03/2024 04:48 AM    MCH 29.2 06/03/2024 04:48 AM    MCHC 31.9 06/03/2024 04:48 AM    RDW 13.6 06/03/2024 04:48 AM     06/03/2024 04:48 AM    MPV 9.3 06/03/2024 04:48 AM     BMP:    Lab Results   Component Value Date/Time     06/03/2024 04:48 AM    K 3.7 06/03/2024 04:48 AM    K 4.3 08/07/2020 11:42 AM    CL 94 06/03/2024 04:48 AM    CO2 27 06/03/2024 04:48 AM    BUN 19 06/03/2024 04:48 AM    CREATININE 0.7 06/03/2024 04:48 AM    CALCIUM 9.7 06/03/2024 04:48 AM    GFRAA >60 03/10/2021 08:47 AM    LABGLOM >90 06/03/2024 04:48 AM    LABGLOM 62 04/16/2024 01:05 PM    GLUCOSE 148 06/03/2024 04:48 AM    GLUCOSE 97 10/06/2010 04:15 PM     PT/INR:    Lab Results   Component Value Date/Time    PROTIME 11.0 05/15/2024 05:15 PM    PROTIME 11.0 10/06/2010 04:15 PM    INR 1.0 05/15/2024 05:15 PM     PTT:    Lab Results   Component Value Date/Time    APTT 27.9 10/06/2010 04:15 PM   [APTT}    Current Inpatient Medications  Current Facility-Administered Medications: pregabalin (LYRICA) capsule 75 mg, 75 mg, Oral, TID  divalproex (DEPAKOTE SPRINKLE) DR capsule 125 mg, 125 mg, Oral, 2 times per day  insulin 
Department of Neurosurgery  Progress Note    CHIEF COMPLAINT: weakness, numbness    SUBJECTIVE:  Plan for OR tomorrow for anterior cervical discectomy and fusion. All questions answered at this time    REVIEW OF SYSTEMS :  Constitutional: Negative for chills and fever.    Neurological: Negative for dizziness, tremors and speech change.     OBJECTIVE:   VITALS:  /60   Pulse 67   Temp 97.5 °F (36.4 °C) (Temporal)   Resp 18   Ht 1.6 m (5' 3\")   Wt 104.3 kg (229 lb 15 oz)   SpO2 97%   BMI 40.73 kg/m²   PHYSICAL:  CONSTITUTIONAL:  awake, alert, cooperative, no apparent distress, and appears stated age    DATA:  CBC:   Lab Results   Component Value Date/Time    WBC 9.0 05/23/2024 04:42 AM    RBC 3.91 05/23/2024 04:42 AM    HGB 12.2 05/23/2024 04:42 AM    HCT 35.8 05/23/2024 04:42 AM    MCV 91.6 05/23/2024 04:42 AM    MCH 31.2 05/23/2024 04:42 AM    MCHC 34.1 05/23/2024 04:42 AM    RDW 13.6 05/23/2024 04:42 AM     05/23/2024 04:42 AM    MPV 9.4 05/23/2024 04:42 AM     BMP:    Lab Results   Component Value Date/Time     05/23/2024 04:42 AM    K 3.2 05/23/2024 04:42 AM    K 4.3 08/07/2020 11:42 AM    CL 98 05/23/2024 04:42 AM    CO2 26 05/23/2024 04:42 AM    BUN 32 05/23/2024 04:42 AM    CREATININE 0.9 05/23/2024 04:42 AM    CALCIUM 9.3 05/23/2024 04:42 AM    GFRAA >60 03/10/2021 08:47 AM    LABGLOM 72 05/23/2024 04:42 AM    LABGLOM 62 04/16/2024 01:05 PM    GLUCOSE 123 05/23/2024 04:42 AM    GLUCOSE 97 10/06/2010 04:15 PM     PT/INR:    Lab Results   Component Value Date/Time    PROTIME 11.0 05/15/2024 05:15 PM    PROTIME 11.0 10/06/2010 04:15 PM    INR 1.0 05/15/2024 05:15 PM     PTT:    Lab Results   Component Value Date/Time    APTT 27.9 10/06/2010 04:15 PM   [APTT}    Current Inpatient Medications  Current Facility-Administered Medications: [START ON 5/24/2024] 0.9 % sodium chloride infusion, , IntraVENous, Continuous  [START ON 5/24/2024] ceFAZolin (ANCEF) 2,000 mg in sterile water 20 mL IV 
Dr Doll notified personally of consult.  Pt added to census  
Dr Garcia office notified per office answering machine that Dr Regalado sees Rheumatioid consults as out pt only.    
Dr OLIVIA Uribe notified of neuro surg consult per manju. Pt added to census  
I CALLED+ SPOKE p-p TO GET TO [place]  
Internal Medicine   Progress Note    Admit Date: 5/17/2024  Hospital day:    Hospital Day: 3  SUBJECTIVE:    68-year-old lady who was transferred from [place] for weakness and to be evaluated by neurologist.  She was admitted with bilateral lower extremity weakness.  She states she is doing well and has no complaints.  No fever, cough or shortness of breath.  No nausea vomiting or abdominal pain.  Does have history of constipation  OBJECTIVE:     /62   Pulse 78   Temp 97.9 °F (36.6 °C) (Temporal)   Resp 18   Ht 1.6 m (5' 3\")   Wt 107 kg (235 lb 14.3 oz)   SpO2 98%   BMI 41.79 kg/m²   Patient Vitals for the past 24 hrs:   BP Temp Temp src Pulse Resp SpO2   05/19/24 0732 120/62 97.9 °F (36.6 °C) Temporal 78 18 98 %   05/19/24 0345 114/64 -- -- 80 17 95 %   05/18/24 2330 115/60 98 °F (36.7 °C) Temporal 82 15 95 %   05/18/24 1930 (!) 118/54 97.8 °F (36.6 °C) Temporal 80 16 93 %   05/18/24 1645 104/60 -- -- -- -- --   05/18/24 1527 (!) 81/41 97.5 °F (36.4 °C) Temporal 82 18 96 %     24HR INTAKE/OUTPUT:    Intake/Output Summary (Last 24 hours) at 5/19/2024 0854  Last data filed at 5/18/2024 1836  Gross per 24 hour   Intake 960 ml   Output 360 ml   Net 600 ml      GENERAL: Alert, breathing easily, not in apparent acute distress.  EYES:  No pallor, no icterus.    LUNGS:  No obvious increased work of breathing, clear to auscultation bilaterally.   CARDIOVASCULAR:  S1 and S2 regular to auscultate.    ABDOMEN:  Soft, non-tender.  Bowel sounds present  NEUROLOGIC:  Alert, and oriented.  No gross focal deficit  SKIN:  unremarkable  EXTREMITIES: edema present  Data      Recent Labs     05/17/24  0808 05/18/24  0535 05/19/24  0522   WBC 6.8 8.4 9.5   HGB 12.7 12.3 11.6    299 257     Recent Labs     05/17/24  0808 05/18/24  0535 05/19/24  0522    137 139   K 4.7 3.9 4.0    100 102   CO2 28 21* 23   BUN 18 19 24*   CREATININE 0.8 1.0 1.0   GLUCOSE 138* 148* 124*     Recent Labs     05/18/24  0535 
Internal Medicine Progress Note    Patient's name: Mignon Fischer  : 1955  Chief complaints (on day of admission): No chief complaint on file.  Admission date: 2024  Date of service: 2024   Room: [address] IMCU/NEURO  Primary care physician: Sandy Leiva MD  Reason for visit: Follow-up for  bl leg weakness, DM, gait dysfunction, bl arm weakness, Cervical myelopathy, POD  s/p ACDF. Stable .    Subjective  Mignon was seen and examined.    Review of Systems NEG x 10, SOME CONSTIPATION AND URINE RETENTION- ,  She understands about rehab after the surgery. D/w  pt  There are no new complaints of chest pain, shortness of breath, abdominal pain, nausea, vomiting, diarrhea, constipation.    Hospital Medications  Current Facility-Administered Medications   Medication Dose Route Frequency Provider Last Rate Last Admin    insulin glargine (LANTUS) injection vial 18 Units  18 Units SubCUTAneous Nightly Davidson Rivera MD   18 Units at 24    senna (SENOKOT) tablet 8.6 mg  1 tablet Oral BID Davidson Rivera MD   8.6 mg at 24    enoxaparin Sodium (LOVENOX) injection 30 mg  30 mg SubCUTAneous BID Kalyani Murphy MD   30 mg at 24 2207    0.9 % sodium chloride infusion   IntraVENous Continuous Alethea Gomez  mL/hr at 24 2347 New Bag at 24 2347    sodium chloride flush 0.9 % injection 5-40 mL  5-40 mL IntraVENous 2 times per day Kalyani Murphy MD   10 mL at 24 215    sodium chloride flush 0.9 % injection 5-40 mL  5-40 mL IntraVENous PRN Kalyani Murphy MD        0.9 % sodium chloride infusion   IntraVENous PRN Kalyani Murphy MD        acetaminophen (TYLENOL) tablet 650 mg  650 mg Oral Q6H Kalyani Murphy MD   650 mg at 24 0628    ondansetron (ZOFRAN-ODT) disintegrating tablet 4 mg  4 mg Oral Q8H PRN Kalyani Murphy MD        Or    ondansetron (ZOFRAN) injection 4 mg  4 mg IntraVENous Q6H PRN Kalyani Murphy MD   4 mg at 24 0542    oxyCODONE (ROXICODONE) 
Internal Medicine Progress Note    Patient's name: Mignon Fischer  : 1955  Chief complaints (on day of admission): No chief complaint on file.  Admission date: 2024  Date of service: 2024   Room: [address] IMCU/NEURO  Primary care physician: Sandy Leiva MD  Reason for visit: Follow-up for  bl leg weakness, DM, gait dysfunction, bl arm weakness    Subjective  Mignon was seen and examined.    Review of Systems NEG x 10, occ HA, dipoplia- horizontal. Lbp.   There are no new complaints of chest pain, shortness of breath, abdominal pain, nausea, vomiting, diarrhea, constipation.    Hospital Medications  Current Facility-Administered Medications   Medication Dose Route Frequency Provider Last Rate Last Admin    ceFEPIme (MAXIPIME) 2,000 mg in sodium chloride 0.9 % 100 mL IVPB (Cizu5Sox)  2,000 mg IntraVENous Q12H Kia Mazariegos MD   Stopped at 24 0745    vitamin B-12 (CYANOCOBALAMIN) tablet 1,000 mcg  1,000 mcg Oral Daily Rajwinder Cruz APRN - CNP   1,000 mcg at 24 1340    magnesium chloride (MAG DELAY) extended release tablet 64 mg  1 tablet Oral Daily with breakfast Rajwinder Cruz APRN - CNP        LORazepam (ATIVAN) injection 1 mg  1 mg IntraVENous PRN Meliton Doll DO        losartan (COZAAR) tablet 25 mg  25 mg Oral Daily Meliton Evans DO   25 mg at 24 0857    albuterol (PROVENTIL) (2.5 MG/3ML) 0.083% nebulizer solution 2.5 mg  2.5 mg Nebulization Q4H PRN Cornelia Lemus APRN - CNP        aspirin EC tablet 81 mg  81 mg Oral BID Cornelia Lemus APRN - CNP   81 mg at 24    metoprolol succinate (TOPROL XL) extended release tablet 25 mg  25 mg Oral Daily Cornelia Lemus APRN - CNP   25 mg at 24 0857    pregabalin (LYRICA) capsule 75 mg  75 mg Oral BID Cornelia Lemus APRN - CNP   75 mg at 24    SUMAtriptan (IMITREX) tablet 100 mg  100 mg Oral Once Cornelia Lemus, APRN - KAMILAH        rosuvastatin (CRESTOR) tablet 20 mg  20 
Internal Medicine Progress Note    Patient's name: Mignon Fischer  : 1955  Chief complaints (on day of admission): No chief complaint on file.  Admission date: 2024  Date of service: 2024   Room: [address] IMCU/NEURO  Primary care physician: Sandy Leiva MD  Reason for visit: Follow-up for  bl leg weakness, DM, gait dysfunction, bl arm weakness, Cervical myelopathy, POD  s/p ACDF. Stable .    Subjective  Mignon was seen and examined.    Review of Systems NEG x 10, SOME CONSTIPATION AND URINE RETENTION- ,  She understands about rehab after the surgery. D/w family and pt  There are no new complaints of chest pain, shortness of breath, abdominal pain, nausea, vomiting, diarrhea, constipation.    Hospital Medications  Current Facility-Administered Medications   Medication Dose Route Frequency Provider Last Rate Last Admin    insulin glargine (LANTUS) injection vial 18 Units  18 Units SubCUTAneous BID Cornelia Lemus APRN - CNP   18 Units at 24    senna (SENOKOT) tablet 8.6 mg  1 tablet Oral BID Davidson Rivera MD   8.6 mg at 24    enoxaparin Sodium (LOVENOX) injection 30 mg  30 mg SubCUTAneous BID Kalyani Murphy MD   30 mg at 24    0.9 % sodium chloride infusion   IntraVENous Continuous Alethea Gomez  mL/hr at 24 2347 New Bag at 24    sodium chloride flush 0.9 % injection 5-40 mL  5-40 mL IntraVENous 2 times per day Kalyani Murphy MD   10 mL at 24    sodium chloride flush 0.9 % injection 5-40 mL  5-40 mL IntraVENous PRN Kalyani Murphy MD        0.9 % sodium chloride infusion   IntraVENous PRN Kalyani Murphy MD        acetaminophen (TYLENOL) tablet 650 mg  650 mg Oral Q6H Kalyani Murphy MD   650 mg at 24 0203    ondansetron (ZOFRAN-ODT) disintegrating tablet 4 mg  4 mg Oral Q8H PRN Kalyani Murphy MD        Or    ondansetron (ZOFRAN) injection 4 mg  4 mg IntraVENous Q6H PRN Kalyani Murphy MD   4 mg at 24 0556    
Internal Medicine Progress Note    Patient's name: Mignon Fischer  : 1955  Chief complaints (on day of admission): No chief complaint on file.  Admission date: 2024  Date of service: 2024   Room: [address] IMCU/NEURO  Primary care physician: Sandy Leiva MD  Reason for visit: Follow-up for  bl leg weakness, DM, gait dysfunction, bl arm weakness, Cervical myelopathy, POD  s/p ACDF. Stable .    Subjective  Mignon was seen and examined.    Review of Systems NEG x 10, SOME CONSTIPATION AND URINE RETENTION- ,  She understands about rehab after the surgery. D/w family and pt  There are no new complaints of chest pain, shortness of breath, abdominal pain, nausea, vomiting, diarrhea, constipation.    Hospital Medications  Current Facility-Administered Medications   Medication Dose Route Frequency Provider Last Rate Last Admin    insulin glargine (LANTUS) injection vial 18 Units  18 Units SubCUTAneous Nightly Davidson Rivera MD   18 Units at 24    senna (SENOKOT) tablet 8.6 mg  1 tablet Oral BID Davidson Rivera MD   8.6 mg at 24    enoxaparin Sodium (LOVENOX) injection 30 mg  30 mg SubCUTAneous BID Kalyani Murphy MD   30 mg at 24    0.9 % sodium chloride infusion   IntraVENous Continuous Alethea Gomez  mL/hr at 24 2347 New Bag at 24 2347    sodium chloride flush 0.9 % injection 5-40 mL  5-40 mL IntraVENous 2 times per day Kalyani Murphy MD   10 mL at 24    sodium chloride flush 0.9 % injection 5-40 mL  5-40 mL IntraVENous PRN Kalyani Murphy MD        0.9 % sodium chloride infusion   IntraVENous PRN Kalyani Murphy MD        acetaminophen (TYLENOL) tablet 650 mg  650 mg Oral Q6H Kalyani Murphy MD   650 mg at 24 2008    ondansetron (ZOFRAN-ODT) disintegrating tablet 4 mg  4 mg Oral Q8H PRN Kalyani Murphy MD        Or    ondansetron (ZOFRAN) injection 4 mg  4 mg IntraVENous Q6H PRN Kalyani Murphy MD   4 mg at 24 0509    oxyCODONE 
Internal Medicine Progress Note    Patient's name: Mignon Fischer  : 1955  Chief complaints (on day of admission): No chief complaint on file.  Admission date: 2024  Date of service: 2024   Room: [address] IMCU/NEURO  Primary care physician: Sandy Leiva MD  Reason for visit: Follow-up for  bl leg weakness, DM, gait dysfunction, bl arm weakness, Cervical myelopathy.    Subjective  Mignon was seen and examined.    Review of Systems NEG x 10, ofeeling better, mri,Neurosurgery consult dw pt.She wishes to proceed with surgery as outlined by neurosurgery.  She understands about rehab after the surgery  There are no new complaints of chest pain, shortness of breath, abdominal pain, nausea, vomiting, diarrhea, constipation.    Hospital Medications  Current Facility-Administered Medications   Medication Dose Route Frequency Provider Last Rate Last Admin    0.9 % sodium chloride infusion   IntraVENous Continuous Demetri Gay  mL/hr at 24 0006 New Bag at 24 0006    ceFAZolin (ANCEF) 2,000 mg in sterile water 20 mL IV syringe  2,000 mg IntraVENous On Call to OR Demetri Gay PA        insulin lispro (HUMALOG,ADMELOG) injection vial 0-8 Units  0-8 Units SubCUTAneous 4x Daily AC & HS Davidson Rivera MD   2 Units at 24 1744    vitamin B-12 (CYANOCOBALAMIN) tablet 1,000 mcg  1,000 mcg Oral Daily Rajwinder Cruz APRN - CNP   1,000 mcg at 24 0808    magnesium chloride (MAG DELAY) extended release tablet 64 mg  1 tablet Oral Daily with breakfast Rajwinder Cruz APRN - CNP   64 mg at 24 0808    LORazepam (ATIVAN) injection 1 mg  1 mg IntraVENous PRN Meliton Doll DO   1 mg at 24 1923    losartan (COZAAR) tablet 25 mg  25 mg Oral Daily Meliton Evans DO   25 mg at 24 0808    albuterol (PROVENTIL) (2.5 MG/3ML) 0.083% nebulizer solution 2.5 mg  2.5 mg Nebulization Q4H PRN Lacivita, Cornelia, APRN - CNP        metoprolol succinate (TOPROL XL) 
Internal Medicine Progress Note    Patient's name: Mignon Fischer  : 1955  Chief complaints (on day of admission): No chief complaint on file.  Admission date: 2024  Date of service: 2024   Room: [address] IMCU/NEURO  Primary care physician: Sandy Leiva MD  Reason for visit: Follow-up for  bl leg weakness, DM, gait dysfunction, bl arm weakness, Cervical myelopathy, POD  s/p ACDF. Stable .    Subjective  Mignon was seen and examined.    Review of Systems NEG x 10, SOME CONSTIPATION AND URINE RETENTION- ,  She understands about rehab after the surgery. D/w family and pt  There are no new complaints of chest pain, shortness of breath, abdominal pain, nausea, vomiting, diarrhea, constipation.    Hospital Medications  Current Facility-Administered Medications   Medication Dose Route Frequency Provider Last Rate Last Admin    insulin glargine (LANTUS) injection vial 18 Units  18 Units SubCUTAneous BID Cornelia Lemus, DOMINICK - CNP        enoxaparin Sodium (LOVENOX) injection 30 mg  30 mg SubCUTAneous BID Kalyani Murphy MD   30 mg at 24 2131    0.9 % sodium chloride infusion   IntraVENous Continuous Alethea Gomez  mL/hr at 24 2347 New Bag at 24 2347    sodium chloride flush 0.9 % injection 5-40 mL  5-40 mL IntraVENous 2 times per day Kalyani Murphy MD        sodium chloride flush 0.9 % injection 5-40 mL  5-40 mL IntraVENous PRN Kalyani Murphy MD        0.9 % sodium chloride infusion   IntraVENous PRN Kalyani Murphy MD        acetaminophen (TYLENOL) tablet 650 mg  650 mg Oral Q6H Kalyani Murphy MD   650 mg at 24 0551    ondansetron (ZOFRAN-ODT) disintegrating tablet 4 mg  4 mg Oral Q8H PRN Kalyani Murphy MD        Or    ondansetron (ZOFRAN) injection 4 mg  4 mg IntraVENous Q6H PRN Kalyani Murphy MD   4 mg at 24 0556    oxyCODONE (ROXICODONE) immediate release tablet 5 mg  5 mg Oral Q4H PRN Kalyani Murphy MD   5 mg at 24 0529    Or    oxyCODONE HCl (OXY-IR) 
Internal Medicine Progress Note    Patient's name: Mignon Fischer  : 1955  Chief complaints (on day of admission): No chief complaint on file.  Admission date: 2024  Date of service: 2024   Room: [address] IMCU/NEURO  Primary care physician: Sandy Leiva MD  Reason for visit: Follow-up for  bl leg weakness, DM, gait dysfunction, bl arm weakness, Cervical myelopathy, POD  s/p ACDF. Stable .    Subjective  Mignon was seen and examined.    Review of Systems NEG x 10, SOME CONSTIPATION AND URINE RETENTION- ,  She understands about rehab after the surgery. D/w  pt- On leaving the [place] catheter in until she is more mobile  There are no new complaints of chest pain, shortness of breath, abdominal pain, nausea, vomiting, diarrhea, constipation.    Hospital Medications  Current Facility-Administered Medications   Medication Dose Route Frequency Provider Last Rate Last Admin    insulin glargine (LANTUS) injection vial 18 Units  18 Units SubCUTAneous Nightly Davidson Rivera MD   18 Units at 24 2043    senna (SENOKOT) tablet 8.6 mg  1 tablet Oral BID Davidson Rivera MD   8.6 mg at 24 0848    enoxaparin Sodium (LOVENOX) injection 30 mg  30 mg SubCUTAneous BID Kalyani Murphy MD   30 mg at 24 0848    0.9 % sodium chloride infusion   IntraVENous Continuous Alethea Gomez  mL/hr at 24 2347 New Bag at 24 2347    sodium chloride flush 0.9 % injection 5-40 mL  5-40 mL IntraVENous 2 times per day Kalyani Murphy MD   10 mL at 24 0848    sodium chloride flush 0.9 % injection 5-40 mL  5-40 mL IntraVENous PRN Kalyani Murphy MD        0.9 % sodium chloride infusion   IntraVENous PRN Kalyani Murphy MD        acetaminophen (TYLENOL) tablet 650 mg  650 mg Oral Q6H Kalyani Murphy MD   650 mg at 24 0847    ondansetron (ZOFRAN-ODT) disintegrating tablet 4 mg  4 mg Oral Q8H PRN Ugokwe, Kene, MD        Or    ondansetron (ZOFRAN) injection 4 mg  4 mg IntraVENous Q6H PRN Ugokwe, 
Internal Medicine Progress Note    Patient's name: Mignon Fischer  : 1955  Chief complaints (on day of admission): No chief complaint on file.  Admission date: 2024  Date of service: 6/3/2024   Room: [address] IMCU/NEURO  Primary care physician: Sandy Leiva MD  Reason for visit: Follow-up for  bl leg weakness, DM, gait dysfunction, bl arm weakness, Cervical myelopathy, POD  s/p ACDF. Stable .    Subjective  Mignon was seen and examined.    Review of Systems NEG x 10, MS clear. Sharp shooting pain down right leg night,  AND URINE RETENTION- ,  She understands about rehab after the surgery. D/w  pt- On leaving the [place] catheter in until she is more mobile  There are no new complaints of chest pain, shortness of breath, abdominal pain, nausea, vomiting, diarrhea, constipation.    Hospital Medications  Current Facility-Administered Medications   Medication Dose Route Frequency Provider Last Rate Last Admin    pregabalin (LYRICA) capsule 75 mg  75 mg Oral TID Demetri Gay PA        divalproex (DEPAKOTE SPRINKLE) DR capsule 125 mg  125 mg Oral 2 times per day Davidson Rivera MD   125 mg at 24    insulin glargine (LANTUS) injection vial 18 Units  18 Units SubCUTAneous Nightly Davidson Rivera MD   18 Units at 24    senna (SENOKOT) tablet 8.6 mg  1 tablet Oral BID Davidson Rivera MD   8.6 mg at 24    enoxaparin Sodium (LOVENOX) injection 30 mg  30 mg SubCUTAneous BID Kalyani Murphy MD   30 mg at 24    0.9 % sodium chloride infusion   IntraVENous Continuous Alethea Gomez  mL/hr at 247 New Bag at 24    sodium chloride flush 0.9 % injection 5-40 mL  5-40 mL IntraVENous 2 times per day Kalyani Murphy MD   10 mL at 24    sodium chloride flush 0.9 % injection 5-40 mL  5-40 mL IntraVENous PRN Kalyani Murphy MD        0.9 % sodium chloride infusion   IntraVENous PRN Kalyani Murphy MD        acetaminophen (TYLENOL) tablet 
JACQUE Olivares notified of neuro consult personally.  Pt added to census  
New consult for orthodist completed   
Notified Dr. Berumen pt arrived to unit.  
Occupational Therapy  OT BEDSIDE TREATMENT NOTE   [facility]  [address], [place]      Date:2024  Patient Name: Mignon Fischer  MRN: [id number]  : 1955  Room: [address] [place] [address]     Evaluating OT: Delfina Gardner OTR/L [id number]   Re-evaluating OT: Rosmery Ramirez OTD, OTR/L, [id number]       Referring Provider:Kia Mazariegos MD       Specific Provider Orders/Date:OT eval and treat 24        Diagnosis: Bilateral Extremity Weakness      Surgery:  T TKA 24      Pertinent Medical History: DM, R TKA, OA, HTN , Brain Concussion       Precautions:  Fall Risk, spinal precautions, c-collar, recent R TKA, RUE weakness   (Pt was scheduled for L knee replacement first of )      Assessment of current deficits    [x] Functional mobility            [x]ADLs           [x] Strength                  []Cognition    [x] Functional transfers          [x] IADLs         [] Safety Awareness   [x]Endurance    [x] Fine Coordination                         [x] Balance      [] Vision/perception   []Sensation      [x]Gross Motor Coordination             [] ROM           [] Delirium                   [] Motor Control      OT PLAN OF CARE   OT POC based on physician orders, patient diagnosis and results of clinical assessment     Frequency/Duration 1-3 days/wk for 2 weeks PRN   Specific OT Treatment Interventions to include:   * Instruction/training on adapted ADL techniques and AE recommendations to increase functional independence within precautions       * Training on energy conservation strategies, correct breathing pattern and techniques to improve independence/tolerance for self-care routine  * Functional transfer/mobility training/DME recommendations for increased independence, safety, and fall prevention  * Patient/Family education to increase follow through with safety techniques and functional independence  * Recommendation of environmental 
Occupational Therapy  OT SESSION ATTEMPT     Date:2024  Patient Name: Mignon Fischer  MRN: [id number]  : 1955  Room: [address] [place], [place] [address]     Attempted OT session this date:    [] unavailable due to other medical staff currently with pt   [x] on hold - await neurosurgery consult   [] on hold per nursing staff secondary to lab / radiology results    [] declined treatment  this date due to ____.  Benefits of participation in therapy reviewed with pt.    [] off unit   [] Other:     Will reattempt OT eval at a later time.    Brandy Min, OTR/L #[id number]    
PM&R consult called   
Paged Dr. Evans regarding BP 81/41.    250cc NS bolus ordered.   
Physical Therapy    PT evaluation orders received and chart review completed. Pt with pending MRI of cervical spine for weakness. Will await imaging results and subsequent plan for OOB activity.     Will follow and re-attempt as appropriate. Thank you.    Mela Hawthorne PT, DPT  [id number]      
Physical Therapy    PT order received and medical chart reviewed 5/21. Awaiting neurosurgical recommendations/POC. Will continue to follow. Thank you.    Adrian Ngo, PT, DPT  [id number]       
Physical Therapy  Physical Therapy Missed Visit    Name: Mignon Fischer  : 1955  MRN: [id number]  Room #: 8410/8410-A    Date of Service: 2024    Attempted PT evaluation. Chart reviewed. Pt scheduled for OR  with NS. Will hold evaluation until after procedure.    Ulisses Boyle, PT, DPT  [id number]      
Physical Therapy  Physical Therapy Treatment    Name: Mignon Fischer  : 1955  MRN: [id number]      Date of Service: 2024    Evaluating PT:  Ulisses Boyle PT, DPT    Room #:  8410/8410-A  Diagnosis:  Bilateral leg weakness [R29.898]  PMHx/PSHx:  DM, HTN, HLD, lymphedema, R TKA 24  Procedure/Surgery:  s/p C4-C7 ACDF  Precautions:  spine, custom c/s collar, fall risk  Equipment Owned: cane, ww, bsc, tb, lift chair  Equipment Needs:  TBD    SUBJECTIVE:    Pt lives alone in a 1 story home with 5 steps to enter and B handrail.  Bed/bath is on 1st floor.  Pt ambulated with cane PTA.    OBJECTIVE:   Initial Evaluation  Date: 24 Treatment  Date: 24 Short Term/ Long Term   Goals   AM-PAC 6 Clicks 10/24 9/24    Was pt agreeable to Eval/treatment? yes Yes     Does pt have pain? 5/10 neck Unrated neck pain    Bed Mobility  Rolling: NT  Supine to sit: max A (HOB elevated)  Sit to supine: NT  Scooting: mod A Rolling: ModA  Supine to sit: ModA (HOB elevated)  Sit to supine: MaxA  Scooting: ModA Rolling: mod I  Supine to sit: mod I  Sit to supine: mod I  Scooting: mod I   Transfers Sit to stand: mod A  Stand to sit: mod A  Stand pivot: mod Ax2 with ww Sit to stand: MaxA x2  Stand to sit: Max A  Stand pivot: NT Sit to stand: mod I  Stand to sit: mod I  Stand pivot: mod I with AAD   Ambulation    2' with ww mod Ax2  (Steps to bedside chair) NT 50'+ with AAD mod I   Stair negotiation: ascended and descended  NT NT 5 steps with B handrail mod I     Strength/ROM:   BLE grossly 3+/5  BLE AROM WFL    Balance:   Static Sitting: SBA  Dynamic Sitting: NT  Static Standing: MaxA x2  Dynamic Standing: NT    Pt is A & O x 4  Sensation:  NT  Edema:  none      Patient education  Pt educated on role of PT, safety with mobility.     Patient response to education:   Pt verbalized understanding Pt demonstrated skill Pt requires further education in this area   Yes  Yes  Yes      ASSESSMENT:    Comments:  Upon entrance, pt 
Physical Therapy  Physical Therapy Treatment    Name: Mignon Fischer  : 1955  MRN: [id number]      Date of Service: 2024    Evaluating PT:  Ulisses Boyle PT, DPT    Room #:  8410/8410-A  Diagnosis:  Bilateral leg weakness [R29.898]  PMHx/PSHx:  DM, HTN, HLD, lymphedema, R TKA 24  Procedure/Surgery:  s/p C4-C7 ACDF  Precautions:  spine, custom c/s collar, fall risk, garcias, L knee buckle  Equipment Owned: cane, ww, bsc, tb, lift chair  Equipment Needs:  TBD    SUBJECTIVE:    Pt lives alone in a 1 story home with 5 steps to enter and B handrail.  Bed/bath is on 1st floor.  Pt ambulated with cane PTA.    OBJECTIVE:   Initial Evaluation  Date: 24 Treatment  Date: 24 Short Term/ Long Term   Goals   AM-PAC 6 Clicks 10/24 9/24    Was pt agreeable to Eval/treatment? yes Yes     Does pt have pain? 5/10 neck 7/10 pain \"all over \"     Bed Mobility  Rolling: NT  Supine to sit: max A (HOB elevated)  Sit to supine: NT  Scooting: mod A Head of bed elevated   Supine to sit: MaxA  Sit to supine: MaxA of 2  Scooting: ModA Rolling: mod I  Supine to sit: mod I  Sit to supine: mod I  Scooting: mod I   Transfers Sit to stand: mod A  Stand to sit: mod A  Stand pivot: mod Ax2 with ww Sit to stand: MaxA of 2  (L knee buckle)   Stand to sit: Max A of 2  Stand pivot: NT Sit to stand: mod I  Stand to sit: mod I  Stand pivot: mod I with AAD   Ambulation    2' with ww mod Ax2  (Steps to bedside chair) NT 50'+ with AAD mod I   Stair negotiation: ascended and descended  NT NT 5 steps with B handrail mod I       Therapeutic Exercises:      Ankle pumps ( x10) , glut/quad sets (x10) , heel slides ( x10) , hip abduction ( x10)       Patient education  Pt educated on safety with all mobility    Patient response to education:   Pt verbalized understanding Pt demonstrated skill Pt requires further education in this area   x X with verbal instruction  Reinforce      ASSESSMENT:    Comments:  Nursing cleared pt for physical 
Physical Therapy  Physical Therapy Treatment    Name: Mignon Fischer  : 1955  MRN: [id number]      Date of Service: 2024    Evaluating PT:  Ulisses Boyle PT, DPT    Room #:  8410/8410-A  Diagnosis:  Bilateral leg weakness [R29.898]  PMHx/PSHx:  DM, HTN, HLD, lymphedema, R TKA 24  Procedure/Surgery:  s/p C4-C7 ACDF  Precautions:  spine, custom c/s collar, fall risk, garcias, L knee buckle  Equipment Owned: cane, ww, bsc, tb, lift chair  Equipment Needs:  TBD    SUBJECTIVE:    Pt lives alone in a 1 story home with 5 steps to enter and B handrail.  Bed/bath is on 1st floor.  Pt ambulated with cane PTA.    OBJECTIVE:   Initial Evaluation  Date: 24 Treatment  Date: 24 Short Term/ Long Term   Goals   AM-PAC 6 Clicks 10/24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 5/24/2024 at 9:39 AM   
MD Kalyani   75 mg at 06/01/24 0825    SUMAtriptan (IMITREX) tablet 100 mg  100 mg Oral Once Kalyani Murphy MD        [Held by provider] rosuvastatin (CRESTOR) tablet 20 mg  20 mg Oral Nightly Kalyani Murphy MD   20 mg at 05/27/24 2115    [Held by provider] spironolactone (ALDACTONE) tablet 25 mg  25 mg Oral Daily Kalyani Murphy MD   25 mg at 05/18/24 0901    torsemide (DEMADEX) tablet 20 mg  20 mg Oral Daily Kalyani Murphy MD   20 mg at 06/01/24 0825    cyclobenzaprine (FLEXERIL) tablet 10 mg  10 mg Oral TID PRN Kalyani Murphy MD   10 mg at 05/31/24 2123    glucose chewable tablet 16 g  4 tablet Oral PRN Kalyani Murphy MD        dextrose bolus 10% 125 mL  125 mL IntraVENous PRN Kalyani Murphy MD        Or    dextrose bolus 10% 250 mL  250 mL IntraVENous PRN Kalyani Murphy MD        glucagon injection 1 mg  1 mg SubCUTAneous PRN Kalyani Murphy MD        dextrose 10 % infusion   IntraVENous Continuous PRN Kalyani Murphy MD        0.9 % sodium chloride infusion   IntraVENous PRN Kalyani Murphy MD        potassium chloride (KLOR-CON M) extended release tablet 40 mEq  40 mEq Oral PRN Kalyani Murphy MD        Or    potassium bicarb-citric acid (EFFER-K) effervescent tablet 40 mEq  40 mEq Oral PRN Kalyani Murphy MD   40 mEq at 05/23/24 0808    Or    potassium chloride 10 mEq/100 mL IVPB (Peripheral Line)  10 mEq IntraVENous PRN Kalyani Murphy MD        magnesium sulfate 2000 mg in 50 mL IVPB premix  2,000 mg IntraVENous PRN Kalyani Murphy MD        acetaminophen (TYLENOL) tablet 650 mg  650 mg Oral Q6H PRN Kalyani Murphy MD   650 mg at 06/01/24 0824    Or    acetaminophen (TYLENOL) suppository 650 mg  650 mg Rectal Q6H PRN Kalyani Murphy MD           PRN Medications  sodium chloride flush, sodium chloride, ondansetron **OR** ondansetron, oxyCODONE **OR** oxyCODONE, morphine **OR** morphine, benzocaine-menthol, albuterol, cyclobenzaprine, glucose, dextrose bolus **OR** dextrose bolus, glucagon (rDNA), dextrose, sodium chloride, potassium 
PLAN OF CARE:    PT POC is established based on physician order and patient diagnosis     Referring provider/PT Order:  Kalyani Murphy MD   Diagnosis:  Bilateral leg weakness [R29.898]  Specific instructions for next treatment:  Progress as tolerated    Current Treatment Recommendations:     [x] Strengthening to improve independence with functional mobility   [] ROM to improve independence with functional mobility   [x] Balance Training to improve static/dynamic balance and to reduce fall risk  [x] Endurance Training to improve activity tolerance during functional mobility   [x] Transfer Training to improve safety and independence with all functional transfers   [x] Gait Training to improve gait mechanics, endurance and assess need for appropriate assistive device  [x] Stair Training in preparation for safe discharge home and/or into the community   [] Positioning to prevent skin breakdown and contractures  [x] Safety and Education Training   [x] Patient/Caregiver Education   [] HEP  [] Other     PT long term treatment goals are located in above grid    Frequency of treatments: 2-5x/week x 1-2 weeks.    Time in  0920  Time out  0945    Total Treatment Time  15 minutes     Evaluation Time includes thorough review of current medical information, gathering information on past medical history/social history and prior level of function, completion of standardized testing/informal observation of tasks, assessment of data and education on plan of care and goals.    CPT codes:  [x] Low Complexity PT evaluation 11807  [] Moderate Complexity PT evaluation 45769  [] High Complexity PT evaluation 85575  [] PT Re-evaluation 00541  [] Gait training 53849 -- minutes  [] Manual therapy 05153 -- minutes  [x] Therapeutic activities 79376 15 minutes  [] Therapeutic exercises 11020 -- minutes  [] Neuromuscular reeducation 12555 -- minutes     Ulisses Boyle, PT, DPT  [id number]      
Q6H PRN    ASSESSMENT:   Cervical stenosis with myelopathy    PLAN:  C4-7 ACDF Friday      Electronically signed by MARLEY Fletcher on 5/22/2024 at 7:57 AM   
SubCUTAneous BID Cornelia Lemus APRN - CNP   30 mg at 05/21/24 0937    ondansetron (ZOFRAN-ODT) disintegrating tablet 4 mg  4 mg Oral Q8H PRN LacCornelia plata APRN - CNP        Or    ondansetron (ZOFRAN) injection 4 mg  4 mg IntraVENous Q6H PRN LacCornelia plata, APRN - CNP        senna (SENOKOT) tablet 8.6 mg  1 tablet Oral Daily PRN Cornelia Lemus APRN - CNP   8.6 mg at 05/17/24 1958    acetaminophen (TYLENOL) tablet 650 mg  650 mg Oral Q6H PRN Lacivsusy, Cornelia, APRN - CNP   650 mg at 05/21/24 0147    Or    acetaminophen (TYLENOL) suppository 650 mg  650 mg Rectal Q6H PRN LacCornelia plata, APRN - CNP           PRN Medications  LORazepam, albuterol, traMADol, cyclobenzaprine, glucose, dextrose bolus **OR** dextrose bolus, glucagon (rDNA), dextrose, sodium chloride flush, sodium chloride, potassium chloride **OR** potassium alternative oral replacement **OR** potassium chloride, magnesium sulfate, ondansetron **OR** ondansetron, senna, acetaminophen **OR** acetaminophen    Objective  Most Recent Recorded Vitals  /72   Pulse 65   Temp 98.5 °F (36.9 °C) (Temporal)   Resp 18   Ht 1.6 m (5' 3\")   Wt 107 kg (235 lb 14.3 oz)   SpO2 96%   BMI 41.79 kg/m²   I/O last 3 completed shifts:  In: 250 [P.O.:250]  Out: 1200 [Urine:1200]  No intake/output data recorded.    Physical Exam: SEEN IN ROOM   General: AAO to person/place/time/purpose, NAD, no labored breathing  Eyes: conjunctivae/corneas clear, sclera non icteric  Skin: color/texture/turgor normal, no rashes or lesions  Lungs: CTAB, no retractions/use of accessory muscles, no vocal fremitus, no rhonchi, no crackle, no rales  Heart: regular rate, regular rhythm, no murmur  Abdomen: soft, NT, bowel sounds normal  Extremities: atraumatic, no edema  Neurologic: cranial nerves 2-12 grossly intact, no slurred speech    Most Recent Labs  Lab Results   Component Value Date    WBC 6.6 05/21/2024    HGB 12.1 05/21/2024    HCT 35.4 05/21/2024     05/21/2024 
Recent Labs  Lab Results   Component Value Date    WBC 9.0 05/23/2024    HGB 12.2 05/23/2024    HCT 35.8 05/23/2024     05/23/2024     05/23/2024    K 3.2 (L) 05/23/2024    CL 98 05/23/2024    CREATININE 0.9 05/23/2024    BUN 32 (H) 05/23/2024    CO2 26 05/23/2024    GLUCOSE 123 (H) 05/23/2024    ALT 19 05/23/2024    AST 22 05/23/2024    INR 1.0 05/15/2024    APTT 27.9 10/06/2010    TSH 1.11 05/18/2024    LABA1C 7.3 (H) 05/18/2024       MRI CERVICAL SPINE WO CONTRAST   Final Result   1. Severe spinal stenosis at C6-7 associated with moderate myelomalacic   change extending from the C6-7 disc space through the mid C7 level.   2. Moderate spinal stenosis at C5-6 from mild broad disc bulge and moderate   ligamentum flavum hypertrophy.  Mild myelomalacia inferior to the C5-6 disc   space.   3. Moderate bilateral foraminal stenosis at C3-4, C4-5, and C5-6.             Echocardiogram      Assessment   Active Hospital Problems    Diagnosis     Pre-op evaluation [Z01.818]     Cervical herniated disc [M50.20]     Bilateral leg weakness [R29.898]    Diabetic neuropathy  cervical myelopathy Secondary to severe stenosis at C6-C7 and moderate stenosis at C5-C6     Plan:  MRI reviewed and discussed in detail with patient  Plan to OR Friday for a C4-C7 ACDF,  PMR consult- think she would benefit post-op    Plan  Emg pending   Esr=57, crp=6--->RHEUM JUST SEES OP CONSULTS  ?mri brain with diplopia  may need STEPH Versus acute rehab after surgery  PT AM-PAC--   OT AM- PAC--   Await NS input and EMG    Follow labs   DVT prophylaxis  Please see orders for further management and care.  Discharge plan: TBD      Electronically signed by Davidson Rivera MD on 5/23/2024 at 12:16 PM    I can be reached through [employer].   
glucagon (rDNA), dextrose, sodium chloride, potassium chloride **OR** potassium alternative oral replacement **OR** potassium chloride, magnesium sulfate, senna, acetaminophen **OR** acetaminophen    Objective  Most Recent Recorded Vitals  /68   Pulse 72   Temp 97.8 °F (36.6 °C) (Temporal)   Resp 16   Ht 1.6 m (5' 2.99\")   Wt 109.5 kg (241 lb 6.5 oz)   SpO2 96%   BMI 42.77 kg/m²   I/O last 3 completed shifts:  In: 2174 [P.O.:630; I.V.:1544]  Out: 4675 [Urine:4675]  No intake/output data recorded.    Physical Exam: SEEN IN ROOM c-colar on  General: AAO to person/place/time/purpose, NAD, no labored breathing  Eyes: conjunctivae/corneas clear, sclera non icteric  Skin: color/texture/turgor normal, no rashes or lesions  Lungs: CTAB, no retractions/use of accessory muscles, no vocal fremitus, no rhonchi, no crackle, no rales  Heart: regular rate, regular rhythm, no murmur  Abdomen: soft, NT, bowel sounds normal  Extremities: atraumatic, no edema  Neurologic: cranial nerves 2-12 grossly intact, no slurred speech. Legs weaker>ue, bl    Most Recent Labs  Lab Results   Component Value Date    WBC 21.6 (H) 05/26/2024    HGB 11.1 (L) 05/26/2024    HCT 33.6 (L) 05/26/2024     05/26/2024     05/26/2024    K 4.1 05/26/2024     05/26/2024    CREATININE 0.7 05/26/2024    BUN 22 05/26/2024    CO2 25 05/26/2024    GLUCOSE 186 (H) 05/26/2024    ALT 15 05/26/2024    AST 25 05/26/2024    INR 1.0 05/15/2024    APTT 27.9 10/06/2010    TSH 1.11 05/18/2024    LABA1C 7.3 (H) 05/18/2024       FLUORO FOR SURGICAL PROCEDURES   Final Result   Intraprocedural fluoroscopic spot images as above.  See separate procedure   report for more information.         MRI CERVICAL SPINE WO CONTRAST   Final Result   1. Severe spinal stenosis at C6-7 associated with moderate myelomalacic   change extending from the C6-7 disc space through the mid C7 level.   2. Moderate spinal stenosis at C5-6 from mild broad disc bulge and 
optimal healing, skin integrity, to prevent breakdown, decrease edema, and reduce risk of contracture.  Skilled Monitoring of Vitals: to include BP, spO2, and HR throughout session to maximize safety.  Sitting/Standing Balance/Tolerance: to increase balance and activity tolerance during ADLs and facilitate proper posture and positioning.  Delirium Prevention: Environmental and sensory modifications assessed and implemented to decrease ICU acquired delirium and to improve overall orientation, mentation and pt interaction with family/staff.          Rehab Potential: Good  for established goals     Patient / Family Goal: \" be independent \"       Patient and/or family were instructed on functional diagnosis, prognosis/goals and OT plan of care. Demonstrated good  understanding.     Eval Complexity: re-eval    Time In: 0915   Time Out: 0945   Total Treatment Time: 15 min    Min Units   OT Eval Low 25296      OT Eval Medium 27699      OT Eval High 45661      OT Re-Eval 20339 X      Therapeutic Ex 11779       Therapeutic Activities 94948       ADL/Self Care 17948 15  1    Orthotic Management 08881       Manual 07850     Neuro Re-Ed 47672       Non-Billable Time          Evaluation Time additionally includes thorough review of current medical information, gathering information on past medical history/social history and prior level of function, interpretation of standardized testing/informal observation of tasks, assessment of data and development of plan of care and goals.            Rosmery Ramirez OTD, OTR/L, [id number]            
premix  2,000 mg IntraVENous PRN Kalyani Murphy MD        ondansetron (ZOFRAN-ODT) disintegrating tablet 4 mg  4 mg Oral Q8H PRN Kalyani Murphy MD        Or    ondansetron (ZOFRAN) injection 4 mg  4 mg IntraVENous Q6H PRN Kalyani Murphy MD        senna (SENOKOT) tablet 8.6 mg  1 tablet Oral Daily PRN Kalyani Murphy MD   8.6 mg at 05/17/24 1958    acetaminophen (TYLENOL) tablet 650 mg  650 mg Oral Q6H PRN Kalyani Murphy MD   650 mg at 05/24/24 0618    Or    acetaminophen (TYLENOL) suppository 650 mg  650 mg Rectal Q6H PRN Kalyani Murphy MD           PRN Medications  sodium chloride flush, sodium chloride, sodium chloride flush, sodium chloride, sodium chloride flush, sodium chloride, ondansetron **OR** ondansetron, oxyCODONE **OR** oxyCODONE, morphine **OR** morphine, benzocaine-menthol, midazolam, albuterol, cyclobenzaprine, glucose, dextrose bolus **OR** dextrose bolus, glucagon (rDNA), dextrose, sodium chloride flush, sodium chloride, potassium chloride **OR** potassium alternative oral replacement **OR** potassium chloride, magnesium sulfate, ondansetron **OR** ondansetron, senna, acetaminophen **OR** acetaminophen    Objective  Most Recent Recorded Vitals  BP (!) 141/78   Pulse 74   Temp 97.4 °F (36.3 °C) (Temporal)   Resp 18   Ht 1.6 m (5' 2.99\")   Wt 109.7 kg (241 lb 13.5 oz)   SpO2 95%   BMI 42.85 kg/m²   I/O last 3 completed shifts:  In: 2562 [P.O.:150; I.V.:2412]  Out: 4800 [Urine:4775; Blood:25]  No intake/output data recorded.    Physical Exam: SEEN IN ROOM c-colar on  General: AAO to person/place/time/purpose, NAD, no labored breathing  Eyes: conjunctivae/corneas clear, sclera non icteric  Skin: color/texture/turgor normal, no rashes or lesions  Lungs: CTAB, no retractions/use of accessory muscles, no vocal fremitus, no rhonchi, no crackle, no rales  Heart: regular rate, regular rhythm, no murmur  Abdomen: soft, NT, bowel sounds normal  Extremities: atraumatic, no edema  Neurologic: cranial nerves 2-12

## 2024-06-03 NOTE — PLAN OF CARE
Problem: Chronic Conditions and Co-morbidities  Goal: Patient's chronic conditions and co-morbidity symptoms are monitored and maintained or improved  6/3/2024 1138 by Yoko Manning RN  Outcome: Progressing     Problem: Discharge Planning  Goal: Discharge to home or other facility with appropriate resources  6/3/2024 1138 by Yoko Manning RN  Outcome: Progressing     Problem: Safety - Adult  Goal: Free from fall injury  6/3/2024 1138 by Yoko Manning RN  Outcome: Progressing     Problem: Skin/Tissue Integrity  Goal: Absence of new skin breakdown  Description: 1.  Monitor for areas of redness and/or skin breakdown  2.  Assess vascular access sites hourly  3.  Every 4-6 hours minimum:  Change oxygen saturation probe site  4.  Every 4-6 hours:  If on nasal continuous positive airway pressure, respiratory therapy assess nares and determine need for appliance change or resting period.  6/3/2024 1138 by Yoko Manning RN  Outcome: Progressing     Problem: ABCDS Injury Assessment  Goal: Absence of physical injury  6/3/2024 1138 by Yoko Manning RN  Outcome: Progressing

## 2024-06-03 NOTE — CARE COORDINATION
CM update note.  Discharge plan unclear at this time.  Pt will be NPO after midnight for a C4-C7 ACDF tomorrow with NS.  Custom Cervical Collar ordered.  Cardiology cleared for surgery.  Will  need post op Pt/Ot evals.  Met with patient and her brother at the bedside.  Explained difference between ARU and STEPH.  If patient meets criteria for ARU they would want to stay here at [facility].  If she will need STEPH they have chosen 1. [facility]. [facility] 3. [facility].  PMR consult placed today.  They will follow up next week.  Pt will need to placed on weekend therapy if medically cleared to participate.  CM/SW to follow.  Christiano Moore RN  [phone]  
CM update note.  Per ARU, they are waiting on updated OT notes.  Called 0828 and requested updated today.  Plan is ARU here vs [facility].  If not accepted to that level of care, back up plan is 1. [facility] 2. [facility] 3. [facility].  Patient is s/p C4-C7 ACDF.  Urology consulted today for retention.  Await ARU outcome.  CM/SW to follow.  Christiano Moore RN -[phone].    
CM update note.  Pt was a transfer from .  Admitted with bilateral leg weakness and numbness.  NS following for cervical stenosis with myelopathy.  Plan is for C4-7 ACDF on Friday. Cardiology consulted for clearance.  Pt will need Pt/ot evals after surgery once cleared to work with them.  Met with patient.  She reports heaviness and numbness in legs.  She has been in bed now for about 9 days.  Discussed STEPH.  She is agreeable if needed.  STEPH list provided to her.  CM/SW to follow.  Christiano Moore RN -[phone].    
CM update note.  Spoke with Allegra from [facility], precert was started 5/30 and remains pending.  Nasim/destination completed.  Ambulance form with envelope is on the soft chart.  Back up plan if denied by ARU is 1. [facility] 2. [facility] 3. [facility].  Pt is s/p C4-C7 ACD on 5/24. CM/SW to follow.  Christiano Moore RN -[phone].   
CM update note.  Spoke with Allegra from [facility]. Insurance denied and peer to peer is offered by noon today.  Call [phone] option [phone].  Reference number [id number].  Perfect serve message sent to attending.    0916:  Attending called [facility], Allegra updated.  Await outcome.  Patient and her brother updated.  Back up plan is 1. [facility], Referral called to [facility] to review.     1135:  patient approved for [facility].  Perfect serve message sent to attending.  He will place discharge.  Patient set up via stretcher with PAS.   time .  Nurse will need to call report to [phone].  Facility liaison, patient, patient brother and RN notified of  time.  Christiano Moore RN  [phone].    
CM update note.  Spoke with Demi at ARU.  Pt has appropriate diagnosis for ARU, however they do no have any beds.  Pt was agreeable to [facility] as second choice.  Referral called to Sangeeta with [facility].    1110:  [facility] has accepted and will start insurance precert today.  Nasim/destination completed.  Ambulance form with envelope placed on the soft chart.   Patient and her brother updated.  CM/SW to follow.  Christiano Moore RN -[phone].    
Consult received. Dr. Salazar to [facility] for ARU.   
Patient for OR today for C4-C7 ACDF. Then await further input & plan from neurosurgery. Will need post op PT/OT when cleared by neurosurgery. Patient placed on the Sunday therapy list. Per previous CM note, Met with patient and her brother at the bedside.  Explained difference between ARU and STEPH.  If patient meets criteria for ARU they would want to stay here at [facility].  If she will need STEPH they have [facility]  [facility].  PMR consult placed, they are following patient for post op PT/OT. Await acceptance. Preliminary referral made to Christiano at [facility] and Nany at [facility] incase AR does not work out.   Deepthi Hoover RN   [phone]    
[facility] apparently for BL LE weakness ?    She is sp recent R TKA     Apparently sent downtown for neurology evaluation ?       
Reviewed with Dr. Salazar. Patient is to have surgery tomorrow and will need post op therapies as well as verify home support.  Will follow up next week.    
[facility]( MARILYNN ESPERANZA, -[phone]):  I met with pt and her brother, Ferny, this a.m. PT and OT to see pt today and determine if she is aru vs eric. [facility] here is following pt and they do not know if they will have a bed for pt and needs to see if she meets criteria. Precert is needed. If pt is accepted to aru but no bed then pt and her brother are in agreement to [facility]. If eric is needed then the previous sw made a referral to [facility] and [facility]. If another eric is needed then the pt wants [facility]. .Marilynn Esperanza, GOPI  5/28/2024          
Transfer from [facility] Here for Bilateral leg weakness  Neurology consult, neurosurgery and rheumatology consults. Ct head-No acute intracranial hemorrhage or edema. No acute osseous abnormality involving the cervical spine or lumbar spine.Stable anterolisthesis of C6 on C7 which is likely degenerative in etiology given severe facet arthropathy.Severe bilateral neural foraminal narrowing at L5/S1. .MRI C spine ordered and  EMG  pt/ot. Met with patient and her brother who was in the room to discuss role/transition of care. Has history of  right TKA in Jan,She lives alone in [place] Her PCP is Dr Sandy Leiva and uses [facility]. She has FWW, tub bench BSC and lift chair. She has had [facility] and no [facility] history. Await testing and pt/ot for discharge plan Sw/cm to follow. Electronically signed by Mandy Billings RN on 5/20/2024 at 2:15 PM    
English

## 2024-06-03 NOTE — DISCHARGE SUMMARY
Internal Medicine Discharge Summary    NAME: Mignon Fischer :  1955  MRN:  [id number] PCP:Sandy Leiva MD    ADMITTED: 2024   DISCHARGED: 6/3  ADMITTING PHYSICIAN: Davidson Rivera MD    PCP: Sandy Leiva MD    CONSULTANT(S):   IP CONSULT TO NEUROLOGY  IP CONSULT TO NEUROSURGERY  IP CONSULT TO NEUROSURGERY  IP CONSULT TO CARDIOLOGY  INPATIENT CONSULT TO ORTHOTIST/PROSTHETIST  IP CONSULT TO PHYSICAL MEDICINE REHAB  IP CONSULT TO UROLOGY     ADMITTING DIAGNOSIS:   Bilateral leg weakness [R29.898]     Please see H&P for further details    DISCHARGE DIAGNOSES:   Active Hospital Problems    Diagnosis     Pre-op evaluation [Z01.818]     Cervical herniated disc [M50.20]     Bilateral leg weakness [R29.898]     s/p ACDF. . Stable   Constipation- tx w/ bowel regimen  Urine retention- has cath  Incr LFTs-improved  Post op delirium- improved on depakote    BRIEF HISTORY OF PRESENT ILLNESS: Mignon Fischer is a 68 y.o. female patient of Sandy Leiva MD who  has a past medical history of Brain concussion, Diabetes mellitus (HCC), Double vision with both eyes open, Dyspnea on exertion, Hx of blood clots, Hyperlipemia, Hypertension, Knee pain, Lymphedema, Pancreatitis, and Vitreous floaters of both eyes. who originally had no chief complaint listed for this encounter. at presentation on 2024, and was found to have Bilateral leg weakness [R29.898] after workup.    Please see H&P for further details.    HOSPITAL COURSE:   The patient presented to the hospital with the chief complaint of No chief complaint on file.  . The patient was admitted to the hospital.  s/p ACDF. . Stable   Constipation- tx w/ bowel regimen  Urine retention- has cath  Incr LFTs-improved  Post op delirium- improved on depakote  High esr+crp- no rheum avail    BRIEF PHYSICAL EXAMINATION AND LABORATORIES ON DAY OF DISCHARGE:  VITALS:  /69   Pulse 94   Temp 97 °F (36.1 °C) (Temporal)   Resp 18   Ht 1.6 m (5' 2.99\")

## 2024-06-04 LAB — SURGICAL PATHOLOGY REPORT: NORMAL

## 2024-06-05 NOTE — CONSULTS
Consult reviewed with Acute rehab nurse screener. Agree with ARU screener notes as documented, please refer to notes for further details.     Thank you for the consult.    Norah Salazar MD  Physical Medicine and Rehabilitation

## 2024-06-17 DIAGNOSIS — Z98.1 S/P CERVICAL SPINAL FUSION: Primary | ICD-10-CM

## 2024-06-21 ENCOUNTER — HOSPITAL ENCOUNTER (OUTPATIENT)
Age: 69
End: 2024-06-21
Payer: MEDICARE

## 2024-06-21 ENCOUNTER — HOSPITAL ENCOUNTER (OUTPATIENT)
Dept: GENERAL RADIOLOGY | Age: 69
End: 2024-06-21
Payer: MEDICARE

## 2024-06-21 ENCOUNTER — OFFICE VISIT (OUTPATIENT)
Dept: NEUROSURGERY | Age: 69
End: 2024-06-21
Payer: MEDICARE

## 2024-06-21 VITALS
BODY MASS INDEX: 39.69 KG/M2 | RESPIRATION RATE: 18 BRPM | DIASTOLIC BLOOD PRESSURE: 69 MMHG | HEIGHT: 63 IN | SYSTOLIC BLOOD PRESSURE: 127 MMHG | OXYGEN SATURATION: 98 % | HEART RATE: 68 BPM

## 2024-06-21 DIAGNOSIS — Z98.1 S/P CERVICAL SPINAL FUSION: ICD-10-CM

## 2024-06-21 DIAGNOSIS — Z98.1 S/P CERVICAL SPINAL FUSION: Primary | ICD-10-CM

## 2024-06-21 PROBLEM — Z01.818 PRE-OP EVALUATION: Status: RESOLVED | Noted: 2024-05-22 | Resolved: 2024-06-21

## 2024-06-21 PROCEDURE — 99024 POSTOP FOLLOW-UP VISIT: CPT | Performed by: STUDENT IN AN ORGANIZED HEALTH CARE EDUCATION/TRAINING PROGRAM

## 2024-06-21 PROCEDURE — 72040 X-RAY EXAM NECK SPINE 2-3 VW: CPT

## 2024-06-21 NOTE — PROGRESS NOTES
Post-Operative Follow-up     This is a 68 year old female who presents to the office for a 1 month follow-up s/p C4-C7 ACDF      Subjective: Patient [facility] [place] She states she is doing okay. She denies any significant neck pain with pain medication. She also admits to some left knee pain and low back pain. She continues to work with rehab. Family present at visit, all questions answered. XR reviewed.      Physical Exam:              WDWN, no apparent distress              Non-labored breathing               Vitals Stable              Alert and oriented x3              CN 3-12 intact              PERRL              EOMI              CALDERA well              4-/5 in all extremities               Sensation to LT intact bilaterally               Imagin2024 XR Cervical Spine  Stable alignment, stable fusion. No acute abnormalities noted. Final report pending.     Assessment: This is a 68 y.o.  female presenting for a 1 month follow-up s/p C4-C7 ACDF      Plan:  -Pain control and expectations discussed  -Continue C-Collar and restrictions   -OARRS report reviewed   -Follow-up in neurosurgery clinic in 2 months with repeat XR  -Call or return to neurosurgery office sooner if symptoms worsen or if new issues arise in the interim.    Electronically signed by Alethea Gomez PA-C on 2024 at 6:11 PM

## 2024-07-10 ENCOUNTER — TELEPHONE (OUTPATIENT)
Dept: NEUROSURGERY | Age: 69
End: 2024-07-10

## 2024-07-10 DIAGNOSIS — M50.20 CERVICAL HERNIATED DISC: ICD-10-CM

## 2024-07-10 RX ORDER — PREGABALIN 75 MG/1
75 CAPSULE ORAL 3 TIMES DAILY
Qty: 90 CAPSULE | Refills: 0 | Status: SHIPPED | OUTPATIENT
Start: 2024-07-10 | End: 2024-08-09

## 2024-07-10 RX ORDER — CYCLOBENZAPRINE HCL 10 MG
10 TABLET ORAL 3 TIMES DAILY PRN
Qty: 30 TABLET | Refills: 0 | Status: SHIPPED | OUTPATIENT
Start: 2024-07-10

## 2024-07-10 RX ORDER — OXYCODONE HYDROCHLORIDE 10 MG/1
10 TABLET ORAL EVERY 4 HOURS PRN
Qty: 42 TABLET | Refills: 0 | Status: SHIPPED | OUTPATIENT
Start: 2024-07-10 | End: 2024-07-17

## 2024-07-10 NOTE — TELEPHONE ENCOUNTER
Patient brother call and states that patient was discharged from [facility] and does not have any pain medication or Lyrica-can a script be sent to [facility] in [place].

## 2024-08-16 ENCOUNTER — HOSPITAL ENCOUNTER (OUTPATIENT)
Age: 69
End: 2024-08-16
Payer: MEDICARE

## 2024-08-16 ENCOUNTER — OFFICE VISIT (OUTPATIENT)
Dept: NEUROSURGERY | Age: 69
End: 2024-08-16
Payer: MEDICARE

## 2024-08-16 ENCOUNTER — HOSPITAL ENCOUNTER (OUTPATIENT)
Dept: GENERAL RADIOLOGY | Age: 69
End: 2024-08-16
Payer: MEDICARE

## 2024-08-16 VITALS
TEMPERATURE: 97.4 F | SYSTOLIC BLOOD PRESSURE: 114 MMHG | DIASTOLIC BLOOD PRESSURE: 68 MMHG | OXYGEN SATURATION: 100 % | HEART RATE: 78 BPM | BODY MASS INDEX: 39.69 KG/M2 | HEIGHT: 63 IN | WEIGHT: 224 LBS

## 2024-08-16 DIAGNOSIS — M54.41 CHRONIC BILATERAL LOW BACK PAIN WITH BILATERAL SCIATICA: ICD-10-CM

## 2024-08-16 DIAGNOSIS — Z98.1 S/P CERVICAL SPINAL FUSION: Primary | ICD-10-CM

## 2024-08-16 DIAGNOSIS — Z98.1 S/P CERVICAL SPINAL FUSION: ICD-10-CM

## 2024-08-16 DIAGNOSIS — M48.062 SPINAL STENOSIS OF LUMBAR REGION WITH NEUROGENIC CLAUDICATION: ICD-10-CM

## 2024-08-16 DIAGNOSIS — G89.29 CHRONIC BILATERAL LOW BACK PAIN WITH BILATERAL SCIATICA: ICD-10-CM

## 2024-08-16 DIAGNOSIS — I89.0 LYMPHEDEMA: ICD-10-CM

## 2024-08-16 DIAGNOSIS — M54.42 CHRONIC BILATERAL LOW BACK PAIN WITH BILATERAL SCIATICA: ICD-10-CM

## 2024-08-16 PROCEDURE — 72040 X-RAY EXAM NECK SPINE 2-3 VW: CPT

## 2024-08-16 PROCEDURE — 72120 X-RAY BEND ONLY L-S SPINE: CPT

## 2024-08-16 PROCEDURE — 99024 POSTOP FOLLOW-UP VISIT: CPT | Performed by: STUDENT IN AN ORGANIZED HEALTH CARE EDUCATION/TRAINING PROGRAM

## 2024-08-16 RX ORDER — LIDOCAINE 50 MG/G
1 PATCH TOPICAL DAILY
Qty: 30 PATCH | Refills: 0 | Status: SHIPPED | OUTPATIENT
Start: 2024-08-16 | End: 2024-09-15

## 2024-08-16 NOTE — PROGRESS NOTES
Post-Operative Follow-up     This is a 68 year old female who presents to the office for a 3 month follow-up s/p C4-C7 ACDF      Subjective: Patient states she is doing well. She denies any significant neck pain. Does admit to weakness and numbness in her right arm. States she is walking with a walker now. Still with severe low back pain that radiates into her legs. She has done PT in the past, none recently. No MELANIE for her back pain. MRI reviewed with patient. She denies any bowel or bladder incontinence.      Physical Exam:              WDWN, no apparent distress              Non-labored breathing     In wheelchair              Vitals Stable              Alert and oriented x3              CN 3-12 intact              PERRL              EOMI              CALDERA well              4-/5 in RUE              Sensation to LT intact bilaterally        [id number] XR Cervical Spine  Stable alignment, stable fusion. No acute abnormalities noted. Final report pending.    05/15/2024 MRI Lumbar Spine  1. No fracture or bony destructive changes.  2. Mild left-sided perifacet enhancement at L3-4 likely due to arthrosis.  3. Severe central canal stenosis at L2-L3. Moderate central canal stenosis at L3-L4.  4. Multilevel neural foraminal stenoses, worst (severe) at the bilateral L5-S1 levels.  5. Moderate to severe lateral recess stenoses at L2-3 bilaterally and on the right at L3-4.     Assessment: This is a 69 y.o.  female presenting for a 3 month follow-up s/p C4-C7 ACDF      Plan:  -Pain control and expectations discussed  -MRI Lumbar spine reviewed and discussed in detail. Continue with PT, if no relief follow up with Dr. Murphy to discuss surgical interventions  -Can discontinue C-Collar and restrictions   -PT referral  -OARRS report reviewed   -Follow-up in neurosurgery clinic in 9 months for 1 year follow up with repeat XR  -Call or return to neurosurgery office sooner if symptoms worsen or if new issues arise in the

## 2024-09-03 ENCOUNTER — OFFICE VISIT (OUTPATIENT)
Dept: NEUROSURGERY | Age: 69
End: 2024-09-03
Payer: MEDICARE

## 2024-09-03 VITALS
WEIGHT: 224 LBS | SYSTOLIC BLOOD PRESSURE: 114 MMHG | OXYGEN SATURATION: 98 % | HEIGHT: 63 IN | TEMPERATURE: 97.4 F | RESPIRATION RATE: 18 BRPM | BODY MASS INDEX: 39.69 KG/M2 | DIASTOLIC BLOOD PRESSURE: 68 MMHG | HEART RATE: 78 BPM

## 2024-09-03 DIAGNOSIS — M71.38 SYNOVIAL CYST OF LUMBAR FACET JOINT: Primary | ICD-10-CM

## 2024-09-03 PROCEDURE — 99213 OFFICE O/P EST LOW 20 MIN: CPT | Performed by: NEUROLOGICAL SURGERY

## 2024-09-03 PROCEDURE — 1123F ACP DISCUSS/DSCN MKR DOCD: CPT | Performed by: NEUROLOGICAL SURGERY

## 2024-09-03 NOTE — PROGRESS NOTES
Patient is here for follow up consult for: back and leg pain with right leg weakness.  The pain is interfering with her activities of daily living.  Her pain is rated as 7/10    Physical exam  Alert and Oriented X3  PERRLA, EOMI  CALDERA 5/5 except 4/5 in RLE  Sensation intact to LT and PP  Reflexes are 2+ and symmetric    A/P: patient is here for follow up for: back pain.  She has tried physical therapy without any relief.  Her MRI shows an L2-L3 synovial cyst with stenosis from L2-S1.  I am recommending an L2-S1 laminectomy and fusion.  She will need a fusion because in order to decompress her nerve roots, she will need an aggressive facetectomy at these levels where 50% of the facets will be removed bilaterally.  Risks, benefits and alternatives have been discussed and she wishes to proceed    Kalyani Murphy MD

## 2024-10-21 ENCOUNTER — PREP FOR PROCEDURE (OUTPATIENT)
Dept: NEUROSURGERY | Age: 69
End: 2024-10-21

## 2024-10-21 ENCOUNTER — TELEPHONE (OUTPATIENT)
Dept: NEUROSURGERY | Age: 69
End: 2024-10-21

## 2024-10-21 DIAGNOSIS — Z01.818 PRE-OP TESTING: Primary | ICD-10-CM

## 2024-10-21 DIAGNOSIS — M71.38 SYNOVIAL CYST OF LUMBAR SPINE: ICD-10-CM

## 2024-10-21 PROBLEM — M48.061 STENOSIS, SPINAL, LUMBAR: Status: ACTIVE | Noted: 2024-10-21

## 2024-10-21 RX ORDER — SODIUM CHLORIDE 0.9 % (FLUSH) 0.9 %
5-40 SYRINGE (ML) INJECTION EVERY 12 HOURS SCHEDULED
Status: CANCELLED | OUTPATIENT
Start: 2024-10-21

## 2024-10-21 RX ORDER — SODIUM CHLORIDE 9 MG/ML
INJECTION, SOLUTION INTRAVENOUS CONTINUOUS
Status: CANCELLED | OUTPATIENT
Start: 2024-10-21

## 2024-10-21 RX ORDER — SODIUM CHLORIDE 0.9 % (FLUSH) 0.9 %
5-40 SYRINGE (ML) INJECTION PRN
Status: CANCELLED | OUTPATIENT
Start: 2024-10-21

## 2024-10-21 RX ORDER — SODIUM CHLORIDE 9 MG/ML
INJECTION, SOLUTION INTRAVENOUS PRN
Status: CANCELLED | OUTPATIENT
Start: 2024-10-21

## 2024-10-21 NOTE — TELEPHONE ENCOUNTER
Prior Authorization Form:      DEMOGRAPHICS:                     Patient Name:  Mignon Fischer  Patient :  1955            Insurance:  Payor: [facility] MEDICARE / Plan: [facility] OPTUM PLAN [employer] [facility] ADV / Product Type: *No Product type* /   Insurance ID Number:    Payer/Plan Subscr  Sex Relation Sub. Ins. ID Effective Group Num   1. [facility] MEDICARE * MIGNON FISCHER* 1955 Female Self [id number]                                   [address] [address]         DIAGNOSIS & PROCEDURE:                       Procedure/Operation: Posterior L2-S1 laminectomy and fusion           CPT Code: 09206, 22614 x3, 69372, 63048 x3, 32622, 55187, 13841,     Diagnosis:  L2-S1 stenosis, L2-L3 synovial cyst    ICD10 Code: M48.061, M71.38    Location:  Main    Surgeon:  Katherine    SCHEDULING INFORMATION:                          Date: 24                Anesthesia:  general                                                       Status:  Outpatient        Special Comments:  Manf:  Globus  Products:  Creo (rods,screws)-[id number], Ossifuse (allograft)-       Electronically signed by Selene Phillips MA on 10/21/2024 at 2:45 PM

## 2024-10-24 DIAGNOSIS — M71.38 SYNOVIAL CYST OF LUMBAR SPINE: ICD-10-CM

## 2024-10-24 DIAGNOSIS — M48.062 SPINAL STENOSIS OF LUMBAR REGION WITH NEUROGENIC CLAUDICATION: Primary | ICD-10-CM

## 2024-10-29 ENCOUNTER — TELEPHONE (OUTPATIENT)
Dept: NEUROSURGERY | Age: 69
End: 2024-10-29

## 2024-11-01 RX ORDER — ACETAMINOPHEN 500 MG
500 TABLET ORAL EVERY 6 HOURS PRN
Status: ON HOLD | COMMUNITY

## 2024-11-01 RX ORDER — OXYCODONE HCL 10 MG/1
10 TABLET, FILM COATED, EXTENDED RELEASE ORAL EVERY 12 HOURS PRN
Status: ON HOLD | COMMUNITY
End: 2024-11-21 | Stop reason: HOSPADM

## 2024-11-01 RX ORDER — FERROUS SULFATE 325(65) MG
325 TABLET ORAL 2 TIMES DAILY
Status: ON HOLD | COMMUNITY

## 2024-11-01 RX ORDER — ROSUVASTATIN CALCIUM 20 MG/1
20 TABLET, COATED ORAL DAILY
Status: ON HOLD | COMMUNITY

## 2024-11-01 RX ORDER — CELECOXIB 200 MG/1
200 CAPSULE ORAL 2 TIMES DAILY
Status: ON HOLD | COMMUNITY

## 2024-11-01 RX ORDER — CALCIUM CARBONATE 500(1250)
500 TABLET ORAL EVERY MORNING
Status: ON HOLD | COMMUNITY

## 2024-11-01 RX ORDER — ASPIRIN 81 MG/1
81 TABLET ORAL EVERY MORNING
Status: ON HOLD | COMMUNITY
End: 2024-11-21 | Stop reason: HOSPADM

## 2024-11-01 RX ORDER — LIDOCAINE 50 MG/G
1 PATCH TOPICAL DAILY
Status: ON HOLD | COMMUNITY
End: 2024-11-21 | Stop reason: HOSPADM

## 2024-11-01 RX ORDER — POTASSIUM CHLORIDE 750 MG/1
10 TABLET, EXTENDED RELEASE ORAL DAILY
Status: ON HOLD | COMMUNITY

## 2024-11-01 NOTE — PROGRESS NOTES
[facility]   PRE-ADMISSION TESTING GENERAL INSTRUCTIONS  PAT Phone Number: [phone]    GENERAL INSTRUCTIONS:    [x] The Night Before Surgery: Take an antibacterial soap shower - followed by CHG Wipes.   -The Morning of Surgery: Repeat CHG Wipes.  [x] Do not wear makeup, lotions, powders, deodorant the morning of surgery.  [x] No solid food after midnight. You may have SIPS of clear liquids up until 2 hours before your arrival time to the hospital.   [x] You may brush your teeth, gargle, but do not swallow water.   [x] No tobacco products, illegal drugs, or alcohol within 24 hours of your surgery.  [x] Jewelry or valuables should not be brought to the hospital. All body and/or tongue piercing's must be removed prior to arriving to hospital. No contact lens or hair pins.   [x] Arrange transportation with a responsible adult  to and from the hospital. Arrange for someone to be with you for the remainder of the day and for 24 hours after your procedure due to having had anesthesia.          -Who will be your  for transportation? Brother        -Who will be staying with you for 24 hrs after your procedure? Patient states believes is being admitted  [x] Bring insurance card and photo ID.  [x] Bring copy of living will or healthcare power of  papers to be placed in your electronic record.    [x] Transfusion (Green) Bracelet: Please bring with you to hospital, day of surgery.     PARKING INSTRUCTIONS:     [x] ARRIVAL DATE & TIME: 11/18 5 am  [x] Times are subject to change. We will contact you the business day before surgery if that were to occur.  [x] Enter into the [facility] Entrance. Two people may accompany you. Masks are not required.  [x] Parking Lot \"I\" is where you will park. It is located on the corner of [address] and [address]. The entrance is on [address].   Only one vehicle - per patient, is permitted in parking lot.   Upon entering the  parking lot, a voucher ticket will print.    EDUCATION INSTRUCTIONS:        [x] Pre-admission Testing educational folder given.  [x] Incentive Spirometry,coughing & deep breathing exercises reviewed.  [x] Fluoroscopy-Xray used in surgery reviewed with patient. Educational pamphlet placed in chart.  [x] Pain: Post-op pain is normal and to be expected. You will be asked to rate your pain from 0-10.  [x] CHG Wipes instruction sheet and wipes given during PAT apt.    MEDICATION INSTRUCTIONS:    [x] Bring a complete list of your medications, please write the last time you took the medicine, give this list to the nurse in Pre-Op.  [x] Take ONLY the following medications the morning of surgery:  divalproex (Depakote Sprinkle), Pregabalin (Lyrica)    May take the morning of surgery if needed with a sip of water: oxycodone (oxycontin)  [x] Stop all herbal supplements and vitamins 5 days before surgery. Stop NSAIDS 7 days before surgery.  Last day to take celecoxib (Celebrex) 11/10  [x] DO NOT take any diabetic medicine the morning of surgery.  Follow instructions for insulin the day before surgery.  [x] If you are diabetic and your blood sugar is low or you feel symptomatic, you may drink 1-2 ounces of apple juice or take a glucose tablet.            -The morning of your procedure, you may call the pre-op area if you have concerns about your blood sugar [phone].  [x] Use your inhalers the morning of surgery.  Bring your emergency inhaler with you day of surgery and bring with you the day of surgery.  [x] Follow physician instructions regarding any blood thinners you may be taking.  Hold aspirin the morning of surgery    WHAT TO EXPECT:    [x] The day of surgery you will be greeted and checked in by the [facility] . In addition, you will be registered in the [facility] by a Patient Access Representative. Please bring your photo ID and insurance card. A nurse will greet you in accordance to the time you  are needed in the pre-op area to prepare you for surgery. Please do not be discouraged if you are not greeted in the order you arrive as there are many variables that are involved in patient preparation. Your patience is greatly appreciated as you wait for your nurse.   [x] Delays may occur with surgery and staff will make a sincere effort to keep you informed of delays. If any delays occur with your procedure, we apologize ahead of time for your inconvenience as we recognize the value of your time.

## 2024-11-06 NOTE — PROGRESS NOTES
Spoke with Leonides at Dr. BRENDA Leiva's office calling as patient states had EKG and labs drawn at office visit.  She states EKG and labs were drawn on 10/18.  She will fax signed interpreted EKG to pre admission testing and Dr. Murphy's office.  Left message for Selene regarding the above.

## 2024-11-11 ENCOUNTER — HOSPITAL ENCOUNTER (OUTPATIENT)
Dept: GENERAL RADIOLOGY | Age: 69
Discharge: HOME | End: 2024-11-13
Payer: MEDICARE

## 2024-11-11 ENCOUNTER — HOSPITAL ENCOUNTER (OUTPATIENT)
Dept: PREADMISSION TESTING | Age: 69
Discharge: HOME | End: 2024-11-11
Payer: MEDICARE

## 2024-11-11 VITALS
TEMPERATURE: 97.7 F | DIASTOLIC BLOOD PRESSURE: 61 MMHG | HEART RATE: 71 BPM | HEIGHT: 64 IN | RESPIRATION RATE: 16 BRPM | BODY MASS INDEX: 39.11 KG/M2 | OXYGEN SATURATION: 96 % | SYSTOLIC BLOOD PRESSURE: 132 MMHG | WEIGHT: 229.1 LBS

## 2024-11-11 DIAGNOSIS — Z01.812 PRE-OPERATIVE LABORATORY EXAMINATION: Primary | ICD-10-CM

## 2024-11-11 DIAGNOSIS — Z01.818 PRE-OP TESTING: ICD-10-CM

## 2024-11-11 LAB
ABO + RH BLD: NORMAL
ANION GAP SERPL CALCULATED.3IONS-SCNC: 12 MMOL/L (ref 7–16)
ARM BAND NUMBER: NORMAL
BACTERIA URNS QL MICRO: ABNORMAL
BASOPHILS # BLD: 0.03 K/UL (ref 0–0.2)
BASOPHILS NFR BLD: 0 % (ref 0–2)
BILIRUB UR QL STRIP: NEGATIVE
BLOOD BANK SAMPLE EXPIRATION: NORMAL
BLOOD GROUP ANTIBODIES SERPL: NEGATIVE
BUN SERPL-MCNC: 15 MG/DL (ref 6–23)
CALCIUM SERPL-MCNC: 9.2 MG/DL (ref 8.6–10.2)
CHLORIDE SERPL-SCNC: 102 MMOL/L (ref 98–107)
CLARITY UR: CLEAR
CO2 SERPL-SCNC: 25 MMOL/L (ref 22–29)
COLOR UR: YELLOW
CREAT SERPL-MCNC: 0.7 MG/DL (ref 0.5–1)
EOSINOPHIL # BLD: 0.07 K/UL (ref 0.05–0.5)
EOSINOPHILS RELATIVE PERCENT: 1 % (ref 0–6)
EPI CELLS #/AREA URNS HPF: ABNORMAL /HPF
ERYTHROCYTE [DISTWIDTH] IN BLOOD BY AUTOMATED COUNT: 13.2 % (ref 11.5–15)
GFR, ESTIMATED: 90 ML/MIN/1.73M2
GLUCOSE SERPL-MCNC: 98 MG/DL (ref 74–99)
GLUCOSE UR STRIP-MCNC: NEGATIVE MG/DL
HCT VFR BLD AUTO: 39.2 % (ref 34–48)
HGB BLD-MCNC: 12.8 G/DL (ref 11.5–15.5)
HGB UR QL STRIP.AUTO: NEGATIVE
IMM GRANULOCYTES # BLD AUTO: 0.03 K/UL (ref 0–0.58)
IMM GRANULOCYTES NFR BLD: 0 % (ref 0–5)
INR PPP: 1
KETONES UR STRIP-MCNC: NEGATIVE MG/DL
LEUKOCYTE ESTERASE UR QL STRIP: ABNORMAL
LYMPHOCYTES NFR BLD: 1.96 K/UL (ref 1.5–4)
LYMPHOCYTES RELATIVE PERCENT: 29 % (ref 20–42)
MCH RBC QN AUTO: 30.3 PG (ref 26–35)
MCHC RBC AUTO-ENTMCNC: 32.7 G/DL (ref 32–34.5)
MCV RBC AUTO: 92.9 FL (ref 80–99.9)
MONOCYTES NFR BLD: 0.58 K/UL (ref 0.1–0.95)
MONOCYTES NFR BLD: 8 % (ref 2–12)
NEUTROPHILS NFR BLD: 61 % (ref 43–80)
NEUTS SEG NFR BLD: 4.21 K/UL (ref 1.8–7.3)
NITRITE UR QL STRIP: NEGATIVE
PH UR STRIP: 5.5 [PH] (ref 5–9)
PLATELET # BLD AUTO: 284 K/UL (ref 130–450)
PMV BLD AUTO: 9.6 FL (ref 7–12)
POTASSIUM SERPL-SCNC: 4.9 MMOL/L (ref 3.5–5)
PROT UR STRIP-MCNC: NEGATIVE MG/DL
PROTHROMBIN TIME: 10.6 SEC (ref 9.3–12.4)
RBC # BLD AUTO: 4.22 M/UL (ref 3.5–5.5)
RBC #/AREA URNS HPF: ABNORMAL /HPF
SODIUM SERPL-SCNC: 139 MMOL/L (ref 132–146)
SP GR UR STRIP: >1.03 (ref 1–1.03)
UROBILINOGEN UR STRIP-ACNC: 0.2 EU/DL (ref 0–1)
WBC #/AREA URNS HPF: ABNORMAL /HPF
WBC OTHER # BLD: 6.9 K/UL (ref 4.5–11.5)

## 2024-11-11 PROCEDURE — 87081 CULTURE SCREEN ONLY: CPT

## 2024-11-11 PROCEDURE — 36415 COLL VENOUS BLD VENIPUNCTURE: CPT

## 2024-11-11 PROCEDURE — 80048 BASIC METABOLIC PNL TOTAL CA: CPT

## 2024-11-11 PROCEDURE — 86901 BLOOD TYPING SEROLOGIC RH(D): CPT

## 2024-11-11 PROCEDURE — 86900 BLOOD TYPING SEROLOGIC ABO: CPT

## 2024-11-11 PROCEDURE — 81001 URINALYSIS AUTO W/SCOPE: CPT

## 2024-11-11 PROCEDURE — 85610 PROTHROMBIN TIME: CPT

## 2024-11-11 PROCEDURE — 85025 COMPLETE CBC W/AUTO DIFF WBC: CPT

## 2024-11-11 PROCEDURE — 87086 URINE CULTURE/COLONY COUNT: CPT

## 2024-11-11 PROCEDURE — 86850 RBC ANTIBODY SCREEN: CPT

## 2024-11-11 PROCEDURE — 71046 X-RAY EXAM CHEST 2 VIEWS: CPT

## 2024-11-11 NOTE — PROGRESS NOTES
[facility]   PRE-ADMISSION TESTING GENERAL INSTRUCTIONS  PAT Phone Number: [phone]     GENERAL INSTRUCTIONS:     [x] The Night Before Surgery: Take an antibacterial soap shower - followed by CHG Wipes.   -The Morning of Surgery: Repeat CHG Wipes.  [x] Do not wear makeup, lotions, powders, deodorant the morning of surgery.  [x] No solid food after midnight. You may have SIPS of clear liquids up until 2 hours before your arrival time to the hospital.   [x] You may brush your teeth, gargle, but do not swallow water.   [x] No tobacco products, illegal drugs, or alcohol within 24 hours of your surgery.  [x] Jewelry or valuables should not be brought to the hospital. All body and/or tongue piercing's must be removed prior to arriving to hospital. No contact lens or hair pins.   [x] Arrange transportation with a responsible adult  to and from the hospital. Arrange for someone to be with you for the remainder of the day and for 24 hours after your procedure due to having had anesthesia.          -Who will be your  for transportation? Brother        -Who will be staying with you for 24 hrs after your procedure? Patient states believes is being admitted  [x] Bring insurance card and photo ID.  [x] Bring copy of living will or healthcare power of  papers to be placed in your electronic record.     [x] Transfusion (Green) Bracelet: Please bring with you to hospital, day of surgery.     PARKING INSTRUCTIONS:      [x] ARRIVAL DATE & TIME: MONDAY, 11/18 @ 0500 am  [x] Times are subject to change. We will contact you the business day before surgery if that were to occur.  [x] Enter into the [facility] Entrance. Two people may accompany you. Masks are not required.  [x] Parking Lot \"I\" is where you will park. It is located on the corner of [address] and [address]. The entrance is on [address].   Only one vehicle - per patient, is permitted in parking lot.

## 2024-11-11 NOTE — PROGRESS NOTES
Per Alethea Gomez. PA patient's last dose of Aspirin will be today, 11/11. Notified patient during PAT visit, verbalized understanding.

## 2024-11-12 LAB
MICROORGANISM SPEC CULT: ABNORMAL
MICROORGANISM SPEC CULT: NORMAL
SERVICE CMNT-IMP: ABNORMAL
SPECIMEN DESCRIPTION: ABNORMAL
SPECIMEN DESCRIPTION: NORMAL

## 2024-11-15 ENCOUNTER — ANESTHESIA EVENT (OUTPATIENT)
Dept: OPERATING ROOM | Age: 69
DRG: 448 | End: 2024-11-15
Payer: MEDICARE

## 2024-11-15 NOTE — H&P
HPI:   Mignon Fischer is a 69 y.o.  female who presents for evaluation of difficulty walking and leg weakness.   She also has back pain. Patient states she had a right knee replacement at the beginning of the year and was doing well working with therapy until about 2 weeks ago when she states she \"started to feel weaker in her legs\". She states her leg kept giving out and she started to have frequent falls. She admits to associated numbness and weakness in her arms and legs. She admits to associated low back and neck soreness, but denies any radiation of this pain. She denies any bowel or bladder incontinence.      MRI Cervical spine with severe stenosis at C6-C7 and moderate stenosis at C5-C6 which is why Neurosurgery was consulted.      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)    Vitreous floaters of both eyes           Past Surgical History         Past Surgical History:   Procedure Laterality Date    APPENDECTOMY        CHOLECYSTECTOMY        COLONOSCOPY   07/11/2016    EYE SURGERY        EYE SURGERY        GALLBLADDER SURGERY   1999    KNEE ARTHROSCOPY Right 2008    TOTAL KNEE ARTHROPLASTY Right 1/30/2024     ROBOTIC LISA ASSISTED RIGHT KNEE TOTAL ARTHROPLASTY   +++ELOISA+++  ++PNB++ performed by Alberto Campbell MD at [facility] OR    UPPER GASTROINTESTINAL ENDOSCOPY   10/2016         Family History         Family History   Problem Relation Age of Onset    Heart Disease Mother           silent MI     Diabetes Mother      Heart Disease Father           3 coronary stents    Cancer Sister           breast     Hypertension Brother      Cancer Sister           breast            Medications:    Current Facility-Administered Medications             Current Facility-Administered Medications   Medication Dose Route Frequency Provider Last Rate Last Admin    predniSONE (DELTASONE) tablet 20 mg  20 mg Oral BID Davidson Rivera MD   20 mg at 05/21/24 0938    insulin lispro (HUMALOG,ADMELOG) injection vial 0-8 Units  0-8 Units SubCUTAneous 4x Daily AC & HS Davidson Rivera MD        vitamin B-12 (CYANOCOBALAMIN) tablet 1,000 mcg  1,000 mcg Oral Daily Rajwinder Cruz APRN - CNP   1,000 mcg at 05/21/24 1031    magnesium chloride (MAG DELAY) extended release tablet 64 mg  1 tablet Oral Daily with breakfast Rajwinder Cruz APRN - CNP   64 mg at 05/21/24 1031    LORazepam (ATIVAN) injection 1 mg  1 mg IntraVENous PRN Meliton Doll DO   1 mg at 05/20/24 1923    losartan (COZAAR) tablet 25 mg  25 mg Oral Daily Meliton Evans DO   25 mg at 05/21/24 0938    albuterol (PROVENTIL) (2.5 MG/3ML) 0.083% nebulizer solution 2.5 mg  2.5 mg Nebulization Q4H PRN Cornelia Lemus APRN - CNP        aspirin EC tablet 81 mg  81 mg Oral BID Cornelia Lemus APRN - CNP   81 mg at 05/21/24 0938    metoprolol succinate (TOPROL XL) extended release tablet 25 mg  25 mg Oral Daily Cornelia Lemus APRN - CNP   25 mg at 05/21/24 0938    pregabalin (LYRICA) capsule 75 mg  75 mg Oral BID Cornelia Lemus APRN - CNP   75 mg at 05/21/24 0938    SUMAtriptan (IMITREX) tablet 100 mg  100 mg Oral Once Cornelia Lemus APRN - CNP        rosuvastatin (CRESTOR) tablet 20 mg  20 mg Oral Nightly Cornelia Lemus APRN - CNP   20 mg at 05/20/24 2106    [Held by provider] spironolactone (ALDACTONE) tablet 25 mg  25 mg Oral Daily Cornelia Lemus APRN - CNP   25 mg at 05/18/24 0901    torsemide (DEMADEX) tablet 20 mg  20 mg Oral Daily LacCornelia plata APRN - CNP   20 mg at 05/21/24 1030    traMADol (ULTRAM) tablet 50 mg  50 mg Oral BID PRN Cornelia Lemus APRN - CNP   50 mg at 05/21/24 1030    cyclobenzaprine (FLEXERIL) tablet 10 mg   Cornelia Lemus, APRN - CNP                Allergies:    Lipitor [atorvastatin], Megace [megestrol acetate], Vytorin [ezetimibe-simvastatin], and Zocor [simvastatin]      /72   Pulse 65   Temp 98.5 °F (36.9 °C) (Temporal)   Resp 18   Ht 1.6 m (5' 3\")   Wt 107 kg (235 lb 14.3 oz)   SpO2 96%   BMI 41.79 kg/m²      Review of Systems   Constitutional:  Negative for chills and fever.   HENT:  Negative for trouble swallowing.    Eyes:  Negative for photophobia.   Respiratory:  Negative for shortness of breath.    Cardiovascular:  Negative for chest pain.   Gastrointestinal:  Negative for abdominal pain.   Endocrine: Negative for heat intolerance.   Genitourinary:  Negative for difficulty urinating and flank pain.   Musculoskeletal:  Positive for arthralgias, back pain, gait problem, neck pain and neck stiffness. Negative for myalgias.   Skin:  Negative for wound.   Neurological:  Positive for weakness and numbness. Negative for headaches.   Psychiatric/Behavioral:  Negative for confusion.          Physical Exam  HENT:      Head: Normocephalic.   Eyes:      Pupils: Pupils are equal, round, and reactive to light.   Cardiovascular:      Rate and Rhythm: Normal rate.   Pulmonary:      Effort: Pulmonary effort is normal.   Abdominal:      General: There is no distension.   Musculoskeletal:      Cervical back: Normal range of motion.   Skin:     General: Skin is warm and dry.   Neurological:      Mental Status: She is alert.      Comments: A&Ox3  CN3-12 intact  Bilateral Hand  4/5  Left HF 4/5, Left KE 4/5  Right HF 3/5 Right KE 3/5   Bilateral DF and PF 5/5  Decreased Sensation to light touch in BLE  (+)Hoffmans   Psychiatric:         Thought Content: Thought content normal.            Assessment:   This is a 70 y/o female with lumbar stenosis   Plan:  I am recommending an L2-S1 laminectomy and fusion  R/B/A have been discussed and she wishes to proceed.  Will shoot for Friday     Kalyani Murphy MD

## 2024-11-18 ENCOUNTER — ANESTHESIA (OUTPATIENT)
Dept: OPERATING ROOM | Age: 69
DRG: 448 | End: 2024-11-18
Payer: MEDICARE

## 2024-11-18 ENCOUNTER — APPOINTMENT (OUTPATIENT)
Dept: GENERAL RADIOLOGY | Age: 69
DRG: 448 | End: 2024-11-18
Attending: NEUROLOGICAL SURGERY
Payer: MEDICARE

## 2024-11-18 ENCOUNTER — HOSPITAL ENCOUNTER (INPATIENT)
Age: 69
LOS: 1 days | Discharge: NURSING, REHAB OR LONG-TERM CARE | DRG: 448 | End: 2024-11-21
Attending: NEUROLOGICAL SURGERY | Admitting: NEUROLOGICAL SURGERY
Payer: MEDICARE

## 2024-11-18 DIAGNOSIS — Z98.1 S/P LUMBAR FUSION: ICD-10-CM

## 2024-11-18 DIAGNOSIS — Z01.812 PRE-OPERATIVE LABORATORY EXAMINATION: Primary | ICD-10-CM

## 2024-11-18 PROBLEM — M71.38 SYNOVIAL CYST OF LUMBAR FACET JOINT: Status: ACTIVE | Noted: 2024-11-18

## 2024-11-18 LAB
GLUCOSE BLD-MCNC: 138 MG/DL (ref 74–99)
GLUCOSE BLD-MCNC: 171 MG/DL (ref 74–99)
GLUCOSE BLD-MCNC: 238 MG/DL (ref 74–99)

## 2024-11-18 PROCEDURE — 6360000002 HC RX W HCPCS: Performed by: STUDENT IN AN ORGANIZED HEALTH CARE EDUCATION/TRAINING PROGRAM

## 2024-11-18 PROCEDURE — 2580000003 HC RX 258: Performed by: NEUROLOGICAL SURGERY

## 2024-11-18 PROCEDURE — 7100000000 HC PACU RECOVERY - FIRST 15 MIN: Performed by: NEUROLOGICAL SURGERY

## 2024-11-18 PROCEDURE — 6360000002 HC RX W HCPCS: Performed by: NEUROLOGICAL SURGERY

## 2024-11-18 PROCEDURE — 63267 EXCISE INTRSPINL LESION LMBR: CPT | Performed by: NEUROLOGICAL SURGERY

## 2024-11-18 PROCEDURE — 7100000001 HC PACU RECOVERY - ADDTL 15 MIN: Performed by: NEUROLOGICAL SURGERY

## 2024-11-18 PROCEDURE — 6360000002 HC RX W HCPCS

## 2024-11-18 PROCEDURE — 3600000015 HC SURGERY LEVEL 5 ADDTL 15MIN: Performed by: NEUROLOGICAL SURGERY

## 2024-11-18 PROCEDURE — 2580000003 HC RX 258

## 2024-11-18 PROCEDURE — 22842 INSERT SPINE FIXATION DEVICE: CPT | Performed by: PHYSICIAN ASSISTANT

## 2024-11-18 PROCEDURE — C1713 ANCHOR/SCREW BN/BN,TIS/BN: HCPCS | Performed by: NEUROLOGICAL SURGERY

## 2024-11-18 PROCEDURE — 6370000000 HC RX 637 (ALT 250 FOR IP): Performed by: NEUROLOGICAL SURGERY

## 2024-11-18 PROCEDURE — 2500000003 HC RX 250 WO HCPCS: Performed by: ANESTHESIOLOGY

## 2024-11-18 PROCEDURE — 95911 NRV CNDJ TEST 9-10 STUDIES: CPT | Performed by: AUDIOLOGIST

## 2024-11-18 PROCEDURE — 2709999900 HC NON-CHARGEABLE SUPPLY: Performed by: NEUROLOGICAL SURGERY

## 2024-11-18 PROCEDURE — 22842 INSERT SPINE FIXATION DEVICE: CPT | Performed by: NEUROLOGICAL SURGERY

## 2024-11-18 PROCEDURE — 0SG3071 FUSION OF LUMBOSACRAL JOINT WITH AUTOLOGOUS TISSUE SUBSTITUTE, POSTERIOR APPROACH, POSTERIOR COLUMN, OPEN APPROACH: ICD-10-PCS | Performed by: NEUROLOGICAL SURGERY

## 2024-11-18 PROCEDURE — 61783 SCAN PROC SPINAL: CPT | Performed by: NEUROLOGICAL SURGERY

## 2024-11-18 PROCEDURE — 0SG1071 FUSION OF 2 OR MORE LUMBAR VERTEBRAL JOINTS WITH AUTOLOGOUS TISSUE SUBSTITUTE, POSTERIOR APPROACH, POSTERIOR COLUMN, OPEN APPROACH: ICD-10-PCS | Performed by: NEUROLOGICAL SURGERY

## 2024-11-18 PROCEDURE — 2500000003 HC RX 250 WO HCPCS: Performed by: NEUROLOGICAL SURGERY

## 2024-11-18 PROCEDURE — 6370000000 HC RX 637 (ALT 250 FOR IP)

## 2024-11-18 PROCEDURE — 2700000000 HC OXYGEN THERAPY PER DAY

## 2024-11-18 PROCEDURE — 22612 ARTHRD PST TQ 1NTRSPC LUMBAR: CPT | Performed by: NEUROLOGICAL SURGERY

## 2024-11-18 PROCEDURE — G0378 HOSPITAL OBSERVATION PER HR: HCPCS

## 2024-11-18 PROCEDURE — 3E0U0GB INTRODUCTION OF RECOMBINANT BONE MORPHOGENETIC PROTEIN INTO JOINTS, OPEN APPROACH: ICD-10-PCS | Performed by: NEUROLOGICAL SURGERY

## 2024-11-18 PROCEDURE — 95940 IONM IN OPERATNG ROOM 15 MIN: CPT | Performed by: AUDIOLOGIST

## 2024-11-18 PROCEDURE — 2500000003 HC RX 250 WO HCPCS

## 2024-11-18 PROCEDURE — 01NR0ZZ RELEASE SACRAL NERVE, OPEN APPROACH: ICD-10-PCS | Performed by: NEUROLOGICAL SURGERY

## 2024-11-18 PROCEDURE — 22614 ARTHRD PST TQ 1NTRSPC EA ADD: CPT | Performed by: PHYSICIAN ASSISTANT

## 2024-11-18 PROCEDURE — 63267 EXCISE INTRSPINL LESION LMBR: CPT | Performed by: PHYSICIAN ASSISTANT

## 2024-11-18 PROCEDURE — 3700000001 HC ADD 15 MINUTES (ANESTHESIA): Performed by: NEUROLOGICAL SURGERY

## 2024-11-18 PROCEDURE — 2580000003 HC RX 258: Performed by: STUDENT IN AN ORGANIZED HEALTH CARE EDUCATION/TRAINING PROGRAM

## 2024-11-18 PROCEDURE — 3600000005 HC SURGERY LEVEL 5 BASE: Performed by: NEUROLOGICAL SURGERY

## 2024-11-18 PROCEDURE — 22614 ARTHRD PST TQ 1NTRSPC EA ADD: CPT | Performed by: NEUROLOGICAL SURGERY

## 2024-11-18 PROCEDURE — 3700000000 HC ANESTHESIA ATTENDED CARE: Performed by: NEUROLOGICAL SURGERY

## 2024-11-18 PROCEDURE — 01NB0ZZ RELEASE LUMBAR NERVE, OPEN APPROACH: ICD-10-PCS | Performed by: NEUROLOGICAL SURGERY

## 2024-11-18 PROCEDURE — A4217 STERILE WATER/SALINE, 500 ML: HCPCS | Performed by: NEUROLOGICAL SURGERY

## 2024-11-18 PROCEDURE — 82962 GLUCOSE BLOOD TEST: CPT

## 2024-11-18 PROCEDURE — 2720000010 HC SURG SUPPLY STERILE: Performed by: NEUROLOGICAL SURGERY

## 2024-11-18 PROCEDURE — C1729 CATH, DRAINAGE: HCPCS | Performed by: NEUROLOGICAL SURGERY

## 2024-11-18 PROCEDURE — 6360000002 HC RX W HCPCS: Performed by: ANESTHESIOLOGY

## 2024-11-18 PROCEDURE — 2500000003 HC RX 250 WO HCPCS: Performed by: CHIROPRACTOR

## 2024-11-18 PROCEDURE — 22612 ARTHRD PST TQ 1NTRSPC LUMBAR: CPT | Performed by: PHYSICIAN ASSISTANT

## 2024-11-18 DEVICE — VIASORB STRP 20X50X5MM: Type: IMPLANTABLE DEVICE | Site: SPINE LUMBAR | Status: FUNCTIONAL

## 2024-11-18 DEVICE — BONE GRAFT KIT 7510800 INFUSE LARGE II
Type: IMPLANTABLE DEVICE | Site: SPINE LUMBAR | Status: FUNCTIONAL
Brand: INFUSE® BONE GRAFT

## 2024-11-18 DEVICE — CREO® THREADED 6.5 X 45MM POLYAXIAL SCREW
Type: IMPLANTABLE DEVICE | Site: SPINE LUMBAR | Status: FUNCTIONAL
Brand: CREO

## 2024-11-18 DEVICE — CREO® THREADED 6.5 X 50MM POLYAXIAL SCREW
Type: IMPLANTABLE DEVICE | Site: SPINE LUMBAR | Status: FUNCTIONAL
Brand: CREO

## 2024-11-18 DEVICE — THREADED LOCKING CAP, CREO
Type: IMPLANTABLE DEVICE | Site: SPINE LUMBAR | Status: FUNCTIONAL
Brand: CREO

## 2024-11-18 DEVICE — 5.5MM CURVED ROD, 120MM
Type: IMPLANTABLE DEVICE | Site: SPINE LUMBAR | Status: FUNCTIONAL
Brand: REVERE

## 2024-11-18 RX ORDER — INSULIN LISPRO 100 [IU]/ML
0-4 INJECTION, SOLUTION INTRAVENOUS; SUBCUTANEOUS
Status: DISCONTINUED | OUTPATIENT
Start: 2024-11-18 | End: 2024-11-20

## 2024-11-18 RX ORDER — INSULIN LISPRO 100 [IU]/ML
0-8 INJECTION, SOLUTION INTRAVENOUS; SUBCUTANEOUS
Status: DISCONTINUED | OUTPATIENT
Start: 2024-11-18 | End: 2024-11-18

## 2024-11-18 RX ORDER — ONDANSETRON 2 MG/ML
4 INJECTION INTRAMUSCULAR; INTRAVENOUS
Status: COMPLETED | OUTPATIENT
Start: 2024-11-18 | End: 2024-11-18

## 2024-11-18 RX ORDER — MAGNESIUM HYDROXIDE/ALUMINUM HYDROXICE/SIMETHICONE 120; 1200; 1200 MG/30ML; MG/30ML; MG/30ML
15 SUSPENSION ORAL EVERY 6 HOURS PRN
Status: DISCONTINUED | OUTPATIENT
Start: 2024-11-18 | End: 2024-11-21 | Stop reason: HOSPADM

## 2024-11-18 RX ORDER — ACETAMINOPHEN 325 MG/1
650 TABLET ORAL EVERY 6 HOURS
Status: DISCONTINUED | OUTPATIENT
Start: 2024-11-18 | End: 2024-11-21 | Stop reason: HOSPADM

## 2024-11-18 RX ORDER — MORPHINE SULFATE 4 MG/ML
4 INJECTION, SOLUTION INTRAMUSCULAR; INTRAVENOUS
Status: DISCONTINUED | OUTPATIENT
Start: 2024-11-18 | End: 2024-11-21 | Stop reason: HOSPADM

## 2024-11-18 RX ORDER — IPRATROPIUM BROMIDE AND ALBUTEROL SULFATE 2.5; .5 MG/3ML; MG/3ML
1 SOLUTION RESPIRATORY (INHALATION)
Status: DISCONTINUED | OUTPATIENT
Start: 2024-11-18 | End: 2024-11-18 | Stop reason: HOSPADM

## 2024-11-18 RX ORDER — DIVALPROEX SODIUM 125 MG/1
125 CAPSULE, COATED PELLETS ORAL EVERY 12 HOURS SCHEDULED
Status: DISCONTINUED | OUTPATIENT
Start: 2024-11-18 | End: 2024-11-21 | Stop reason: HOSPADM

## 2024-11-18 RX ORDER — BISACODYL 5 MG/1
5 TABLET, DELAYED RELEASE ORAL DAILY
Status: DISCONTINUED | OUTPATIENT
Start: 2024-11-18 | End: 2024-11-21 | Stop reason: HOSPADM

## 2024-11-18 RX ORDER — DIPHENHYDRAMINE HYDROCHLORIDE 50 MG/ML
25 INJECTION INTRAMUSCULAR; INTRAVENOUS EVERY 6 HOURS PRN
Status: DISCONTINUED | OUTPATIENT
Start: 2024-11-18 | End: 2024-11-21 | Stop reason: HOSPADM

## 2024-11-18 RX ORDER — HYDROMORPHONE HYDROCHLORIDE 2 MG/ML
INJECTION, SOLUTION INTRAMUSCULAR; INTRAVENOUS; SUBCUTANEOUS
Status: DISCONTINUED | OUTPATIENT
Start: 2024-11-18 | End: 2024-11-18 | Stop reason: SDUPTHER

## 2024-11-18 RX ORDER — HYDRALAZINE HYDROCHLORIDE 20 MG/ML
5 INJECTION INTRAMUSCULAR; INTRAVENOUS
Status: DISCONTINUED | OUTPATIENT
Start: 2024-11-18 | End: 2024-11-18 | Stop reason: HOSPADM

## 2024-11-18 RX ORDER — POTASSIUM CHLORIDE 1500 MG/1
10 TABLET, EXTENDED RELEASE ORAL DAILY
Status: DISCONTINUED | OUTPATIENT
Start: 2024-11-18 | End: 2024-11-21 | Stop reason: HOSPADM

## 2024-11-18 RX ORDER — ROCURONIUM BROMIDE 10 MG/ML
INJECTION, SOLUTION INTRAVENOUS
Status: DISCONTINUED | OUTPATIENT
Start: 2024-11-18 | End: 2024-11-18 | Stop reason: SDUPTHER

## 2024-11-18 RX ORDER — SODIUM CHLORIDE 0.9 % (FLUSH) 0.9 %
5-40 SYRINGE (ML) INJECTION PRN
Status: DISCONTINUED | OUTPATIENT
Start: 2024-11-18 | End: 2024-11-18 | Stop reason: HOSPADM

## 2024-11-18 RX ORDER — PHENYLEPHRINE HCL IN 0.9% NACL 1 MG/10 ML
SYRINGE (ML) INTRAVENOUS
Status: DISCONTINUED | OUTPATIENT
Start: 2024-11-18 | End: 2024-11-18 | Stop reason: SDUPTHER

## 2024-11-18 RX ORDER — MIDAZOLAM HYDROCHLORIDE 1 MG/ML
INJECTION, SOLUTION INTRAMUSCULAR; INTRAVENOUS
Status: DISCONTINUED | OUTPATIENT
Start: 2024-11-18 | End: 2024-11-18 | Stop reason: SDUPTHER

## 2024-11-18 RX ORDER — ALBUTEROL SULFATE 90 UG/1
1 INHALANT RESPIRATORY (INHALATION) EVERY 4 HOURS PRN
Status: DISCONTINUED | OUTPATIENT
Start: 2024-11-18 | End: 2024-11-18 | Stop reason: CLARIF

## 2024-11-18 RX ORDER — SODIUM CHLORIDE 0.9 % (FLUSH) 0.9 %
5-40 SYRINGE (ML) INJECTION EVERY 12 HOURS SCHEDULED
Status: DISCONTINUED | OUTPATIENT
Start: 2024-11-18 | End: 2024-11-18 | Stop reason: HOSPADM

## 2024-11-18 RX ORDER — ACETAMINOPHEN 325 MG/1
650 TABLET ORAL
Status: DISCONTINUED | OUTPATIENT
Start: 2024-11-18 | End: 2024-11-18 | Stop reason: HOSPADM

## 2024-11-18 RX ORDER — SODIUM PHOSPHATE, DIBASIC AND SODIUM PHOSPHATE, MONOBASIC 7; 19 G/230ML; G/230ML
1 ENEMA RECTAL DAILY PRN
Status: DISCONTINUED | OUTPATIENT
Start: 2024-11-18 | End: 2024-11-21 | Stop reason: HOSPADM

## 2024-11-18 RX ORDER — SODIUM CHLORIDE 9 MG/ML
INJECTION, SOLUTION INTRAVENOUS PRN
Status: DISCONTINUED | OUTPATIENT
Start: 2024-11-18 | End: 2024-11-18 | Stop reason: HOSPADM

## 2024-11-18 RX ORDER — PREGABALIN 75 MG/1
75 CAPSULE ORAL 3 TIMES DAILY
Status: DISCONTINUED | OUTPATIENT
Start: 2024-11-18 | End: 2024-11-21 | Stop reason: HOSPADM

## 2024-11-18 RX ORDER — VANCOMYCIN HYDROCHLORIDE 1 G/20ML
INJECTION, POWDER, LYOPHILIZED, FOR SOLUTION INTRAVENOUS PRN
Status: DISCONTINUED | OUTPATIENT
Start: 2024-11-18 | End: 2024-11-18 | Stop reason: ALTCHOICE

## 2024-11-18 RX ORDER — FENTANYL CITRATE 50 UG/ML
INJECTION, SOLUTION INTRAMUSCULAR; INTRAVENOUS
Status: DISCONTINUED | OUTPATIENT
Start: 2024-11-18 | End: 2024-11-18 | Stop reason: SDUPTHER

## 2024-11-18 RX ORDER — OXYCODONE HCL 10 MG/1
10 TABLET, FILM COATED, EXTENDED RELEASE ORAL EVERY 12 HOURS SCHEDULED
Status: DISCONTINUED | OUTPATIENT
Start: 2024-11-18 | End: 2024-11-21 | Stop reason: HOSPADM

## 2024-11-18 RX ORDER — DOCUSATE SODIUM 100 MG/1
200 CAPSULE, LIQUID FILLED ORAL 2 TIMES DAILY
Status: DISCONTINUED | OUTPATIENT
Start: 2024-11-18 | End: 2024-11-21 | Stop reason: HOSPADM

## 2024-11-18 RX ORDER — PROPOFOL 10 MG/ML
INJECTION, EMULSION INTRAVENOUS
Status: DISCONTINUED | OUTPATIENT
Start: 2024-11-18 | End: 2024-11-18 | Stop reason: SDUPTHER

## 2024-11-18 RX ORDER — ONDANSETRON 2 MG/ML
INJECTION INTRAMUSCULAR; INTRAVENOUS
Status: DISCONTINUED | OUTPATIENT
Start: 2024-11-18 | End: 2024-11-18 | Stop reason: SDUPTHER

## 2024-11-18 RX ORDER — ROSUVASTATIN CALCIUM 20 MG/1
20 TABLET, COATED ORAL DAILY
Status: DISCONTINUED | OUTPATIENT
Start: 2024-11-18 | End: 2024-11-21 | Stop reason: HOSPADM

## 2024-11-18 RX ORDER — LIDOCAINE HYDROCHLORIDE AND EPINEPHRINE 5; 5 MG/ML; UG/ML
INJECTION, SOLUTION INFILTRATION; PERINEURAL PRN
Status: DISCONTINUED | OUTPATIENT
Start: 2024-11-18 | End: 2024-11-18 | Stop reason: ALTCHOICE

## 2024-11-18 RX ORDER — TORSEMIDE 20 MG/1
40 TABLET ORAL EVERY MORNING
Status: DISCONTINUED | OUTPATIENT
Start: 2024-11-19 | End: 2024-11-21 | Stop reason: HOSPADM

## 2024-11-18 RX ORDER — HYDROMORPHONE HYDROCHLORIDE 1 MG/ML
0.5 INJECTION, SOLUTION INTRAMUSCULAR; INTRAVENOUS; SUBCUTANEOUS EVERY 5 MIN PRN
Status: COMPLETED | OUTPATIENT
Start: 2024-11-18 | End: 2024-11-18

## 2024-11-18 RX ORDER — DEXTROSE MONOHYDRATE 100 MG/ML
INJECTION, SOLUTION INTRAVENOUS CONTINUOUS PRN
Status: DISCONTINUED | OUTPATIENT
Start: 2024-11-18 | End: 2024-11-21 | Stop reason: HOSPADM

## 2024-11-18 RX ORDER — DROPERIDOL 2.5 MG/ML
0.62 INJECTION, SOLUTION INTRAMUSCULAR; INTRAVENOUS
Status: DISCONTINUED | OUTPATIENT
Start: 2024-11-18 | End: 2024-11-18 | Stop reason: HOSPADM

## 2024-11-18 RX ORDER — DIAZEPAM 5 MG/1
5 TABLET ORAL EVERY 6 HOURS PRN
Status: DISCONTINUED | OUTPATIENT
Start: 2024-11-18 | End: 2024-11-21 | Stop reason: HOSPADM

## 2024-11-18 RX ORDER — ONDANSETRON 2 MG/ML
4 INJECTION INTRAMUSCULAR; INTRAVENOUS EVERY 6 HOURS PRN
Status: DISCONTINUED | OUTPATIENT
Start: 2024-11-18 | End: 2024-11-21 | Stop reason: HOSPADM

## 2024-11-18 RX ORDER — ALBUTEROL SULFATE 0.83 MG/ML
2.5 SOLUTION RESPIRATORY (INHALATION) EVERY 4 HOURS PRN
Status: DISCONTINUED | OUTPATIENT
Start: 2024-11-18 | End: 2024-11-21 | Stop reason: HOSPADM

## 2024-11-18 RX ORDER — FERROUS SULFATE 325(65) MG
325 TABLET ORAL EVERY 12 HOURS
Status: DISCONTINUED | OUTPATIENT
Start: 2024-11-18 | End: 2024-11-21 | Stop reason: HOSPADM

## 2024-11-18 RX ORDER — POLYETHYLENE GLYCOL 3350 17 G/17G
17 POWDER, FOR SOLUTION ORAL DAILY
Status: DISCONTINUED | OUTPATIENT
Start: 2024-11-18 | End: 2024-11-21 | Stop reason: HOSPADM

## 2024-11-18 RX ORDER — NALOXONE HYDROCHLORIDE 0.4 MG/ML
INJECTION, SOLUTION INTRAMUSCULAR; INTRAVENOUS; SUBCUTANEOUS PRN
Status: DISCONTINUED | OUTPATIENT
Start: 2024-11-18 | End: 2024-11-18 | Stop reason: HOSPADM

## 2024-11-18 RX ORDER — BUPIVACAINE HYDROCHLORIDE 2.5 MG/ML
INJECTION, SOLUTION EPIDURAL; INFILTRATION; INTRACAUDAL PRN
Status: DISCONTINUED | OUTPATIENT
Start: 2024-11-18 | End: 2024-11-18 | Stop reason: ALTCHOICE

## 2024-11-18 RX ORDER — OXYCODONE HYDROCHLORIDE 5 MG/1
5 TABLET ORAL EVERY 4 HOURS PRN
Status: DISCONTINUED | OUTPATIENT
Start: 2024-11-18 | End: 2024-11-21 | Stop reason: HOSPADM

## 2024-11-18 RX ORDER — MEPERIDINE HYDROCHLORIDE 25 MG/ML
12.5 INJECTION INTRAMUSCULAR; INTRAVENOUS; SUBCUTANEOUS EVERY 5 MIN PRN
Status: DISCONTINUED | OUTPATIENT
Start: 2024-11-18 | End: 2024-11-18 | Stop reason: HOSPADM

## 2024-11-18 RX ORDER — BISACODYL 5 MG/1
5 TABLET, DELAYED RELEASE ORAL DAILY
Status: DISCONTINUED | OUTPATIENT
Start: 2024-11-18 | End: 2024-11-19 | Stop reason: SDUPTHER

## 2024-11-18 RX ORDER — DIPHENHYDRAMINE HCL 25 MG
25 TABLET ORAL EVERY 6 HOURS PRN
Status: DISCONTINUED | OUTPATIENT
Start: 2024-11-18 | End: 2024-11-21 | Stop reason: HOSPADM

## 2024-11-18 RX ORDER — MORPHINE SULFATE 2 MG/ML
2 INJECTION, SOLUTION INTRAMUSCULAR; INTRAVENOUS
Status: DISCONTINUED | OUTPATIENT
Start: 2024-11-18 | End: 2024-11-21 | Stop reason: HOSPADM

## 2024-11-18 RX ORDER — DIPHENHYDRAMINE HYDROCHLORIDE 50 MG/ML
12.5 INJECTION INTRAMUSCULAR; INTRAVENOUS
Status: DISCONTINUED | OUTPATIENT
Start: 2024-11-18 | End: 2024-11-18 | Stop reason: HOSPADM

## 2024-11-18 RX ORDER — DEXAMETHASONE SODIUM PHOSPHATE 10 MG/ML
INJECTION INTRAMUSCULAR; INTRAVENOUS
Status: DISCONTINUED | OUTPATIENT
Start: 2024-11-18 | End: 2024-11-18 | Stop reason: SDUPTHER

## 2024-11-18 RX ORDER — GLUCAGON 1 MG/ML
1 KIT INJECTION PRN
Status: DISCONTINUED | OUTPATIENT
Start: 2024-11-18 | End: 2024-11-21 | Stop reason: HOSPADM

## 2024-11-18 RX ORDER — ONDANSETRON 4 MG/1
4 TABLET, ORALLY DISINTEGRATING ORAL EVERY 8 HOURS PRN
Status: DISCONTINUED | OUTPATIENT
Start: 2024-11-18 | End: 2024-11-21 | Stop reason: HOSPADM

## 2024-11-18 RX ORDER — SENNA AND DOCUSATE SODIUM 50; 8.6 MG/1; MG/1
1 TABLET, FILM COATED ORAL 2 TIMES DAILY
Status: DISCONTINUED | OUTPATIENT
Start: 2024-11-18 | End: 2024-11-21 | Stop reason: HOSPADM

## 2024-11-18 RX ORDER — MIDAZOLAM HYDROCHLORIDE 2 MG/2ML
2 INJECTION, SOLUTION INTRAMUSCULAR; INTRAVENOUS
Status: DISCONTINUED | OUTPATIENT
Start: 2024-11-18 | End: 2024-11-18 | Stop reason: HOSPADM

## 2024-11-18 RX ORDER — PROCHLORPERAZINE EDISYLATE 5 MG/ML
10 INJECTION INTRAMUSCULAR; INTRAVENOUS EVERY 6 HOURS PRN
Status: DISCONTINUED | OUTPATIENT
Start: 2024-11-18 | End: 2024-11-21 | Stop reason: HOSPADM

## 2024-11-18 RX ORDER — LABETALOL HYDROCHLORIDE 5 MG/ML
5 INJECTION, SOLUTION INTRAVENOUS
Status: DISCONTINUED | OUTPATIENT
Start: 2024-11-18 | End: 2024-11-18 | Stop reason: HOSPADM

## 2024-11-18 RX ORDER — LIDOCAINE HYDROCHLORIDE 20 MG/ML
INJECTION, SOLUTION INTRAVENOUS
Status: DISCONTINUED | OUTPATIENT
Start: 2024-11-18 | End: 2024-11-18 | Stop reason: SDUPTHER

## 2024-11-18 RX ORDER — BISACODYL 10 MG
10 SUPPOSITORY, RECTAL RECTAL DAILY PRN
Status: DISCONTINUED | OUTPATIENT
Start: 2024-11-18 | End: 2024-11-21 | Stop reason: HOSPADM

## 2024-11-18 RX ORDER — HYDROMORPHONE HYDROCHLORIDE 1 MG/ML
0.25 INJECTION, SOLUTION INTRAMUSCULAR; INTRAVENOUS; SUBCUTANEOUS EVERY 5 MIN PRN
Status: DISCONTINUED | OUTPATIENT
Start: 2024-11-18 | End: 2024-11-18 | Stop reason: HOSPADM

## 2024-11-18 RX ORDER — METOPROLOL SUCCINATE 25 MG/1
25 TABLET, EXTENDED RELEASE ORAL NIGHTLY
Status: DISCONTINUED | OUTPATIENT
Start: 2024-11-18 | End: 2024-11-21 | Stop reason: HOSPADM

## 2024-11-18 RX ORDER — LOSARTAN POTASSIUM 25 MG/1
25 TABLET ORAL DAILY
Status: DISCONTINUED | OUTPATIENT
Start: 2024-11-18 | End: 2024-11-21 | Stop reason: HOSPADM

## 2024-11-18 RX ORDER — SODIUM CHLORIDE 9 MG/ML
INJECTION, SOLUTION INTRAVENOUS CONTINUOUS
Status: DISCONTINUED | OUTPATIENT
Start: 2024-11-18 | End: 2024-11-18 | Stop reason: HOSPADM

## 2024-11-18 RX ORDER — OXYCODONE HYDROCHLORIDE 10 MG/1
10 TABLET ORAL EVERY 4 HOURS PRN
Status: DISCONTINUED | OUTPATIENT
Start: 2024-11-18 | End: 2024-11-21 | Stop reason: HOSPADM

## 2024-11-18 RX ADMIN — SENNOSIDES AND DOCUSATE SODIUM 1 TABLET: 50; 8.6 TABLET ORAL at 20:15

## 2024-11-18 RX ADMIN — DIAZEPAM 5 MG: 5 TABLET ORAL at 14:58

## 2024-11-18 RX ADMIN — WATER 2000 MG: 1 INJECTION INTRAMUSCULAR; INTRAVENOUS; SUBCUTANEOUS at 16:14

## 2024-11-18 RX ADMIN — CEFAZOLIN 2000 MG: 2 INJECTION, POWDER, FOR SOLUTION INTRAMUSCULAR; INTRAVENOUS at 06:48

## 2024-11-18 RX ADMIN — FENTANYL CITRATE 50 MCG: 0.05 INJECTION, SOLUTION INTRAMUSCULAR; INTRAVENOUS at 07:35

## 2024-11-18 RX ADMIN — OXYCODONE HYDROCHLORIDE 10 MG: 10 TABLET, FILM COATED, EXTENDED RELEASE ORAL at 20:24

## 2024-11-18 RX ADMIN — Medication 150 MCG: at 06:45

## 2024-11-18 RX ADMIN — POLYETHYLENE GLYCOL 3350 17 G: 17 POWDER, FOR SOLUTION ORAL at 14:59

## 2024-11-18 RX ADMIN — Medication 100 MCG: at 07:02

## 2024-11-18 RX ADMIN — Medication 100 MCG: at 10:27

## 2024-11-18 RX ADMIN — SODIUM CHLORIDE: 9 INJECTION, SOLUTION INTRAVENOUS at 05:48

## 2024-11-18 RX ADMIN — FERROUS SULFATE TAB 325 MG (65 MG ELEMENTAL FE) 325 MG: 325 (65 FE) TAB at 14:57

## 2024-11-18 RX ADMIN — DOCUSATE SODIUM 200 MG: 100 CAPSULE, LIQUID FILLED ORAL at 20:16

## 2024-11-18 RX ADMIN — Medication 150 MCG: at 07:08

## 2024-11-18 RX ADMIN — SENNOSIDES AND DOCUSATE SODIUM 1 TABLET: 50; 8.6 TABLET ORAL at 14:57

## 2024-11-18 RX ADMIN — MICONAZOLE NITRATE: 20.6 POWDER TOPICAL at 20:17

## 2024-11-18 RX ADMIN — ROCURONIUM BROMIDE 40 MG: 10 INJECTION, SOLUTION INTRAVENOUS at 06:37

## 2024-11-18 RX ADMIN — DIVALPROEX SODIUM 125 MG: 125 CAPSULE ORAL at 20:15

## 2024-11-18 RX ADMIN — FERROUS SULFATE TAB 325 MG (65 MG ELEMENTAL FE) 325 MG: 325 (65 FE) TAB at 20:15

## 2024-11-18 RX ADMIN — PREGABALIN 75 MG: 75 CAPSULE ORAL at 20:16

## 2024-11-18 RX ADMIN — METOPROLOL SUCCINATE 25 MG: 25 TABLET, EXTENDED RELEASE ORAL at 20:15

## 2024-11-18 RX ADMIN — PREGABALIN 75 MG: 75 CAPSULE ORAL at 14:59

## 2024-11-18 RX ADMIN — PHENYLEPHRINE HYDROCHLORIDE 50 MCG/MIN: 10 INJECTION INTRAVENOUS at 06:55

## 2024-11-18 RX ADMIN — DIAZEPAM 5 MG: 5 TABLET ORAL at 23:23

## 2024-11-18 RX ADMIN — Medication 100 MCG: at 10:24

## 2024-11-18 RX ADMIN — HYDROMORPHONE HYDROCHLORIDE 0.5 MG: 1 INJECTION, SOLUTION INTRAMUSCULAR; INTRAVENOUS; SUBCUTANEOUS at 13:09

## 2024-11-18 RX ADMIN — FENTANYL CITRATE 50 MCG: 0.05 INJECTION, SOLUTION INTRAMUSCULAR; INTRAVENOUS at 07:11

## 2024-11-18 RX ADMIN — FENTANYL CITRATE 50 MCG: 0.05 INJECTION, SOLUTION INTRAMUSCULAR; INTRAVENOUS at 06:33

## 2024-11-18 RX ADMIN — MORPHINE SULFATE 4 MG: 4 INJECTION, SOLUTION INTRAMUSCULAR; INTRAVENOUS at 19:03

## 2024-11-18 RX ADMIN — HYDROMORPHONE HYDROCHLORIDE 0.5 MG: 1 INJECTION, SOLUTION INTRAMUSCULAR; INTRAVENOUS; SUBCUTANEOUS at 11:07

## 2024-11-18 RX ADMIN — Medication 30 MG: at 06:50

## 2024-11-18 RX ADMIN — Medication 100 MCG: at 06:50

## 2024-11-18 RX ADMIN — FENTANYL CITRATE 50 MCG: 0.05 INJECTION, SOLUTION INTRAMUSCULAR; INTRAVENOUS at 07:39

## 2024-11-18 RX ADMIN — BISACODYL 5 MG: 5 TABLET, COATED ORAL at 14:57

## 2024-11-18 RX ADMIN — OXYCODONE HYDROCHLORIDE 10 MG: 10 TABLET ORAL at 16:14

## 2024-11-18 RX ADMIN — WATER 2000 MG: 1 INJECTION INTRAMUSCULAR; INTRAVENOUS; SUBCUTANEOUS at 23:19

## 2024-11-18 RX ADMIN — ONDANSETRON 4 MG: 2 INJECTION INTRAMUSCULAR; INTRAVENOUS at 11:45

## 2024-11-18 RX ADMIN — FENTANYL CITRATE 50 MCG: 0.05 INJECTION, SOLUTION INTRAMUSCULAR; INTRAVENOUS at 06:37

## 2024-11-18 RX ADMIN — INSULIN LISPRO 1 UNITS: 100 INJECTION, SOLUTION INTRAVENOUS; SUBCUTANEOUS at 20:24

## 2024-11-18 RX ADMIN — HYDROMORPHONE HYDROCHLORIDE 0.5 MG: 2 INJECTION, SOLUTION INTRAMUSCULAR; INTRAVENOUS; SUBCUTANEOUS at 10:08

## 2024-11-18 RX ADMIN — ONDANSETRON 4 MG: 2 INJECTION INTRAMUSCULAR; INTRAVENOUS at 09:58

## 2024-11-18 RX ADMIN — Medication 100 MCG: at 09:34

## 2024-11-18 RX ADMIN — DOCUSATE SODIUM 200 MG: 100 CAPSULE, LIQUID FILLED ORAL at 14:57

## 2024-11-18 RX ADMIN — HYDROMORPHONE HYDROCHLORIDE 0.5 MG: 2 INJECTION, SOLUTION INTRAMUSCULAR; INTRAVENOUS; SUBCUTANEOUS at 10:12

## 2024-11-18 RX ADMIN — Medication 100 MCG: at 10:19

## 2024-11-18 RX ADMIN — HYDROMORPHONE HYDROCHLORIDE 0.5 MG: 1 INJECTION, SOLUTION INTRAMUSCULAR; INTRAVENOUS; SUBCUTANEOUS at 12:49

## 2024-11-18 RX ADMIN — PROPOFOL 100 MG: 10 INJECTION, EMULSION INTRAVENOUS at 06:37

## 2024-11-18 RX ADMIN — ACETAMINOPHEN 650 MG: 325 TABLET ORAL at 14:57

## 2024-11-18 RX ADMIN — MIDAZOLAM 2 MG: 1 INJECTION INTRAMUSCULAR; INTRAVENOUS at 06:33

## 2024-11-18 RX ADMIN — Medication 150 MCG: at 06:41

## 2024-11-18 RX ADMIN — DEXAMETHASONE SODIUM PHOSPHATE 10 MG: 10 INJECTION INTRAMUSCULAR; INTRAVENOUS at 06:45

## 2024-11-18 RX ADMIN — LIDOCAINE HYDROCHLORIDE 100 MG: 20 INJECTION, SOLUTION INTRAVENOUS at 06:37

## 2024-11-18 RX ADMIN — HYDROMORPHONE HYDROCHLORIDE 0.5 MG: 1 INJECTION, SOLUTION INTRAMUSCULAR; INTRAVENOUS; SUBCUTANEOUS at 11:28

## 2024-11-18 RX ADMIN — ACETAMINOPHEN 650 MG: 325 TABLET ORAL at 20:15

## 2024-11-18 RX ADMIN — ROCURONIUM BROMIDE 10 MG: 10 INJECTION, SOLUTION INTRAVENOUS at 07:37

## 2024-11-18 ASSESSMENT — PAIN DESCRIPTION - ORIENTATION
ORIENTATION: RIGHT;LEFT
ORIENTATION: MID;LOWER
ORIENTATION: POSTERIOR
ORIENTATION: POSTERIOR
ORIENTATION: LOWER
ORIENTATION: RIGHT;LEFT
ORIENTATION: RIGHT;LEFT

## 2024-11-18 ASSESSMENT — PAIN DESCRIPTION - LOCATION
LOCATION: BACK
LOCATION: BACK;LEG
LOCATION: BACK
LOCATION: BACK;LEG
LOCATION: BACK;LEG
LOCATION: BACK

## 2024-11-18 ASSESSMENT — PAIN DESCRIPTION - FREQUENCY
FREQUENCY: INTERMITTENT
FREQUENCY: CONTINUOUS
FREQUENCY: INTERMITTENT
FREQUENCY: INTERMITTENT

## 2024-11-18 ASSESSMENT — PAIN DESCRIPTION - DESCRIPTORS
DESCRIPTORS: SPASM
DESCRIPTORS: SPASM
DESCRIPTORS: ACHING;DISCOMFORT
DESCRIPTORS: DISCOMFORT
DESCRIPTORS: ACHING;DISCOMFORT;SPASM
DESCRIPTORS: ACHING;SPASM;SHARP
DESCRIPTORS: ACHING;SORE
DESCRIPTORS: ACHING;DISCOMFORT;THROBBING;SORE
DESCRIPTORS: ACHING;DISCOMFORT
DESCRIPTORS: SPASM
DESCRIPTORS: THROBBING;ACHING;DISCOMFORT;SORE

## 2024-11-18 ASSESSMENT — PAIN SCALES - GENERAL
PAINLEVEL_OUTOF10: 5
PAINLEVEL_OUTOF10: 3
PAINLEVEL_OUTOF10: 6
PAINLEVEL_OUTOF10: 8
PAINLEVEL_OUTOF10: 3
PAINLEVEL_OUTOF10: 8
PAINLEVEL_OUTOF10: 5
PAINLEVEL_OUTOF10: 7
PAINLEVEL_OUTOF10: 4
PAINLEVEL_OUTOF10: 3
PAINLEVEL_OUTOF10: 7
PAINLEVEL_OUTOF10: 6

## 2024-11-18 ASSESSMENT — PAIN DESCRIPTION - PAIN TYPE
TYPE: SURGICAL PAIN

## 2024-11-18 ASSESSMENT — PAIN - FUNCTIONAL ASSESSMENT
PAIN_FUNCTIONAL_ASSESSMENT: PREVENTS OR INTERFERES SOME ACTIVE ACTIVITIES AND ADLS
PAIN_FUNCTIONAL_ASSESSMENT: 0-10
PAIN_FUNCTIONAL_ASSESSMENT: PREVENTS OR INTERFERES SOME ACTIVE ACTIVITIES AND ADLS
PAIN_FUNCTIONAL_ASSESSMENT: ADULT NONVERBAL PAIN SCALE (NPVS)

## 2024-11-18 ASSESSMENT — PAIN DESCRIPTION - ONSET
ONSET: AWAKENED FROM SLEEP
ONSET: ON-GOING
ONSET: ON-GOING
ONSET: AWAKENED FROM SLEEP

## 2024-11-18 ASSESSMENT — PAIN SCALES - WONG BAKER
WONGBAKER_NUMERICALRESPONSE: NO HURT
WONGBAKER_NUMERICALRESPONSE: HURTS A LITTLE BIT

## 2024-11-18 ASSESSMENT — ENCOUNTER SYMPTOMS
SHORTNESS OF BREATH: 1
DYSPNEA ACTIVITY LEVEL: AFTER AMBULATING 1 FLIGHT OF STAIRS

## 2024-11-18 NOTE — PROGRESS NOTES
Consult received. Will discuss with Dr. Rodgers to see if patient is appropriate for ARU. PT/OT pending.

## 2024-11-18 NOTE — PROGRESS NOTES
4 Eyes Skin Assessment     NAME:  Mignon Fischer  YOB: 1955  MEDICAL RECORD NUMBER:  [id number]    The patient is being assessed for  Admission    I agree that at least one RN has performed a thorough Head to Toe Skin Assessment on the patient. ALL assessment sites listed below have been assessed.      Areas assessed by both nurses:    Head, Face, Ears, Shoulders, Back, Chest, Arms, Elbows, Hands, Sacrum. Buttock, Coccyx, Ischium, Legs. Feet and Heels, and Under Medical Devices     Lumbar surgical incision  Scab to left shin  Redness to Buttocks and bilateral heels/ blanchable/ elevated           Does the Patient have a Wound? No noted wound(s)       Braxton Prevention initiated by RN: Yes  Wound Care Orders initiated by RN: No    Pressure Injury (Stage 3,4, Unstageable, DTI, NWPT, and Complex wounds) if present, place Wound referral order by RN under : No    New Ostomies, if present place, Ostomy referral order under : No     Nurse 1 eSignature: Electronically signed by Caro Reyes RN on 11/18/24 at 3:03 PM EST    **SHARE this note so that the co-signing nurse can place an eSignature**    Nurse 2 eSignature: Electronically signed by Rc Sanchez RN on 11/18/24 at 3:24 PM EST

## 2024-11-18 NOTE — ANESTHESIA POSTPROCEDURE EVALUATION
Department of Anesthesiology  Postprocedure Note    Patient: Mignon Fischer  MRN: [id number]  YOB: 1955  Date of evaluation: 11/18/2024    Procedure Summary       Date: 11/18/24 Room / Location: [address]    Anesthesia Start: 0626 Anesthesia Stop: 1038    Procedure: Posterior lumbar two to sacral one laminectomy and fusion (Spine Lumbar) Diagnosis:       Stenosis, spinal, lumbar      Synovial cyst of lumbar spine      (Stenosis, spinal, lumbar [M48.061])      (Synovial cyst of lumbar spine [M71.38])    Surgeons: Kalyani Murphy MD Responsible Provider: Radha Kirkpatrick MD    Anesthesia Type: General ASA Status: 4            Anesthesia Type: General    Dominick Phase I: Dominick Score: 8    Dominick Phase II:      Anesthesia Post Evaluation    Patient location during evaluation: PACU  Patient participation: complete - patient participated  Level of consciousness: awake and alert  Airway patency: patent  Nausea & Vomiting: no nausea and no vomiting  Cardiovascular status: blood pressure returned to baseline and hemodynamically stable  Respiratory status: acceptable and spontaneous ventilation  Hydration status: euvolemic  Multimodal analgesia pain management approach  Pain management: adequate    No notable events documented.

## 2024-11-18 NOTE — CONSULTS
tablet by mouth daily 6/4/24  Yes Davidson Rivera MD   metFORMIN (GLUCOPHAGE) 500 MG tablet Take 1 tablet by mouth daily (with breakfast)   Yes Luis Griffiths MD   torsemide (DEMADEX) 20 MG tablet Take 2 tablets by mouth every morning 1 tablet in the morning   Yes Luis Griffiths MD   metoprolol succinate (TOPROL XL) 25 MG extended release tablet Take 1 tablet by mouth nightly   Yes Luis Griffiths MD   celecoxib (CELEBREX) 200 MG capsule Take 1 capsule by mouth 2 times daily    Luis Griffiths MD   vitamin B-12 1000 MCG tablet Take 1 tablet by mouth daily 6/4/24   Davidson Rivera MD       Allergies:  Lipitor [atorvastatin], Megace [megestrol acetate], Vytorin [ezetimibe-simvastatin], and Zocor [simvastatin]    Social History:   TOBACCO:   reports that she has never smoked. She has never used smokeless tobacco.  ETOH:   reports no history of alcohol use.  OCCUPATION: retired     Family History:       Problem Relation Age of Onset    Heart Disease Mother         silent MI     Diabetes Mother     Heart Disease Father         3 coronary stents    Cancer Sister         breast     Hypertension Brother     Cancer Sister         breast       REVIEW OF SYSTEMS:    Constitutional: No fever, no chills, no change in weight; good appetite  HEENT: No blurred vision, no ear problems, no sore throat, no rhinorrhea.  Respiratory: No cough, no sputum production, no pleuritic chest pain, no shortness of breath  Cardiology: No angina, no dyspnea on exertion, no paroxysmal nocturnal dyspnea, no orthopnea, no palpitation, no leg swelling.   Gastroenterology: No dysphagia, no reflux; no abdominal pain, no nausea or vomiting; no constipation or diarrhea. No hematochezia   Genitourinary: No dysuria, no frequency, hesitancy; no hematuria  Musculoskeletal: no joint pain, no myalgia, no change in range of movement  Neurology: no focal weakness in extremities, no slurred speech, no double vision, no tingling or  numbness sensation  Endocrinology: no temperature intolerance, no polyphagia, polydipsia or polyuria  Hematology: no increased bleeding, no bruising, no lymphadenopathy  Skin: no skin changes noticed by patient  Psychology: no depressed mood, no suicidal ideation    Physical Exam   Vitals: /76   Pulse 83   Temp 98 °F (36.7 °C) (Temporal)   Resp 11   Ht 1.613 m (5' 3.5\")   Wt 103.9 kg (229 lb 1.6 oz)   SpO2 100%   BMI 39.95 kg/m²     General Appearance: alert and oriented to person, place and time  Skin: warm and dry, no rash or erythema  Head: normocephalic and atraumatic  Neck: neck supple and non tender without mass   Pulmonary/Chest: clear to auscultation bilaterally- no wheezes, rales or rhonchi, normal air movement, no respiratory distress  Cardiovascular: normal rate, normal S1 and S2, no gallops, intact distal pulses, and no carotid bruits  Abdomen: soft, non-tender, non-distended, normal bowel sounds, no masses or organomegaly  Musculoskeletal: normal range of motion, no joint swelling, deformity or tenderness  Neurologic: speech normal, strength & sensation intact bilaterally   Labs and Imaging Studies   Basic Labs  No results for input(s): \"NA\", \"K\", \"CL\", \"CO2\", \"BUN\", \"CREATININE\", \"GLUCOSE\", \"CALCIUM\" in the last 72 hours.    No results for input(s): \"WBC\", \"RBC\", \"HGB\", \"HCT\", \"MCV\", \"MCH\", \"MCHC\", \"RDW\", \"PLT\", \"MPV\" in the last 72 hours.      Imaging Studies:     FLUORO FOR SURGICAL PROCEDURES    Result Date: 11/18/2024  EXAMINATION: SPOT FLUOROSCOPIC IMAGES 11/18/2024 8:41 am TECHNIQUE: Fluoroscopy was provided by the radiology department for procedure. Radiologist was not present during examination. FLUOROSCOPY DOSE AND TYPE: Radiation Exposure Index: Fluoroscopy time equals 6.6 seconds.  Total air kerma dose equals 10.3 mGy COMPARISON: None HISTORY: ORDERING SYSTEM PROVIDED HISTORY: lunbar fusion TECHNOLOGIST PROVIDED HISTORY: Reason for exam:->lunbar fusion Intraprocedural imaging.

## 2024-11-18 NOTE — ANESTHESIA PRE PROCEDURE
Department of Anesthesiology  Preprocedure Note       Name:  Mignon Fischer   Age:  69 y.o.  :  1955                                          MRN:  [id number]         Date:  2024      Surgeon: Surgeon(s):  Kalyani Murphy MD    Procedure: Procedure(s):  Posterior lumbar two to sacral one laminectomy and fusion    Medications prior to admission:   Prior to Admission medications    Medication Sig Start Date End Date Taking? Authorizing Provider   oxyCODONE (OXYCONTIN) 10 MG extended release tablet Take 1 tablet by mouth every 12 hours as needed for Pain. Not to exceed 20 mg daily- states was instructed may cut pill in half   Yes Luis Griffiths MD   potassium chloride (KLOR-CON M) 10 MEQ extended release tablet Take 1 tablet by mouth Daily   Yes Luis Griffiths MD   ferrous sulfate (IRON 325) 325 (65 Fe) MG tablet Take 1 tablet by mouth every 12 hours   Yes Luis Griffiths MD   rosuvastatin (CRESTOR) 20 MG tablet Take 1 tablet by mouth daily 10/18/24  Yes Luis Griffiths MD   calcium carbonate (OSCAL) 500 MG TABS tablet Take 1 tablet by mouth every morning   Yes Luis Griffiths MD   aspirin 81 MG EC tablet Take 1 tablet by mouth every morning   Yes Luis Griffiths MD   lidocaine (LIDODERM) 5 % Place 1 patch onto the skin daily No more than 3 patches every 12 hours on, 12 hours off.   Yes Luis Griffiths MD   acetaminophen (TYLENOL) 500 MG tablet Take 1 tablet by mouth every 6 hours as needed for Pain   Yes Luis Griffiths MD   pregabalin (LYRICA) 75 MG capsule Take 1 capsule by mouth 3 times daily for 30 days. Max Daily Amount: 225 mg 7/10/24 11/18/24 Yes Mary Jo Ramsey PA-C   albuterol sulfate HFA (VENTOLIN HFA) 108 (90 Base) MCG/ACT inhaler Inhale 1 puff into the lungs every 4 hours as needed for Wheezing 6/3/24  Yes Davidson Rivera MD   divalproex (DEPAKOTE SPRINKLE) 125 MG DR capsule Take 1 capsule by mouth every 12 hours 6/3/24  Yes Davidson Rivera 
Amount: 225 mg 90 capsule 0   • celecoxib (CELEBREX) 200 MG capsule Take 1 capsule by mouth 2 times daily     • albuterol sulfate HFA (VENTOLIN HFA) 108 (90 Base) MCG/ACT inhaler Inhale 1 puff into the lungs every 4 hours as needed for Wheezing 18 g 3   • divalproex (DEPAKOTE SPRINKLE) 125 MG DR capsule Take 1 capsule by mouth every 12 hours 60 capsule 3   • losartan (COZAAR) 25 MG tablet Take 1 tablet by mouth daily 30 tablet 3   • vitamin B-12 1000 MCG tablet Take 1 tablet by mouth daily 30 tablet 3   • bisacodyl (DULCOLAX) 5 MG EC tablet Take 1 tablet by mouth daily 30 tablet 0   • metFORMIN (GLUCOPHAGE) 500 MG tablet Take 1 tablet by mouth daily (with breakfast)     • torsemide (DEMADEX) 20 MG tablet Take 2 tablets by mouth every morning 1 tablet in the morning     • metoprolol succinate (TOPROL XL) 25 MG extended release tablet Take 1 tablet by mouth nightly         Allergies:    Allergies   Allergen Reactions   • Lipitor [Atorvastatin] Myalgia     \"LEG CRAMPS\"   • Megace [Megestrol Acetate] Other (See Comments)     UNKNOWN REACTION   • Vytorin [Ezetimibe-Simvastatin] Other (See Comments)     UNKNOWN REACTION   • Zocor [Simvastatin] Other (See Comments)     UNKNOWN REACTION       Problem List:    Patient Active Problem List   Diagnosis Code   • Abnormal gait R26.9   • Diabetes mellitus type II, controlled ([facility]) E11.9   • Vitreous floaters of both eyes H43.393   • Idiopathic localized osteoarthropathy M19.91   • Pancreatic pseudocyst K86.3   • Blood glucose abnormal R73.09   • Obstructive sleep apnea syndrome G47.33   • Adiposity E66.9   • Nausea R11.0   • Strabismic amblyopia H53.039   • Type 2 diabetes mellitus without complications ([facility]) E11.9   • Asthma J45.909   • Vitamin D deficiency E55.9   • 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

## 2024-11-18 NOTE — PROGRESS NOTES
1034- Pt verified using 2 Identifiers and ID band with OR staff prior to acceptance to PACU/Phase II care.

## 2024-11-18 NOTE — PROGRESS NOTES
INTRAOPERATIVE MONITORING EMG REPORT    Diagnosis: stenosis  Procedure: posterior laminectomy and fusion L2-S1   Anesthesia: sevoflurane  Surgeon: Kalyani Murphy M.D.    Intra-op Monitorin.5  hours    Procedure:     EMG recording electrodes were placed over the vastus medialis, vastus lateralis, anterior tibialis, and medial gastrocnemius   muscles for recording spontaneous EMG activity.  A silent control was performed to test the integrity of the system prior to the procedure.     Results:  Spontaneous EMG: was quiet throughout the surgical procedure.     Evoked EMG:   LEFT    Direct Nerve (mA)            Screw (mA)   L2                                                      >30  L3                                                      >30  L4                                                      >30  L5                                                      >30  S1                                                       25      RIGHT     L2                                                      >30  L3                                                      >30  L4                                                      13  L5                                                      >30  S1                       28

## 2024-11-18 NOTE — H&P
I have examined the patient and reviewed the H and P and no changes are noted    Kalyani Murphy MD

## 2024-11-18 NOTE — BRIEF OP NOTE
Brief Postoperative Note      Patient: Mignon Fischer  YOB: 1955  MRN: [id number]    Date of Procedure: 11/18/2024    Pre-Op Diagnosis Codes:      * Stenosis, spinal, lumbar [M48.061]     * Synovial cyst of lumbar spine [M71.38]    Post-Op Diagnosis: Same       Procedure(s):  Posterior lumbar two to sacral one laminectomy and fusion    Surgeon(s):  Kalyani Murphy MD    Assistant:  Physician Assistant: Demetri Gay PA    Anesthesia: General    Estimated Blood Loss (mL): 200     Complications: None    Specimens:   * No specimens in log *    Implants:  Implant Name Type Inv. Item Serial No.  Lot No. LRB No. Used Action   VIASORB STRP [id number] - [id number]  VIASORB STRP [id number] Tenebril  N/A 1 Implanted   VIASORB STRP [id number] - [id number]  VIASORB STRP [id number] Tenebril  N/A 1 Implanted   VIASORB STRP [id number] - [id number]  VIASORB STRP [id number] Tenebril  N/A 1 Implanted   SCREW SPNL L50MM DIA6.5MM THORLUM PEDCL NONCANNULATED MAHIN - [id number]  SCREW SPNL L50MM DIA6.5MM THORLUM PEDCL NONCANNULATED MAHIN  Tenebril  N/A 6 Implanted   SCREW SPNL L45MM DIA6.5MM THORLUM PEDCL NONCANNULATED MAHIN - [id number]  SCREW SPNL L45MM DIA6.5MM THORLUM PEDCL NONCANNULATED MAHIN  Tenebril  N/A 4 Implanted   CAP SPNL THORLUM MAHIN THRD CREO - [id number]  CAP SPNL THORLUM MAHIN THRD CREO  Tenebril  N/A 10 Implanted   KIT BNE GRFT L RHBMP-2 12MG INJ 10ML CONTAIN NDL 20GA - [id number]  KIT BNE GRFT L RHBMP-2 12MG INJ 10ML CONTAIN NDL 20GA  [employer] SPINALGRAFT TECH- [id number] N/A 1 Implanted   HANNAH SPNL L120MM DIA5.5MM POST TI BILAT PRECRV SMOOTH FOR - [id number]  HANNAH SPNL L120MM DIA5.5MM POST TI BILAT PRECRV SMOOTH FOR  Flipps-  N/A 2 Implanted         Drains:   Closed/Suction Drain Inferior Back Accordion (Active)       Urinary Catheter 11/18/24 (Active)

## 2024-11-18 NOTE — PROGRESS NOTES
Messaged Dr Lyon for medical management consult. Inquiring on coverage for PCP Dr Chirinos.     1456: Dr Lyon added to treatment team.

## 2024-11-19 LAB
ANION GAP SERPL CALCULATED.3IONS-SCNC: 11 MMOL/L (ref 7–16)
BUN SERPL-MCNC: 15 MG/DL (ref 6–23)
CALCIUM SERPL-MCNC: 8.3 MG/DL (ref 8.6–10.2)
CHLORIDE SERPL-SCNC: 102 MMOL/L (ref 98–107)
CO2 SERPL-SCNC: 29 MMOL/L (ref 22–29)
CREAT SERPL-MCNC: 0.9 MG/DL (ref 0.5–1)
ERYTHROCYTE [DISTWIDTH] IN BLOOD BY AUTOMATED COUNT: 13.5 % (ref 11.5–15)
GFR, ESTIMATED: 72 ML/MIN/1.73M2
GLUCOSE BLD-MCNC: 186 MG/DL (ref 74–99)
GLUCOSE BLD-MCNC: 194 MG/DL (ref 74–99)
GLUCOSE BLD-MCNC: 211 MG/DL (ref 74–99)
GLUCOSE BLD-MCNC: 236 MG/DL (ref 74–99)
GLUCOSE SERPL-MCNC: 168 MG/DL (ref 74–99)
HBA1C MFR BLD: 5.8 % (ref 4–5.6)
HCT VFR BLD AUTO: 24.8 % (ref 34–48)
HCT VFR BLD AUTO: 25.5 % (ref 34–48)
HGB BLD-MCNC: 8.1 G/DL (ref 11.5–15.5)
HGB BLD-MCNC: 8.5 G/DL (ref 11.5–15.5)
MCH RBC QN AUTO: 30.8 PG (ref 26–35)
MCHC RBC AUTO-ENTMCNC: 33.3 G/DL (ref 32–34.5)
MCV RBC AUTO: 92.4 FL (ref 80–99.9)
PLATELET # BLD AUTO: 245 K/UL (ref 130–450)
PMV BLD AUTO: 9.8 FL (ref 7–12)
POTASSIUM SERPL-SCNC: 4.6 MMOL/L (ref 3.5–5)
RBC # BLD AUTO: 2.76 M/UL (ref 3.5–5.5)
SODIUM SERPL-SCNC: 142 MMOL/L (ref 132–146)
WBC OTHER # BLD: 15.5 K/UL (ref 4.5–11.5)

## 2024-11-19 PROCEDURE — G0378 HOSPITAL OBSERVATION PER HR: HCPCS

## 2024-11-19 PROCEDURE — 97161 PT EVAL LOW COMPLEX 20 MIN: CPT

## 2024-11-19 PROCEDURE — 97535 SELF CARE MNGMENT TRAINING: CPT

## 2024-11-19 PROCEDURE — 85014 HEMATOCRIT: CPT

## 2024-11-19 PROCEDURE — 82947 ASSAY GLUCOSE BLOOD QUANT: CPT

## 2024-11-19 PROCEDURE — 80048 BASIC METABOLIC PNL TOTAL CA: CPT

## 2024-11-19 PROCEDURE — 85018 HEMOGLOBIN: CPT

## 2024-11-19 PROCEDURE — 6370000000 HC RX 637 (ALT 250 FOR IP)

## 2024-11-19 PROCEDURE — 36415 COLL VENOUS BLD VENIPUNCTURE: CPT

## 2024-11-19 PROCEDURE — 6370000000 HC RX 637 (ALT 250 FOR IP): Performed by: NEUROLOGICAL SURGERY

## 2024-11-19 PROCEDURE — 6360000002 HC RX W HCPCS: Performed by: NEUROLOGICAL SURGERY

## 2024-11-19 PROCEDURE — 97530 THERAPEUTIC ACTIVITIES: CPT

## 2024-11-19 PROCEDURE — 83036 HEMOGLOBIN GLYCOSYLATED A1C: CPT

## 2024-11-19 PROCEDURE — 2700000000 HC OXYGEN THERAPY PER DAY

## 2024-11-19 PROCEDURE — 2580000003 HC RX 258: Performed by: NEUROLOGICAL SURGERY

## 2024-11-19 PROCEDURE — 85027 COMPLETE CBC AUTOMATED: CPT

## 2024-11-19 PROCEDURE — 97165 OT EVAL LOW COMPLEX 30 MIN: CPT

## 2024-11-19 RX ORDER — 0.9 % SODIUM CHLORIDE 0.9 %
500 INTRAVENOUS SOLUTION INTRAVENOUS ONCE
Status: COMPLETED | OUTPATIENT
Start: 2024-11-19 | End: 2024-11-19

## 2024-11-19 RX ORDER — SODIUM CHLORIDE 0.9 % (FLUSH) 0.9 %
5-40 SYRINGE (ML) INJECTION EVERY 12 HOURS
Status: DISCONTINUED | OUTPATIENT
Start: 2024-11-19 | End: 2024-11-21 | Stop reason: HOSPADM

## 2024-11-19 RX ORDER — SODIUM CHLORIDE 0.9 % (FLUSH) 0.9 %
5-40 SYRINGE (ML) INJECTION PRN
Status: DISCONTINUED | OUTPATIENT
Start: 2024-11-19 | End: 2024-11-21 | Stop reason: HOSPADM

## 2024-11-19 RX ADMIN — PREGABALIN 75 MG: 75 CAPSULE ORAL at 14:17

## 2024-11-19 RX ADMIN — POLYETHYLENE GLYCOL 3350 17 G: 17 POWDER, FOR SOLUTION ORAL at 07:58

## 2024-11-19 RX ADMIN — DIVALPROEX SODIUM 125 MG: 125 CAPSULE ORAL at 20:46

## 2024-11-19 RX ADMIN — DOCUSATE SODIUM 200 MG: 100 CAPSULE, LIQUID FILLED ORAL at 07:58

## 2024-11-19 RX ADMIN — INSULIN LISPRO 1 UNITS: 100 INJECTION, SOLUTION INTRAVENOUS; SUBCUTANEOUS at 17:38

## 2024-11-19 RX ADMIN — INSULIN LISPRO 1 UNITS: 100 INJECTION, SOLUTION INTRAVENOUS; SUBCUTANEOUS at 06:34

## 2024-11-19 RX ADMIN — MICONAZOLE NITRATE: 20.6 POWDER TOPICAL at 20:47

## 2024-11-19 RX ADMIN — POTASSIUM CHLORIDE 10 MEQ: 1500 TABLET, EXTENDED RELEASE ORAL at 06:26

## 2024-11-19 RX ADMIN — DIAZEPAM 5 MG: 5 TABLET ORAL at 09:33

## 2024-11-19 RX ADMIN — LOSARTAN POTASSIUM 25 MG: 25 TABLET, FILM COATED ORAL at 07:58

## 2024-11-19 RX ADMIN — ROSUVASTATIN CALCIUM 20 MG: 20 TABLET, FILM COATED ORAL at 07:59

## 2024-11-19 RX ADMIN — ACETAMINOPHEN 650 MG: 325 TABLET ORAL at 14:16

## 2024-11-19 RX ADMIN — FERROUS SULFATE TAB 325 MG (65 MG ELEMENTAL FE) 325 MG: 325 (65 FE) TAB at 07:58

## 2024-11-19 RX ADMIN — ACETAMINOPHEN 650 MG: 325 TABLET ORAL at 08:00

## 2024-11-19 RX ADMIN — SENNOSIDES AND DOCUSATE SODIUM 1 TABLET: 50; 8.6 TABLET ORAL at 20:18

## 2024-11-19 RX ADMIN — ACETAMINOPHEN 650 MG: 325 TABLET ORAL at 20:18

## 2024-11-19 RX ADMIN — OXYCODONE HYDROCHLORIDE 10 MG: 10 TABLET ORAL at 03:10

## 2024-11-19 RX ADMIN — MICONAZOLE NITRATE: 20.6 POWDER TOPICAL at 08:01

## 2024-11-19 RX ADMIN — SODIUM CHLORIDE, PRESERVATIVE FREE 10 ML: 5 INJECTION INTRAVENOUS at 17:39

## 2024-11-19 RX ADMIN — OXYCODONE HYDROCHLORIDE 10 MG: 10 TABLET, FILM COATED, EXTENDED RELEASE ORAL at 08:00

## 2024-11-19 RX ADMIN — DIVALPROEX SODIUM 125 MG: 125 CAPSULE ORAL at 07:59

## 2024-11-19 RX ADMIN — SENNOSIDES AND DOCUSATE SODIUM 1 TABLET: 50; 8.6 TABLET ORAL at 07:58

## 2024-11-19 RX ADMIN — DOCUSATE SODIUM 200 MG: 100 CAPSULE, LIQUID FILLED ORAL at 20:18

## 2024-11-19 RX ADMIN — PREGABALIN 75 MG: 75 CAPSULE ORAL at 07:59

## 2024-11-19 RX ADMIN — INSULIN LISPRO 1 UNITS: 100 INJECTION, SOLUTION INTRAVENOUS; SUBCUTANEOUS at 11:14

## 2024-11-19 RX ADMIN — ACETAMINOPHEN 650 MG: 325 TABLET ORAL at 03:09

## 2024-11-19 RX ADMIN — BISACODYL 5 MG: 5 TABLET, COATED ORAL at 07:58

## 2024-11-19 RX ADMIN — PREGABALIN 75 MG: 75 CAPSULE ORAL at 22:06

## 2024-11-19 RX ADMIN — FERROUS SULFATE TAB 325 MG (65 MG ELEMENTAL FE) 325 MG: 325 (65 FE) TAB at 20:18

## 2024-11-19 RX ADMIN — OXYCODONE HYDROCHLORIDE 10 MG: 10 TABLET, FILM COATED, EXTENDED RELEASE ORAL at 22:06

## 2024-11-19 RX ADMIN — OXYCODONE HYDROCHLORIDE 10 MG: 10 TABLET ORAL at 14:16

## 2024-11-19 RX ADMIN — INSULIN LISPRO 1 UNITS: 100 INJECTION, SOLUTION INTRAVENOUS; SUBCUTANEOUS at 20:18

## 2024-11-19 RX ADMIN — SODIUM CHLORIDE 500 ML: 9 INJECTION, SOLUTION INTRAVENOUS at 21:16

## 2024-11-19 RX ADMIN — TORSEMIDE 40 MG: 20 TABLET ORAL at 07:58

## 2024-11-19 RX ADMIN — OXYCODONE HYDROCHLORIDE 10 MG: 10 TABLET ORAL at 09:33

## 2024-11-19 RX ADMIN — WATER 2000 MG: 1 INJECTION INTRAMUSCULAR; INTRAVENOUS; SUBCUTANEOUS at 14:17

## 2024-11-19 RX ADMIN — WATER 2000 MG: 1 INJECTION INTRAMUSCULAR; INTRAVENOUS; SUBCUTANEOUS at 06:26

## 2024-11-19 ASSESSMENT — PAIN SCALES - GENERAL
PAINLEVEL_OUTOF10: 4
PAINLEVEL_OUTOF10: 6
PAINLEVEL_OUTOF10: 8
PAINLEVEL_OUTOF10: 7
PAINLEVEL_OUTOF10: 8
PAINLEVEL_OUTOF10: 7
PAINLEVEL_OUTOF10: 4

## 2024-11-19 ASSESSMENT — PAIN DESCRIPTION - PAIN TYPE: TYPE: SURGICAL PAIN

## 2024-11-19 ASSESSMENT — PAIN DESCRIPTION - DESCRIPTORS
DESCRIPTORS: SHARP;SHOOTING;ACHING
DESCRIPTORS: SHARP;STABBING;PENETRATING
DESCRIPTORS: ACHING;DISCOMFORT;SHARP
DESCRIPTORS: SHOOTING;SHARP;ACHING
DESCRIPTORS: ACHING;DISCOMFORT;THROBBING

## 2024-11-19 ASSESSMENT — PAIN DESCRIPTION - ORIENTATION
ORIENTATION: MID;LOWER;POSTERIOR
ORIENTATION: LOWER
ORIENTATION: LOWER;MID;POSTERIOR
ORIENTATION: LOWER

## 2024-11-19 ASSESSMENT — PAIN DESCRIPTION - LOCATION
LOCATION: BACK

## 2024-11-19 ASSESSMENT — PAIN - FUNCTIONAL ASSESSMENT
PAIN_FUNCTIONAL_ASSESSMENT: PREVENTS OR INTERFERES SOME ACTIVE ACTIVITIES AND ADLS
PAIN_FUNCTIONAL_ASSESSMENT: PREVENTS OR INTERFERES SOME ACTIVE ACTIVITIES AND ADLS

## 2024-11-19 NOTE — PROGRESS NOTES
[facility]  Internal Medicine Residency Program  Progress Note - House Team 1    Patient:  Mignon Fischer 69 y.o. female MRN: [id number]     Date of Service: 11/19/2024     Subjective     Patient was seen and examined at bedside this morning. She is feeling well this morning. Complains of pain in her back however states that this is manageable.       Objective     Physical Exam:  Vitals: BP 98/71   Pulse (!) 104   Temp 96.9 °F (36.1 °C) (Temporal)   Resp 18   Ht 1.613 m (5' 3.5\")   Wt 103.9 kg (229 lb 1.6 oz)   SpO2 98%   BMI 39.95 kg/m²     I & O - 24hr: I/O this shift:  In: -   Out: 50 [Drains:50]   General Appearance: alert and oriented to person, place and time  Skin: warm and dry, no rash or erythema  Head: normocephalic and atraumatic  Neck: neck supple and non tender without mass   Pulmonary/Chest: clear to auscultation bilaterally- no wheezes, rales or rhonchi, normal air movement, no respiratory distress  Cardiovascular: normal rate, normal S1 and S2, no gallops, intact distal pulses, and no carotid bruits  Abdomen: soft, non-tender, non-distended, normal bowel sounds, no masses or organomegaly  Musculoskeletal: normal range of motion, no joint swelling, deformity or tenderness  Neurologic: speech normal, strength & sensation intact bilaterally   Pertinent Labs & Imaging Studies     CBC:   Lab Results   Component Value Date/Time    WBC 15.5 11/19/2024 06:38 AM    RBC 2.76 11/19/2024 06:38 AM    HGB 8.5 11/19/2024 06:38 AM    HCT 25.5 11/19/2024 06:38 AM    MCV 92.4 11/19/2024 06:38 AM    MCH 30.8 11/19/2024 06:38 AM    MCHC 33.3 11/19/2024 06:38 AM    RDW 13.5 11/19/2024 06:38 AM     11/19/2024 06:38 AM    MPV 9.8 11/19/2024 06:38 AM     CMP:    Lab Results   Component Value Date/Time     11/19/2024 06:38 AM    K 4.6 11/19/2024 06:38 AM    K 4.3 08/07/2020 11:42 AM     11/19/2024 06:38 AM    CO2 29 11/19/2024 06:38 AM    BUN 15 11/19/2024 06:38 AM     CREATININE 0.9 11/19/2024 06:38 AM    GFRAA >60 03/10/2021 08:47 AM    LABGLOM 72 11/19/2024 06:38 AM    LABGLOM 62 04/16/2024 01:05 PM    GLUCOSE 168 11/19/2024 06:38 AM    GLUCOSE 97 10/06/2010 04:15 PM    CALCIUM 8.3 11/19/2024 06:38 AM    BILITOT 0.4 06/03/2024 04:48 AM    ALKPHOS 131 06/03/2024 04:48 AM    AST 13 06/03/2024 04:48 AM    ALT 25 06/03/2024 04:48 AM       Resident's Assessment and Plan     Mignon Fischer is a 69 y.o. female with PMH of diabetes mellitus type 2, peripheral neuropathy, obesity, hypertension, osteoarthritis, C4-C7 ACDF (5/2024). Admitted for L2-S2 laminectomy and fusion on 11/18/24 with Dr. Murphy. Internal medicine has been consulted for medical management.      Assessment  Diabetes mellitus type 2  Hypertension  Peripheral neuropathy  Osteoarthritis  C4-C7 ACDF (5/24)  severe stenosis at C6-C7 and moderate stenosis at C5-C6 s/p laminectomy and fusion     Plan  Goal blood glucose 140-180 while inpatient  Hold home metformin while admitted  Low dose sliding scale insulin  POCT glucose ACHS  Continue home antihypertensives- toprol 25mg, torsemide 20mg, losartan 25mg  Pain control per neurosurgery  Continue bowel regimen while receiving opioid pain management      PT/OT evaluation: consulted  DVT prophylaxis/ GI prophylaxis: PCDs/diet   Disposition: PMR consulted    Hannah Rosales MD, PGY-3  Attending physician: Dr. Hernandez

## 2024-11-19 NOTE — PLAN OF CARE

## 2024-11-19 NOTE — PROGRESS NOTES
Department of Neurosurgery  Progress Note    CHIEF COMPLAINT: s/p L2-S1 laminectomy and fusion 11/18    SUBJECTIVE:  Lamberto op site pain okay. Has not yet been up with PT/OT. No new issues overnight.     REVIEW OF SYSTEMS :  Constitutional: Negative for chills and fever.    Neurological: Negative for dizziness, tremors and speech change.     OBJECTIVE:   VITALS:  /64   Pulse 97   Temp 97.4 °F (36.3 °C) (Temporal)   Resp 16   Ht 1.613 m (5' 3.5\")   Wt 103.9 kg (229 lb 1.6 oz)   SpO2 99%   BMI 39.95 kg/m²     PHYSICAL:  Neurologic: Alert and oriented x3; PERRL  Motor Exam:  Motor exam is symmetrical 5 out of 5 all extremities bilaterally  Sensory:  Sensory intact  Incision c/d/i      DATA:  CBC:   Lab Results   Component Value Date/Time    WBC 15.5 11/19/2024 06:38 AM    RBC 2.76 11/19/2024 06:38 AM    HGB 8.5 11/19/2024 06:38 AM    HCT 25.5 11/19/2024 06:38 AM    MCV 92.4 11/19/2024 06:38 AM    MCH 30.8 11/19/2024 06:38 AM    MCHC 33.3 11/19/2024 06:38 AM    RDW 13.5 11/19/2024 06:38 AM     11/19/2024 06:38 AM    MPV 9.8 11/19/2024 06:38 AM     BMP:    Lab Results   Component Value Date/Time     11/19/2024 06:38 AM    K 4.6 11/19/2024 06:38 AM    K 4.3 08/07/2020 11:42 AM     11/19/2024 06:38 AM    CO2 29 11/19/2024 06:38 AM    BUN 15 11/19/2024 06:38 AM    CREATININE 0.9 11/19/2024 06:38 AM    CALCIUM 8.3 11/19/2024 06:38 AM    GFRAA >60 03/10/2021 08:47 AM    LABGLOM 72 11/19/2024 06:38 AM    LABGLOM 62 04/16/2024 01:05 PM    GLUCOSE 168 11/19/2024 06:38 AM    GLUCOSE 97 10/06/2010 04:15 PM     PT/INR:    Lab Results   Component Value Date/Time    PROTIME 10.6 11/11/2024 09:30 AM    PROTIME 11.0 10/06/2010 04:15 PM    INR 1.0 11/11/2024 09:30 AM     PTT:    Lab Results   Component Value Date/Time    APTT 27.9 10/06/2010 04:15 PM   [APTT}    Current Inpatient Medications  Current Facility-Administered Medications: bisacodyl (DULCOLAX) EC tablet 5 mg, 5 mg, Oral, Daily  divalproex

## 2024-11-19 NOTE — PROGRESS NOTES
Physical Therapy Initial Evaluation    Name: Mignon Fischer  : 1955  MRN: [id number]      Date of Service: 2024    Evaluating PT:  Meliton Sheth, PT [id number]    Referring provider/PT Order:  PT Eval and Treat   24 1430  PT eval and treat  Start:  24 1430,   End:  24 143,   ONE TIME,   Standing Count:  1 Occurrences,   R         Kalyani Murphy MD     Room #:  5204/5204-A  Diagnosis:  Stenosis, spinal, lumbar [M48.061]  Synovial cyst of lumbar spine [M71.38]  Synovial cyst of lumbar facet joint [M71.38]  PMHx/PSHx:     has a past medical history of Brain concussion, Diabetes mellitus (HCC), Double vision with both eyes open, Dyspnea on exertion, Hx of blood clots, Hyperlipemia, Hypertension, Knee pain, Lymphedema, Pancreatitis, and Vitreous floaters of both eyes.    has a past surgical history that includes Appendectomy; Cholecystectomy; Eye surgery (Left); Colonoscopy (2016); Upper gastrointestinal endoscopy (10/2016); Gallbladder surgery (); Knee arthroscopy (Right, ); eye surgery (Right); Total knee arthroplasty (Right, 2024); cervical fusion (Right, 2024); and Lumbar spine surgery (N/A, 2024).     Procedure/Surgery:  s/p L2-S1 laminectomy and fusion    Precautions:  Falls,  FWB (full weight bearing) Bilateral LE, recent history of falls, Hemovac drain, Spinal precautions no \"BLT\", and LSO on when greater than 45degrees (elective)  Equipment Needs: To be determined,    Patient can benefit fro ARU process.       SUBJECTIVE:    Patient lives alone with support  in a ranch home  with 5 steps to enter with 2 wide Rail  Bed is on 1 floor and bath is on 1 floor.  Patient ambulated independently, with wheeled walker and recently used w/c for mobility due to falling  PTA. Equipment owned: Wheelchair and Wheeled Walker,      OBJECTIVE:   Initial Evaluation  Date: 24 Treatment Short Term/ Long Term   Goals   AM-PAC 6 Clicks      Was pt agreeable  Neuromuscular reeducation [id number] minutes     Meliton Sheth, PT [id number]

## 2024-11-19 NOTE — OP NOTE
[facility]              [address] [place] [address]                            OPERATIVE REPORT      PATIENT NAME: MARLENI SESAY           : 1955  MED REC NO: [id number]                        ROOM: 5204  ACCOUNT NO: [id number]                       ADMIT DATE: 2024  PROVIDER: Kalyani Murphy MD      DATE OF PROCEDURE:  2024    SURGEON:  Kalyani Murphy MD    PREOPERATIVE DIAGNOSES:    1. Lumbar canal stenosis from L2 to S1.  2. L2-L3 synovial cyst.    POSTOPERATIVE DIAGNOSES:    1. Lumbar canal stenosis from L2 to S1.  2. L2-L3 synovial cyst.    OPERATIVE PROCEDURES:    1. Bilateral segmental arthrodesis and fusion from L2 to S1 with use of bilateral L2, L3, L4, L5, and S1 pedicle screws with use of locally harvested autograft plus allograft in the form of ViaSorb Strips and recombinant BMP-2 (Infuse) with posterolateral fusion from L2 to S1.  2. Bilateral L2, L3, L4, L5, and S1 laminectomies with bilateral L2-L3, L3-L4, L4-L5, and L5-S1 medial facetectomies, and bilateral  L2, L3, L4, L5, and S1 foraminotomies and resection of L2-L3 synovial cyst.  3. Use of intraoperative navigation for placement of pedicle screws.  4. Use of free-running EMG to test pedicle screw heads.  5. AS modifier for Demetri Gay PA-C, who assisted with primary exposure and primary closure.    ANESTHESIA:  Generalized endotracheal anesthesia.    ASSISTANT:  Demetri Gay PA-C.    COMPLICATIONS:  None.    ESTIMATED BLOOD LOSS:  200 mL.    SPECIMEN:  None.    OPERATIVE INDICATIONS:  The patient is a 69-year-old lady who presented to the office complaining of back pain that radiated into her legs with numbness, tingling, and weakness.  She had an MRI that showed that she had lumbar canal stenosis from L2 to S1.  She had failed conservative therapy and after risks, benefits, and alternatives were discussed with the patient, it was determined that she would  undergo the above-listed procedure.  Of note, Demetri Gay PA-C's services were required as he was the only qualified assistant to assist with primary exposure and primary closure.    DESCRIPTION OF PROCEDURE:  The patient was brought into the operating room.  A time-out was performed where she was identified by her name, medical record number, and the operative procedure which she was brought to undergo.  Next, induction of generalized endotracheal anesthesia was then commenced.  Upon completion of induction of generalized endotracheal anesthesia, she received preoperative antibiotics.  [place] catheter was placed.  Monitoring electrodes were placed into the muscle groups in her lower extremity for free-running EMG testing.  She was then flipped into prone position on a Shadi table.  All pressure points were padded.  Lumbosacral region was prepped and draped in the usual sterile fashion.  After this was done, a #10 blade was used to make a skin incision.  Monopolar cautery was used to dissect through subcutaneous tissue.  I placed self-retaining Weitlaner retractor into the wound.  Next, I opened up the lumbodorsal fascia sharply with monopolar cautery and exposed the spinous processes at L2, L3, L4, L5 and S1.  I then proceeded to perform a subperiosteal dissection to expose the bilateral lamina and transverse processes at L2, L3, L4, and L5, and the bilateral lamina at S1 with the sacral ala.  I attached the O-arm reference frame to the S1 spinous process.  Using O-arm assisted navigation, I placed bilateral L2, L3, L4, L5 and S1 pedicle screws.  I performed another O-arm spin that showed the screws were within the pedicles.  I then removed the O-arm from the field.  I replaced the self-retaining angled cerebellar retractors into the wound.  I used a hand-held stimulator to test the pedicle screws.  There was no evidence of pedicle breach.  I then subsequently used a Leksell rongeur to bite up the spinous

## 2024-11-19 NOTE — PROGRESS NOTES
OCCUPATIONAL THERAPY INITIAL EVALUATION    [facility] [address], [place]      Date:2024                                                Patient Name: Mignon Fischer  MRN: [id number]  : 1955  Room: [address] [place] [address]     Evaluating OT:Georgina Lau, OTR/L   License #  [id number]       Referring Provider: Kalyani Murphy MD     Specific Provider Orders/Date: OT evaluation & treatment        Diagnosis: Stenosis, spinal, lumbar [M48.061]  Synovial cyst of lumbar spine [M71.38]  Synovial cyst of lumbar facet joint [M71.38]      Pertinent Medical History:  has a past medical history of Brain concussion, Diabetes mellitus (HCC), Double vision with both eyes open, Dyspnea on exertion, Hx of blood clots, Hyperlipemia, Hypertension, Knee pain, Lymphedema, Pancreatitis, and Vitreous floaters of both eyes.    Surgery: s/p L2-S1 laminectomy and fusion      Past Surgical History:  has a past surgical history that includes Appendectomy; Cholecystectomy; Eye surgery (Left); Colonoscopy (2016); Upper gastrointestinal endoscopy (10/2016); Gallbladder surgery (); Knee arthroscopy (Right, ); eye surgery (Right); Total knee arthroplasty (Right, 2024); cervical fusion (Right, 2024); and Lumbar spine surgery (N/A, 2024).       Precautions:  Fall Risk, neutral spine, LSO brace, hemovac drain      Assessment of current deficits    [x] Functional mobility            [x]ADLs           [x] Strength                  [x]Cognition    [x] Functional transfers          [x] IADLs         [x] Safety Awareness   [x]Endurance    [x] Fine Coordination                         [x] Balance      [] Vision/perception   [x]Sensation      []Gross Motor Coordination             [] ROM           [] Delirium                   [] Motor Control      OT PLAN OF CARE   OT POC based on physician orders, patient diagnosis and results of clinical  assessment     Frequency/Duration: 2-4 days/wk for 2 weeks PRN   Specific OT Treatment Interventions to include:   Instruction/training on adapted ADL techniques and AE recommendations to increase functional independence within precautions  Training on energy conservation strategies, correct breathing pattern and techniques to improve independence/tolerance for self-care routine  Functional transfer/mobility training/DME recommendations for increased independence, safety, and fall prevention  Patient/Family education to increase follow through with safety techniques and functional independence  Recommendation of environmental modifications for increased safety with functional transfers/mobility and ADLs  Splinting/positioning for increased function, prevention of contractures, and improve skin integrity  Therapeutic exercise to improve motor endurance, ROM, and functional strength for ADLs/functional transfers  Therapeutic activities to facilitate/challenge dynamic balance, stand tolerance for increased safety and independence with ADLs  Therapeutic activities to facilitate gross/fine motor skills for increased independence with ADLs  Positioning to improve skin integrity, interaction with environment and functional independence     Recommended Adaptive Equipment:  TBD      Home Living: Pt lives alone in a 1 story with 5 steps to enter with 1+1 HR.  B&B on main level.  Bathroom setup: tub/shower   Equipment owned: shower bench, ww, spc, w/c on loan per pt. From friend     Prior Level of Function: Ind. with ADLs , Ind. with IADLs; ambulated w/c most recent d/t multiple falls  Driving: active  Occupation: retired phlebotomist     Pain Level: 7/10; low back pain  Cognition: A&O: 4/4; Follows multi- step directions              Memory:  G              Sequencing:  G              Problem solving:  G              Judgement/safety:  F+                Functional Assessment:  AM-PAC Daily Activity Raw Score: 12/24    Initial

## 2024-11-19 NOTE — PLAN OF CARE
Problem: Chronic Conditions and Co-morbidities  Goal: Patient's chronic conditions and co-morbidity symptoms are monitored and maintained or improved  11/19/2024 0045 by Hien Belcher RN  Outcome: Progressing  11/18/2024 1918 by Rc Sanchez RN  Outcome: Progressing     Problem: Discharge Planning  Goal: Discharge to home or other facility with appropriate resources  11/19/2024 0045 by Hien Belcher RN  Outcome: Progressing  11/18/2024 1918 by Rc Sanchez RN  Outcome: Progressing     Problem: Pain  Goal: Verbalizes/displays adequate comfort level or baseline comfort level  11/18/2024 1918 by Rc Sanchez RN  Outcome: Progressing     Problem: Skin/Tissue Integrity  Goal: Absence of new skin breakdown  Description: 1.  Monitor for areas of redness and/or skin breakdown  2.  Assess vascular access sites hourly  3.  Every 4-6 hours minimum:  Change oxygen saturation probe site  4.  Every 4-6 hours:  If on nasal continuous positive airway pressure, respiratory therapy assess nares and determine need for appliance change or resting period.  11/18/2024 1918 by Rc Sanchez, RN  Outcome: Progressing

## 2024-11-19 NOTE — CARE COORDINATION
11/19/24 Transition of care: patient is observation and POD 1 Lumbar fraser/fusion. She does have a drain. [place] is out. She is on iv ancef q8. She is alert and oriented. She lives alone in a ranch home. She has 5 steps with a rail to enter. She was independent prior to the surgery. She has and uses a wheeled walker. She also has a wheelchair when out. Spoke with her and her brother and her choice is [facility]. Referral sent to [facility]. If she is able to go home she would like [facility].  Electronically signed by Joan Corona RN CM on 11/19/2024 at 1:13 PM

## 2024-11-19 NOTE — DISCHARGE INSTRUCTIONS
Discharge Instructions:    1. No lifting more than 10 pounds.  2. Refrain from bending, twisting, or turning at the waist.   3. Soft LSO brace for comfort only when out of bed.   4. Walking is encourage, slowing increase time and distance.   5. All stitches are under the skin and will dissolve in time  6. Patient may shower. DO NOT soak or scrub at incision site.  7. No driving while on narcotic pain medications.  8. No sexual activity for one (1) month after surgery  9. Take medications as prescribed. Continue taking stool softener while taking narcotic pain medications.   10. Follow up in the Neurosurgery clinic in 4-6 weeks with repeat upright AP and lateral x-rays

## 2024-11-19 NOTE — CONSULTS
[facility]              [address] [place] [address]                              CONSULTATION      PATIENT NAME: MARLENI SESAY           : 1955  MED REC NO: [id number]                        ROOM: 5204  ACCOUNT NO: [id number]                       ADMIT DATE: 2024  PROVIDER: Angel Rodgers MD    REHAB CONSULT    CONSULT DATE: 2024      CHIEF COMPLAINT:  Weakness in both lower extremities.    HISTORY OF PRESENT ILLNESS:  The patient is well known to me.  She had a cervical surgery earlier in the year for cervical myelopathy with significant weakness.  She came to [place] for rehab at that time because of bed availability here, recovered well, and returned home, functioning pretty well.  Over the last 6 weeks, she has had steadily worsening weakness in both lower extremities with buckling and significant worsening of her gait.  She was seen by Dr. 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  She underwent lumbar decompression and fusion.  She just had the surgery for that today.  She has not yet been seen by Therapy or been out of bed, but she was referred to rehab because of her previous history.    PAST MEDICAL HISTORY:    1. Morbid obesity.  2. Type 2 diabetes mellitus.  3. Diabetic neuropathy.  4. Hypertension.  5. Lymphedema.    ALLERGIES:  LIPITOR, MEGACE, VYTORIN, AND ZOCOR.      CURRENT MEDICATIONS:  Tylenol, Dulcolax, Depakote, Colace, iron, short and long-acting insulin, Cozaar, Glucophage, Toprol, OxyContin with oxycodone for breakthrough, potassium, Lyrica, Senokot, and Demadex.    SOCIAL HISTORY:  She lives with her .    REVIEW OF SYSTEMS:  HEENT:  Negative for prior HEENT problems.  PULMONARY:  Positive for dyspnea on exertion.  CARDIAC:  Positive for some degree of congestive failure.  GI and :  Negative.  ORTHOPEDIC:  Negative for

## 2024-11-19 NOTE — PLAN OF CARE
Problem: Chronic Conditions and Co-morbidities  Goal: Patient's chronic conditions and co-morbidity symptoms are monitored and maintained or improved  11/19/2024 1136 by Sofya Ramos RN  Outcome: Progressing     Problem: Discharge Planning  Goal: Discharge to home or other facility with appropriate resources  11/19/2024 1136 by Sofya Ramos, RN  Outcome: Progressing     Problem: Pain  Goal: Verbalizes/displays adequate comfort level or baseline comfort level  Outcome: Progressing     Problem: Skin/Tissue Integrity  Goal: Absence of new skin breakdown  Description: 1.  Monitor for areas of redness and/or skin breakdown  2.  Assess vascular access sites hourly  3.  Every 4-6 hours minimum:  Change oxygen saturation probe site  4.  Every 4-6 hours:  If on nasal continuous positive airway pressure, respiratory therapy assess nares and determine need for appliance change or resting period.  Outcome: Progressing

## 2024-11-20 ENCOUNTER — APPOINTMENT (OUTPATIENT)
Dept: GENERAL RADIOLOGY | Age: 69
DRG: 448 | End: 2024-11-20
Attending: NEUROLOGICAL SURGERY
Payer: MEDICARE

## 2024-11-20 LAB
GLUCOSE BLD-MCNC: 183 MG/DL (ref 74–99)
GLUCOSE BLD-MCNC: 200 MG/DL (ref 74–99)
GLUCOSE BLD-MCNC: 202 MG/DL (ref 74–99)
GLUCOSE BLD-MCNC: 217 MG/DL (ref 74–99)

## 2024-11-20 PROCEDURE — G0378 HOSPITAL OBSERVATION PER HR: HCPCS

## 2024-11-20 PROCEDURE — 6360000002 HC RX W HCPCS: Performed by: NEUROLOGICAL SURGERY

## 2024-11-20 PROCEDURE — 94640 AIRWAY INHALATION TREATMENT: CPT

## 2024-11-20 PROCEDURE — 2580000003 HC RX 258: Performed by: NEUROLOGICAL SURGERY

## 2024-11-20 PROCEDURE — 2700000000 HC OXYGEN THERAPY PER DAY

## 2024-11-20 PROCEDURE — 51798 US URINE CAPACITY MEASURE: CPT

## 2024-11-20 PROCEDURE — 6370000000 HC RX 637 (ALT 250 FOR IP)

## 2024-11-20 PROCEDURE — 97535 SELF CARE MNGMENT TRAINING: CPT

## 2024-11-20 PROCEDURE — 71045 X-RAY EXAM CHEST 1 VIEW: CPT

## 2024-11-20 PROCEDURE — 97530 THERAPEUTIC ACTIVITIES: CPT

## 2024-11-20 PROCEDURE — 82947 ASSAY GLUCOSE BLOOD QUANT: CPT

## 2024-11-20 PROCEDURE — 97161 PT EVAL LOW COMPLEX 20 MIN: CPT

## 2024-11-20 PROCEDURE — 6370000000 HC RX 637 (ALT 250 FOR IP): Performed by: NEUROLOGICAL SURGERY

## 2024-11-20 RX ORDER — INSULIN LISPRO 100 [IU]/ML
0-8 INJECTION, SOLUTION INTRAVENOUS; SUBCUTANEOUS
Status: DISCONTINUED | OUTPATIENT
Start: 2024-11-20 | End: 2024-11-21 | Stop reason: HOSPADM

## 2024-11-20 RX ORDER — INSULIN GLARGINE 100 [IU]/ML
10 INJECTION, SOLUTION SUBCUTANEOUS NIGHTLY
Status: DISCONTINUED | OUTPATIENT
Start: 2024-11-20 | End: 2024-11-20

## 2024-11-20 RX ADMIN — ACETAMINOPHEN 650 MG: 325 TABLET ORAL at 18:22

## 2024-11-20 RX ADMIN — MICONAZOLE NITRATE: 20.6 POWDER TOPICAL at 20:49

## 2024-11-20 RX ADMIN — INSULIN LISPRO 2 UNITS: 100 INJECTION, SOLUTION INTRAVENOUS; SUBCUTANEOUS at 16:26

## 2024-11-20 RX ADMIN — OXYCODONE HYDROCHLORIDE 5 MG: 5 TABLET ORAL at 21:01

## 2024-11-20 RX ADMIN — INSULIN LISPRO 2 UNITS: 100 INJECTION, SOLUTION INTRAVENOUS; SUBCUTANEOUS at 12:39

## 2024-11-20 RX ADMIN — PREGABALIN 75 MG: 75 CAPSULE ORAL at 09:20

## 2024-11-20 RX ADMIN — SENNOSIDES AND DOCUSATE SODIUM 1 TABLET: 50; 8.6 TABLET ORAL at 09:20

## 2024-11-20 RX ADMIN — POTASSIUM CHLORIDE 10 MEQ: 1500 TABLET, EXTENDED RELEASE ORAL at 09:21

## 2024-11-20 RX ADMIN — ALBUTEROL SULFATE 2.5 MG: 2.5 SOLUTION RESPIRATORY (INHALATION) at 15:41

## 2024-11-20 RX ADMIN — DOCUSATE SODIUM 200 MG: 100 CAPSULE, LIQUID FILLED ORAL at 20:49

## 2024-11-20 RX ADMIN — DIVALPROEX SODIUM 125 MG: 125 CAPSULE ORAL at 20:50

## 2024-11-20 RX ADMIN — PREGABALIN 75 MG: 75 CAPSULE ORAL at 20:49

## 2024-11-20 RX ADMIN — INSULIN LISPRO 2 UNITS: 100 INJECTION, SOLUTION INTRAVENOUS; SUBCUTANEOUS at 20:49

## 2024-11-20 RX ADMIN — FERROUS SULFATE TAB 325 MG (65 MG ELEMENTAL FE) 325 MG: 325 (65 FE) TAB at 20:49

## 2024-11-20 RX ADMIN — ACETAMINOPHEN 650 MG: 325 TABLET ORAL at 12:39

## 2024-11-20 RX ADMIN — ROSUVASTATIN CALCIUM 20 MG: 20 TABLET, FILM COATED ORAL at 09:20

## 2024-11-20 RX ADMIN — ALBUTEROL SULFATE 2.5 MG: 2.5 SOLUTION RESPIRATORY (INHALATION) at 10:22

## 2024-11-20 RX ADMIN — SENNOSIDES AND DOCUSATE SODIUM 1 TABLET: 50; 8.6 TABLET ORAL at 20:49

## 2024-11-20 RX ADMIN — ALBUTEROL SULFATE 2.5 MG: 2.5 SOLUTION RESPIRATORY (INHALATION) at 19:12

## 2024-11-20 RX ADMIN — POLYETHYLENE GLYCOL 3350 17 G: 17 POWDER, FOR SOLUTION ORAL at 09:20

## 2024-11-20 RX ADMIN — WATER 2000 MG: 1 INJECTION INTRAMUSCULAR; INTRAVENOUS; SUBCUTANEOUS at 00:30

## 2024-11-20 RX ADMIN — METOPROLOL SUCCINATE 25 MG: 25 TABLET, EXTENDED RELEASE ORAL at 20:49

## 2024-11-20 RX ADMIN — LOSARTAN POTASSIUM 25 MG: 25 TABLET, FILM COATED ORAL at 09:20

## 2024-11-20 RX ADMIN — BISACODYL 5 MG: 5 TABLET, COATED ORAL at 09:21

## 2024-11-20 RX ADMIN — PREGABALIN 75 MG: 75 CAPSULE ORAL at 12:39

## 2024-11-20 RX ADMIN — TORSEMIDE 40 MG: 20 TABLET ORAL at 09:21

## 2024-11-20 RX ADMIN — SODIUM CHLORIDE, PRESERVATIVE FREE 10 ML: 5 INJECTION INTRAVENOUS at 20:50

## 2024-11-20 RX ADMIN — DIVALPROEX SODIUM 125 MG: 125 CAPSULE ORAL at 09:20

## 2024-11-20 RX ADMIN — WATER 2000 MG: 1 INJECTION INTRAMUSCULAR; INTRAVENOUS; SUBCUTANEOUS at 09:20

## 2024-11-20 RX ADMIN — WATER 2000 MG: 1 INJECTION INTRAMUSCULAR; INTRAVENOUS; SUBCUTANEOUS at 16:26

## 2024-11-20 RX ADMIN — DOCUSATE SODIUM 200 MG: 100 CAPSULE, LIQUID FILLED ORAL at 09:21

## 2024-11-20 RX ADMIN — FERROUS SULFATE TAB 325 MG (65 MG ELEMENTAL FE) 325 MG: 325 (65 FE) TAB at 09:20

## 2024-11-20 RX ADMIN — SODIUM CHLORIDE, PRESERVATIVE FREE 10 ML: 5 INJECTION INTRAVENOUS at 09:21

## 2024-11-20 ASSESSMENT — PAIN DESCRIPTION - DESCRIPTORS
DESCRIPTORS: BURNING;ACHING;SORE
DESCRIPTORS: ACHING;DISCOMFORT;SORE

## 2024-11-20 ASSESSMENT — PAIN SCALES - GENERAL
PAINLEVEL_OUTOF10: 0
PAINLEVEL_OUTOF10: 5
PAINLEVEL_OUTOF10: 7
PAINLEVEL_OUTOF10: 0
PAINLEVEL_OUTOF10: 8
PAINLEVEL_OUTOF10: 0

## 2024-11-20 ASSESSMENT — PAIN DESCRIPTION - ONSET: ONSET: ON-GOING

## 2024-11-20 ASSESSMENT — PAIN DESCRIPTION - LOCATION
LOCATION: BACK
LOCATION: BACK

## 2024-11-20 ASSESSMENT — PAIN DESCRIPTION - PAIN TYPE: TYPE: SURGICAL PAIN

## 2024-11-20 ASSESSMENT — PAIN DESCRIPTION - FREQUENCY: FREQUENCY: INTERMITTENT

## 2024-11-20 ASSESSMENT — PAIN DESCRIPTION - ORIENTATION: ORIENTATION: LOWER

## 2024-11-20 NOTE — PROGRESS NOTES
Attempted to meet with patient at bedside. Nursing was with patient. Talked to CM. Patient's ambrose is to go to [facility].

## 2024-11-20 NOTE — DISCHARGE INSTR - COC
Continuity of Care Form    Patient Name: Mignon Fischer   :  1955  MRN:  [id number]    Admit date:  2024  Discharge date:  ***    Code Status Order: Full Code   Advance Directives:   Advance Care Flowsheet Documentation        Date/Time Healthcare Directive Type of Healthcare Directive Copy in Chart Healthcare Agent Appointed Healthcare Agent's Name Healthcare Agent's Phone Number    24 0523 Yes, patient has an advance directive for healthcare treatment  Durable power of  for health care;Health care treatment directive;Living will  --  --  --  --                     Admitting Physician:  Kalyani Murphy MD  PCP: Sandy Leiva MD    Discharging Nurse: ***  Discharging Hospital Unit/Room#: 5204/5204-A  Discharging Unit Phone Number: ***    Emergency Contact:   Extended Emergency Contact Information  Primary Emergency Contact: Meliton Fischer  Address: [address] [place] [address]  Home Phone: [phone]  Work Phone: [phone]  Mobile Phone: [phone]  Relation: Brother/Sister  Preferred language: English  Secondary Emergency Contact: Roger Fischer \"Ricardo\"  Address: [address] [place] [address]  Home Phone: [phone]  Mobile Phone: [phone]  Relation: Brother/Sister  Preferred language: English   needed? No    Past Surgical History:  Past Surgical History:   Procedure Laterality Date    APPENDECTOMY      CERVICAL FUSION Right 2024    C-4 -C5, C5-C6, and C6- C7 ANTERIOR CERVICAL DISCECTOMY AND  FUSION performed by [facility]ani [facility]    CHOLECYSTECTOMY      COLONOSCOPY  2016    EYE SURGERY Left     Cataract    EYE SURGERY Right     Cataract    GALLBLADDER SURGERY      KNEE ARTHROSCOPY Right     LUMBAR SPINE SURGERY N/A 2024    Posterior lumbar two to sacral one laminectomy and fusion performed by Kalyani Murphy MD at [facility]    TOTAL  Vent List:[id number]}    Rehab Therapies: {THERAPEUTIC INTERVENTION:6251942528}  Weight Bearing Status/Restrictions: { CC Weight Bearin}  Other Medical Equipment (for information only, NOT a DME order):  {EQUIPMENT:226345921}  Other Treatments: ***    Patient's personal belongings (please select all that are sent with patient):  {CHP DME Belongings:471200377}    RN SIGNATURE:  {Esignature:276420613}    CASE MANAGEMENT/SOCIAL WORK SECTION    Inpatient Status Date: ***    Readmission Risk Assessment Score:  Readmission Risk              Risk of Unplanned Readmission:  0           Discharging to Facility/ Agency   Name:    [facility]   Address:  Phone:  Fax:    Dialysis Facility (if applicable)   Name:  Address:  Dialysis Schedule:  Phone:  Fax:    / signature: Electronically signed by Joan Corona RN CM DISCHARGE PLANNER on 2024 at 9:38 AM      PHYSICIAN SECTION    Prognosis: {Prognosis:9885847251}    Condition at Discharge: { Patient Condition:258735788}    Rehab Potential (if transferring to Rehab): {Prognosis:0407588905}    Recommended Labs or Other Treatments After Discharge: ***    Physician Certification: I certify the above information and transfer of Mignon Fischer  is necessary for the continuing treatment of the diagnosis listed and that she requires {Admit to Appropriate Level of Care:47532} for {GREATER/LESS:885325353} 30 days.     Update Admission H&P: {CHP DME Changes in HandP:662461626}    PHYSICIAN SIGNATURE:  {Esignature:654521319}

## 2024-11-20 NOTE — PROGRESS NOTES
Pts manual BP is 84/50 with 86 pulse. IM notified and ordered stat H&H. Primary notified as well, 500mL bolus of NS ordered. Will continue care per treatment plan.

## 2024-11-20 NOTE — PROGRESS NOTES
Patient received the Sacrament of the Anointing of the Sick by Father Moi Scanlon (11/19/2024).    If additional support is requested or needed please reach out to Spiritual Health ([phone]).    Chap. Nhan Cuellar MDIV, BCC

## 2024-11-20 NOTE — PROGRESS NOTES
Physical Therapy Treatment Note     Name: Mignon Fischer  : 1955  MRN: [id number]        Date of Service: 2024     Evaluating PT:  Meliton Sheth, PT [id number]     Referring provider/PT Order:  PT Eval and Treat   24 1430   PT eval and treat  Start:  24 1430,   End:  24 143,   ONE TIME,   Standing Count:  1 Occurrences,   R         Kalyani Murphy MD      Room #:  5204/5204-A  Diagnosis:  Stenosis, spinal, lumbar [M48.061]  Synovial cyst of lumbar spine [M71.38]  Synovial cyst of lumbar facet joint [M71.38]  PMHx/PSHx:     has a past medical history of Brain concussion, Diabetes mellitus (HCC), Double vision with both eyes open, Dyspnea on exertion, Hx of blood clots, Hyperlipemia, Hypertension, Knee pain, Lymphedema, Pancreatitis, and Vitreous floaters of both eyes.    has a past surgical history that includes Appendectomy; Cholecystectomy; Eye surgery (Left); Colonoscopy (2016); Upper gastrointestinal endoscopy (10/2016); Gallbladder surgery (); Knee arthroscopy (Right, ); eye surgery (Right); Total knee arthroplasty (Right, 2024); cervical fusion (Right, 2024); and Lumbar spine surgery (N/A, 2024).      Procedure/Surgery:  s/p L2-S1 laminectomy and fusion    Precautions:  Falls,  FWB (full weight bearing) Bilateral LE, recent history of falls, Hemovac drain, Spinal precautions no \"BLT\", and LSO on when greater than 45degrees (elective)  Equipment Needs: To be determined,     Patient can benefit fro ARU process.         SUBJECTIVE:     Patient lives alone with support  in a ranch home  with 5 steps to enter with 2 wide Rail  Bed is on 1 floor and bath is on 1 floor.  Patient ambulated independently, with wheeled walker and recently used w/c for mobility due to falling  PTA. Equipment owned: Wheelchair and Wheeled Walker,       OBJECTIVE:    Initial Evaluation  Date: 24 Treatment  24 Short Term/ Long Term   Goals   AM-PAC 6 Clicks   yes  yes      ASSESSMENT:     Conditions Requiring Skilled Therapeutic Intervention:     [x]Decreased strength                                      [x]Decreased ROM  [x]Decreased functional mobility  [x]Decreased balance   [x]Decreased endurance   [x]Decreased posture  []Decreased sensation  []Decreased coordination   []Decreased vision  [x]Decreased safety awareness   []Increased pain           Comments:    RN cleared patient for participation in therapy session. Patient was seen this date for PT treatment.  Patient was agreeable to intervention.  Results of the functional assessment are noted above.  Upon entering the room patient was found supine in bed. Willing to attempt mobility. Increased assist required with bed mobility to don LSO, Sat EOB x 10 minutes to increase dynamic sitting balance and activity tolerance.  STS completed multiple times with increased assist this date. and transfer completed to bedside chair.    At end of session, patient in chair with  call light and phone within reach,  all lines and tubes intact, nursing notified.   This patient can benefit from the continuation of skilled PT possibly in a rehab setting to maximize functional level and return to PLOF.      Assistance of two required due to acuity of medical condition, complex medical lines management as well as overall deconditioning of patient.     Treatment:  Patient completed and was instructed in the following treatment:       *Bed mobility training - pt given verbal and tactile cues to facilitate proper sequencing and safety during rolling and supine>sit as well as provided with physical assistance to complete task  *STS and transfer training - educated on hand/foot placement, safety, and sequencing during STS and pivot transfers using assistive device  *Patient Education- regarding use of call light for safety, spinal precautions, and application of LSO.  *Vitals and symptoms were closely monitored throughout session.     Pt's/

## 2024-11-20 NOTE — CARE COORDINATION
11/20/24 Update CM Note: patient is POD 2 lami/fusion. Drain remains intact and will dc tomorrow. She is on aerosols q4prn prn now. She refused her bipap over night. She remains on 3lnc. She is congested today. Also unable to void and being straight cathed. She is accepted at [facility] for rehab. She will require a prior auth. Pass/luna/destination completed. Ambulette form initiated and will need completion at discharge. Electronically signed by Joan Corona RN CM on 11/20/2024 at 9:38 AM

## 2024-11-20 NOTE — PROGRESS NOTES
[facility]  Internal Medicine Residency Program  Progress Note - House Team 1    Patient:  Mignon Fischer 69 y.o. female MRN: [id number]     Date of Service: 11/20/2024         Subjective     Patient was seen and examined at bedside this morning, no issues or complaints.    Objective     Physical Exam:  Vitals: BP (!) 95/30   Pulse (!) 101   Temp 98.2 °F (36.8 °C) (Temporal)   Resp 16   Ht 1.613 m (5' 3.5\")   Wt 103.9 kg (229 lb 1.6 oz)   SpO2 100%   BMI 39.95 kg/m²     I & O - 24hr: No intake/output data recorded.   General Appearance: alert, appears stated age, and cooperative  HEENT:  Head: Normal, normocephalic, atraumatic.  Lung: clear to auscultation bilaterally  Heart: S1, S2 normal  Abdomen: soft, non-tender; bowel sounds normal; no masses,  no organomegaly  Extremities:  extremities normal, atraumatic, no cyanosis or edema  Musculokeletal: No joint swelling, no muscle tenderness. ROM normal in all joints of extremities.   Subject  Pertinent Labs & Imaging Studies   jennifer  CBC with Differential:    Lab Results   Component Value Date/Time    WBC 15.5 11/19/2024 06:38 AM    RBC 2.76 11/19/2024 06:38 AM    HGB 8.1 11/19/2024 10:22 PM    HCT 24.8 11/19/2024 10:22 PM     11/19/2024 06:38 AM    MCV 92.4 11/19/2024 06:38 AM    MCH 30.8 11/19/2024 06:38 AM    MCHC 33.3 11/19/2024 06:38 AM    RDW 13.5 11/19/2024 06:38 AM    LYMPHOPCT 29 11/11/2024 09:30 AM    MONOPCT 8 11/11/2024 09:30 AM    EOSPCT 1 11/11/2024 09:30 AM    BASOPCT 0 11/11/2024 09:30 AM    MONOSABS 0.58 11/11/2024 09:30 AM    LYMPHSABS 1.96 11/11/2024 09:30 AM    EOSABS 0.07 11/11/2024 09:30 AM    BASOSABS 0.03 11/11/2024 09:30 AM     CMP:    Lab Results   Component Value Date/Time     11/19/2024 06:38 AM    K 4.6 11/19/2024 06:38 AM    K 4.3 08/07/2020 11:42 AM     11/19/2024 06:38 AM    CO2 29 11/19/2024 06:38 AM    BUN 15 11/19/2024 06:38 AM    CREATININE 0.9 11/19/2024 06:38 AM    GFRAA >60

## 2024-11-20 NOTE — PROGRESS NOTES
Occupational Therapy  OT BEDSIDE TREATMENT NOTE   [facility]  [address] [place], [place]        Date:2024  Patient Name: Mignon Fischer  MRN: [id number]  : 1955  Room: [address] [place] [address]-A     Per OT Eval:    Evaluating OT:Georgina Lau, OTR/L   License #  [id number]        Referring Provider: Kalyani Murphy MD     Specific Provider Orders/Date: OT evaluation & treatment        Diagnosis: Stenosis, spinal, lumbar [M48.061]  Synovial cyst of lumbar spine [M71.38]  Synovial cyst of lumbar facet joint [M71.38]      Pertinent Medical History:  has a past medical history of Brain concussion, Diabetes mellitus (HCC), Double vision with both eyes open, Dyspnea on exertion, Hx of blood clots, Hyperlipemia, Hypertension, Knee pain, Lymphedema, Pancreatitis, and Vitreous floaters of both eyes.    Surgery: s/p L2-S1 laminectomy and fusion      Past Surgical History:  has a past surgical history that includes Appendectomy; Cholecystectomy; Eye surgery (Left); Colonoscopy (2016); Upper gastrointestinal endoscopy (10/2016); Gallbladder surgery (); Knee arthroscopy (Right, ); eye surgery (Right); Total knee arthroplasty (Right, 2024); cervical fusion (Right, 2024); and Lumbar spine surgery (N/A, 2024).       Precautions:  Fall Risk, neutral spine, LSO brace, hemovac drain      Assessment of current deficits    [x] Functional mobility            [x]ADLs           [x] Strength                  [x]Cognition    [x] Functional transfers          [x] IADLs         [x] Safety Awareness   [x]Endurance    [x] Fine Coordination                         [x] Balance      [] Vision/perception   [x]Sensation      []Gross Motor Coordination             [] ROM           [] Delirium                   [] Motor Control      OT PLAN OF CARE   OT POC based on physician orders, patient diagnosis and results of clinical assessment    Raw Score: 10/24    Initial Eval Status  Date: 11-19-24 Treatment Status  Date: 11/20/24 STGs = LTGs  Time frame: 10-14 days   Feeding Set up  Setup  Sitting upright in bed eating of peaches, able to grasp utensil and bring to mouth, assistance to open package Mod I/ Ind   Grooming Min A to brush hair seated  maxA  Pt able to wash face, assistance to claudio deodorant and comb hair seated EOB Modified [place]    UB Dressing Max A with gown & donning LSO supine/ log-roll in bed  maxA  [facility] gown and LSO brace supine in bed Modified [place]    LB Dressing Dep  Dependent  Claudio brief, assistance to thread and rolling side to side to pull over hips. Total assist to [facility] socks Modified [place]    Bathing Max A sim. task  maxA/dependent  Simulated Task  Assistance to wash of UB, total assist to wash of LB, buttocks and ricarda area Modified [place]    Toileting NT  Dependent  Pt having of pure wick Modified [place]    Bed Mobility  Logroll: Max A  Supine to sit: Max A  Sit to supine: NT  maxAx2  Supine<>EOB  EOB<>Supine Supine to sit: Modified [place]   Sit to supine: Modified [place]    Functional Transfers Mod A x2 sit <> stand, SPT  modAx2  Sit to Stand  Stand to Sit  X3 stands Modified [place]    Functional Mobility Mod A x2 with ww few steps to chair  Attempted to have pt take of short steps towards HOB, pt unable    modAx2 per previous session Modified [place]    Balance Sitting:     Static:  SBA    Dynamic:Min A  Standing: Mod A x 2  Sitting EOB:  modA    Standing:  modAx2     Activity Tolerance F Fair-  Poor activity tolerance, limited by fatigue and dizziness G   Visual/  Perceptual Glasses: yes          Vitals spO2 on RA  & HR WFL   WFL      Hand Dominance R    AROM (PROM) Strength Additional Info:    BREANA  Shld. to 90  WFL distal 3+/4- good  and wfl FMC/dexterity noted during ADL tasks      JOVANI Shld. to 90  WFL distal 3+/4- good  and wfl  FMC/dexterity noted during ADL tasks         Hearing: WFL   Sensation:  No c/o numbness or tingling B UE  Tone: WFL   Edema: none noted B UE       Education:  Pt was educated on role of OT, goals to be reached, importance of OOB activity, precautions to follow, log roll technique with bed mobility, balance/posture seated unsupported at EOB and standing upright at EOB and safety/hand placement with transfers.    Comments: Upon arrival pt supine in bed, agreeable to therapy once awakened. At end of session, pt seated upright in chair position in bed, eating of peaches, all lines and tubes intact, call light within reach.     Pt has made slow progress towards set goals.   Continue with current plan of care      Treatment Time In: 9:51am           Treatment Time Out: 10:20am                Treatment Charges: Mins Units   Ther Ex  94657     Manual Therapy 99025     Thera Activities 37113 17 1   ADL/Home Mgt 41632 12 1   Neuro Re-ed 80470     Group Therapy      Orthotic manage/training  75136     Non-Billable Time     Total Timed Treatment 29 2        Rhonda Acharya CAMPOS/L [id number]

## 2024-11-20 NOTE — PROGRESS NOTES
Department of Neurosurgery  Progress Note    CHIEF COMPLAINT: s/p L2-S1 laminectomy and fusion 11/18    SUBJECTIVE:  Lamberto op site pain ok    REVIEW OF SYSTEMS :  Constitutional: Negative for chills and fever.    Neurological: Negative for dizziness, tremors and speech change.     OBJECTIVE:   VITALS:  BP (!) 107/49   Pulse 71   Temp 97.7 °F (36.5 °C) (Oral)   Resp 18   Ht 1.613 m (5' 3.5\")   Wt 103.9 kg (229 lb 1.6 oz)   SpO2 93%   BMI 39.95 kg/m²     PHYSICAL:  Neurologic: Alert and oriented x3; PERRL  Motor Exam:  Motor exam is symmetrical 5 out of 5 all extremities bilaterally  Sensory:  Sensory intact  Incision c/d/i      DATA:  CBC:   Lab Results   Component Value Date/Time    WBC 15.5 11/19/2024 06:38 AM    RBC 2.76 11/19/2024 06:38 AM    HGB 8.1 11/19/2024 10:22 PM    HCT 24.8 11/19/2024 10:22 PM    MCV 92.4 11/19/2024 06:38 AM    MCH 30.8 11/19/2024 06:38 AM    MCHC 33.3 11/19/2024 06:38 AM    RDW 13.5 11/19/2024 06:38 AM     11/19/2024 06:38 AM    MPV 9.8 11/19/2024 06:38 AM     BMP:    Lab Results   Component Value Date/Time     11/19/2024 06:38 AM    K 4.6 11/19/2024 06:38 AM    K 4.3 08/07/2020 11:42 AM     11/19/2024 06:38 AM    CO2 29 11/19/2024 06:38 AM    BUN 15 11/19/2024 06:38 AM    CREATININE 0.9 11/19/2024 06:38 AM    CALCIUM 8.3 11/19/2024 06:38 AM    GFRAA >60 03/10/2021 08:47 AM    LABGLOM 72 11/19/2024 06:38 AM    LABGLOM 62 04/16/2024 01:05 PM    GLUCOSE 168 11/19/2024 06:38 AM    GLUCOSE 97 10/06/2010 04:15 PM     PT/INR:    Lab Results   Component Value Date/Time    PROTIME 10.6 11/11/2024 09:30 AM    PROTIME 11.0 10/06/2010 04:15 PM    INR 1.0 11/11/2024 09:30 AM     PTT:    Lab Results   Component Value Date/Time    APTT 27.9 10/06/2010 04:15 PM   [APTT}    Current Inpatient Medications  Current Facility-Administered Medications: insulin lispro (HUMALOG,ADMELOG) injection vial 0-8 Units, 0-8 Units, SubCUTAneous, 4x Daily AC & HS  sodium chloride flush 0.9 %  PRN  aluminum & magnesium hydroxide-simethicone (MAALOX) 200-200-20 MG/5ML suspension 15 mL, 15 mL, Oral, Q6H PRN  benzocaine-menthol (CEPACOL SORE THROAT) lozenge 1 lozenge, 1 lozenge, Oral, Q2H PRN  docusate sodium (COLACE) capsule 200 mg, 200 mg, Oral, BID  prochlorperazine (COMPAZINE) injection 10 mg, 10 mg, IntraVENous, Q6H PRN  glucose chewable tablet 16 g, 4 tablet, Oral, PRN  dextrose bolus 10% 125 mL, 125 mL, IntraVENous, PRN **OR** dextrose bolus 10% 250 mL, 250 mL, IntraVENous, PRN  glucagon injection 1 mg, 1 mg, SubCUTAneous, PRN  dextrose 10 % infusion, , IntraVENous, Continuous PRN  albuterol (PROVENTIL) (2.5 MG/3ML) 0.083% nebulizer solution 2.5 mg, 2.5 mg, Nebulization, Q4H PRN  miconazole (MICOTIN) 2 % powder, , Topical, BID    ASSESSMENT:   s/p L2-S1 laminectomy and fusion 11/18    PLAN:  Pain control  PT/OT  Drain care. Plan to remove Thursday  PMR consult  Follow up in Neurosurgery clinic in 4 weeks with XR      Electronically signed by MARLEY Fletcher on 11/20/2024 at 9:52 AM

## 2024-11-21 VITALS
BODY MASS INDEX: 39.11 KG/M2 | DIASTOLIC BLOOD PRESSURE: 62 MMHG | OXYGEN SATURATION: 100 % | WEIGHT: 229.1 LBS | HEART RATE: 94 BPM | TEMPERATURE: 98.1 F | RESPIRATION RATE: 18 BRPM | HEIGHT: 64 IN | SYSTOLIC BLOOD PRESSURE: 123 MMHG

## 2024-11-21 PROBLEM — M48.061 SPINAL STENOSIS AT L4-L5 LEVEL: Status: ACTIVE | Noted: 2024-11-21

## 2024-11-21 LAB
GLUCOSE BLD-MCNC: 146 MG/DL (ref 74–99)
GLUCOSE BLD-MCNC: 239 MG/DL (ref 74–99)

## 2024-11-21 PROCEDURE — 6360000002 HC RX W HCPCS: Performed by: NEUROLOGICAL SURGERY

## 2024-11-21 PROCEDURE — 97530 THERAPEUTIC ACTIVITIES: CPT

## 2024-11-21 PROCEDURE — 51798 US URINE CAPACITY MEASURE: CPT

## 2024-11-21 PROCEDURE — 97535 SELF CARE MNGMENT TRAINING: CPT

## 2024-11-21 PROCEDURE — G0378 HOSPITAL OBSERVATION PER HR: HCPCS

## 2024-11-21 PROCEDURE — 1200000000 HC SEMI PRIVATE

## 2024-11-21 PROCEDURE — 82947 ASSAY GLUCOSE BLOOD QUANT: CPT

## 2024-11-21 PROCEDURE — 6370000000 HC RX 637 (ALT 250 FOR IP): Performed by: NEUROLOGICAL SURGERY

## 2024-11-21 PROCEDURE — 2700000000 HC OXYGEN THERAPY PER DAY

## 2024-11-21 PROCEDURE — 2580000003 HC RX 258: Performed by: NEUROLOGICAL SURGERY

## 2024-11-21 PROCEDURE — 51702 INSERT TEMP BLADDER CATH: CPT

## 2024-11-21 PROCEDURE — 6370000000 HC RX 637 (ALT 250 FOR IP)

## 2024-11-21 RX ORDER — DIAZEPAM 5 MG/1
5 TABLET ORAL EVERY 6 HOURS PRN
Qty: 40 TABLET | Refills: 0 | Status: ON HOLD | OUTPATIENT
Start: 2024-11-21 | End: 2024-12-01

## 2024-11-21 RX ORDER — OXYCODONE HYDROCHLORIDE 10 MG/1
10 TABLET ORAL EVERY 4 HOURS PRN
Qty: 42 TABLET | Refills: 0 | Status: ON HOLD | OUTPATIENT
Start: 2024-11-21 | End: 2024-11-28

## 2024-11-21 RX ADMIN — OXYCODONE HYDROCHLORIDE 10 MG: 10 TABLET ORAL at 12:24

## 2024-11-21 RX ADMIN — TORSEMIDE 40 MG: 20 TABLET ORAL at 09:46

## 2024-11-21 RX ADMIN — BISACODYL 5 MG: 5 TABLET, COATED ORAL at 09:45

## 2024-11-21 RX ADMIN — OXYCODONE HYDROCHLORIDE 5 MG: 5 TABLET ORAL at 06:22

## 2024-11-21 RX ADMIN — ACETAMINOPHEN 650 MG: 325 TABLET ORAL at 00:22

## 2024-11-21 RX ADMIN — ACETAMINOPHEN 650 MG: 325 TABLET ORAL at 12:09

## 2024-11-21 RX ADMIN — FERROUS SULFATE TAB 325 MG (65 MG ELEMENTAL FE) 325 MG: 325 (65 FE) TAB at 09:46

## 2024-11-21 RX ADMIN — WATER 2000 MG: 1 INJECTION INTRAMUSCULAR; INTRAVENOUS; SUBCUTANEOUS at 00:20

## 2024-11-21 RX ADMIN — POTASSIUM CHLORIDE 10 MEQ: 1500 TABLET, EXTENDED RELEASE ORAL at 09:45

## 2024-11-21 RX ADMIN — DIAZEPAM 5 MG: 5 TABLET ORAL at 00:25

## 2024-11-21 RX ADMIN — WATER 2000 MG: 1 INJECTION INTRAMUSCULAR; INTRAVENOUS; SUBCUTANEOUS at 09:45

## 2024-11-21 RX ADMIN — PREGABALIN 75 MG: 75 CAPSULE ORAL at 09:45

## 2024-11-21 RX ADMIN — SENNOSIDES AND DOCUSATE SODIUM 1 TABLET: 50; 8.6 TABLET ORAL at 09:45

## 2024-11-21 RX ADMIN — DIVALPROEX SODIUM 125 MG: 125 CAPSULE ORAL at 09:46

## 2024-11-21 RX ADMIN — ACETAMINOPHEN 650 MG: 325 TABLET ORAL at 06:18

## 2024-11-21 RX ADMIN — INSULIN LISPRO 2 UNITS: 100 INJECTION, SOLUTION INTRAVENOUS; SUBCUTANEOUS at 12:10

## 2024-11-21 RX ADMIN — SODIUM CHLORIDE, PRESERVATIVE FREE 10 ML: 5 INJECTION INTRAVENOUS at 09:45

## 2024-11-21 RX ADMIN — DOCUSATE SODIUM 200 MG: 100 CAPSULE, LIQUID FILLED ORAL at 09:45

## 2024-11-21 RX ADMIN — ROSUVASTATIN CALCIUM 20 MG: 20 TABLET, FILM COATED ORAL at 09:45

## 2024-11-21 RX ADMIN — MICONAZOLE NITRATE: 20.6 POWDER TOPICAL at 09:45

## 2024-11-21 RX ADMIN — LOSARTAN POTASSIUM 25 MG: 25 TABLET, FILM COATED ORAL at 09:45

## 2024-11-21 RX ADMIN — POLYETHYLENE GLYCOL 3350 17 G: 17 POWDER, FOR SOLUTION ORAL at 09:46

## 2024-11-21 RX ADMIN — PREGABALIN 75 MG: 75 CAPSULE ORAL at 14:20

## 2024-11-21 ASSESSMENT — PAIN DESCRIPTION - LOCATION
LOCATION: BACK
LOCATION: BACK

## 2024-11-21 ASSESSMENT — PAIN SCALES - GENERAL
PAINLEVEL_OUTOF10: 4
PAINLEVEL_OUTOF10: 3
PAINLEVEL_OUTOF10: 7
PAINLEVEL_OUTOF10: 6
PAINLEVEL_OUTOF10: 0

## 2024-11-21 ASSESSMENT — PAIN DESCRIPTION - DESCRIPTORS
DESCRIPTORS: ACHING;BURNING;DISCOMFORT
DESCRIPTORS: ACHING;DISCOMFORT;SORE

## 2024-11-21 ASSESSMENT — PAIN - FUNCTIONAL ASSESSMENT: PAIN_FUNCTIONAL_ASSESSMENT: PREVENTS OR INTERFERES SOME ACTIVE ACTIVITIES AND ADLS

## 2024-11-21 ASSESSMENT — PAIN DESCRIPTION - ORIENTATION: ORIENTATION: MID;LOWER

## 2024-11-21 NOTE — PROGRESS NOTES
Called [facility] answering service regarding consult. Eveline Haskins NP.     4320: Spoke to Eveline regarding consult.

## 2024-11-21 NOTE — PLAN OF CARE
Problem: Chronic Conditions and Co-morbidities  Goal: Patient's chronic conditions and co-morbidity symptoms are monitored and maintained or improved  11/21/2024 0137 by Shubham Delacruz RN  Outcome: Progressing     Problem: Discharge Planning  Goal: Discharge to home or other facility with appropriate resources  11/21/2024 0137 by Shubham Delacruz RN  Outcome: Progressing     Problem: Pain  Goal: Verbalizes/displays adequate comfort level or baseline comfort level  11/21/2024 0137 by Shubham Delacruz RN  Outcome: Progressing     Problem: Musculoskeletal - Adult  Goal: Return mobility to safest level of function  Outcome: Progressing  Goal: Maintain proper alignment of affected body part  Outcome: Progressing     Problem: Genitourinary - Adult  Goal: Absence of urinary retention  Outcome: Progressing  Goal: Urinary catheter remains patent  Outcome: Progressing

## 2024-11-21 NOTE — PROGRESS NOTES
Frequency/Duration: 2-4 days/wk for 2 weeks PRN   Specific OT Treatment Interventions to include:   Instruction/training on adapted ADL techniques and AE recommendations to increase functional independence within precautions  Training on energy conservation strategies, correct breathing pattern and techniques to improve independence/tolerance for self-care routine  Functional transfer/mobility training/DME recommendations for increased independence, safety, and fall prevention  Patient/Family education to increase follow through with safety techniques and functional independence  Recommendation of environmental modifications for increased safety with functional transfers/mobility and ADLs  Splinting/positioning for increased function, prevention of contractures, and improve skin integrity  Therapeutic exercise to improve motor endurance, ROM, and functional strength for ADLs/functional transfers  Therapeutic activities to facilitate/challenge dynamic balance, stand tolerance for increased safety and independence with ADLs  Therapeutic activities to facilitate gross/fine motor skills for increased independence with ADLs  Positioning to improve skin integrity, interaction with environment and functional independence     Recommended Adaptive Equipment:  TBD      Home Living: Pt lives alone in a 1 story with 5 steps to enter with 1+1 HR.  B&B on main level.  Bathroom setup: tub/shower   Equipment owned: shower bench, ww, spc, w/c on loan per pt. From friend     Prior Level of Function: Ind. with ADLs , Ind. with IADLs; ambulated w/c most recent d/t multiple falls  Driving: active  Occupation: retired phlebotomist     Pain Level: Pt compaliend of minimal back pain this session  Cognition: A&O: 4/4; Follows multi- step directions              Memory:  G              Sequencing:  G              Problem solving:  G              Judgement/safety:  F+                Functional Assessment:  AM-PAC Daily Activity Raw Score:  11/24    Initial Eval Status  Date: 11-19-24 Treatment Status  Date: 11/21/24 STGs = LTGs  Time frame: 10-14 days   Feeding Set up  Setup  Pt able to grasp cup and bring to mouth Mod I/ Ind   Grooming Min A to brush hair seated Josseline  Pt washed face, combed hair seated upright in chair Modified [place]    UB Dressing Max A with gown & donning LSO supine/ log-roll in bed  maxA  [facility] gown and LSO brace supine in bed Modified [place]    LB Dressing Dep  Dependent  Claudio brief, assistance to thread and rolling side to side to pull over hips. Total assist to [facility] socks Modified [place]    Bathing Max A sim. task  maxA/dependent  Simulated Task  Assistance to wash of UB, total assist to wash of LB, buttocks and ircarda area Modified [place]    Toileting NT  Dependent  Pt having of pure wick Modified [place]    Bed Mobility  Logroll: Max A  Supine to sit: Max A  Sit to supine: NT  maxAx2  Supine<>EOB  EOB<>Supine Supine to sit: Modified [place]   Sit to supine: Modified [place]    Functional Transfers Mod A x2 sit <> stand, SPT  modAx2  Sit to Stand  Stand to Sit    Stand Pivot Transfer  EOB<>Chair wth w.w. Modified [place]    Functional Mobility Mod A x2 with ww few steps to chair modAx2  Pt took of short steps during SPT wth w.w., seated rest break and then took of short forward steps with w.w. and chair follow Modified [place]    Balance Sitting:     Static:  SBA    Dynamic:Min A  Standing: Mod A x 2  Sitting EOB:  SBA    Standing:  modAx2     Activity Tolerance F Fair-  Pt more alert and  motivated to engage in session G   Visual/  Perceptual Glasses: yes          Vitals spO2 on RA  & HR WFL   WFL      Hand Dominance R    AROM (PROM) Strength Additional Info:    VERENAE  Shld. to 90  WFL distal 3+/4- good  and wfl FMC/dexterity noted during ADL tasks      LUE Shld. to 90  WFL distal 3+/4- good  and wfl FMC/dexterity noted during ADL tasks          Hearing: WFL   Sensation:  No c/o numbness or tingling B UE  Tone: WFL   Edema: none noted B UE       Education:  Pt was educated on role of OT, goals to be reached, importance of OOB activity, precautions to follow, log roll technique with bed mobility, balance/posture seated unsupported at EOB and standing upright at EOB and safety/hand placement with transfers, safety/balance and walker management with short steps and proper donning of LSO brace.    Comments: Upon arrival pt supine in bed, agreeable to therapy, nursing present okaying pt to be seen this session. Pt more alert and talkative this session, motivated to engage in therapy session, being very appreciative of therapy and to be sitting upright in chair. At end of session, pt seated upright in chair, all lines and tubes intact, call light within reach.     Pt has made fair progress towards set goals.   Continue with current plan of care      Treatment Time In: 9:50am           Treatment Time Out: 10:19am              Treatment Charges: Mins Units   Ther Ex  92408     Manual Therapy 29602     Thera Activities 56012 17 1   ADL/Home Mgt 12209 12 1   Neuro Re-ed 91830     Group Therapy      Orthotic manage/training  21069     Non-Billable Time     Total Timed Treatment 29 2        **Pt requesting assistance to return to bed. Pt completed of STS from chair, SPT from Chair<>EOB, and bed mobility EOB<>Supine with modAx2. At end of session, pt supine in bed, all tubes and lines intact, call light within reach.   Treatment Time In: 1:07pm           Treatment Time Out: 1:20pm              Treatment Charges: Mins Units   Ther Ex  11067     Manual Therapy 43390     Thera Activities 13305 13 1   ADL/Home Mgt 19297     Neuro Re-ed 02411     Group Therapy      Orthotic manage/training  60570     Non-Billable Time     Total Timed Treatment 13 1      Rhonda Acharya CAMPOS/L [id number]

## 2024-11-21 NOTE — PROGRESS NOTES
Department of Neurosurgery  Progress Note    CHIEF COMPLAINT: s/p L2-S1 laminectomy and fusion 11/18    SUBJECTIVE:  Sitting up in chair, pain better today.     REVIEW OF SYSTEMS :  Constitutional: Negative for chills and fever.    Neurological: Negative for dizziness, tremors and speech change.     OBJECTIVE:   VITALS:  /65   Pulse (!) 102   Temp (!) 96.3 °F (35.7 °C) (Temporal)   Resp 20   Ht 1.613 m (5' 3.5\")   Wt 103.9 kg (229 lb 1.6 oz)   SpO2 97%   BMI 39.95 kg/m²     PHYSICAL:  Neurologic: Alert and oriented x3; PERRL  Motor Exam:  Motor exam is symmetrical 5 out of 5 all extremities bilaterally  Sensory:  Sensory intact  Incision c/d/i    DATA:  CBC:   Lab Results   Component Value Date/Time    WBC 15.5 11/19/2024 06:38 AM    RBC 2.76 11/19/2024 06:38 AM    HGB 8.1 11/19/2024 10:22 PM    HCT 24.8 11/19/2024 10:22 PM    MCV 92.4 11/19/2024 06:38 AM    MCH 30.8 11/19/2024 06:38 AM    MCHC 33.3 11/19/2024 06:38 AM    RDW 13.5 11/19/2024 06:38 AM     11/19/2024 06:38 AM    MPV 9.8 11/19/2024 06:38 AM     BMP:    Lab Results   Component Value Date/Time     11/19/2024 06:38 AM    K 4.6 11/19/2024 06:38 AM    K 4.3 08/07/2020 11:42 AM     11/19/2024 06:38 AM    CO2 29 11/19/2024 06:38 AM    BUN 15 11/19/2024 06:38 AM    CREATININE 0.9 11/19/2024 06:38 AM    CALCIUM 8.3 11/19/2024 06:38 AM    GFRAA >60 03/10/2021 08:47 AM    LABGLOM 72 11/19/2024 06:38 AM    LABGLOM 62 04/16/2024 01:05 PM    GLUCOSE 168 11/19/2024 06:38 AM    GLUCOSE 97 10/06/2010 04:15 PM     PT/INR:    Lab Results   Component Value Date/Time    PROTIME 10.6 11/11/2024 09:30 AM    PROTIME 11.0 10/06/2010 04:15 PM    INR 1.0 11/11/2024 09:30 AM     PTT:    Lab Results   Component Value Date/Time    APTT 27.9 10/06/2010 04:15 PM   [APTT}    Current Inpatient Medications  Current Facility-Administered Medications: insulin lispro (HUMALOG,ADMELOG) injection vial 0-8 Units, 0-8 Units, SubCUTAneous, 4x Daily AC &  HS  sodium chloride flush 0.9 % injection 5-40 mL, 5-40 mL, IntraVENous, Q12H  sodium chloride flush 0.9 % injection 5-40 mL, 5-40 mL, IntraVENous, PRN  bisacodyl (DULCOLAX) EC tablet 5 mg, 5 mg, Oral, Daily  divalproex (DEPAKOTE SPRINKLE) DR capsule 125 mg, 125 mg, Oral, 2 times per day  ferrous sulfate (IRON 325) tablet 325 mg, 325 mg, Oral, Q12H  losartan (COZAAR) tablet 25 mg, 25 mg, Oral, Daily  [Held by provider] metFORMIN (GLUCOPHAGE) tablet 500 mg, 500 mg, Oral, Daily with breakfast  metoprolol succinate (TOPROL XL) extended release tablet 25 mg, 25 mg, Oral, Nightly  oxyCODONE (OXYCONTIN) extended release tablet 10 mg, 10 mg, Oral, 2 times per day  potassium chloride (KLOR-CON M) extended release tablet 10 mEq, 10 mEq, Oral, Daily  pregabalin (LYRICA) capsule 75 mg, 75 mg, Oral, TID  rosuvastatin (CRESTOR) tablet 20 mg, 20 mg, Oral, Daily  torsemide (DEMADEX) tablet 40 mg, 40 mg, Oral, QAM  acetaminophen (TYLENOL) tablet 650 mg, 650 mg, Oral, Q6H  ondansetron (ZOFRAN-ODT) disintegrating tablet 4 mg, 4 mg, Oral, Q8H PRN **OR** ondansetron (ZOFRAN) injection 4 mg, 4 mg, IntraVENous, Q6H PRN  oxyCODONE (ROXICODONE) immediate release tablet 5 mg, 5 mg, Oral, Q4H PRN **OR** oxyCODONE HCl (OXY-IR) immediate release tablet 10 mg, 10 mg, Oral, Q4H PRN  morphine (PF) injection 2 mg, 2 mg, IntraVENous, Q2H PRN **OR** morphine sulfate (PF) injection 4 mg, 4 mg, IntraVENous, Q2H PRN  diazePAM (VALIUM) tablet 5 mg, 5 mg, Oral, Q6H PRN  diphenhydrAMINE (BENADRYL) tablet 25 mg, 25 mg, Oral, Q6H PRN **OR** diphenhydrAMINE (BENADRYL) injection 25 mg, 25 mg, IntraVENous, Q6H PRN  polyethylene glycol (GLYCOLAX) packet 17 g, 17 g, Oral, Daily  sennosides-docusate sodium (SENOKOT-S) 8.6-50 MG tablet 1 tablet, 1 tablet, Oral, BID  bisacodyl (DULCOLAX) suppository 10 mg, 10 mg, Rectal, Daily PRN  sodium phosphate (FLEET) rectal enema 1 enema, 1 enema, Rectal, Daily PRN  aluminum & magnesium hydroxide-simethicone (MAALOX)  200-200-20 MG/5ML suspension 15 mL, 15 mL, Oral, Q6H PRN  benzocaine-menthol (CEPACOL SORE THROAT) lozenge 1 lozenge, 1 lozenge, Oral, Q2H PRN  docusate sodium (COLACE) capsule 200 mg, 200 mg, Oral, BID  prochlorperazine (COMPAZINE) injection 10 mg, 10 mg, IntraVENous, Q6H PRN  glucose chewable tablet 16 g, 4 tablet, Oral, PRN  dextrose bolus 10% 125 mL, 125 mL, IntraVENous, PRN **OR** dextrose bolus 10% 250 mL, 250 mL, IntraVENous, PRN  glucagon injection 1 mg, 1 mg, SubCUTAneous, PRN  dextrose 10 % infusion, , IntraVENous, Continuous PRN  albuterol (PROVENTIL) (2.5 MG/3ML) 0.083% nebulizer solution 2.5 mg, 2.5 mg, Nebulization, Q4H PRN  miconazole (MICOTIN) 2 % powder, , Topical, BID    ASSESSMENT:   s/p L2-S1 laminectomy and fusion 11/18    PLAN:  Pain control  PT/OT  Remove drain today  Discharge planning-scripts printed   Follow up in Neurosurgery clinic in 4 weeks with XR      Electronically signed by MARLEY Alfonso on 11/21/2024 at 2:59 PM

## 2024-11-21 NOTE — PROGRESS NOTES
Nurse bladder scanned patient at 1824 for 588mL. Pt was straight cathed for the second time today. Pt had 600mL out. Documented appropriately, pt tolerated well.

## 2024-11-21 NOTE — CARE COORDINATION
11/21/24 Update CM Note: patient is approved to discharge today to [facility]. Dr Acosta/Team 1 notified of need for discharge med req for anti-hypertensives. Notified of need for narcotic scripts as well. Electronically signed by Joan Corona RN CM on 11/21/2024 at 11:03 AM

## 2024-11-21 NOTE — CARE COORDINATION
11/21/24 Update CM Note: Patient is discharged and will discharge today to [facility]. She is approved. Rosalia nuno will transport via wheelchair with pickup 4:30pm. All paperwork completed. Liaison/bedside nurse notified. Electronically signed by Joan Corona RN CM on 11/21/2024 at 12:42 PM

## 2024-11-21 NOTE — PLAN OF CARE
Problem: Chronic Conditions and Co-morbidities  Goal: Patient's chronic conditions and co-morbidity symptoms are monitored and maintained or improved  11/21/2024 0137 by Shubham Delacruz RN  Outcome: Progressing  11/20/2024 1715 by Darlene Arredondo RN  Outcome: Progressing     Problem: Discharge Planning  Goal: Discharge to home or other facility with appropriate resources  11/21/2024 0137 by Shubham Delacruz RN  Outcome: Progressing  11/20/2024 1715 by Darlene Arredondo RN  Outcome: Progressing     Problem: Pain  Goal: Verbalizes/displays adequate comfort level or baseline comfort level  11/21/2024 0137 by Shubham Delacruz RN  Outcome: Progressing  11/20/2024 1715 by Darlene Arredondo RN  Outcome: Progressing     Problem: Skin/Tissue Integrity  Goal: Absence of new skin breakdown  Description: 1.  Monitor for areas of redness and/or skin breakdown  2.  Assess vascular access sites hourly  3.  Every 4-6 hours minimum:  Change oxygen saturation probe site  4.  Every 4-6 hours:  If on nasal continuous positive airway pressure, respiratory therapy assess nares and determine need for appliance change or resting period.  11/21/2024 0137 by Shubham Delacruz RN  Outcome: Progressing  11/20/2024 1715 by Darlene Arredondo RN  Outcome: Progressing

## 2024-11-21 NOTE — PROGRESS NOTES
Physical Therapy Treatment Note     Name: Mignon Fischer  : 1955  MRN: [id number]        Date of Service: 2024     Evaluating PT:  Meliton Sheth, PT [id number]     Referring provider/PT Order:  PT Eval and Treat   24 1430   PT eval and treat  Start:  24 1430,   End:  24 143,   ONE TIME,   Standing Count:  1 Occurrences,   R         Kalyani Murphy MD      Room #:  5204/5204-A  Diagnosis:  Stenosis, spinal, lumbar [M48.061]  Synovial cyst of lumbar spine [M71.38]  Synovial cyst of lumbar facet joint [M71.38]  PMHx/PSHx:     has a past medical history of Brain concussion, Diabetes mellitus (HCC), Double vision with both eyes open, Dyspnea on exertion, Hx of blood clots, Hyperlipemia, Hypertension, Knee pain, Lymphedema, Pancreatitis, and Vitreous floaters of both eyes.    has a past surgical history that includes Appendectomy; Cholecystectomy; Eye surgery (Left); Colonoscopy (2016); Upper gastrointestinal endoscopy (10/2016); Gallbladder surgery (); Knee arthroscopy (Right, ); eye surgery (Right); Total knee arthroplasty (Right, 2024); cervical fusion (Right, 2024); and Lumbar spine surgery (N/A, 2024).      Procedure/Surgery:  s/p L2-S1 laminectomy and fusion    Precautions:  Falls,  FWB (full weight bearing) Bilateral LE, recent history of falls, Hemovac drain, Spinal precautions no \"BLT\", and LSO on when greater than 45degrees (elective)  Equipment Needs: To be determined,     Patient can benefit fro ARU process.         SUBJECTIVE:     Patient lives alone with support  in a ranch home  with 5 steps to enter with 2 wide Rail  Bed is on 1 floor and bath is on 1 floor.  Patient ambulated independently, with wheeled walker and recently used w/c for mobility due to falling  PTA. Equipment owned: Wheelchair and Wheeled Walker,       OBJECTIVE:    Initial Evaluation  Date: 24 Treatment  24 Short Term/ Long Term   Goals   AM-PAC 6 Clicks     yes  yes  yes      ASSESSMENT:     Conditions Requiring Skilled Therapeutic Intervention:     [x]Decreased strength                                      [x]Decreased ROM  [x]Decreased functional mobility  [x]Decreased balance   [x]Decreased endurance   [x]Decreased posture  []Decreased sensation  []Decreased coordination   []Decreased vision  [x]Decreased safety awareness   []Increased pain           Comments:    RN cleared patient for participation in therapy session. Patient was seen this date for PT treatment.  Patient was agreeable to intervention.  Results of the functional assessment are noted above.  Upon entering the room patient was found supine in bed. Improved cognition this date. Willing to attempt mobility. Increased assist required with bed mobility to don LSO, Sat EOB x 10 minutes to increase dynamic sitting balance and activity tolerance.  STS completed with transfer to bedside chair. Following rest and instructions in BLE exercise, patient completed STS and gait with fww with chair follow.     PM session patient completed with fww x 7 feet chair follow, STS completed with fww. Mod A x 2.       At end of session, patient in chair with  call light and phone within reach,  all lines and tubes intact, nursing notified.   This patient can benefit from the continuation of skilled PT possibly in a rehab setting to maximize functional level and return to PLOF.      Assistance of two required due to acuity of medical condition, complex medical lines management as well as overall deconditioning of patient.     Treatment:  Patient completed and was instructed in the following treatment:       *Bed mobility training - pt given verbal and tactile cues to facilitate proper sequencing and safety during rolling and supine>sit as well as provided with physical assistance to complete task  *STS and transfer training - educated on hand/foot placement, safety, and sequencing during STS and pivot transfers using

## 2024-11-21 NOTE — PROGRESS NOTES
[facility]  Internal Medicine Residency Program  Progress Note - House Team 1    Patient:  Mignon Fischer 69 y.o. female MRN: [id number]     Date of Service: 11/21/2024     Subjective     Patient was seen and examined at bedside this morning. She is feeling well this morning. Complains of pain in her back however states that this is manageable.       Objective     Physical Exam:  Vitals: /65   Pulse (!) 102   Temp (!) 96.3 °F (35.7 °C) (Temporal)   Resp 20   Ht 1.613 m (5' 3.5\")   Wt 103.9 kg (229 lb 1.6 oz)   SpO2 97%   BMI 39.95 kg/m²     I & O - 24hr: No intake/output data recorded.   General Appearance: alert and oriented to person, place and time  Skin: warm and dry, no rash or erythema  Head: normocephalic and atraumatic  Neck: neck supple and non tender without mass   Pulmonary/Chest: clear to auscultation bilaterally- no wheezes, rales or rhonchi, normal air movement, no respiratory distress  Cardiovascular: normal rate, normal S1 and S2, no gallops, intact distal pulses, and no carotid bruits  Abdomen: soft, non-tender, non-distended, normal bowel sounds, no masses or organomegaly  Musculoskeletal: normal range of motion, no joint swelling, deformity or tenderness  Neurologic: speech normal, strength & sensation intact bilaterally   Pertinent Labs & Imaging Studies     CBC:   Lab Results   Component Value Date/Time    WBC 15.5 11/19/2024 06:38 AM    RBC 2.76 11/19/2024 06:38 AM    HGB 8.1 11/19/2024 10:22 PM    HCT 24.8 11/19/2024 10:22 PM    MCV 92.4 11/19/2024 06:38 AM    MCH 30.8 11/19/2024 06:38 AM    MCHC 33.3 11/19/2024 06:38 AM    RDW 13.5 11/19/2024 06:38 AM     11/19/2024 06:38 AM    MPV 9.8 11/19/2024 06:38 AM     CMP:    Lab Results   Component Value Date/Time     11/19/2024 06:38 AM    K 4.6 11/19/2024 06:38 AM    K 4.3 08/07/2020 11:42 AM     11/19/2024 06:38 AM    CO2 29 11/19/2024 06:38 AM    BUN 15 11/19/2024 06:38 AM    CREATININE 0.9

## 2024-11-21 NOTE — CARE COORDINATION
11/21/24 Update CM Note: POD 3 Lami/fusion. Drain to be removed today. Patient ambulating in the [facility] with therapy today. The patient is approved by insurance to go to [facility] today. All paperwork is completed. Will await Dr Murphy to round to check on readiness to discharge. Electronically signed by Joan Corona RN CM on 11/21/2024 at 9:56 AM

## 2024-11-22 ENCOUNTER — HOSPITAL ENCOUNTER (INPATIENT)
Age: 69
LOS: 17 days | Discharge: NURSING, REHAB OR LONG-TERM CARE | DRG: 811 | End: 2024-12-09
Attending: EMERGENCY MEDICINE | Admitting: INTERNAL MEDICINE
Payer: MEDICARE

## 2024-11-22 ENCOUNTER — APPOINTMENT (OUTPATIENT)
Dept: GENERAL RADIOLOGY | Age: 69
DRG: 811 | End: 2024-11-22
Payer: MEDICARE

## 2024-11-22 ENCOUNTER — APPOINTMENT (OUTPATIENT)
Dept: CT IMAGING | Age: 69
DRG: 811 | End: 2024-11-22
Payer: MEDICARE

## 2024-11-22 DIAGNOSIS — D64.9 ANEMIA, UNSPECIFIED TYPE: ICD-10-CM

## 2024-11-22 DIAGNOSIS — M48.061 SPINAL STENOSIS AT L4-L5 LEVEL: Primary | ICD-10-CM

## 2024-11-22 DIAGNOSIS — J96.01 ACUTE HYPOXIC RESPIRATORY FAILURE: ICD-10-CM

## 2024-11-22 LAB
ALBUMIN SERPL-MCNC: 2.7 G/DL (ref 3.5–5.2)
ALP SERPL-CCNC: 80 U/L (ref 35–104)
ALT SERPL-CCNC: <5 U/L (ref 0–32)
ANION GAP SERPL CALCULATED.3IONS-SCNC: 8 MMOL/L (ref 7–16)
AST SERPL-CCNC: 21 U/L (ref 0–31)
B PARAP IS1001 DNA NPH QL NAA+NON-PROBE: NOT DETECTED
B PERT DNA SPEC QL NAA+PROBE: NOT DETECTED
B.E.: 5.8 MMOL/L (ref -3–3)
BACTERIA URNS QL MICRO: ABNORMAL
BASOPHILS # BLD: 0.03 K/UL (ref 0–0.2)
BASOPHILS NFR BLD: 0 % (ref 0–2)
BILIRUB SERPL-MCNC: 0.2 MG/DL (ref 0–1.2)
BILIRUB UR QL STRIP: NEGATIVE
BUN SERPL-MCNC: 12 MG/DL (ref 6–23)
C PNEUM DNA NPH QL NAA+NON-PROBE: NOT DETECTED
CALCIUM SERPL-MCNC: 8.5 MG/DL (ref 8.6–10.2)
CHLORIDE SERPL-SCNC: 97 MMOL/L (ref 98–107)
CLARITY UR: CLEAR
CO2 SERPL-SCNC: 32 MMOL/L (ref 22–29)
COHB: 0.4 % (ref 0–1.5)
COLOR UR: YELLOW
CREAT SERPL-MCNC: 0.7 MG/DL (ref 0.5–1)
CRITICAL: ABNORMAL
DATE ANALYZED: ABNORMAL
DATE OF COLLECTION: ABNORMAL
EOSINOPHIL # BLD: 0.11 K/UL (ref 0.05–0.5)
EOSINOPHILS RELATIVE PERCENT: 1 % (ref 0–6)
EPI CELLS #/AREA URNS HPF: ABNORMAL /HPF
ERYTHROCYTE [DISTWIDTH] IN BLOOD BY AUTOMATED COUNT: 13.6 % (ref 11.5–15)
FERRITIN SERPL-MCNC: 231 NG/ML
FLUAV RNA NPH QL NAA+NON-PROBE: NOT DETECTED
FLUBV RNA NPH QL NAA+NON-PROBE: NOT DETECTED
GFR, ESTIMATED: >90 ML/MIN/1.73M2
GLUCOSE SERPL-MCNC: 154 MG/DL (ref 74–99)
GLUCOSE UR STRIP-MCNC: NEGATIVE MG/DL
HADV DNA NPH QL NAA+NON-PROBE: NOT DETECTED
HCO3: 29.3 MMOL/L (ref 22–26)
HCOV 229E RNA NPH QL NAA+NON-PROBE: NOT DETECTED
HCOV HKU1 RNA NPH QL NAA+NON-PROBE: NOT DETECTED
HCOV NL63 RNA NPH QL NAA+NON-PROBE: NOT DETECTED
HCOV OC43 RNA NPH QL NAA+NON-PROBE: NOT DETECTED
HCT VFR BLD AUTO: 22.8 % (ref 34–48)
HGB BLD-MCNC: 7.2 G/DL (ref 11.5–15.5)
HGB UR QL STRIP.AUTO: ABNORMAL
HHB: 8.1 % (ref 0–5)
HMPV RNA NPH QL NAA+NON-PROBE: NOT DETECTED
HPIV1 RNA NPH QL NAA+NON-PROBE: NOT DETECTED
HPIV2 RNA NPH QL NAA+NON-PROBE: NOT DETECTED
HPIV3 RNA NPH QL NAA+NON-PROBE: NOT DETECTED
HPIV4 RNA NPH QL NAA+NON-PROBE: NOT DETECTED
IMM GRANULOCYTES # BLD AUTO: 0.09 K/UL (ref 0–0.58)
IMM GRANULOCYTES NFR BLD: 1 % (ref 0–5)
IRON SATN MFR SERPL: 10 % (ref 15–50)
IRON SERPL-MCNC: 20 UG/DL (ref 37–145)
KETONES UR STRIP-MCNC: ABNORMAL MG/DL
LAB: ABNORMAL
LACTATE BLDV-SCNC: 1.5 MMOL/L (ref 0.5–1.9)
LACTATE BLDV-SCNC: 1.5 MMOL/L (ref 0.5–1.9)
LEUKOCYTE ESTERASE UR QL STRIP: NEGATIVE
LYMPHOCYTES NFR BLD: 1.49 K/UL (ref 1.5–4)
LYMPHOCYTES RELATIVE PERCENT: 14 % (ref 20–42)
Lab: 1618
M PNEUMO DNA NPH QL NAA+NON-PROBE: NOT DETECTED
MAGNESIUM SERPL-MCNC: 1.9 MG/DL (ref 1.6–2.6)
MCH RBC QN AUTO: 30 PG (ref 26–35)
MCHC RBC AUTO-ENTMCNC: 31.6 G/DL (ref 32–34.5)
MCV RBC AUTO: 95 FL (ref 80–99.9)
METHB: 0 % (ref 0–1.5)
MODE: ABNORMAL
MONOCYTES NFR BLD: 1.08 K/UL (ref 0.1–0.95)
MONOCYTES NFR BLD: 10 % (ref 2–12)
NEUTROPHILS NFR BLD: 73 % (ref 43–80)
NEUTS SEG NFR BLD: 7.71 K/UL (ref 1.8–7.3)
NITRITE UR QL STRIP: NEGATIVE
O2 SATURATION: 91.9 % (ref 92–98.5)
O2HB: 91.5 % (ref 94–97)
OPERATOR ID: 5100
PATIENT TEMP: 37 C
PCO2: 38.3 MMHG (ref 35–45)
PH BLOOD GAS: 7.5 (ref 7.35–7.45)
PH UR STRIP: 8 [PH] (ref 5–9)
PLATELET # BLD AUTO: 229 K/UL (ref 130–450)
PMV BLD AUTO: 10.1 FL (ref 7–12)
PO2: 60.9 MMHG (ref 75–100)
POTASSIUM SERPL-SCNC: 4.3 MMOL/L (ref 3.5–5)
PROT SERPL-MCNC: 5.9 G/DL (ref 6.4–8.3)
PROT UR STRIP-MCNC: 30 MG/DL
RBC # BLD AUTO: 2.4 M/UL (ref 3.5–5.5)
RBC #/AREA URNS HPF: ABNORMAL /HPF
RSV RNA NPH QL NAA+NON-PROBE: NOT DETECTED
RV+EV RNA NPH QL NAA+NON-PROBE: NOT DETECTED
SARS-COV-2 RNA NPH QL NAA+NON-PROBE: NOT DETECTED
SODIUM SERPL-SCNC: 137 MMOL/L (ref 132–146)
SOURCE, BLOOD GAS: ABNORMAL
SP GR UR STRIP: 1.02 (ref 1–1.03)
SPECIMEN DESCRIPTION: NORMAL
THB: 7.9 G/DL (ref 11.5–16.5)
TIBC SERPL-MCNC: 208 UG/DL (ref 250–450)
TIME ANALYZED: 1624
TROPONIN I SERPL HS-MCNC: 16 NG/L (ref 0–9)
TROPONIN I SERPL HS-MCNC: 16 NG/L (ref 0–9)
UROBILINOGEN UR STRIP-ACNC: 0.2 EU/DL (ref 0–1)
WBC #/AREA URNS HPF: ABNORMAL /HPF
WBC OTHER # BLD: 10.5 K/UL (ref 4.5–11.5)

## 2024-11-22 PROCEDURE — 93005 ELECTROCARDIOGRAM TRACING: CPT | Performed by: EMERGENCY MEDICINE

## 2024-11-22 PROCEDURE — 84484 ASSAY OF TROPONIN QUANT: CPT

## 2024-11-22 PROCEDURE — 2580000003 HC RX 258: Performed by: RADIOLOGY

## 2024-11-22 PROCEDURE — 81001 URINALYSIS AUTO W/SCOPE: CPT

## 2024-11-22 PROCEDURE — 2580000003 HC RX 258

## 2024-11-22 PROCEDURE — 6370000000 HC RX 637 (ALT 250 FOR IP)

## 2024-11-22 PROCEDURE — 82728 ASSAY OF FERRITIN: CPT

## 2024-11-22 PROCEDURE — 85025 COMPLETE CBC W/AUTO DIFF WBC: CPT

## 2024-11-22 PROCEDURE — 71275 CT ANGIOGRAPHY CHEST: CPT

## 2024-11-22 PROCEDURE — 99285 EMERGENCY DEPT VISIT HI MDM: CPT

## 2024-11-22 PROCEDURE — 83605 ASSAY OF LACTIC ACID: CPT

## 2024-11-22 PROCEDURE — 1200000000 HC SEMI PRIVATE

## 2024-11-22 PROCEDURE — 80053 COMPREHEN METABOLIC PANEL: CPT

## 2024-11-22 PROCEDURE — 87086 URINE CULTURE/COLONY COUNT: CPT

## 2024-11-22 PROCEDURE — 2580000003 HC RX 258: Performed by: STUDENT IN AN ORGANIZED HEALTH CARE EDUCATION/TRAINING PROGRAM

## 2024-11-22 PROCEDURE — 6360000004 HC RX CONTRAST MEDICATION: Performed by: RADIOLOGY

## 2024-11-22 PROCEDURE — 82805 BLOOD GASES W/O2 SATURATION: CPT

## 2024-11-22 PROCEDURE — 0202U NFCT DS 22 TRGT SARS-COV-2: CPT

## 2024-11-22 PROCEDURE — 87040 BLOOD CULTURE FOR BACTERIA: CPT

## 2024-11-22 PROCEDURE — 71045 X-RAY EXAM CHEST 1 VIEW: CPT

## 2024-11-22 PROCEDURE — 72132 CT LUMBAR SPINE W/DYE: CPT

## 2024-11-22 PROCEDURE — 83735 ASSAY OF MAGNESIUM: CPT

## 2024-11-22 PROCEDURE — 6370000000 HC RX 637 (ALT 250 FOR IP): Performed by: STUDENT IN AN ORGANIZED HEALTH CARE EDUCATION/TRAINING PROGRAM

## 2024-11-22 PROCEDURE — 83540 ASSAY OF IRON: CPT

## 2024-11-22 PROCEDURE — 6360000002 HC RX W HCPCS: Performed by: STUDENT IN AN ORGANIZED HEALTH CARE EDUCATION/TRAINING PROGRAM

## 2024-11-22 PROCEDURE — 83550 IRON BINDING TEST: CPT

## 2024-11-22 RX ORDER — SENNOSIDES A AND B 8.6 MG/1
1 TABLET, FILM COATED ORAL DAILY PRN
Status: DISCONTINUED | OUTPATIENT
Start: 2024-11-22 | End: 2024-12-02

## 2024-11-22 RX ORDER — SODIUM CHLORIDE 9 MG/ML
INJECTION, SOLUTION INTRAVENOUS PRN
Status: DISCONTINUED | OUTPATIENT
Start: 2024-11-22 | End: 2024-12-09 | Stop reason: HOSPADM

## 2024-11-22 RX ORDER — SODIUM CHLORIDE 0.9 % (FLUSH) 0.9 %
10 SYRINGE (ML) INJECTION PRN
Status: DISCONTINUED | OUTPATIENT
Start: 2024-11-22 | End: 2024-12-09 | Stop reason: HOSPADM

## 2024-11-22 RX ORDER — BISACODYL 5 MG/1
5 TABLET, DELAYED RELEASE ORAL DAILY
Status: DISCONTINUED | OUTPATIENT
Start: 2024-11-22 | End: 2024-12-02

## 2024-11-22 RX ORDER — ONDANSETRON 4 MG/1
4 TABLET, ORALLY DISINTEGRATING ORAL EVERY 8 HOURS PRN
Status: DISCONTINUED | OUTPATIENT
Start: 2024-11-22 | End: 2024-12-09 | Stop reason: HOSPADM

## 2024-11-22 RX ORDER — SODIUM CHLORIDE 0.9 % (FLUSH) 0.9 %
10 SYRINGE (ML) INJECTION EVERY 12 HOURS SCHEDULED
Status: DISCONTINUED | OUTPATIENT
Start: 2024-11-22 | End: 2024-12-09 | Stop reason: HOSPADM

## 2024-11-22 RX ORDER — OXYCODONE HYDROCHLORIDE 5 MG/1
5 TABLET ORAL EVERY 8 HOURS PRN
Status: DISCONTINUED | OUTPATIENT
Start: 2024-11-22 | End: 2024-11-23

## 2024-11-22 RX ORDER — ACETAMINOPHEN 325 MG/1
650 TABLET ORAL EVERY 6 HOURS PRN
Status: DISCONTINUED | OUTPATIENT
Start: 2024-11-22 | End: 2024-12-09 | Stop reason: HOSPADM

## 2024-11-22 RX ORDER — ROSUVASTATIN CALCIUM 20 MG/1
20 TABLET, COATED ORAL DAILY
Status: DISCONTINUED | OUTPATIENT
Start: 2024-11-22 | End: 2024-12-09 | Stop reason: HOSPADM

## 2024-11-22 RX ORDER — CALCIUM CARBONATE 500(1250)
500 TABLET ORAL EVERY MORNING
Status: DISCONTINUED | OUTPATIENT
Start: 2024-11-23 | End: 2024-12-09 | Stop reason: HOSPADM

## 2024-11-22 RX ORDER — LOSARTAN POTASSIUM 25 MG/1
25 TABLET ORAL DAILY
Status: DISCONTINUED | OUTPATIENT
Start: 2024-11-22 | End: 2024-12-09 | Stop reason: HOSPADM

## 2024-11-22 RX ORDER — ONDANSETRON 2 MG/ML
4 INJECTION INTRAMUSCULAR; INTRAVENOUS EVERY 6 HOURS PRN
Status: DISCONTINUED | OUTPATIENT
Start: 2024-11-22 | End: 2024-12-09 | Stop reason: HOSPADM

## 2024-11-22 RX ORDER — IOPAMIDOL 755 MG/ML
75 INJECTION, SOLUTION INTRAVASCULAR
Status: COMPLETED | OUTPATIENT
Start: 2024-11-22 | End: 2024-11-22

## 2024-11-22 RX ORDER — METOPROLOL SUCCINATE 25 MG/1
25 TABLET, EXTENDED RELEASE ORAL NIGHTLY
Status: DISCONTINUED | OUTPATIENT
Start: 2024-11-22 | End: 2024-12-09 | Stop reason: HOSPADM

## 2024-11-22 RX ORDER — SUCRALFATE 1 G/1
1 TABLET ORAL
Status: DISCONTINUED | OUTPATIENT
Start: 2024-11-22 | End: 2024-12-09 | Stop reason: HOSPADM

## 2024-11-22 RX ORDER — CELECOXIB 100 MG/1
200 CAPSULE ORAL ONCE
Status: COMPLETED | OUTPATIENT
Start: 2024-11-22 | End: 2024-11-22

## 2024-11-22 RX ORDER — PANTOPRAZOLE SODIUM 40 MG/10ML
40 INJECTION, POWDER, LYOPHILIZED, FOR SOLUTION INTRAVENOUS 2 TIMES DAILY
Status: DISCONTINUED | OUTPATIENT
Start: 2024-11-22 | End: 2024-11-22 | Stop reason: SDUPTHER

## 2024-11-22 RX ORDER — DIVALPROEX SODIUM 125 MG/1
125 CAPSULE, COATED PELLETS ORAL EVERY 12 HOURS SCHEDULED
Status: DISCONTINUED | OUTPATIENT
Start: 2024-11-22 | End: 2024-12-03

## 2024-11-22 RX ORDER — ALBUTEROL SULFATE 90 UG/1
1 INHALANT RESPIRATORY (INHALATION) EVERY 4 HOURS PRN
Status: DISCONTINUED | OUTPATIENT
Start: 2024-11-22 | End: 2024-11-22 | Stop reason: CLARIF

## 2024-11-22 RX ORDER — ACETAMINOPHEN 650 MG/1
650 SUPPOSITORY RECTAL EVERY 6 HOURS PRN
Status: DISCONTINUED | OUTPATIENT
Start: 2024-11-22 | End: 2024-12-09 | Stop reason: HOSPADM

## 2024-11-22 RX ORDER — 0.9 % SODIUM CHLORIDE 0.9 %
1000 INTRAVENOUS SOLUTION INTRAVENOUS ONCE
Status: COMPLETED | OUTPATIENT
Start: 2024-11-22 | End: 2024-11-22

## 2024-11-22 RX ORDER — ALBUTEROL SULFATE 0.83 MG/ML
2.5 SOLUTION RESPIRATORY (INHALATION) EVERY 4 HOURS PRN
Status: DISCONTINUED | OUTPATIENT
Start: 2024-11-22 | End: 2024-12-09 | Stop reason: HOSPADM

## 2024-11-22 RX ORDER — PANTOPRAZOLE SODIUM 40 MG/10ML
40 INJECTION, POWDER, LYOPHILIZED, FOR SOLUTION INTRAVENOUS ONCE
Status: DISCONTINUED | OUTPATIENT
Start: 2024-11-22 | End: 2024-12-09 | Stop reason: HOSPADM

## 2024-11-22 RX ORDER — PREGABALIN 75 MG/1
75 CAPSULE ORAL 3 TIMES DAILY
Status: ON HOLD | COMMUNITY
Start: 2024-11-21 | End: 2024-11-26 | Stop reason: HOSPADM

## 2024-11-22 RX ORDER — PREGABALIN 75 MG/1
75 CAPSULE ORAL 3 TIMES DAILY
Status: DISCONTINUED | OUTPATIENT
Start: 2024-11-22 | End: 2024-11-23

## 2024-11-22 RX ADMIN — PREGABALIN 75 MG: 75 CAPSULE ORAL at 19:58

## 2024-11-22 RX ADMIN — IOPAMIDOL 75 ML: 755 INJECTION, SOLUTION INTRAVENOUS at 15:02

## 2024-11-22 RX ADMIN — ACETAMINOPHEN 650 MG: 325 TABLET ORAL at 22:46

## 2024-11-22 RX ADMIN — CELECOXIB 200 MG: 100 CAPSULE ORAL at 16:22

## 2024-11-22 RX ADMIN — DIVALPROEX SODIUM 125 MG: 125 CAPSULE, COATED PELLETS ORAL at 20:00

## 2024-11-22 RX ADMIN — METOPROLOL SUCCINATE 25 MG: 25 TABLET, EXTENDED RELEASE ORAL at 19:59

## 2024-11-22 RX ADMIN — SUCRALFATE 1 G: 1 TABLET ORAL at 21:21

## 2024-11-22 RX ADMIN — SODIUM CHLORIDE, PRESERVATIVE FREE 40 MG: 5 INJECTION INTRAVENOUS at 20:00

## 2024-11-22 RX ADMIN — SODIUM CHLORIDE, PRESERVATIVE FREE 10 ML: 5 INJECTION INTRAVENOUS at 20:00

## 2024-11-22 RX ADMIN — SODIUM CHLORIDE 1000 ML: 9 INJECTION, SOLUTION INTRAVENOUS at 12:39

## 2024-11-22 RX ADMIN — OXYCODONE HYDROCHLORIDE 5 MG: 5 TABLET ORAL at 19:59

## 2024-11-22 RX ADMIN — Medication 10 ML: at 15:03

## 2024-11-22 ASSESSMENT — PAIN DESCRIPTION - FREQUENCY: FREQUENCY: CONTINUOUS

## 2024-11-22 ASSESSMENT — PAIN SCALES - GENERAL
PAINLEVEL_OUTOF10: 6
PAINLEVEL_OUTOF10: 7
PAINLEVEL_OUTOF10: 3
PAINLEVEL_OUTOF10: 7

## 2024-11-22 ASSESSMENT — PAIN DESCRIPTION - PAIN TYPE
TYPE: ACUTE PAIN;SURGICAL PAIN
TYPE: ACUTE PAIN

## 2024-11-22 ASSESSMENT — PAIN DESCRIPTION - LOCATION
LOCATION: BACK

## 2024-11-22 ASSESSMENT — PAIN DESCRIPTION - DESCRIPTORS
DESCRIPTORS: TENDER
DESCRIPTORS: ACHING;DISCOMFORT;TENDER

## 2024-11-22 ASSESSMENT — PAIN - FUNCTIONAL ASSESSMENT
PAIN_FUNCTIONAL_ASSESSMENT: 0-10
PAIN_FUNCTIONAL_ASSESSMENT: PREVENTS OR INTERFERES SOME ACTIVE ACTIVITIES AND ADLS

## 2024-11-22 ASSESSMENT — PAIN DESCRIPTION - ORIENTATION
ORIENTATION: LOWER
ORIENTATION: LOWER

## 2024-11-22 ASSESSMENT — LIFESTYLE VARIABLES: HOW OFTEN DO YOU HAVE A DRINK CONTAINING ALCOHOL: NEVER

## 2024-11-22 NOTE — CONSULTS
General Surgery   Consult Note      Patient's Name/Date of Birth: Mignon Fischer / 1955    Date: November 22, 2024     PCP: Sandy Leiva MD     Chief Complaint:   Chief Complaint   Patient presents with    Altered Mental Status       HPI:   Mignon Fischer is a 69 y.o. female who presented today for back pain and extremity weakness. Pt was discharged yesterday after having a L2-S1 fusion and synovial cyst resection for L2-S1 canal stenosis and synovial cyst on 11/18 with Dr. Murphy.  Patient was admitted due to her back pain and hypoxia at nursing home. General surgery was consulted for concerns of GI bleed (Hgb 7.2).  Upon evaluation patient reports having mild fullness and lower abdominal pain 4/10.  Reports associated nausea and constipation (last bowel movement a week ago and was diarrheal which is unusual for her).  Denies SOB, hematochezia, melena, hematemesis, postprandial.  She is tolerating diet.  No personal or family history of gastric/colon cancer or IBD.  No chronic NSAID use or steroid use.  Not on any anticoagulants/antiplatelets.Last colonoscopy and EGD in 2016, patient does not remember results of her colonoscopy.  EGD showed moderate gastritis, negative H. pylori. Denies alcohol, drug and tobacco use.     PMHx includes DM, DVT, HLD, HTN, pancreatitis.  SHX includes cervical fusion, appendectomy,cholecystectomy 1999.     Patient is intermittently mildly tachycardic 104 but hemodynamically stable.  Labs reviewed showed lactate 1.5, no significant electrolyte abnormalities, LFTs WNL, WBC 6.5, Hgb 7.2 (BL 12), plts 229.     Patient Active Problem List   Diagnosis    Abnormal gait    Diabetes mellitus type II, controlled (HCC)    Vitreous floaters of both eyes    Idiopathic localized osteoarthropathy    Pancreatic pseudocyst    Blood glucose abnormal    Obstructive sleep apnea syndrome    Adiposity    Nausea    Strabismic amblyopia    Type 2 diabetes mellitus without complications (HCC)

## 2024-11-22 NOTE — ED PROVIDER NOTES
[facility] EMERGENCY DEPARTMENT  EMERGENCY DEPARTMENT ENCOUNTER        Pt Name: Mignon Fischer  MRN: [id number]  Birthdate 1955  Date of evaluation: 11/22/2024  Provider: Bruce Kramer MD  PCP: Sandy Leiva MD  Note Started: 11:41 AM EST 11/22/24    CHIEF COMPLAINT       Chief Complaint   Patient presents with    Altered Mental Status       HISTORY OF PRESENT ILLNESS: 1 or more Elements   History From: Patient, EMS and family    Limitations to history : None  Social Determinants : None    Mignon Fischer is a 69 y.o. female with a history of obstructive sleep apnea, type 2 diabetes, hyperlipidemia who had a laminectomy and fusion done on 11/18 with concerns for altered mentation from the nursing home rehabilitation facility.    As per the nursing home facility, she was having increased amount of pain on her back and she had decreased strength in her upper and lower extremities and she felt as if she was confused and not acting as her normal self.    As per nursing home reports, patient's oxycodone was increased from 5 to 10 mg today.  Additionally EMS to mention that patient was hypoxic to 77% and was placed on a nonrebreather.    Patient complains of low back pain however denies any fever, chills, nausea, vomiting, headache, dizziness, vision changes, neck tenderness or stiffness, weakness, chest pain, palpitations, leg swelling/tenderness, sob, cough, abdominal pain, dysuria, hematuria, diarrhea, constipation, bloody stools.    Nursing Notes were all reviewed and agreed with or any disagreements were addressed in the HPI.    ROS:   Pertinent positives and negatives are stated within HPI, all other systems reviewed and are negative.      --------------------------------------------- PAST HISTORY ---------------------------------------------  Past Medical History:       Diagnosis Date    Brain concussion     Diabetes mellitus (HCC)     Double vision with both eyes

## 2024-11-22 NOTE — ED NOTES
Pt verbal given RN Crystal for bed 8422A. Awaiting bed to be cleaned.   MEDICATIONS  (STANDING):  amLODIPine   Tablet 10 milliGRAM(s) Oral daily  ammonium lactate 12% Lotion 1 Application(s) Topical every 12 hours  atorvastatin 10 milliGRAM(s) Oral at bedtime  cefTRIAXone Injectable. 1000 milliGRAM(s) IV Push every 24 hours  citalopram 10 milliGRAM(s) Oral daily  doxazosin 2 milliGRAM(s) Oral <User Schedule>  heparin   Injectable 5000 Unit(s) SubCutaneous every 12 hours  sodium bicarbonate 650 milliGRAM(s) Oral three times a day    MEDICATIONS  (PRN):

## 2024-11-22 NOTE — CONSULTS
[facility]              [address] [place] [address]                              CONSULTATION      PATIENT NAME: MARLENI SESAY           : 1955  MED REC NO: [id number]                        ROOM: [facility]  ACCOUNT NO: [id number]                       ADMIT DATE: 2024  PROVIDER: Alberto Corrigan MD      CONSULT DATE: 2024    CHIEF COMPLAINT:  Inability to urinate.    HISTORY OF PRESENT ILLNESS:  This is a 69-year-old female who did not have urinary issues prior to her admission on .  She underwent surgical treatment for spinal fusion.  Postoperatively, she was unable to urinate and a Phoenix catheter has been inserted.  The patient is still not fully ambulating and she is being discharged to an extended care facility today.    PAST MEDICAL HISTORY:  Significant for diabetes mellitus, diplopia, brain concussion, history of lower extremity blood clots, hyperlipidemia, hypertension, lymphedema, pancreatitis, and pseudocyst of the pancreas.    PAST SURGICAL HISTORY:  Include appendectomy, cholecystectomy, cataract surgery, total knee arthroplasty, and her above-mentioned back surgery.    HOME MEDICATIONS:  Include oxycodone, Crestor, aspirin, Os-Chilo, Lyrica, Ventolin, Depakote, Dulcolax, Demadex, Toprol, Celebrex.    ALLERGIES:  LISTED AS LIPITOR, MEGACE, AND ZOCOR.      SOCIAL HISTORY:  The patient has never been a cigarette smoker.    PHYSICAL EXAMINATION:  GENERAL:  The patient is very alert, somewhat obese female, who is lying flat in bed.  She cannot really get out of bed without assistance.  ABDOMEN:  Soft.  There are no palpable organs or masses present.  She does have a Phoenix catheter in place.    IMPRESSION:  Urinary retention postoperatively.  The patient will be discharged to an extended care facility today.  We will follow her through the extended care facility.  Once she has recovered and has regained some ambulatory

## 2024-11-23 LAB
ABO + RH BLD: NORMAL
ALBUMIN SERPL-MCNC: 2.5 G/DL (ref 3.5–5.2)
ALP SERPL-CCNC: 66 U/L (ref 35–104)
ALT SERPL-CCNC: <5 U/L (ref 0–32)
ANION GAP SERPL CALCULATED.3IONS-SCNC: 6 MMOL/L (ref 7–16)
ARM BAND NUMBER: NORMAL
AST SERPL-CCNC: 19 U/L (ref 0–31)
BACTERIA URNS QL MICRO: ABNORMAL
BASOPHILS # BLD: 0.02 K/UL (ref 0–0.2)
BASOPHILS NFR BLD: 0 % (ref 0–2)
BILIRUB SERPL-MCNC: 0.3 MG/DL (ref 0–1.2)
BILIRUB UR QL STRIP: NEGATIVE
BLOOD BANK SAMPLE EXPIRATION: NORMAL
BLOOD GROUP ANTIBODIES SERPL: NEGATIVE
BUN SERPL-MCNC: 10 MG/DL (ref 6–23)
CALCIUM SERPL-MCNC: 8.2 MG/DL (ref 8.6–10.2)
CHLORIDE SERPL-SCNC: 100 MMOL/L (ref 98–107)
CLARITY UR: CLEAR
CO2 SERPL-SCNC: 32 MMOL/L (ref 22–29)
COLOR UR: YELLOW
CREAT SERPL-MCNC: 0.7 MG/DL (ref 0.5–1)
EOSINOPHIL # BLD: 0.17 K/UL (ref 0.05–0.5)
EOSINOPHILS RELATIVE PERCENT: 2 % (ref 0–6)
ERYTHROCYTE [DISTWIDTH] IN BLOOD BY AUTOMATED COUNT: 13.7 % (ref 11.5–15)
GFR, ESTIMATED: >90 ML/MIN/1.73M2
GLUCOSE SERPL-MCNC: 144 MG/DL (ref 74–99)
GLUCOSE UR STRIP-MCNC: NEGATIVE MG/DL
HCT VFR BLD AUTO: 20.8 % (ref 34–48)
HCT VFR BLD AUTO: 24.7 % (ref 34–48)
HGB BLD-MCNC: 6.6 G/DL (ref 11.5–15.5)
HGB BLD-MCNC: 7.9 G/DL (ref 11.5–15.5)
HGB UR QL STRIP.AUTO: ABNORMAL
IMM GRANULOCYTES # BLD AUTO: 0.12 K/UL (ref 0–0.58)
IMM GRANULOCYTES NFR BLD: 1 % (ref 0–5)
KETONES UR STRIP-MCNC: NEGATIVE MG/DL
LEUKOCYTE ESTERASE UR QL STRIP: NEGATIVE
LYMPHOCYTES NFR BLD: 1.9 K/UL (ref 1.5–4)
LYMPHOCYTES RELATIVE PERCENT: 20 % (ref 20–42)
MCH RBC QN AUTO: 30.3 PG (ref 26–35)
MCHC RBC AUTO-ENTMCNC: 31.7 G/DL (ref 32–34.5)
MCV RBC AUTO: 95.4 FL (ref 80–99.9)
MICROORGANISM SPEC CULT: NO GROWTH
MONOCYTES NFR BLD: 1.15 K/UL (ref 0.1–0.95)
MONOCYTES NFR BLD: 12 % (ref 2–12)
NEUTROPHILS NFR BLD: 65 % (ref 43–80)
NEUTS SEG NFR BLD: 6.11 K/UL (ref 1.8–7.3)
NITRITE UR QL STRIP: NEGATIVE
PH UR STRIP: 8.5 [PH] (ref 5–9)
PLATELET # BLD AUTO: 221 K/UL (ref 130–450)
PMV BLD AUTO: 9.7 FL (ref 7–12)
POTASSIUM SERPL-SCNC: 3.9 MMOL/L (ref 3.5–5)
PROT SERPL-MCNC: 5.3 G/DL (ref 6.4–8.3)
PROT UR STRIP-MCNC: 30 MG/DL
RBC # BLD AUTO: 2.18 M/UL (ref 3.5–5.5)
RBC #/AREA URNS HPF: ABNORMAL /HPF
SODIUM SERPL-SCNC: 138 MMOL/L (ref 132–146)
SP GR UR STRIP: 1.02 (ref 1–1.03)
SPECIMEN DESCRIPTION: NORMAL
UROBILINOGEN UR STRIP-ACNC: 0.2 EU/DL (ref 0–1)
WBC #/AREA URNS HPF: ABNORMAL /HPF
WBC OTHER # BLD: 9.5 K/UL (ref 4.5–11.5)

## 2024-11-23 PROCEDURE — 85025 COMPLETE CBC W/AUTO DIFF WBC: CPT

## 2024-11-23 PROCEDURE — 2700000000 HC OXYGEN THERAPY PER DAY

## 2024-11-23 PROCEDURE — 86901 BLOOD TYPING SEROLOGIC RH(D): CPT

## 2024-11-23 PROCEDURE — 6360000002 HC RX W HCPCS: Performed by: STUDENT IN AN ORGANIZED HEALTH CARE EDUCATION/TRAINING PROGRAM

## 2024-11-23 PROCEDURE — 6370000000 HC RX 637 (ALT 250 FOR IP): Performed by: NEUROLOGICAL SURGERY

## 2024-11-23 PROCEDURE — 85014 HEMATOCRIT: CPT

## 2024-11-23 PROCEDURE — 36430 TRANSFUSION BLD/BLD COMPNT: CPT

## 2024-11-23 PROCEDURE — 30233N1 TRANSFUSION OF NONAUTOLOGOUS RED BLOOD CELLS INTO PERIPHERAL VEIN, PERCUTANEOUS APPROACH: ICD-10-PCS | Performed by: NEUROLOGICAL SURGERY

## 2024-11-23 PROCEDURE — 80053 COMPREHEN METABOLIC PANEL: CPT

## 2024-11-23 PROCEDURE — 86923 COMPATIBILITY TEST ELECTRIC: CPT

## 2024-11-23 PROCEDURE — P9016 RBC LEUKOCYTES REDUCED: HCPCS

## 2024-11-23 PROCEDURE — 2580000003 HC RX 258: Performed by: STUDENT IN AN ORGANIZED HEALTH CARE EDUCATION/TRAINING PROGRAM

## 2024-11-23 PROCEDURE — 85018 HEMOGLOBIN: CPT

## 2024-11-23 PROCEDURE — 6370000000 HC RX 637 (ALT 250 FOR IP): Performed by: INTERNAL MEDICINE

## 2024-11-23 PROCEDURE — 1200000000 HC SEMI PRIVATE

## 2024-11-23 PROCEDURE — 6370000000 HC RX 637 (ALT 250 FOR IP)

## 2024-11-23 PROCEDURE — 86900 BLOOD TYPING SEROLOGIC ABO: CPT

## 2024-11-23 PROCEDURE — 86850 RBC ANTIBODY SCREEN: CPT

## 2024-11-23 PROCEDURE — 36415 COLL VENOUS BLD VENIPUNCTURE: CPT

## 2024-11-23 PROCEDURE — 6370000000 HC RX 637 (ALT 250 FOR IP): Performed by: STUDENT IN AN ORGANIZED HEALTH CARE EDUCATION/TRAINING PROGRAM

## 2024-11-23 RX ORDER — SODIUM CHLORIDE 9 MG/ML
INJECTION, SOLUTION INTRAVENOUS PRN
Status: DISCONTINUED | OUTPATIENT
Start: 2024-11-23 | End: 2024-12-02

## 2024-11-23 RX ORDER — OXYCODONE HYDROCHLORIDE 5 MG/1
5 TABLET ORAL EVERY 6 HOURS PRN
Status: DISCONTINUED | OUTPATIENT
Start: 2024-11-23 | End: 2024-11-23

## 2024-11-23 RX ORDER — DIAZEPAM 5 MG/1
5 TABLET ORAL EVERY 6 HOURS PRN
Status: DISCONTINUED | OUTPATIENT
Start: 2024-11-23 | End: 2024-12-02

## 2024-11-23 RX ORDER — QUETIAPINE FUMARATE 25 MG/1
25 TABLET, FILM COATED ORAL 2 TIMES DAILY
Status: DISCONTINUED | OUTPATIENT
Start: 2024-11-23 | End: 2024-11-24

## 2024-11-23 RX ORDER — OXYCODONE HYDROCHLORIDE 10 MG/1
10 TABLET ORAL EVERY 4 HOURS PRN
Status: DISCONTINUED | OUTPATIENT
Start: 2024-11-23 | End: 2024-11-30

## 2024-11-23 RX ORDER — LIDOCAINE 4 G/G
1 PATCH TOPICAL DAILY
Status: DISCONTINUED | OUTPATIENT
Start: 2024-11-23 | End: 2024-12-09 | Stop reason: HOSPADM

## 2024-11-23 RX ADMIN — OXYCODONE HYDROCHLORIDE 10 MG: 10 TABLET ORAL at 19:32

## 2024-11-23 RX ADMIN — QUETIAPINE FUMARATE 25 MG: 25 TABLET ORAL at 19:33

## 2024-11-23 RX ADMIN — SUCRALFATE 1 G: 1 TABLET ORAL at 19:33

## 2024-11-23 RX ADMIN — METOPROLOL SUCCINATE 25 MG: 25 TABLET, EXTENDED RELEASE ORAL at 19:33

## 2024-11-23 RX ADMIN — OXYCODONE HYDROCHLORIDE 5 MG: 5 TABLET ORAL at 04:24

## 2024-11-23 RX ADMIN — SODIUM CHLORIDE, PRESERVATIVE FREE 10 ML: 5 INJECTION INTRAVENOUS at 16:25

## 2024-11-23 RX ADMIN — OXYCODONE HYDROCHLORIDE 10 MG: 10 TABLET ORAL at 09:57

## 2024-11-23 RX ADMIN — SUCRALFATE 1 G: 1 TABLET ORAL at 10:00

## 2024-11-23 RX ADMIN — CALCIUM 500 MG: 500 TABLET ORAL at 10:00

## 2024-11-23 RX ADMIN — DIAZEPAM 5 MG: 5 TABLET ORAL at 22:40

## 2024-11-23 RX ADMIN — QUETIAPINE FUMARATE 25 MG: 25 TABLET ORAL at 10:00

## 2024-11-23 RX ADMIN — BISACODYL 5 MG: 5 TABLET, COATED ORAL at 09:59

## 2024-11-23 RX ADMIN — DIVALPROEX SODIUM 125 MG: 125 CAPSULE, COATED PELLETS ORAL at 19:33

## 2024-11-23 RX ADMIN — SUCRALFATE 1 G: 1 TABLET ORAL at 05:25

## 2024-11-23 RX ADMIN — ACETAMINOPHEN 650 MG: 325 TABLET ORAL at 23:35

## 2024-11-23 RX ADMIN — SODIUM CHLORIDE, PRESERVATIVE FREE 10 ML: 5 INJECTION INTRAVENOUS at 10:10

## 2024-11-23 RX ADMIN — OXYCODONE HYDROCHLORIDE 10 MG: 10 TABLET ORAL at 23:36

## 2024-11-23 RX ADMIN — SODIUM CHLORIDE, PRESERVATIVE FREE 40 MG: 5 INJECTION INTRAVENOUS at 19:33

## 2024-11-23 RX ADMIN — ROSUVASTATIN 20 MG: 20 TABLET, FILM COATED ORAL at 10:00

## 2024-11-23 RX ADMIN — SODIUM CHLORIDE, PRESERVATIVE FREE 40 MG: 5 INJECTION INTRAVENOUS at 10:00

## 2024-11-23 RX ADMIN — DIAZEPAM 5 MG: 5 TABLET ORAL at 10:39

## 2024-11-23 RX ADMIN — LOSARTAN POTASSIUM 25 MG: 25 TABLET, FILM COATED ORAL at 09:59

## 2024-11-23 RX ADMIN — SODIUM CHLORIDE, PRESERVATIVE FREE 10 ML: 5 INJECTION INTRAVENOUS at 19:34

## 2024-11-23 RX ADMIN — DIVALPROEX SODIUM 125 MG: 125 CAPSULE, COATED PELLETS ORAL at 10:00

## 2024-11-23 ASSESSMENT — PAIN DESCRIPTION - DESCRIPTORS
DESCRIPTORS: SHOOTING;DISCOMFORT;THROBBING
DESCRIPTORS: ACHING;DISCOMFORT;TENDER

## 2024-11-23 ASSESSMENT — PAIN DESCRIPTION - PAIN TYPE: TYPE: ACUTE PAIN;SURGICAL PAIN

## 2024-11-23 ASSESSMENT — PAIN SCALES - GENERAL
PAINLEVEL_OUTOF10: 6
PAINLEVEL_OUTOF10: 9
PAINLEVEL_OUTOF10: 5
PAINLEVEL_OUTOF10: 8
PAINLEVEL_OUTOF10: 8

## 2024-11-23 ASSESSMENT — PAIN DESCRIPTION - ORIENTATION: ORIENTATION: RIGHT;LEFT

## 2024-11-23 ASSESSMENT — PAIN - FUNCTIONAL ASSESSMENT: PAIN_FUNCTIONAL_ASSESSMENT: PREVENTS OR INTERFERES SOME ACTIVE ACTIVITIES AND ADLS

## 2024-11-23 ASSESSMENT — PAIN DESCRIPTION - LOCATION
LOCATION: BACK;LEG
LOCATION: BACK

## 2024-11-23 ASSESSMENT — PAIN DESCRIPTION - FREQUENCY: FREQUENCY: CONTINUOUS

## 2024-11-23 NOTE — CONSULTS
[address] [place] [address]                              CONSULTATION      PATIENT NAME: MARLENI SESAY           : 1955  MED REC NO: [id number]                        ROOM: [id number]  ACCOUNT NO: [id number]                       ADMIT DATE: 2024  PROVIDER: Kalyani Murphy MD    NEUROSURGERY CONSULT    CONSULT DATE: 2024      REASON FOR CONSULTATION:  Postoperative back pain.    HISTORY OF PRESENT ILLNESS:  The patient is a 69-year-old lady who is known to my service.  She recently underwent lumbar decompression and fusion on Monday and was subsequently discharged to rehab.  While at rehab, she was found to be hypoxic.  She had altered mental status.  She was also found to be anemic and was subsequently transferred back to [facility] for further evaluation and management.  At this time, she has complained of back pain and generalized weakness.    PAST MEDICAL HISTORY:  Positive for diabetes, hyperlipidemia, and hypertension.    PAST SURGICAL HISTORY:  Positive for cervical fusion, lumbar fusion, and appendectomy.    FAMILY HISTORY:  Positive for diabetes in her mother.    SOCIAL HISTORY:  Negative for tobacco or alcohol use.    ALLERGIES:  INCLUDE LIPITOR, MEGACE, VYTORIN, AND ZOCOR.      REVIEW OF SYSTEMS:  Fourteen-point review of systems is positive for back pain.    PHYSICAL EXAMINATION:  VITAL SIGNS:  She is currently afebrile with a T-current of 36.9 degrees Celsius, pulse 89, respiratory rate is 18, and blood pressure is 98/52.  GENERAL:  She is resting in bed, appears to be in mild-to-moderate distress secondary to pain.  She appears her stated age.  HEENT:  Her head is normocephalic and atraumatic.  Pupils are 3 to 2 mm and reactive.  She has no drainage out of her eyes, ears, nose or throat.  SKIN:  Warm and dry.  MUSCULOSKELETAL:  She has good range of motion in her bilateral upper and lower

## 2024-11-23 NOTE — H&P
Internal Medicine History & Physical     Name: Mignon Fischer  : 1955  Chief Complaint: Altered Mental Status  Primary Care Physician: Sandy Leiva MD  Admission date: 2024  Date of service: 2024     History of Present Illness  Mignon is a 69 y.o. year old female with a PMH as below who presented with a chief complaint of  AMS, hypoxia. She is S/P  L2-S1 fusion and synovial cyst resection for L2-S1 canal stenosis and synovial cyst on  with Dr. Murphy. She has hx sig for LIBRA.       In the ED viral panel was neg. She was iron defi anemic, Surg consulted. Transfused PRBC. She was slightly alkolotic on ABG, and hypoxic. Imaging showed no PE, but she did have atelectasis.     The patient was admitted for AMS, associated with back pain and hypoxia at nursing home. (Oxycodone had been increased at facility to 10 mg).             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)    Vitreous floaters of both eyes        Past Surgical History:   Procedure Laterality Date    APPENDECTOMY      CERVICAL FUSION Right 2024    C-4 -C5, C5-C6, and C6- C7 ANTERIOR CERVICAL DISCECTOMY AND  FUSION performed by Kalyani Murphy MD at [facility] OR    CHOLECYSTECTOMY      COLONOSCOPY  2016    EYE SURGERY Left     Cataract    EYE SURGERY Right     Cataract    GALLBLADDER SURGERY      KNEE ARTHROSCOPY Right     LUMBAR SPINE SURGERY N/A 2024    Posterior lumbar two to sacral one laminectomy and fusion performed by Kalyani Murphy MD at [facility] OR    TOTAL KNEE ARTHROPLASTY Right 2024    ROBOTIC LISA ASSISTED RIGHT KNEE TOTAL ARTHROPLASTY   +++ELOISA+++  ++PNB++ performed by Alberto Campbell MD at [facility] OR    [facility]

## 2024-11-23 NOTE — PLAN OF CARE
Problem: Safety - Adult  Goal: Free from fall injury  Outcome: Progressing     Problem: Chronic Conditions and Co-morbidities  Goal: Patient's chronic conditions and co-morbidity symptoms are monitored and maintained or improved  Outcome: Progressing     Problem: Pain  Goal: Verbalizes/displays adequate comfort level or baseline comfort level  Outcome: Progressing

## 2024-11-23 NOTE — PLAN OF CARE
Problem: Safety - Adult  Goal: Free from fall injury  11/23/2024 1147 by Lizette Soto RN  Outcome: Progressing     Problem: Chronic Conditions and Co-morbidities  Goal: Patient's chronic conditions and co-morbidity symptoms are monitored and maintained or improved  11/23/2024 1147 by Lizette Soto RN  Outcome: Progressing     Problem: Pain  Goal: Verbalizes/displays adequate comfort level or baseline comfort level  11/23/2024 1147 by Lizette Soto RN  Outcome: Progressing     Problem: Skin/Tissue Integrity  Goal: Absence of new skin breakdown  Description: 1.  Monitor for areas of redness and/or skin breakdown  2.  Assess vascular access sites hourly  3.  Every 4-6 hours minimum:  Change oxygen saturation probe site  4.  Every 4-6 hours:  If on nasal continuous positive airway pressure, respiratory therapy assess nares and determine need for appliance change or resting period.  Outcome: Progressing

## 2024-11-24 ENCOUNTER — APPOINTMENT (OUTPATIENT)
Dept: CT IMAGING | Age: 69
DRG: 811 | End: 2024-11-24
Payer: MEDICARE

## 2024-11-24 ENCOUNTER — APPOINTMENT (OUTPATIENT)
Dept: GENERAL RADIOLOGY | Age: 69
DRG: 811 | End: 2024-11-24
Payer: MEDICARE

## 2024-11-24 LAB
ABO/RH: NORMAL
ALBUMIN SERPL-MCNC: 2.6 G/DL (ref 3.5–5.2)
ALP SERPL-CCNC: 68 U/L (ref 35–104)
ALT SERPL-CCNC: 9 U/L (ref 0–32)
ANION GAP SERPL CALCULATED.3IONS-SCNC: 10 MMOL/L (ref 7–16)
ANTIBODY SCREEN: NEGATIVE
ARM BAND NUMBER: NORMAL
AST SERPL-CCNC: 23 U/L (ref 0–31)
BASOPHILS # BLD: 0.03 K/UL (ref 0–0.2)
BASOPHILS NFR BLD: 0 % (ref 0–2)
BILIRUB SERPL-MCNC: 0.4 MG/DL (ref 0–1.2)
BLOOD BANK BLOOD PRODUCT EXPIRATION DATE: NORMAL
BLOOD BANK DISPENSE STATUS: NORMAL
BLOOD BANK ISBT PRODUCT BLOOD TYPE: 9500
BLOOD BANK PRODUCT CODE: NORMAL
BLOOD BANK SAMPLE EXPIRATION: NORMAL
BLOOD BANK UNIT TYPE AND RH: NORMAL
BPU ID: NORMAL
BUN SERPL-MCNC: 8 MG/DL (ref 6–23)
CALCIUM SERPL-MCNC: 8.4 MG/DL (ref 8.6–10.2)
CHLORIDE SERPL-SCNC: 101 MMOL/L (ref 98–107)
CO2 SERPL-SCNC: 27 MMOL/L (ref 22–29)
COMPONENT: NORMAL
CREAT SERPL-MCNC: 0.7 MG/DL (ref 0.5–1)
CROSSMATCH RESULT: NORMAL
EOSINOPHIL # BLD: 0.27 K/UL (ref 0.05–0.5)
EOSINOPHILS RELATIVE PERCENT: 3 % (ref 0–6)
ERYTHROCYTE [DISTWIDTH] IN BLOOD BY AUTOMATED COUNT: 14.8 % (ref 11.5–15)
GFR, ESTIMATED: >90 ML/MIN/1.73M2
GLUCOSE BLD-MCNC: 134 MG/DL (ref 74–99)
GLUCOSE SERPL-MCNC: 166 MG/DL (ref 74–99)
HCT VFR BLD AUTO: 23.2 % (ref 34–48)
HCT VFR BLD AUTO: 23.5 % (ref 34–48)
HCT VFR BLD AUTO: 23.9 % (ref 34–48)
HCT VFR BLD AUTO: 24.7 % (ref 34–48)
HGB BLD-MCNC: 7.4 G/DL (ref 11.5–15.5)
HGB BLD-MCNC: 7.4 G/DL (ref 11.5–15.5)
HGB BLD-MCNC: 7.7 G/DL (ref 11.5–15.5)
HGB BLD-MCNC: 7.8 G/DL (ref 11.5–15.5)
IMM GRANULOCYTES # BLD AUTO: 0.23 K/UL (ref 0–0.58)
IMM GRANULOCYTES NFR BLD: 2 % (ref 0–5)
LYMPHOCYTES NFR BLD: 2.09 K/UL (ref 1.5–4)
LYMPHOCYTES RELATIVE PERCENT: 19 % (ref 20–42)
MCH RBC QN AUTO: 29.2 PG (ref 26–35)
MCHC RBC AUTO-ENTMCNC: 31.9 G/DL (ref 32–34.5)
MCV RBC AUTO: 91.7 FL (ref 80–99.9)
MONOCYTES NFR BLD: 1.48 K/UL (ref 0.1–0.95)
MONOCYTES NFR BLD: 14 % (ref 2–12)
NEUTROPHILS NFR BLD: 62 % (ref 43–80)
NEUTS SEG NFR BLD: 6.78 K/UL (ref 1.8–7.3)
PLATELET # BLD AUTO: 258 K/UL (ref 130–450)
PMV BLD AUTO: 9.4 FL (ref 7–12)
POTASSIUM SERPL-SCNC: 3.9 MMOL/L (ref 3.5–5)
PROT SERPL-MCNC: 5.7 G/DL (ref 6.4–8.3)
RBC # BLD AUTO: 2.53 M/UL (ref 3.5–5.5)
SODIUM SERPL-SCNC: 138 MMOL/L (ref 132–146)
TRANSFUSION STATUS: NORMAL
UNIT DIVISION: 0
UNIT ISSUE DATE/TIME: NORMAL
WBC OTHER # BLD: 10.9 K/UL (ref 4.5–11.5)

## 2024-11-24 PROCEDURE — 6360000002 HC RX W HCPCS: Performed by: PSYCHIATRY & NEUROLOGY

## 2024-11-24 PROCEDURE — 6370000000 HC RX 637 (ALT 250 FOR IP): Performed by: STUDENT IN AN ORGANIZED HEALTH CARE EDUCATION/TRAINING PROGRAM

## 2024-11-24 PROCEDURE — 85014 HEMATOCRIT: CPT

## 2024-11-24 PROCEDURE — 6370000000 HC RX 637 (ALT 250 FOR IP): Performed by: PSYCHIATRY & NEUROLOGY

## 2024-11-24 PROCEDURE — 2700000000 HC OXYGEN THERAPY PER DAY

## 2024-11-24 PROCEDURE — 85018 HEMOGLOBIN: CPT

## 2024-11-24 PROCEDURE — 6370000000 HC RX 637 (ALT 250 FOR IP): Performed by: NEUROLOGICAL SURGERY

## 2024-11-24 PROCEDURE — 80053 COMPREHEN METABOLIC PANEL: CPT

## 2024-11-24 PROCEDURE — 1200000000 HC SEMI PRIVATE

## 2024-11-24 PROCEDURE — 2580000003 HC RX 258: Performed by: STUDENT IN AN ORGANIZED HEALTH CARE EDUCATION/TRAINING PROGRAM

## 2024-11-24 PROCEDURE — 70450 CT HEAD/BRAIN W/O DYE: CPT

## 2024-11-24 PROCEDURE — 6370000000 HC RX 637 (ALT 250 FOR IP)

## 2024-11-24 PROCEDURE — 82947 ASSAY GLUCOSE BLOOD QUANT: CPT

## 2024-11-24 PROCEDURE — 71045 X-RAY EXAM CHEST 1 VIEW: CPT

## 2024-11-24 PROCEDURE — 6370000000 HC RX 637 (ALT 250 FOR IP): Performed by: INTERNAL MEDICINE

## 2024-11-24 PROCEDURE — 85025 COMPLETE CBC W/AUTO DIFF WBC: CPT

## 2024-11-24 PROCEDURE — 36415 COLL VENOUS BLD VENIPUNCTURE: CPT

## 2024-11-24 PROCEDURE — 6360000002 HC RX W HCPCS: Performed by: STUDENT IN AN ORGANIZED HEALTH CARE EDUCATION/TRAINING PROGRAM

## 2024-11-24 RX ORDER — BACLOFEN 10 MG/1
10 TABLET ORAL 3 TIMES DAILY
Status: DISCONTINUED | OUTPATIENT
Start: 2024-11-24 | End: 2024-11-25

## 2024-11-24 RX ORDER — GABAPENTIN 300 MG/1
300 CAPSULE ORAL 3 TIMES DAILY
Status: DISCONTINUED | OUTPATIENT
Start: 2024-11-24 | End: 2024-12-02

## 2024-11-24 RX ORDER — PROMETHAZINE HYDROCHLORIDE 25 MG/ML
6.25 INJECTION, SOLUTION INTRAMUSCULAR; INTRAVENOUS EVERY 8 HOURS
Status: DISPENSED | OUTPATIENT
Start: 2024-11-24 | End: 2024-11-26

## 2024-11-24 RX ORDER — KETOROLAC TROMETHAMINE 30 MG/ML
30 INJECTION, SOLUTION INTRAMUSCULAR; INTRAVENOUS EVERY 8 HOURS
Status: DISPENSED | OUTPATIENT
Start: 2024-11-24 | End: 2024-11-27

## 2024-11-24 RX ORDER — DULOXETIN HYDROCHLORIDE 60 MG/1
60 CAPSULE, DELAYED RELEASE ORAL DAILY
Status: DISCONTINUED | OUTPATIENT
Start: 2024-11-25 | End: 2024-12-09 | Stop reason: HOSPADM

## 2024-11-24 RX ADMIN — BISACODYL 5 MG: 5 TABLET, COATED ORAL at 08:50

## 2024-11-24 RX ADMIN — SUCRALFATE 1 G: 1 TABLET ORAL at 05:45

## 2024-11-24 RX ADMIN — CALCIUM 500 MG: 500 TABLET ORAL at 08:50

## 2024-11-24 RX ADMIN — SODIUM CHLORIDE, PRESERVATIVE FREE 40 MG: 5 INJECTION INTRAVENOUS at 08:50

## 2024-11-24 RX ADMIN — ROSUVASTATIN 20 MG: 20 TABLET, FILM COATED ORAL at 08:50

## 2024-11-24 RX ADMIN — KETOROLAC TROMETHAMINE 30 MG: 30 INJECTION, SOLUTION INTRAMUSCULAR at 23:29

## 2024-11-24 RX ADMIN — DIVALPROEX SODIUM 125 MG: 125 CAPSULE, COATED PELLETS ORAL at 19:58

## 2024-11-24 RX ADMIN — LOSARTAN POTASSIUM 25 MG: 25 TABLET, FILM COATED ORAL at 08:50

## 2024-11-24 RX ADMIN — DIVALPROEX SODIUM 125 MG: 125 CAPSULE, COATED PELLETS ORAL at 08:50

## 2024-11-24 RX ADMIN — ACETAMINOPHEN 650 MG: 325 TABLET ORAL at 05:44

## 2024-11-24 RX ADMIN — GABAPENTIN 300 MG: 300 CAPSULE ORAL at 23:28

## 2024-11-24 RX ADMIN — SODIUM CHLORIDE, PRESERVATIVE FREE 10 ML: 5 INJECTION INTRAVENOUS at 08:51

## 2024-11-24 RX ADMIN — SUCRALFATE 1 G: 1 TABLET ORAL at 19:59

## 2024-11-24 RX ADMIN — SODIUM CHLORIDE, PRESERVATIVE FREE 40 MG: 5 INJECTION INTRAVENOUS at 19:59

## 2024-11-24 RX ADMIN — OXYCODONE HYDROCHLORIDE 10 MG: 10 TABLET ORAL at 19:59

## 2024-11-24 RX ADMIN — SODIUM CHLORIDE, PRESERVATIVE FREE 10 ML: 5 INJECTION INTRAVENOUS at 20:00

## 2024-11-24 RX ADMIN — BACLOFEN 10 MG: 10 TABLET ORAL at 23:28

## 2024-11-24 RX ADMIN — OXYCODONE HYDROCHLORIDE 10 MG: 10 TABLET ORAL at 04:00

## 2024-11-24 RX ADMIN — METOPROLOL SUCCINATE 25 MG: 25 TABLET, EXTENDED RELEASE ORAL at 19:59

## 2024-11-24 ASSESSMENT — PAIN DESCRIPTION - LOCATION: LOCATION: BACK

## 2024-11-24 ASSESSMENT — PAIN SCALES - GENERAL
PAINLEVEL_OUTOF10: 5
PAINLEVEL_OUTOF10: 10
PAINLEVEL_OUTOF10: 10
PAINLEVEL_OUTOF10: 3

## 2024-11-24 ASSESSMENT — PAIN DESCRIPTION - ORIENTATION: ORIENTATION: RIGHT;LEFT

## 2024-11-24 ASSESSMENT — PAIN DESCRIPTION - DESCRIPTORS: DESCRIPTORS: ACHING;DISCOMFORT;TENDER

## 2024-11-24 ASSESSMENT — PAIN DESCRIPTION - PAIN TYPE: TYPE: ACUTE PAIN;SURGICAL PAIN

## 2024-11-24 ASSESSMENT — PAIN - FUNCTIONAL ASSESSMENT: PAIN_FUNCTIONAL_ASSESSMENT: PREVENTS OR INTERFERES SOME ACTIVE ACTIVITIES AND ADLS

## 2024-11-24 NOTE — PLAN OF CARE
Problem: Safety - Adult  Goal: Free from fall injury  11/23/2024 2208 by Joo Kwon RN  Outcome: Progressing  11/23/2024 1147 by Lizette Soto RN  Outcome: Progressing     Problem: Chronic Conditions and Co-morbidities  Goal: Patient's chronic conditions and co-morbidity symptoms are monitored and maintained or improved  11/23/2024 2208 by Joo Kwon RN  Outcome: Progressing  11/23/2024 1147 by Lizette Soto RN  Outcome: Progressing     Problem: Pain  Goal: Verbalizes/displays adequate comfort level or baseline comfort level  11/23/2024 2208 by Joo Kwon RN  Outcome: Progressing  11/23/2024 1147 by Lizette Soto RN  Outcome: Progressing

## 2024-11-24 NOTE — PLAN OF CARE
Problem: Safety - Adult  Goal: Free from fall injury  11/24/2024 1200 by Teresa Seay RN  Outcome: Progressing  11/23/2024 2208 by Joo Kwon RN  Outcome: Progressing     Problem: Chronic Conditions and Co-morbidities  Goal: Patient's chronic conditions and co-morbidity symptoms are monitored and maintained or improved  11/24/2024 1200 by Teresa Seay RN  Outcome: Progressing  11/23/2024 2208 by Joo Kwon RN  Outcome: Progressing     Problem: Pain  Goal: Verbalizes/displays adequate comfort level or baseline comfort level  11/24/2024 1200 by Teresa Seay RN  Outcome: Progressing  11/23/2024 2208 by Joo Kwon RN  Outcome: Progressing     Problem: Skin/Tissue Integrity  Goal: Absence of new skin breakdown  Description: 1.  Monitor for areas of redness and/or skin breakdown  2.  Assess vascular access sites hourly  3.  Every 4-6 hours minimum:  Change oxygen saturation probe site  4.  Every 4-6 hours:  If on nasal continuous positive airway pressure, respiratory therapy assess nares and determine need for appliance change or resting period.  Outcome: Progressing

## 2024-11-24 NOTE — CONSULTS
NEUROLOGY CONSULT NOTE      Requesting Physician:  Davidson Rivera MD    Reason for Consult:  Evaluate for altered mental status    History of Present Illness:  Mignon Fischer is a 69 y.o. female  with h/o DM type II, HTN LIBRA, blood clots, pancreatitis, and lymphedema who was admitted to [facility] on 11/22/2024 with presentation of altered mental status.  Per chart review, patient underwent L2-S1 laminectomy and fusion on 11/18/2024 and subsequently was discharged to nursing home for rehab.  Noted to facility patient developed increasing pain in her back along with decreased strength in her upper and lower extremities.  Subsequent note of confusion along with behavior change.  Nursing staff and reported that patient had her oxycodone dosage increased from 5 mg to 10 mg earlier in the day because of her pain.  When EMS arrived noticed that she was hypoxic with O2 saturation 77% resulting in O2 with nonrebreather mask.  On arrival to the ED, patient was ill-appearing on nonrebreather mask at 12 L/min. Vital signs notable for blood pressure of 146/77 with pulse of 103.  She was afebrile.          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 ( 6.5 weeeks coma)    Vitreous floaters of both eyes            Procedure Laterality Date    APPENDECTOMY      CERVICAL FUSION Right 05/24/2024    C-4 -C5, C5-C6, and C6- C7 ANTERIOR CERVICAL DISCECTOMY AND  FUSION performed by Kalyani Murphy MD at [facility] [place]    CHOLECYSTECTOMY      COLONOSCOPY  07/11/2016    EYE SURGERY Left     Cataract    EYE SURGERY Right     Cataract    GALLBLADDER SURGERY  1999    KNEE ARTHROSCOPY Right 2008    LUMBAR SPINE SURGERY N/A 11/18/2024    Posterior lumbar two to sacral one

## 2024-11-25 LAB
ALBUMIN SERPL-MCNC: 2.5 G/DL (ref 3.5–5.2)
ALP SERPL-CCNC: 73 U/L (ref 35–104)
ALT SERPL-CCNC: 15 U/L (ref 0–32)
ANION GAP SERPL CALCULATED.3IONS-SCNC: 8 MMOL/L (ref 7–16)
AST SERPL-CCNC: 36 U/L (ref 0–31)
BASOPHILS # BLD: 0.02 K/UL (ref 0–0.2)
BASOPHILS NFR BLD: 0 % (ref 0–2)
BILIRUB SERPL-MCNC: 0.3 MG/DL (ref 0–1.2)
BUN SERPL-MCNC: 10 MG/DL (ref 6–23)
CALCIUM SERPL-MCNC: 8.6 MG/DL (ref 8.6–10.2)
CHLORIDE SERPL-SCNC: 100 MMOL/L (ref 98–107)
CO2 SERPL-SCNC: 29 MMOL/L (ref 22–29)
CREAT SERPL-MCNC: 0.8 MG/DL (ref 0.5–1)
EKG ATRIAL RATE: 101 BPM
EKG P AXIS: 0 DEGREES
EKG P-R INTERVAL: 154 MS
EKG Q-T INTERVAL: 330 MS
EKG QRS DURATION: 82 MS
EKG QTC CALCULATION (BAZETT): 427 MS
EKG R AXIS: -9 DEGREES
EKG T AXIS: 9 DEGREES
EKG VENTRICULAR RATE: 101 BPM
EOSINOPHIL # BLD: 0.39 K/UL (ref 0.05–0.5)
EOSINOPHILS RELATIVE PERCENT: 4 % (ref 0–6)
ERYTHROCYTE [DISTWIDTH] IN BLOOD BY AUTOMATED COUNT: 14.4 % (ref 11.5–15)
GFR, ESTIMATED: 86 ML/MIN/1.73M2
GLUCOSE SERPL-MCNC: 134 MG/DL (ref 74–99)
HCT VFR BLD AUTO: 22.4 % (ref 34–48)
HCT VFR BLD AUTO: 24.5 % (ref 34–48)
HCT VFR BLD AUTO: 25.3 % (ref 34–48)
HGB BLD-MCNC: 7.2 G/DL (ref 11.5–15.5)
HGB BLD-MCNC: 7.8 G/DL (ref 11.5–15.5)
HGB BLD-MCNC: 7.8 G/DL (ref 11.5–15.5)
IMM GRANULOCYTES # BLD AUTO: 0.41 K/UL (ref 0–0.58)
IMM GRANULOCYTES NFR BLD: 4 % (ref 0–5)
LYMPHOCYTES NFR BLD: 1.71 K/UL (ref 1.5–4)
LYMPHOCYTES RELATIVE PERCENT: 17 % (ref 20–42)
MCH RBC QN AUTO: 28.9 PG (ref 26–35)
MCHC RBC AUTO-ENTMCNC: 30.8 G/DL (ref 32–34.5)
MCV RBC AUTO: 93.7 FL (ref 80–99.9)
MONOCYTES NFR BLD: 1.18 K/UL (ref 0.1–0.95)
MONOCYTES NFR BLD: 12 % (ref 2–12)
NEUTROPHILS NFR BLD: 64 % (ref 43–80)
NEUTS SEG NFR BLD: 6.43 K/UL (ref 1.8–7.3)
PLATELET # BLD AUTO: 293 K/UL (ref 130–450)
PMV BLD AUTO: 9.8 FL (ref 7–12)
POTASSIUM SERPL-SCNC: 4 MMOL/L (ref 3.5–5)
PROT SERPL-MCNC: 5.7 G/DL (ref 6.4–8.3)
RBC # BLD AUTO: 2.7 M/UL (ref 3.5–5.5)
SODIUM SERPL-SCNC: 137 MMOL/L (ref 132–146)
WBC OTHER # BLD: 10.1 K/UL (ref 4.5–11.5)

## 2024-11-25 PROCEDURE — 6370000000 HC RX 637 (ALT 250 FOR IP): Performed by: PSYCHIATRY & NEUROLOGY

## 2024-11-25 PROCEDURE — 6360000002 HC RX W HCPCS: Performed by: STUDENT IN AN ORGANIZED HEALTH CARE EDUCATION/TRAINING PROGRAM

## 2024-11-25 PROCEDURE — 5A09457 ASSISTANCE WITH RESPIRATORY VENTILATION, 24-96 CONSECUTIVE HOURS, CONTINUOUS POSITIVE AIRWAY PRESSURE: ICD-10-PCS | Performed by: PSYCHIATRY & NEUROLOGY

## 2024-11-25 PROCEDURE — 94660 CPAP INITIATION&MGMT: CPT

## 2024-11-25 PROCEDURE — 1200000000 HC SEMI PRIVATE

## 2024-11-25 PROCEDURE — 2580000003 HC RX 258: Performed by: STUDENT IN AN ORGANIZED HEALTH CARE EDUCATION/TRAINING PROGRAM

## 2024-11-25 PROCEDURE — 2700000000 HC OXYGEN THERAPY PER DAY

## 2024-11-25 PROCEDURE — 6370000000 HC RX 637 (ALT 250 FOR IP): Performed by: STUDENT IN AN ORGANIZED HEALTH CARE EDUCATION/TRAINING PROGRAM

## 2024-11-25 PROCEDURE — 85018 HEMOGLOBIN: CPT

## 2024-11-25 PROCEDURE — 6360000002 HC RX W HCPCS: Performed by: PSYCHIATRY & NEUROLOGY

## 2024-11-25 PROCEDURE — 6370000000 HC RX 637 (ALT 250 FOR IP)

## 2024-11-25 PROCEDURE — 80053 COMPREHEN METABOLIC PANEL: CPT

## 2024-11-25 PROCEDURE — 99232 SBSQ HOSP IP/OBS MODERATE 35: CPT | Performed by: PHYSICIAN ASSISTANT

## 2024-11-25 PROCEDURE — 85014 HEMATOCRIT: CPT

## 2024-11-25 PROCEDURE — 93010 ELECTROCARDIOGRAM REPORT: CPT | Performed by: INTERNAL MEDICINE

## 2024-11-25 PROCEDURE — 97167 OT EVAL HIGH COMPLEX 60 MIN: CPT

## 2024-11-25 PROCEDURE — 36415 COLL VENOUS BLD VENIPUNCTURE: CPT

## 2024-11-25 PROCEDURE — 97161 PT EVAL LOW COMPLEX 20 MIN: CPT

## 2024-11-25 PROCEDURE — 6370000000 HC RX 637 (ALT 250 FOR IP): Performed by: NEUROLOGICAL SURGERY

## 2024-11-25 PROCEDURE — 85025 COMPLETE CBC W/AUTO DIFF WBC: CPT

## 2024-11-25 RX ORDER — BACLOFEN 10 MG/1
10 TABLET ORAL 3 TIMES DAILY PRN
Status: DISCONTINUED | OUTPATIENT
Start: 2024-11-25 | End: 2024-12-02

## 2024-11-25 RX ADMIN — KETOROLAC TROMETHAMINE 30 MG: 30 INJECTION, SOLUTION INTRAMUSCULAR at 05:23

## 2024-11-25 RX ADMIN — DULOXETINE HYDROCHLORIDE 60 MG: 60 CAPSULE, DELAYED RELEASE ORAL at 10:03

## 2024-11-25 RX ADMIN — ACETAMINOPHEN 650 MG: 325 TABLET ORAL at 05:23

## 2024-11-25 RX ADMIN — MAGNESIUM HYDROXIDE 30 ML: 400 SUSPENSION ORAL at 20:57

## 2024-11-25 RX ADMIN — SUCRALFATE 1 G: 1 TABLET ORAL at 20:58

## 2024-11-25 RX ADMIN — SUCRALFATE 1 G: 1 TABLET ORAL at 17:01

## 2024-11-25 RX ADMIN — OXYCODONE HYDROCHLORIDE 10 MG: 10 TABLET ORAL at 20:57

## 2024-11-25 RX ADMIN — METOPROLOL SUCCINATE 25 MG: 25 TABLET, EXTENDED RELEASE ORAL at 20:58

## 2024-11-25 RX ADMIN — BISACODYL 5 MG: 5 TABLET, COATED ORAL at 10:06

## 2024-11-25 RX ADMIN — SODIUM CHLORIDE, PRESERVATIVE FREE 40 MG: 5 INJECTION INTRAVENOUS at 20:58

## 2024-11-25 RX ADMIN — GABAPENTIN 300 MG: 300 CAPSULE ORAL at 20:57

## 2024-11-25 RX ADMIN — GABAPENTIN 300 MG: 300 CAPSULE ORAL at 14:36

## 2024-11-25 RX ADMIN — ROSUVASTATIN 20 MG: 20 TABLET, FILM COATED ORAL at 10:02

## 2024-11-25 RX ADMIN — SUCRALFATE 1 G: 1 TABLET ORAL at 05:23

## 2024-11-25 RX ADMIN — DIVALPROEX SODIUM 125 MG: 125 CAPSULE, COATED PELLETS ORAL at 20:57

## 2024-11-25 RX ADMIN — KETOROLAC TROMETHAMINE 30 MG: 30 INJECTION, SOLUTION INTRAMUSCULAR at 14:36

## 2024-11-25 RX ADMIN — BACLOFEN 10 MG: 10 TABLET ORAL at 10:03

## 2024-11-25 RX ADMIN — DIVALPROEX SODIUM 125 MG: 125 CAPSULE, COATED PELLETS ORAL at 10:02

## 2024-11-25 RX ADMIN — OXYCODONE HYDROCHLORIDE 10 MG: 10 TABLET ORAL at 15:25

## 2024-11-25 RX ADMIN — CALCIUM 500 MG: 500 TABLET ORAL at 10:03

## 2024-11-25 RX ADMIN — SODIUM CHLORIDE, PRESERVATIVE FREE 10 ML: 5 INJECTION INTRAVENOUS at 20:58

## 2024-11-25 RX ADMIN — PROMETHAZINE HYDROCHLORIDE 6.25 MG: 25 INJECTION INTRAMUSCULAR; INTRAVENOUS at 00:01

## 2024-11-25 RX ADMIN — SODIUM CHLORIDE, PRESERVATIVE FREE 40 MG: 5 INJECTION INTRAVENOUS at 10:01

## 2024-11-25 ASSESSMENT — PAIN DESCRIPTION - LOCATION
LOCATION: ABDOMEN
LOCATION: ABDOMEN
LOCATION: ABDOMEN;BACK

## 2024-11-25 ASSESSMENT — PAIN DESCRIPTION - ORIENTATION
ORIENTATION: RIGHT;LEFT
ORIENTATION: MID

## 2024-11-25 ASSESSMENT — PAIN SCALES - GENERAL
PAINLEVEL_OUTOF10: 10
PAINLEVEL_OUTOF10: 10
PAINLEVEL_OUTOF10: 7
PAINLEVEL_OUTOF10: 0

## 2024-11-25 NOTE — ACP (ADVANCE CARE PLANNING)
Advance Care Planning   The patient has the following advanced directives on file:  Advance Directives       Power of  Living Will ACP-Advance Directive ACP-Power of     Filed on 11/22/24 Filed on 11/22/24 Filed Filed            The patient has appointed the following active healthcare agents:    Primary Decision Maker: Meliton Fischer - Brother/Sister - [phone]  Secondary:  Ferny Fischer - brother     [phone]    GOPI Garcia  11/25/2024

## 2024-11-25 NOTE — PLAN OF CARE
Problem: Safety - Adult  Goal: Free from fall injury  11/24/2024 2119 by Joo Kwon RN  Outcome: Progressing  11/24/2024 1200 by Teresa Seay RN  Outcome: Progressing     Problem: Chronic Conditions and Co-morbidities  Goal: Patient's chronic conditions and co-morbidity symptoms are monitored and maintained or improved  11/24/2024 2119 by Joo Kwon RN  Outcome: Progressing  11/24/2024 1200 by Teresa Seay RN  Outcome: Progressing     Problem: Pain  Goal: Verbalizes/displays adequate comfort level or baseline comfort level  11/24/2024 2119 by Joo Kwon RN  Outcome: Progressing  11/24/2024 1200 by Teresa Seay RN  Outcome: Progressing

## 2024-11-25 NOTE — DISCHARGE SUMMARY
Neurosurgery Surgery Discharge Summary    [facility] SUMMARY:                The patient is a 69 y.o. female who was admitted to the hospital on 11/18/2024  4:55 AM for treatment of lumbar stenosis. On the day of admission, a L2-S1 laminectomy and fusion was performed.  The patient's hospital course was uncomplicated and consisted of physical therapy, incision observation, and a return to normal oral intake.  The patient was discharged on 11/21/2024  6:49 PM tolerating a diet, moving bowels, and urinating without difficulty. The incisions were clean and intact.  The patient was discharged to rehab in satisfactory condition with instructions to call the office for a follow up appointment.      Hospital Problem List:  Principal Problem:    Synovial cyst of lumbar facet joint  Active Problems:    Stenosis, spinal, lumbar    Synovial cyst of lumbar spine    Pre-operative laboratory examination    Spinal stenosis at L4-L5 level  Resolved Problems:    * No resolved hospital problems. *     Procedure(s) (LRB):  Posterior lumbar two to sacral one laminectomy and fusion (N/A)    Discharge Medications:   Discharge Medication List as of 11/21/2024  3:40 PM        START taking these medications    Details   oxyCODONE HCl (OXY-IR) 10 MG immediate release tablet Take 1 tablet by mouth every 4 hours as needed for Pain for up to 7 days. Max Daily Amount: 60 mg, Disp-42 tablet, R-0Print      diazePAM (VALIUM) 5 MG tablet Take 1 tablet by mouth every 6 hours as needed for Anxiety (Muscle spasms) for up to 10 days. Max Daily Amount: 20 mg, Disp-40 tablet, R-0Print           CONTINUE these medications which have NOT CHANGED    Details   potassium chloride (KLOR-CON M) 10 MEQ extended release tablet Take 1 tablet by mouth DailyHistorical Med      ferrous sulfate (IRON 325) 325 (65 Fe) MG tablet Take 1 tablet by mouth every 12 hoursHistorical Med      rosuvastatin (CRESTOR) 20 MG tablet Take 1 tablet by  mouth dailyHistorical Med      calcium carbonate (OSCAL) 500 MG TABS tablet Take 1 tablet by mouth every morningHistorical Med      acetaminophen (TYLENOL) 500 MG tablet Take 1 tablet by mouth every 6 hours as needed for PainHistorical Med      pregabalin (LYRICA) 75 MG capsule Take 1 capsule by mouth 3 times daily for 30 days. Max Daily Amount: 225 mg, Disp-90 capsule, R-0Normal      albuterol sulfate HFA (VENTOLIN HFA) 108 (90 Base) MCG/ACT inhaler Inhale 1 puff into the lungs every 4 hours as needed for Wheezing, Disp-18 g, R-3Normal      divalproex (DEPAKOTE SPRINKLE) 125 MG DR capsule Take 1 capsule by mouth every 12 hours, Disp-60 capsule, R-3Normal      losartan (COZAAR) 25 MG tablet Take 1 tablet by mouth daily, Disp-30 tablet, R-3Normal      bisacodyl (DULCOLAX) 5 MG EC tablet Take 1 tablet by mouth daily, Disp-30 tablet, R-0Normal      metFORMIN (GLUCOPHAGE) 500 MG tablet Take 1 tablet by mouth daily (with breakfast)Historical Med      torsemide (DEMADEX) 20 MG tablet Take 2 tablets by mouth every morning 1 tablet in the morningHistorical Med      metoprolol succinate (TOPROL XL) 25 MG extended release tablet Take 1 tablet by mouth nightlyHistorical Med      celecoxib (CELEBREX) 200 MG capsule Take 1 capsule by mouth 2 times dailyHistorical Med      vitamin B-12 1000 MCG tablet Take 1 tablet by mouth daily, Disp-30 tablet, R-3Normal           STOP taking these medications       oxyCODONE (OXYCONTIN) 10 MG extended release tablet Comments:   Reason for Stopping:         aspirin 81 MG EC tablet Comments:   Reason for Stopping:         lidocaine (LIDODERM) 5 % Comments:   Reason for Stopping:               Alethea Gomez PA-C  11/25/2024

## 2024-11-26 LAB
ALBUMIN SERPL-MCNC: 2.7 G/DL (ref 3.5–5.2)
ALP SERPL-CCNC: 76 U/L (ref 35–104)
ALT SERPL-CCNC: 17 U/L (ref 0–32)
ANION GAP SERPL CALCULATED.3IONS-SCNC: 10 MMOL/L (ref 7–16)
AST SERPL-CCNC: 26 U/L (ref 0–31)
BASOPHILS # BLD: 0.04 K/UL (ref 0–0.2)
BASOPHILS NFR BLD: 0 % (ref 0–2)
BILIRUB SERPL-MCNC: 0.2 MG/DL (ref 0–1.2)
BUN SERPL-MCNC: 18 MG/DL (ref 6–23)
CALCIUM SERPL-MCNC: 8.7 MG/DL (ref 8.6–10.2)
CHLORIDE SERPL-SCNC: 101 MMOL/L (ref 98–107)
CO2 SERPL-SCNC: 29 MMOL/L (ref 22–29)
CREAT SERPL-MCNC: 0.8 MG/DL (ref 0.5–1)
EOSINOPHIL # BLD: 0.43 K/UL (ref 0.05–0.5)
EOSINOPHILS RELATIVE PERCENT: 5 % (ref 0–6)
ERYTHROCYTE [DISTWIDTH] IN BLOOD BY AUTOMATED COUNT: 14.1 % (ref 11.5–15)
GFR, ESTIMATED: 78 ML/MIN/1.73M2
GLUCOSE BLD-MCNC: 186 MG/DL (ref 74–99)
GLUCOSE SERPL-MCNC: 186 MG/DL (ref 74–99)
HCT VFR BLD AUTO: 22.2 % (ref 34–48)
HCT VFR BLD AUTO: 23.6 % (ref 34–48)
HCT VFR BLD AUTO: 23.7 % (ref 34–48)
HGB BLD-MCNC: 7.1 G/DL (ref 11.5–15.5)
HGB BLD-MCNC: 7.4 G/DL (ref 11.5–15.5)
HGB BLD-MCNC: 7.5 G/DL (ref 11.5–15.5)
IMM GRANULOCYTES # BLD AUTO: 0.25 K/UL (ref 0–0.58)
IMM GRANULOCYTES NFR BLD: 3 % (ref 0–5)
LYMPHOCYTES NFR BLD: 1.55 K/UL (ref 1.5–4)
LYMPHOCYTES RELATIVE PERCENT: 17 % (ref 20–42)
MCH RBC QN AUTO: 29.1 PG (ref 26–35)
MCHC RBC AUTO-ENTMCNC: 31.4 G/DL (ref 32–34.5)
MCV RBC AUTO: 92.9 FL (ref 80–99.9)
MONOCYTES NFR BLD: 0.81 K/UL (ref 0.1–0.95)
MONOCYTES NFR BLD: 9 % (ref 2–12)
NEUTROPHILS NFR BLD: 66 % (ref 43–80)
NEUTS SEG NFR BLD: 6.04 K/UL (ref 1.8–7.3)
PLATELET # BLD AUTO: 346 K/UL (ref 130–450)
PMV BLD AUTO: 9.8 FL (ref 7–12)
POTASSIUM SERPL-SCNC: 4.3 MMOL/L (ref 3.5–5)
PROT SERPL-MCNC: 5.7 G/DL (ref 6.4–8.3)
RBC # BLD AUTO: 2.54 M/UL (ref 3.5–5.5)
SODIUM SERPL-SCNC: 140 MMOL/L (ref 132–146)
WBC OTHER # BLD: 9.1 K/UL (ref 4.5–11.5)

## 2024-11-26 PROCEDURE — 6360000002 HC RX W HCPCS: Performed by: PSYCHIATRY & NEUROLOGY

## 2024-11-26 PROCEDURE — 2580000003 HC RX 258: Performed by: STUDENT IN AN ORGANIZED HEALTH CARE EDUCATION/TRAINING PROGRAM

## 2024-11-26 PROCEDURE — 6370000000 HC RX 637 (ALT 250 FOR IP): Performed by: PSYCHIATRY & NEUROLOGY

## 2024-11-26 PROCEDURE — 6370000000 HC RX 637 (ALT 250 FOR IP): Performed by: STUDENT IN AN ORGANIZED HEALTH CARE EDUCATION/TRAINING PROGRAM

## 2024-11-26 PROCEDURE — 6370000000 HC RX 637 (ALT 250 FOR IP): Performed by: NEUROLOGICAL SURGERY

## 2024-11-26 PROCEDURE — 6370000000 HC RX 637 (ALT 250 FOR IP)

## 2024-11-26 PROCEDURE — 6370000000 HC RX 637 (ALT 250 FOR IP): Performed by: PHYSICIAN ASSISTANT

## 2024-11-26 PROCEDURE — 82947 ASSAY GLUCOSE BLOOD QUANT: CPT

## 2024-11-26 PROCEDURE — 80053 COMPREHEN METABOLIC PANEL: CPT

## 2024-11-26 PROCEDURE — 85014 HEMATOCRIT: CPT

## 2024-11-26 PROCEDURE — 6360000002 HC RX W HCPCS: Performed by: STUDENT IN AN ORGANIZED HEALTH CARE EDUCATION/TRAINING PROGRAM

## 2024-11-26 PROCEDURE — 85025 COMPLETE CBC W/AUTO DIFF WBC: CPT

## 2024-11-26 PROCEDURE — 36415 COLL VENOUS BLD VENIPUNCTURE: CPT

## 2024-11-26 PROCEDURE — 85018 HEMOGLOBIN: CPT

## 2024-11-26 PROCEDURE — 1200000000 HC SEMI PRIVATE

## 2024-11-26 PROCEDURE — 94660 CPAP INITIATION&MGMT: CPT

## 2024-11-26 RX ORDER — SUCRALFATE 1 G/1
1 TABLET ORAL
DISCHARGE
Start: 2024-11-26

## 2024-11-26 RX ORDER — DULOXETIN HYDROCHLORIDE 60 MG/1
60 CAPSULE, DELAYED RELEASE ORAL DAILY
DISCHARGE
Start: 2024-11-27

## 2024-11-26 RX ORDER — SENNOSIDES A AND B 8.6 MG/1
1 TABLET, FILM COATED ORAL DAILY PRN
DISCHARGE
Start: 2024-11-26 | End: 2024-12-03 | Stop reason: HOSPADM

## 2024-11-26 RX ORDER — BACLOFEN 10 MG/1
10 TABLET ORAL 3 TIMES DAILY PRN
DISCHARGE
Start: 2024-11-26

## 2024-11-26 RX ORDER — PANTOPRAZOLE SODIUM 40 MG/1
40 TABLET, DELAYED RELEASE ORAL
DISCHARGE
Start: 2024-11-26

## 2024-11-26 RX ADMIN — SUCRALFATE 1 G: 1 TABLET ORAL at 10:16

## 2024-11-26 RX ADMIN — CALCIUM 500 MG: 500 TABLET ORAL at 10:16

## 2024-11-26 RX ADMIN — OXYCODONE HYDROCHLORIDE 10 MG: 10 TABLET ORAL at 03:20

## 2024-11-26 RX ADMIN — DIVALPROEX SODIUM 125 MG: 125 CAPSULE, COATED PELLETS ORAL at 10:16

## 2024-11-26 RX ADMIN — ONDANSETRON 4 MG: 4 TABLET, ORALLY DISINTEGRATING ORAL at 03:20

## 2024-11-26 RX ADMIN — MAGNESIUM HYDROXIDE 30 ML: 400 SUSPENSION ORAL at 10:16

## 2024-11-26 RX ADMIN — DIAZEPAM 5 MG: 5 TABLET ORAL at 06:34

## 2024-11-26 RX ADMIN — BISACODYL 5 MG: 5 TABLET, COATED ORAL at 10:16

## 2024-11-26 RX ADMIN — SUCRALFATE 1 G: 1 TABLET ORAL at 06:34

## 2024-11-26 RX ADMIN — GABAPENTIN 300 MG: 300 CAPSULE ORAL at 10:17

## 2024-11-26 RX ADMIN — SUCRALFATE 1 G: 1 TABLET ORAL at 16:41

## 2024-11-26 RX ADMIN — PROMETHAZINE HYDROCHLORIDE 6.25 MG: 25 INJECTION INTRAMUSCULAR; INTRAVENOUS at 10:15

## 2024-11-26 RX ADMIN — BACLOFEN 10 MG: 10 TABLET ORAL at 13:36

## 2024-11-26 RX ADMIN — DIVALPROEX SODIUM 125 MG: 125 CAPSULE, COATED PELLETS ORAL at 21:18

## 2024-11-26 RX ADMIN — SODIUM CHLORIDE, PRESERVATIVE FREE 40 MG: 5 INJECTION INTRAVENOUS at 21:18

## 2024-11-26 RX ADMIN — SODIUM CHLORIDE, PRESERVATIVE FREE 40 MG: 5 INJECTION INTRAVENOUS at 10:16

## 2024-11-26 RX ADMIN — SODIUM CHLORIDE, PRESERVATIVE FREE 10 ML: 5 INJECTION INTRAVENOUS at 10:16

## 2024-11-26 RX ADMIN — SUCRALFATE 1 G: 1 TABLET ORAL at 21:18

## 2024-11-26 RX ADMIN — ROSUVASTATIN 20 MG: 20 TABLET, FILM COATED ORAL at 10:16

## 2024-11-26 RX ADMIN — GABAPENTIN 300 MG: 300 CAPSULE ORAL at 13:37

## 2024-11-26 RX ADMIN — DULOXETINE HYDROCHLORIDE 60 MG: 60 CAPSULE, DELAYED RELEASE ORAL at 10:16

## 2024-11-26 RX ADMIN — SODIUM CHLORIDE, PRESERVATIVE FREE 10 ML: 5 INJECTION INTRAVENOUS at 21:18

## 2024-11-26 RX ADMIN — GABAPENTIN 300 MG: 300 CAPSULE ORAL at 21:18

## 2024-11-26 ASSESSMENT — PAIN DESCRIPTION - LOCATION
LOCATION: BACK
LOCATION: BACK

## 2024-11-26 ASSESSMENT — PAIN DESCRIPTION - ONSET: ONSET: ON-GOING

## 2024-11-26 ASSESSMENT — PAIN SCALES - GENERAL
PAINLEVEL_OUTOF10: 8
PAINLEVEL_OUTOF10: 9
PAINLEVEL_OUTOF10: 9

## 2024-11-26 ASSESSMENT — PAIN - FUNCTIONAL ASSESSMENT: PAIN_FUNCTIONAL_ASSESSMENT: PREVENTS OR INTERFERES SOME ACTIVE ACTIVITIES AND ADLS

## 2024-11-26 ASSESSMENT — PAIN DESCRIPTION - ORIENTATION: ORIENTATION: LOWER

## 2024-11-26 ASSESSMENT — PAIN DESCRIPTION - DESCRIPTORS
DESCRIPTORS: ACHING;DISCOMFORT;SPASM
DESCRIPTORS: BURNING;DISCOMFORT;STABBING

## 2024-11-26 ASSESSMENT — PAIN DESCRIPTION - PAIN TYPE: TYPE: ACUTE PAIN;SURGICAL PAIN

## 2024-11-26 ASSESSMENT — PAIN SCALES - WONG BAKER: WONGBAKER_NUMERICALRESPONSE: NO HURT

## 2024-11-26 ASSESSMENT — PAIN DESCRIPTION - FREQUENCY: FREQUENCY: INTERMITTENT

## 2024-11-26 NOTE — DISCHARGE INSTRUCTIONS
[facility] Internal Medicine    Activity as tolerated  Diet: common adult  Be compliant with your medications and take them as prescribed.    Special Instructions:   Please change Baclofen from daily to AS NEEDED    Other Follow-Ups:    Future Appointments   Date Time Provider Department Center   12/16/2024 11:00 AM Mary Jo Ramsey PA-C [facility]   2/10/2025  1:00 PM Mary Jo Ramsey PA-C [facility]       Other than any new prescriptions given to you today, the list of home going meds on this After Visit Summary are based on information provided to us from you. This information, including the list, dose, and frequency of medications is only as accurate as the information you provided. If you have any questions or concerns about your home medications, please contact your Primary Care Physician for further clarification.      Benny Moise MD  11/26/2024  11:15 AM

## 2024-11-26 NOTE — DISCHARGE SUMMARY
Internal Medicine Discharge Summary    NAME: Mignon Fischer :  1955  MRN:  [id number] PCP:Sandy Leiva MD    ADMITTED: 2024   DISCHARGED: No discharge date for patient encounter.    ADMITTING PHYSICIAN: Benny Moise MD    PCP: Sandy Leiva MD    CONSULTANT(S):   IP CONSULT TO SOCIAL WORK  IP CONSULT TO INTERNAL MEDICINE  IP CONSULT TO GENERAL SURGERY  IP CONSULT TO NEUROSURGERY     ADMITTING DIAGNOSIS:   Anemia, unspecified type [D64.9]  Acute hypoxic respiratory failure [J96.01]     Please see H&P for further details    DISCHARGE DIAGNOSES:   Active Hospital Problems    Diagnosis     Acute hypoxic respiratory failure [J96.01]        BRIEF HISTORY OF PRESENT ILLNESS: Mignon Fischer is a 69 y.o. female patient of Sandy Leiva MD who  has a past medical history of Brain concussion, Diabetes mellitus (HCC), Double vision with both eyes open, Dyspnea on exertion, Hx of blood clots, Hyperlipemia, Hypertension, Knee pain, Lymphedema, Pancreatitis, and Vitreous floaters of both eyes. who originally had concerns including Altered Mental Status. at presentation on 2024, and was found to have Anemia, unspecified type [D64.9]  Acute hypoxic respiratory failure [J96.01] after workup.    Please see H&P for further details.    HOSPITAL COURSE:   The patient presented to the hospital with the chief complaint of Altered Mental Status  Mignon is a 69 y.o. year old female with a PMH as below who presented with a chief complaint of  AMS, hypoxia. She is S/P  L2-S1 fusion and synovial cyst resection for L2-S1 canal stenosis and synovial cyst on  with Dr. Murphy and was subsequently discharged to rehab.  While at rehab she was found hypoxic and altered.  Patient also had concerns of anemia.  These factors together brought the patient back to the ED. upon initial presentation patient very somnolent.  Neurosurgery and neurology were consulted in regards to adjustment of medication regimen.

## 2024-11-26 NOTE — PLAN OF CARE
Problem: Safety - Adult  Goal: Free from fall injury  11/26/2024 1206 by Christine Mcwilliams RN  Outcome: Progressing     Problem: Pain  Goal: Verbalizes/displays adequate comfort level or baseline comfort level  11/26/2024 1206 by Christine Mcwilliams RN  Outcome: Progressing     Problem: Skin/Tissue Integrity  Goal: Absence of new skin breakdown  Description: 1.  Monitor for areas of redness and/or skin breakdown  2.  Assess vascular access sites hourly  3.  Every 4-6 hours minimum:  Change oxygen saturation probe site  4.  Every 4-6 hours:  If on nasal continuous positive airway pressure, respiratory therapy assess nares and determine need for appliance change or resting period.  11/26/2024 1206 by Christine Mcwilliams RN  Outcome: Progressing

## 2024-11-26 NOTE — DISCHARGE INSTR - COC
50 mcg/0.5 mL 09/27/2022    COVID-19, MODERNA, (age 12y+), IM, 50mcg/0.5mL 10/10/2023       Active Problems:  Patient Active Problem List   Diagnosis Code    Abnormal gait R26.9    Diabetes mellitus type II, controlled ([facility]) E11.9    Vitreous floaters of both eyes H43.393    Idiopathic localized osteoarthropathy M19.91    Pancreatic pseudocyst K86.3    Blood glucose abnormal R73.09    Obstructive sleep apnea syndrome G47.33    Adiposity E66.9    Nausea R11.0    Strabismic amblyopia H53.039    Type 2 diabetes mellitus without complications (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    Hairiness L68.0    Erosive esophagitis K22.10    Binocular vision disorder with diplopia H53.2    Diplopia H53.2    Hyperlipemia E78.5    Primary osteoarthritis of right knee M17.11    Lower extremity weakness R29.898    Bilateral leg weakness R29.898    Cervical herniated disc M50.20    Stenosis, spinal, lumbar M48.061    Synovial cyst of lumbar spine M71.38    Synovial cyst of lumbar facet joint M71.38    Pre-operative laboratory examination Z01.812    Spinal stenosis at L4-L5 level M48.061    Acute hypoxic respiratory failure J96.01       Isolation/Infection:   Isolation            No Isolation          Patient Infection Status       None to display                     Nurse Assessment:  Last Vital Signs: BP (!) 103/54   Pulse 85   Temp 96.9 °F (36.1 °C) (Temporal)   Resp 16   Ht 1.6 m (5' 3\")   Wt 102.5 kg (226 lb)   SpO2 95%   BMI 40.03 kg/m²     Last documented pain score (0-10 scale): Pain Level:  (sleeping)  Last Weight:   Wt Readings from Last 1 Encounters:   11/22/24 102.5 kg (226 lb)     Mental Status:  oriented, alert, coherent, logical, thought processes intact, and able to concentrate and follow conversation    IV Access:  - None    Nursing Mobility/ADLs:  Walking   Dependent  Transfer  Dependent  Bathing  Dependent  Dressing

## 2024-11-27 DIAGNOSIS — M71.38 SYNOVIAL CYST OF LUMBAR FACET JOINT: ICD-10-CM

## 2024-11-27 DIAGNOSIS — M71.38 SYNOVIAL CYST OF LUMBAR SPINE: Primary | ICD-10-CM

## 2024-11-27 LAB
ALBUMIN SERPL-MCNC: 2.8 G/DL (ref 3.5–5.2)
ALP SERPL-CCNC: 71 U/L (ref 35–104)
ALT SERPL-CCNC: 13 U/L (ref 0–32)
ANION GAP SERPL CALCULATED.3IONS-SCNC: 6 MMOL/L (ref 7–16)
AST SERPL-CCNC: 16 U/L (ref 0–31)
BASOPHILS # BLD: 0.03 K/UL (ref 0–0.2)
BASOPHILS NFR BLD: 0 % (ref 0–2)
BILIRUB SERPL-MCNC: <0.2 MG/DL (ref 0–1.2)
BUN SERPL-MCNC: 13 MG/DL (ref 6–23)
CALCIUM SERPL-MCNC: 8.7 MG/DL (ref 8.6–10.2)
CHLORIDE SERPL-SCNC: 99 MMOL/L (ref 98–107)
CO2 SERPL-SCNC: 33 MMOL/L (ref 22–29)
CREAT SERPL-MCNC: 0.7 MG/DL (ref 0.5–1)
EOSINOPHIL # BLD: 0.43 K/UL (ref 0.05–0.5)
EOSINOPHILS RELATIVE PERCENT: 5 % (ref 0–6)
ERYTHROCYTE [DISTWIDTH] IN BLOOD BY AUTOMATED COUNT: 13.6 % (ref 11.5–15)
FOLATE SERPL-MCNC: 4.7 NG/ML (ref 4.8–24.2)
GFR, ESTIMATED: >90 ML/MIN/1.73M2
GLUCOSE SERPL-MCNC: 138 MG/DL (ref 74–99)
HCT VFR BLD AUTO: 23.6 % (ref 34–48)
HGB BLD-MCNC: 7.2 G/DL (ref 11.5–15.5)
IMM GRANULOCYTES # BLD AUTO: 0.2 K/UL (ref 0–0.58)
IMM GRANULOCYTES NFR BLD: 2 % (ref 0–5)
LYMPHOCYTES NFR BLD: 2.06 K/UL (ref 1.5–4)
LYMPHOCYTES RELATIVE PERCENT: 24 % (ref 20–42)
MCH RBC QN AUTO: 28.9 PG (ref 26–35)
MCHC RBC AUTO-ENTMCNC: 30.5 G/DL (ref 32–34.5)
MCV RBC AUTO: 94.8 FL (ref 80–99.9)
MICROORGANISM SPEC CULT: NORMAL
MICROORGANISM SPEC CULT: NORMAL
MONOCYTES NFR BLD: 0.78 K/UL (ref 0.1–0.95)
MONOCYTES NFR BLD: 9 % (ref 2–12)
NEUTROPHILS NFR BLD: 59 % (ref 43–80)
NEUTS SEG NFR BLD: 5.11 K/UL (ref 1.8–7.3)
PLATELET # BLD AUTO: 379 K/UL (ref 130–450)
PMV BLD AUTO: 9.3 FL (ref 7–12)
POTASSIUM SERPL-SCNC: 4.7 MMOL/L (ref 3.5–5)
PROT SERPL-MCNC: 5.7 G/DL (ref 6.4–8.3)
RBC # BLD AUTO: 2.49 M/UL (ref 3.5–5.5)
SERVICE CMNT-IMP: NORMAL
SERVICE CMNT-IMP: NORMAL
SODIUM SERPL-SCNC: 138 MMOL/L (ref 132–146)
SPECIMEN DESCRIPTION: NORMAL
SPECIMEN DESCRIPTION: NORMAL
VIT B12 SERPL-MCNC: 1026 PG/ML (ref 211–946)
WBC OTHER # BLD: 8.6 K/UL (ref 4.5–11.5)

## 2024-11-27 PROCEDURE — 6370000000 HC RX 637 (ALT 250 FOR IP)

## 2024-11-27 PROCEDURE — 6370000000 HC RX 637 (ALT 250 FOR IP): Performed by: PHYSICIAN ASSISTANT

## 2024-11-27 PROCEDURE — 82746 ASSAY OF FOLIC ACID SERUM: CPT

## 2024-11-27 PROCEDURE — 6360000002 HC RX W HCPCS: Performed by: PSYCHIATRY & NEUROLOGY

## 2024-11-27 PROCEDURE — 6370000000 HC RX 637 (ALT 250 FOR IP): Performed by: NEUROLOGICAL SURGERY

## 2024-11-27 PROCEDURE — 6370000000 HC RX 637 (ALT 250 FOR IP): Performed by: PSYCHIATRY & NEUROLOGY

## 2024-11-27 PROCEDURE — 1200000000 HC SEMI PRIVATE

## 2024-11-27 PROCEDURE — 6370000000 HC RX 637 (ALT 250 FOR IP): Performed by: STUDENT IN AN ORGANIZED HEALTH CARE EDUCATION/TRAINING PROGRAM

## 2024-11-27 PROCEDURE — 94660 CPAP INITIATION&MGMT: CPT

## 2024-11-27 PROCEDURE — 36415 COLL VENOUS BLD VENIPUNCTURE: CPT

## 2024-11-27 PROCEDURE — 6360000002 HC RX W HCPCS: Performed by: STUDENT IN AN ORGANIZED HEALTH CARE EDUCATION/TRAINING PROGRAM

## 2024-11-27 PROCEDURE — 82607 VITAMIN B-12: CPT

## 2024-11-27 PROCEDURE — 2580000003 HC RX 258: Performed by: STUDENT IN AN ORGANIZED HEALTH CARE EDUCATION/TRAINING PROGRAM

## 2024-11-27 PROCEDURE — 2700000000 HC OXYGEN THERAPY PER DAY

## 2024-11-27 PROCEDURE — 94640 AIRWAY INHALATION TREATMENT: CPT

## 2024-11-27 PROCEDURE — 85025 COMPLETE CBC W/AUTO DIFF WBC: CPT

## 2024-11-27 PROCEDURE — 80053 COMPREHEN METABOLIC PANEL: CPT

## 2024-11-27 RX ORDER — FOLIC ACID 1 MG/1
1 TABLET ORAL DAILY
Qty: 30 TABLET | Refills: 0 | DISCHARGE
Start: 2024-11-27 | End: 2024-12-27

## 2024-11-27 RX ADMIN — SUCRALFATE 1 G: 1 TABLET ORAL at 17:15

## 2024-11-27 RX ADMIN — BACLOFEN 10 MG: 10 TABLET ORAL at 10:02

## 2024-11-27 RX ADMIN — ROSUVASTATIN 20 MG: 20 TABLET, FILM COATED ORAL at 08:54

## 2024-11-27 RX ADMIN — SODIUM CHLORIDE, PRESERVATIVE FREE 10 ML: 5 INJECTION INTRAVENOUS at 19:58

## 2024-11-27 RX ADMIN — SUCRALFATE 1 G: 1 TABLET ORAL at 06:06

## 2024-11-27 RX ADMIN — DIVALPROEX SODIUM 125 MG: 125 CAPSULE, COATED PELLETS ORAL at 08:54

## 2024-11-27 RX ADMIN — SUCRALFATE 1 G: 1 TABLET ORAL at 19:56

## 2024-11-27 RX ADMIN — GABAPENTIN 300 MG: 300 CAPSULE ORAL at 08:53

## 2024-11-27 RX ADMIN — DIAZEPAM 5 MG: 5 TABLET ORAL at 20:14

## 2024-11-27 RX ADMIN — BACLOFEN 10 MG: 10 TABLET ORAL at 18:23

## 2024-11-27 RX ADMIN — DIAZEPAM 5 MG: 5 TABLET ORAL at 14:05

## 2024-11-27 RX ADMIN — DIVALPROEX SODIUM 125 MG: 125 CAPSULE, COATED PELLETS ORAL at 19:55

## 2024-11-27 RX ADMIN — SODIUM CHLORIDE, PRESERVATIVE FREE 40 MG: 5 INJECTION INTRAVENOUS at 19:57

## 2024-11-27 RX ADMIN — CALCIUM 500 MG: 500 TABLET ORAL at 08:54

## 2024-11-27 RX ADMIN — SODIUM CHLORIDE, PRESERVATIVE FREE 40 MG: 5 INJECTION INTRAVENOUS at 08:55

## 2024-11-27 RX ADMIN — OXYCODONE HYDROCHLORIDE 10 MG: 10 TABLET ORAL at 00:21

## 2024-11-27 RX ADMIN — GABAPENTIN 300 MG: 300 CAPSULE ORAL at 19:56

## 2024-11-27 RX ADMIN — LOSARTAN POTASSIUM 25 MG: 25 TABLET, FILM COATED ORAL at 08:54

## 2024-11-27 RX ADMIN — KETOROLAC TROMETHAMINE 30 MG: 30 INJECTION, SOLUTION INTRAMUSCULAR at 06:06

## 2024-11-27 RX ADMIN — GABAPENTIN 300 MG: 300 CAPSULE ORAL at 14:05

## 2024-11-27 RX ADMIN — ALBUTEROL SULFATE 2.5 MG: 2.5 SOLUTION RESPIRATORY (INHALATION) at 20:13

## 2024-11-27 RX ADMIN — DULOXETINE HYDROCHLORIDE 60 MG: 60 CAPSULE, DELAYED RELEASE ORAL at 08:54

## 2024-11-27 RX ADMIN — MAGNESIUM HYDROXIDE 30 ML: 400 SUSPENSION ORAL at 08:55

## 2024-11-27 RX ADMIN — OXYCODONE HYDROCHLORIDE 10 MG: 10 TABLET ORAL at 22:48

## 2024-11-27 RX ADMIN — SENNOSIDES 8.6 MG: 8.6 TABLET, FILM COATED ORAL at 14:55

## 2024-11-27 RX ADMIN — SUCRALFATE 1 G: 1 TABLET ORAL at 11:32

## 2024-11-27 RX ADMIN — SODIUM CHLORIDE, PRESERVATIVE FREE 10 ML: 5 INJECTION INTRAVENOUS at 08:55

## 2024-11-27 RX ADMIN — BISACODYL 5 MG: 5 TABLET, COATED ORAL at 08:54

## 2024-11-27 ASSESSMENT — PAIN DESCRIPTION - ONSET: ONSET: ON-GOING

## 2024-11-27 ASSESSMENT — PAIN DESCRIPTION - DESCRIPTORS
DESCRIPTORS: SPASM
DESCRIPTORS: ACHING;DISCOMFORT;SORE;SPASM
DESCRIPTORS: DISCOMFORT;CRAMPING;SORE
DESCRIPTORS: ACHING;DISCOMFORT;SORE;SPASM
DESCRIPTORS: SPASM;DISCOMFORT

## 2024-11-27 ASSESSMENT — PAIN SCALES - GENERAL
PAINLEVEL_OUTOF10: 7
PAINLEVEL_OUTOF10: 6
PAINLEVEL_OUTOF10: 8
PAINLEVEL_OUTOF10: 6
PAINLEVEL_OUTOF10: 8
PAINLEVEL_OUTOF10: 8

## 2024-11-27 ASSESSMENT — PAIN DESCRIPTION - LOCATION
LOCATION: BACK

## 2024-11-27 ASSESSMENT — PAIN DESCRIPTION - ORIENTATION
ORIENTATION: LOWER
ORIENTATION: UPPER
ORIENTATION: LOWER

## 2024-11-27 ASSESSMENT — PAIN DESCRIPTION - PAIN TYPE: TYPE: ACUTE PAIN;SURGICAL PAIN

## 2024-11-27 ASSESSMENT — PAIN - FUNCTIONAL ASSESSMENT: PAIN_FUNCTIONAL_ASSESSMENT: PREVENTS OR INTERFERES SOME ACTIVE ACTIVITIES AND ADLS

## 2024-11-27 ASSESSMENT — PAIN DESCRIPTION - FREQUENCY: FREQUENCY: INTERMITTENT

## 2024-11-27 NOTE — PLAN OF CARE
Problem: Safety - Adult  Goal: Free from fall injury  11/27/2024 0040 by Abby Jerome RN  Outcome: Progressing     Problem: Chronic Conditions and Co-morbidities  Goal: Patient's chronic conditions and co-morbidity symptoms are monitored and maintained or improved  Outcome: Progressing     Problem: Pain  Goal: Verbalizes/displays adequate comfort level or baseline comfort level  11/27/2024 0040 by Abby Jerome RN  Outcome: Progressing     Problem: Skin/Tissue Integrity  Goal: Absence of new skin breakdown  Description: 1.  Monitor for areas of redness and/or skin breakdown  2.  Assess vascular access sites hourly  3.  Every 4-6 hours minimum:  Change oxygen saturation probe site  4.  Every 4-6 hours:  If on nasal continuous positive airway pressure, respiratory therapy assess nares and determine need for appliance change or resting period.  11/27/2024 0040 by Abby Jerome RN  Outcome: Progressing     Problem: ABCDS Injury Assessment  Goal: Absence of physical injury  Outcome: Progressing

## 2024-11-28 LAB
ALBUMIN SERPL-MCNC: 2.6 G/DL (ref 3.5–5.2)
ALP SERPL-CCNC: 75 U/L (ref 35–104)
ALT SERPL-CCNC: 11 U/L (ref 0–32)
ANION GAP SERPL CALCULATED.3IONS-SCNC: 9 MMOL/L (ref 7–16)
AST SERPL-CCNC: 13 U/L (ref 0–31)
BASOPHILS # BLD: 0.03 K/UL (ref 0–0.2)
BASOPHILS NFR BLD: 0 % (ref 0–2)
BILIRUB SERPL-MCNC: <0.2 MG/DL (ref 0–1.2)
BUN SERPL-MCNC: 14 MG/DL (ref 6–23)
CALCIUM SERPL-MCNC: 8.4 MG/DL (ref 8.6–10.2)
CHLORIDE SERPL-SCNC: 101 MMOL/L (ref 98–107)
CO2 SERPL-SCNC: 31 MMOL/L (ref 22–29)
CREAT SERPL-MCNC: 0.6 MG/DL (ref 0.5–1)
EOSINOPHIL # BLD: 0.51 K/UL (ref 0.05–0.5)
EOSINOPHILS RELATIVE PERCENT: 6 % (ref 0–6)
ERYTHROCYTE [DISTWIDTH] IN BLOOD BY AUTOMATED COUNT: 13.4 % (ref 11.5–15)
GFR, ESTIMATED: >90 ML/MIN/1.73M2
GLUCOSE SERPL-MCNC: 170 MG/DL (ref 74–99)
HCT VFR BLD AUTO: 24.5 % (ref 34–48)
HGB BLD-MCNC: 7.5 G/DL (ref 11.5–15.5)
IMM GRANULOCYTES # BLD AUTO: 0.37 K/UL (ref 0–0.58)
IMM GRANULOCYTES NFR BLD: 4 % (ref 0–5)
LYMPHOCYTES NFR BLD: 1.89 K/UL (ref 1.5–4)
LYMPHOCYTES RELATIVE PERCENT: 21 % (ref 20–42)
MCH RBC QN AUTO: 29 PG (ref 26–35)
MCHC RBC AUTO-ENTMCNC: 30.6 G/DL (ref 32–34.5)
MCV RBC AUTO: 94.6 FL (ref 80–99.9)
MONOCYTES NFR BLD: 0.59 K/UL (ref 0.1–0.95)
MONOCYTES NFR BLD: 7 % (ref 2–12)
NEUTROPHILS NFR BLD: 63 % (ref 43–80)
NEUTS SEG NFR BLD: 5.74 K/UL (ref 1.8–7.3)
PLATELET # BLD AUTO: 412 K/UL (ref 130–450)
PMV BLD AUTO: 9.3 FL (ref 7–12)
POTASSIUM SERPL-SCNC: 4.4 MMOL/L (ref 3.5–5)
PROT SERPL-MCNC: 5.5 G/DL (ref 6.4–8.3)
RBC # BLD AUTO: 2.59 M/UL (ref 3.5–5.5)
SODIUM SERPL-SCNC: 141 MMOL/L (ref 132–146)
WBC OTHER # BLD: 9.1 K/UL (ref 4.5–11.5)

## 2024-11-28 PROCEDURE — 6370000000 HC RX 637 (ALT 250 FOR IP): Performed by: PSYCHIATRY & NEUROLOGY

## 2024-11-28 PROCEDURE — 85025 COMPLETE CBC W/AUTO DIFF WBC: CPT

## 2024-11-28 PROCEDURE — 6370000000 HC RX 637 (ALT 250 FOR IP): Performed by: NEUROLOGICAL SURGERY

## 2024-11-28 PROCEDURE — 6370000000 HC RX 637 (ALT 250 FOR IP): Performed by: STUDENT IN AN ORGANIZED HEALTH CARE EDUCATION/TRAINING PROGRAM

## 2024-11-28 PROCEDURE — 36415 COLL VENOUS BLD VENIPUNCTURE: CPT

## 2024-11-28 PROCEDURE — 1200000000 HC SEMI PRIVATE

## 2024-11-28 PROCEDURE — 94660 CPAP INITIATION&MGMT: CPT

## 2024-11-28 PROCEDURE — 6360000002 HC RX W HCPCS: Performed by: STUDENT IN AN ORGANIZED HEALTH CARE EDUCATION/TRAINING PROGRAM

## 2024-11-28 PROCEDURE — 2580000003 HC RX 258: Performed by: STUDENT IN AN ORGANIZED HEALTH CARE EDUCATION/TRAINING PROGRAM

## 2024-11-28 PROCEDURE — 6370000000 HC RX 637 (ALT 250 FOR IP)

## 2024-11-28 PROCEDURE — 6370000000 HC RX 637 (ALT 250 FOR IP): Performed by: PHYSICIAN ASSISTANT

## 2024-11-28 PROCEDURE — 2700000000 HC OXYGEN THERAPY PER DAY

## 2024-11-28 PROCEDURE — 80053 COMPREHEN METABOLIC PANEL: CPT

## 2024-11-28 RX ADMIN — OXYCODONE HYDROCHLORIDE 10 MG: 10 TABLET ORAL at 21:42

## 2024-11-28 RX ADMIN — SUCRALFATE 1 G: 1 TABLET ORAL at 20:30

## 2024-11-28 RX ADMIN — DIVALPROEX SODIUM 125 MG: 125 CAPSULE, COATED PELLETS ORAL at 09:02

## 2024-11-28 RX ADMIN — BISACODYL 5 MG: 5 TABLET, COATED ORAL at 09:03

## 2024-11-28 RX ADMIN — METOPROLOL SUCCINATE 25 MG: 25 TABLET, EXTENDED RELEASE ORAL at 20:31

## 2024-11-28 RX ADMIN — SODIUM CHLORIDE, PRESERVATIVE FREE 40 MG: 5 INJECTION INTRAVENOUS at 09:04

## 2024-11-28 RX ADMIN — ACETAMINOPHEN 650 MG: 325 TABLET ORAL at 17:31

## 2024-11-28 RX ADMIN — SUCRALFATE 1 G: 1 TABLET ORAL at 12:09

## 2024-11-28 RX ADMIN — ROSUVASTATIN 20 MG: 20 TABLET, FILM COATED ORAL at 09:02

## 2024-11-28 RX ADMIN — SODIUM CHLORIDE, PRESERVATIVE FREE 10 ML: 5 INJECTION INTRAVENOUS at 09:18

## 2024-11-28 RX ADMIN — CALCIUM 500 MG: 500 TABLET ORAL at 09:04

## 2024-11-28 RX ADMIN — SODIUM CHLORIDE, PRESERVATIVE FREE 40 MG: 5 INJECTION INTRAVENOUS at 20:30

## 2024-11-28 RX ADMIN — SODIUM CHLORIDE, PRESERVATIVE FREE 10 ML: 5 INJECTION INTRAVENOUS at 20:31

## 2024-11-28 RX ADMIN — BACLOFEN 10 MG: 10 TABLET ORAL at 20:30

## 2024-11-28 RX ADMIN — DULOXETINE HYDROCHLORIDE 60 MG: 60 CAPSULE, DELAYED RELEASE ORAL at 09:03

## 2024-11-28 RX ADMIN — GABAPENTIN 300 MG: 300 CAPSULE ORAL at 09:02

## 2024-11-28 RX ADMIN — SENNOSIDES 8.6 MG: 8.6 TABLET, FILM COATED ORAL at 20:30

## 2024-11-28 RX ADMIN — SUCRALFATE 1 G: 1 TABLET ORAL at 17:31

## 2024-11-28 RX ADMIN — SUCRALFATE 1 G: 1 TABLET ORAL at 06:03

## 2024-11-28 RX ADMIN — BACLOFEN 10 MG: 10 TABLET ORAL at 02:52

## 2024-11-28 RX ADMIN — GABAPENTIN 300 MG: 300 CAPSULE ORAL at 20:30

## 2024-11-28 RX ADMIN — GABAPENTIN 300 MG: 300 CAPSULE ORAL at 12:09

## 2024-11-28 RX ADMIN — DIVALPROEX SODIUM 125 MG: 125 CAPSULE, COATED PELLETS ORAL at 20:30

## 2024-11-28 RX ADMIN — OXYCODONE HYDROCHLORIDE 10 MG: 10 TABLET ORAL at 09:01

## 2024-11-28 RX ADMIN — MAGNESIUM HYDROXIDE 30 ML: 400 SUSPENSION ORAL at 09:03

## 2024-11-28 RX ADMIN — LOSARTAN POTASSIUM 25 MG: 25 TABLET, FILM COATED ORAL at 09:03

## 2024-11-28 ASSESSMENT — PAIN SCALES - WONG BAKER
WONGBAKER_NUMERICALRESPONSE: NO HURT
WONGBAKER_NUMERICALRESPONSE: HURTS A LITTLE BIT

## 2024-11-28 ASSESSMENT — PAIN DESCRIPTION - DESCRIPTORS
DESCRIPTORS: ACHING;PRESSURE;DISCOMFORT
DESCRIPTORS: ACHING;DISCOMFORT;SPASM
DESCRIPTORS: DISCOMFORT;SPASM;SORE
DESCRIPTORS: ACHING;SPASM;DISCOMFORT

## 2024-11-28 ASSESSMENT — PAIN DESCRIPTION - ORIENTATION
ORIENTATION: LOWER
ORIENTATION: MID
ORIENTATION: LOWER

## 2024-11-28 ASSESSMENT — PAIN DESCRIPTION - LOCATION
LOCATION: BACK
LOCATION: BACK
LOCATION: HEAD
LOCATION: BACK
LOCATION: BACK

## 2024-11-28 ASSESSMENT — PAIN SCALES - GENERAL
PAINLEVEL_OUTOF10: 6
PAINLEVEL_OUTOF10: 8
PAINLEVEL_OUTOF10: 10
PAINLEVEL_OUTOF10: 7
PAINLEVEL_OUTOF10: 9
PAINLEVEL_OUTOF10: 10

## 2024-11-28 NOTE — PLAN OF CARE
Problem: Safety - Adult  Goal: Free from fall injury  Outcome: Progressing     Problem: Chronic Conditions and Co-morbidities  Goal: Patient's chronic conditions and co-morbidity symptoms are monitored and maintained or improved  Outcome: Progressing     Problem: Pain  Goal: Verbalizes/displays adequate comfort level or baseline comfort level  Outcome: Progressing     Problem: Skin/Tissue Integrity  Goal: Absence of new skin breakdown  Description: 1.  Monitor for areas of redness and/or skin breakdown  2.  Assess vascular access sites hourly  3.  Every 4-6 hours minimum:  Change oxygen saturation probe site  4.  Every 4-6 hours:  If on nasal continuous positive airway pressure, respiratory therapy assess nares and determine need for appliance change or resting period.  Outcome: Progressing     Problem: ABCDS Injury Assessment  Goal: Absence of physical injury  Outcome: Progressing

## 2024-11-29 LAB
ALBUMIN SERPL-MCNC: 2.7 G/DL (ref 3.5–5.2)
ALP SERPL-CCNC: 79 U/L (ref 35–104)
ALT SERPL-CCNC: 10 U/L (ref 0–32)
ANION GAP SERPL CALCULATED.3IONS-SCNC: 8 MMOL/L (ref 7–16)
AST SERPL-CCNC: 11 U/L (ref 0–31)
BASOPHILS # BLD: 0.04 K/UL (ref 0–0.2)
BASOPHILS NFR BLD: 0 % (ref 0–2)
BILIRUB SERPL-MCNC: <0.2 MG/DL (ref 0–1.2)
BUN SERPL-MCNC: 10 MG/DL (ref 6–23)
CALCIUM SERPL-MCNC: 9 MG/DL (ref 8.6–10.2)
CHLORIDE SERPL-SCNC: 103 MMOL/L (ref 98–107)
CO2 SERPL-SCNC: 31 MMOL/L (ref 22–29)
CREAT SERPL-MCNC: 0.6 MG/DL (ref 0.5–1)
EOSINOPHIL # BLD: 0.49 K/UL (ref 0.05–0.5)
EOSINOPHILS RELATIVE PERCENT: 5 % (ref 0–6)
ERYTHROCYTE [DISTWIDTH] IN BLOOD BY AUTOMATED COUNT: 13.2 % (ref 11.5–15)
GFR, ESTIMATED: >90 ML/MIN/1.73M2
GLUCOSE SERPL-MCNC: 138 MG/DL (ref 74–99)
HCT VFR BLD AUTO: 23.9 % (ref 34–48)
HGB BLD-MCNC: 7.3 G/DL (ref 11.5–15.5)
IMM GRANULOCYTES # BLD AUTO: 0.45 K/UL (ref 0–0.58)
IMM GRANULOCYTES NFR BLD: 5 % (ref 0–5)
LYMPHOCYTES NFR BLD: 2.11 K/UL (ref 1.5–4)
LYMPHOCYTES RELATIVE PERCENT: 22 % (ref 20–42)
MCH RBC QN AUTO: 29 PG (ref 26–35)
MCHC RBC AUTO-ENTMCNC: 30.5 G/DL (ref 32–34.5)
MCV RBC AUTO: 94.8 FL (ref 80–99.9)
MONOCYTES NFR BLD: 0.69 K/UL (ref 0.1–0.95)
MONOCYTES NFR BLD: 7 % (ref 2–12)
NEUTROPHILS NFR BLD: 60 % (ref 43–80)
NEUTS SEG NFR BLD: 5.66 K/UL (ref 1.8–7.3)
PLATELET # BLD AUTO: 424 K/UL (ref 130–450)
PMV BLD AUTO: 9.3 FL (ref 7–12)
POTASSIUM SERPL-SCNC: 4.3 MMOL/L (ref 3.5–5)
PROT SERPL-MCNC: 5.5 G/DL (ref 6.4–8.3)
RBC # BLD AUTO: 2.52 M/UL (ref 3.5–5.5)
SODIUM SERPL-SCNC: 142 MMOL/L (ref 132–146)
WBC OTHER # BLD: 9.4 K/UL (ref 4.5–11.5)

## 2024-11-29 PROCEDURE — 80053 COMPREHEN METABOLIC PANEL: CPT

## 2024-11-29 PROCEDURE — 6370000000 HC RX 637 (ALT 250 FOR IP): Performed by: PSYCHIATRY & NEUROLOGY

## 2024-11-29 PROCEDURE — 1200000000 HC SEMI PRIVATE

## 2024-11-29 PROCEDURE — 2580000003 HC RX 258: Performed by: STUDENT IN AN ORGANIZED HEALTH CARE EDUCATION/TRAINING PROGRAM

## 2024-11-29 PROCEDURE — 6370000000 HC RX 637 (ALT 250 FOR IP)

## 2024-11-29 PROCEDURE — 97530 THERAPEUTIC ACTIVITIES: CPT

## 2024-11-29 PROCEDURE — 6370000000 HC RX 637 (ALT 250 FOR IP): Performed by: NEUROLOGICAL SURGERY

## 2024-11-29 PROCEDURE — 97535 SELF CARE MNGMENT TRAINING: CPT

## 2024-11-29 PROCEDURE — 6360000002 HC RX W HCPCS: Performed by: STUDENT IN AN ORGANIZED HEALTH CARE EDUCATION/TRAINING PROGRAM

## 2024-11-29 PROCEDURE — 94660 CPAP INITIATION&MGMT: CPT

## 2024-11-29 PROCEDURE — 85025 COMPLETE CBC W/AUTO DIFF WBC: CPT

## 2024-11-29 PROCEDURE — 6370000000 HC RX 637 (ALT 250 FOR IP): Performed by: STUDENT IN AN ORGANIZED HEALTH CARE EDUCATION/TRAINING PROGRAM

## 2024-11-29 PROCEDURE — 2700000000 HC OXYGEN THERAPY PER DAY

## 2024-11-29 PROCEDURE — 6370000000 HC RX 637 (ALT 250 FOR IP): Performed by: PHYSICIAN ASSISTANT

## 2024-11-29 PROCEDURE — 36415 COLL VENOUS BLD VENIPUNCTURE: CPT

## 2024-11-29 RX ORDER — FOLIC ACID 1 MG/1
1 TABLET ORAL DAILY
Status: DISCONTINUED | OUTPATIENT
Start: 2024-11-29 | End: 2024-12-09 | Stop reason: HOSPADM

## 2024-11-29 RX ORDER — LANOLIN ALCOHOL/MO/W.PET/CERES
1000 CREAM (GRAM) TOPICAL DAILY
Status: DISCONTINUED | OUTPATIENT
Start: 2024-11-29 | End: 2024-12-09 | Stop reason: HOSPADM

## 2024-11-29 RX ADMIN — GABAPENTIN 300 MG: 300 CAPSULE ORAL at 19:47

## 2024-11-29 RX ADMIN — OXYCODONE HYDROCHLORIDE 10 MG: 10 TABLET ORAL at 09:13

## 2024-11-29 RX ADMIN — BACLOFEN 10 MG: 10 TABLET ORAL at 10:47

## 2024-11-29 RX ADMIN — SUCRALFATE 1 G: 1 TABLET ORAL at 17:04

## 2024-11-29 RX ADMIN — DULOXETINE HYDROCHLORIDE 60 MG: 60 CAPSULE, DELAYED RELEASE ORAL at 09:13

## 2024-11-29 RX ADMIN — SUCRALFATE 1 G: 1 TABLET ORAL at 19:48

## 2024-11-29 RX ADMIN — CYANOCOBALAMIN TAB 1000 MCG 1000 MCG: 1000 TAB at 09:14

## 2024-11-29 RX ADMIN — DIVALPROEX SODIUM 125 MG: 125 CAPSULE, COATED PELLETS ORAL at 09:13

## 2024-11-29 RX ADMIN — SODIUM CHLORIDE, PRESERVATIVE FREE 40 MG: 5 INJECTION INTRAVENOUS at 09:14

## 2024-11-29 RX ADMIN — DIVALPROEX SODIUM 125 MG: 125 CAPSULE, COATED PELLETS ORAL at 19:48

## 2024-11-29 RX ADMIN — SODIUM CHLORIDE, PRESERVATIVE FREE 10 ML: 5 INJECTION INTRAVENOUS at 19:48

## 2024-11-29 RX ADMIN — SODIUM CHLORIDE, PRESERVATIVE FREE 10 ML: 5 INJECTION INTRAVENOUS at 10:55

## 2024-11-29 RX ADMIN — DIAZEPAM 5 MG: 5 TABLET ORAL at 10:47

## 2024-11-29 RX ADMIN — GABAPENTIN 300 MG: 300 CAPSULE ORAL at 09:12

## 2024-11-29 RX ADMIN — GABAPENTIN 300 MG: 300 CAPSULE ORAL at 15:08

## 2024-11-29 RX ADMIN — FOLIC ACID 1 MG: 1 TABLET ORAL at 09:12

## 2024-11-29 RX ADMIN — SUCRALFATE 1 G: 1 TABLET ORAL at 10:47

## 2024-11-29 RX ADMIN — ROSUVASTATIN 20 MG: 20 TABLET, FILM COATED ORAL at 09:13

## 2024-11-29 RX ADMIN — ACETAMINOPHEN 650 MG: 325 TABLET ORAL at 10:46

## 2024-11-29 RX ADMIN — LOSARTAN POTASSIUM 25 MG: 25 TABLET, FILM COATED ORAL at 09:14

## 2024-11-29 RX ADMIN — SUCRALFATE 1 G: 1 TABLET ORAL at 09:12

## 2024-11-29 RX ADMIN — SODIUM CHLORIDE, PRESERVATIVE FREE 40 MG: 5 INJECTION INTRAVENOUS at 19:47

## 2024-11-29 RX ADMIN — OXYCODONE HYDROCHLORIDE 10 MG: 10 TABLET ORAL at 15:08

## 2024-11-29 RX ADMIN — CALCIUM 500 MG: 500 TABLET ORAL at 09:14

## 2024-11-29 RX ADMIN — OXYCODONE HYDROCHLORIDE 10 MG: 10 TABLET ORAL at 19:49

## 2024-11-29 RX ADMIN — MAGNESIUM HYDROXIDE 30 ML: 400 SUSPENSION ORAL at 09:12

## 2024-11-29 RX ADMIN — BISACODYL 5 MG: 5 TABLET, COATED ORAL at 09:14

## 2024-11-29 RX ADMIN — METOPROLOL SUCCINATE 25 MG: 25 TABLET, EXTENDED RELEASE ORAL at 19:48

## 2024-11-29 ASSESSMENT — PAIN DESCRIPTION - DESCRIPTORS
DESCRIPTORS: DISCOMFORT;CRUSHING;ACHING
DESCRIPTORS: ACHING;DISCOMFORT;THROBBING;SORE
DESCRIPTORS: ACHING;DISCOMFORT;SPASM
DESCRIPTORS: PRESSURE;SORE;DISCOMFORT

## 2024-11-29 ASSESSMENT — PAIN SCALES - GENERAL
PAINLEVEL_OUTOF10: 10
PAINLEVEL_OUTOF10: 7
PAINLEVEL_OUTOF10: 10
PAINLEVEL_OUTOF10: 5

## 2024-11-29 ASSESSMENT — PAIN DESCRIPTION - LOCATION
LOCATION: BACK

## 2024-11-29 ASSESSMENT — PAIN DESCRIPTION - ORIENTATION
ORIENTATION: LOWER;MID
ORIENTATION: LOWER;MID
ORIENTATION: LOWER
ORIENTATION: LOWER;MID

## 2024-11-29 NOTE — PLAN OF CARE
Problem: Safety - Adult  Goal: Free from fall injury  11/29/2024 1628 by Niya Alvarez RN  Outcome: Progressing  11/29/2024 0239 by Rafia Sanchez RN  Outcome: Progressing     Problem: Chronic Conditions and Co-morbidities  Goal: Patient's chronic conditions and co-morbidity symptoms are monitored and maintained or improved  11/29/2024 1628 by Niya Alvarez RN  Outcome: Progressing  11/29/2024 0239 by Rafia Sanchez RN  Outcome: Progressing     Problem: Pain  Goal: Verbalizes/displays adequate comfort level or baseline comfort level  11/29/2024 1628 by Niya Alvarez RN  Outcome: Progressing  11/29/2024 0239 by Rafia Sanchez RN  Outcome: Progressing     Problem: Skin/Tissue Integrity  Goal: Absence of new skin breakdown  Description: 1.  Monitor for areas of redness and/or skin breakdown  2.  Assess vascular access sites hourly  3.  Every 4-6 hours minimum:  Change oxygen saturation probe site  4.  Every 4-6 hours:  If on nasal continuous positive airway pressure, respiratory therapy assess nares and determine need for appliance change or resting period.  11/29/2024 1628 by Niya Alvarez RN  Outcome: Progressing  11/29/2024 0239 by Rafia Sanchez RN  Outcome: Progressing     Problem: ABCDS Injury Assessment  Goal: Absence of physical injury  11/29/2024 1628 by Niya Alvarez RN  Outcome: Progressing  11/29/2024 0239 by Rafia Sanchez RN  Outcome: Progressing

## 2024-11-29 NOTE — PLAN OF CARE
Problem: Safety - Adult  Goal: Free from fall injury  11/29/2024 0239 by Rafia Sanchez RN  Outcome: Progressing     Problem: Chronic Conditions and Co-morbidities  Goal: Patient's chronic conditions and co-morbidity symptoms are monitored and maintained or improved  11/29/2024 0239 by Rafia Sanchez RN  Outcome: Progressing     Problem: Pain  Goal: Verbalizes/displays adequate comfort level or baseline comfort level  11/29/2024 0239 by Rafia Sanchez RN  Outcome: Progressing     Problem: Skin/Tissue Integrity  Goal: Absence of new skin breakdown  Description: 1.  Monitor for areas of redness and/or skin breakdown  2.  Assess vascular access sites hourly  3.  Every 4-6 hours minimum:  Change oxygen saturation probe site  4.  Every 4-6 hours:  If on nasal continuous positive airway pressure, respiratory therapy assess nares and determine need for appliance change or resting period.  11/29/2024 0239 by Rafia Sanchez RN  Outcome: Progressing     Problem: ABCDS Injury Assessment  Goal: Absence of physical injury  11/29/2024 0239 by Rafia Sanchez RN  Outcome: Progressing

## 2024-11-30 LAB
ALBUMIN SERPL-MCNC: 2.8 G/DL (ref 3.5–5.2)
ALP SERPL-CCNC: 86 U/L (ref 35–104)
ALT SERPL-CCNC: 9 U/L (ref 0–32)
ANION GAP SERPL CALCULATED.3IONS-SCNC: 9 MMOL/L (ref 7–16)
AST SERPL-CCNC: 10 U/L (ref 0–31)
BASOPHILS # BLD: 0.06 K/UL (ref 0–0.2)
BASOPHILS NFR BLD: 1 % (ref 0–2)
BILIRUB SERPL-MCNC: <0.2 MG/DL (ref 0–1.2)
BUN SERPL-MCNC: 12 MG/DL (ref 6–23)
CALCIUM SERPL-MCNC: 8.9 MG/DL (ref 8.6–10.2)
CHLORIDE SERPL-SCNC: 100 MMOL/L (ref 98–107)
CO2 SERPL-SCNC: 32 MMOL/L (ref 22–29)
CREAT SERPL-MCNC: 0.7 MG/DL (ref 0.5–1)
EOSINOPHIL # BLD: 0.48 K/UL (ref 0.05–0.5)
EOSINOPHILS RELATIVE PERCENT: 5 % (ref 0–6)
ERYTHROCYTE [DISTWIDTH] IN BLOOD BY AUTOMATED COUNT: 13.6 % (ref 11.5–15)
GFR, ESTIMATED: >90 ML/MIN/1.73M2
GLUCOSE SERPL-MCNC: 131 MG/DL (ref 74–99)
HCT VFR BLD AUTO: 25.1 % (ref 34–48)
HGB BLD-MCNC: 7.8 G/DL (ref 11.5–15.5)
IMM GRANULOCYTES # BLD AUTO: 0.35 K/UL (ref 0–0.58)
IMM GRANULOCYTES NFR BLD: 4 % (ref 0–5)
LYMPHOCYTES NFR BLD: 2.3 K/UL (ref 1.5–4)
LYMPHOCYTES RELATIVE PERCENT: 24 % (ref 20–42)
MCH RBC QN AUTO: 29.7 PG (ref 26–35)
MCHC RBC AUTO-ENTMCNC: 31.1 G/DL (ref 32–34.5)
MCV RBC AUTO: 95.4 FL (ref 80–99.9)
MONOCYTES NFR BLD: 0.69 K/UL (ref 0.1–0.95)
MONOCYTES NFR BLD: 7 % (ref 2–12)
NEUTROPHILS NFR BLD: 60 % (ref 43–80)
NEUTS SEG NFR BLD: 5.87 K/UL (ref 1.8–7.3)
PLATELET # BLD AUTO: 496 K/UL (ref 130–450)
PMV BLD AUTO: 9 FL (ref 7–12)
POTASSIUM SERPL-SCNC: 4.5 MMOL/L (ref 3.5–5)
PROT SERPL-MCNC: 5.8 G/DL (ref 6.4–8.3)
RBC # BLD AUTO: 2.63 M/UL (ref 3.5–5.5)
SODIUM SERPL-SCNC: 141 MMOL/L (ref 132–146)
WBC OTHER # BLD: 9.8 K/UL (ref 4.5–11.5)

## 2024-11-30 PROCEDURE — 2580000003 HC RX 258: Performed by: STUDENT IN AN ORGANIZED HEALTH CARE EDUCATION/TRAINING PROGRAM

## 2024-11-30 PROCEDURE — 1200000000 HC SEMI PRIVATE

## 2024-11-30 PROCEDURE — 6370000000 HC RX 637 (ALT 250 FOR IP): Performed by: NEUROLOGICAL SURGERY

## 2024-11-30 PROCEDURE — 6370000000 HC RX 637 (ALT 250 FOR IP): Performed by: PSYCHIATRY & NEUROLOGY

## 2024-11-30 PROCEDURE — 6370000000 HC RX 637 (ALT 250 FOR IP): Performed by: STUDENT IN AN ORGANIZED HEALTH CARE EDUCATION/TRAINING PROGRAM

## 2024-11-30 PROCEDURE — 6370000000 HC RX 637 (ALT 250 FOR IP): Performed by: PHYSICIAN ASSISTANT

## 2024-11-30 PROCEDURE — 2700000000 HC OXYGEN THERAPY PER DAY

## 2024-11-30 PROCEDURE — 80053 COMPREHEN METABOLIC PANEL: CPT

## 2024-11-30 PROCEDURE — 36415 COLL VENOUS BLD VENIPUNCTURE: CPT

## 2024-11-30 PROCEDURE — 85025 COMPLETE CBC W/AUTO DIFF WBC: CPT

## 2024-11-30 PROCEDURE — 6360000002 HC RX W HCPCS: Performed by: STUDENT IN AN ORGANIZED HEALTH CARE EDUCATION/TRAINING PROGRAM

## 2024-11-30 PROCEDURE — 94660 CPAP INITIATION&MGMT: CPT

## 2024-11-30 PROCEDURE — 6370000000 HC RX 637 (ALT 250 FOR IP)

## 2024-11-30 PROCEDURE — 51702 INSERT TEMP BLADDER CATH: CPT

## 2024-11-30 RX ORDER — OXYCODONE HYDROCHLORIDE 5 MG/1
5 TABLET ORAL EVERY 4 HOURS PRN
Status: DISCONTINUED | OUTPATIENT
Start: 2024-11-30 | End: 2024-12-05

## 2024-11-30 RX ADMIN — SUCRALFATE 1 G: 1 TABLET ORAL at 16:37

## 2024-11-30 RX ADMIN — OXYCODONE HYDROCHLORIDE 10 MG: 10 TABLET ORAL at 01:49

## 2024-11-30 RX ADMIN — SODIUM CHLORIDE, PRESERVATIVE FREE 10 ML: 5 INJECTION INTRAVENOUS at 20:43

## 2024-11-30 RX ADMIN — OXYCODONE HYDROCHLORIDE 10 MG: 10 TABLET ORAL at 12:08

## 2024-11-30 RX ADMIN — DIVALPROEX SODIUM 125 MG: 125 CAPSULE, COATED PELLETS ORAL at 20:43

## 2024-11-30 RX ADMIN — METOPROLOL SUCCINATE 25 MG: 25 TABLET, EXTENDED RELEASE ORAL at 20:43

## 2024-11-30 RX ADMIN — SUCRALFATE 1 G: 1 TABLET ORAL at 20:42

## 2024-11-30 RX ADMIN — SUCRALFATE 1 G: 1 TABLET ORAL at 12:08

## 2024-11-30 RX ADMIN — SODIUM CHLORIDE, PRESERVATIVE FREE 40 MG: 5 INJECTION INTRAVENOUS at 20:42

## 2024-11-30 RX ADMIN — SODIUM CHLORIDE, PRESERVATIVE FREE 10 ML: 5 INJECTION INTRAVENOUS at 09:00

## 2024-11-30 RX ADMIN — OXYCODONE HYDROCHLORIDE 10 MG: 10 TABLET ORAL at 16:53

## 2024-11-30 RX ADMIN — GABAPENTIN 300 MG: 300 CAPSULE ORAL at 20:42

## 2024-11-30 RX ADMIN — BACLOFEN 10 MG: 10 TABLET ORAL at 01:49

## 2024-11-30 RX ADMIN — SODIUM CHLORIDE, PRESERVATIVE FREE 40 MG: 5 INJECTION INTRAVENOUS at 08:59

## 2024-11-30 ASSESSMENT — PAIN SCALES - WONG BAKER
WONGBAKER_NUMERICALRESPONSE: NO HURT

## 2024-11-30 ASSESSMENT — PAIN SCALES - GENERAL
PAINLEVEL_OUTOF10: 7
PAINLEVEL_OUTOF10: 2
PAINLEVEL_OUTOF10: 8
PAINLEVEL_OUTOF10: 6
PAINLEVEL_OUTOF10: 10
PAINLEVEL_OUTOF10: 10

## 2024-11-30 ASSESSMENT — PAIN DESCRIPTION - ONSET
ONSET: ON-GOING

## 2024-11-30 ASSESSMENT — PAIN - FUNCTIONAL ASSESSMENT
PAIN_FUNCTIONAL_ASSESSMENT: ACTIVITIES ARE NOT PREVENTED
PAIN_FUNCTIONAL_ASSESSMENT: ACTIVITIES ARE NOT PREVENTED
PAIN_FUNCTIONAL_ASSESSMENT: PREVENTS OR INTERFERES SOME ACTIVE ACTIVITIES AND ADLS
PAIN_FUNCTIONAL_ASSESSMENT: PREVENTS OR INTERFERES SOME ACTIVE ACTIVITIES AND ADLS

## 2024-11-30 ASSESSMENT — PAIN DESCRIPTION - LOCATION
LOCATION: SHOULDER
LOCATION: BACK
LOCATION: BACK
LOCATION: SHOULDER
LOCATION: BACK
LOCATION: BACK

## 2024-11-30 ASSESSMENT — PAIN DESCRIPTION - ORIENTATION
ORIENTATION: MID;LOWER
ORIENTATION: MID;LOWER
ORIENTATION: LOWER;MID
ORIENTATION: RIGHT
ORIENTATION: RIGHT
ORIENTATION: MID;LOWER

## 2024-11-30 ASSESSMENT — PAIN DESCRIPTION - DESCRIPTORS
DESCRIPTORS: ACHING;DISCOMFORT;SORE
DESCRIPTORS: ACHING;SHARP;SPASM
DESCRIPTORS: ACHING;DISCOMFORT;SORE

## 2024-11-30 ASSESSMENT — PAIN DESCRIPTION - FREQUENCY
FREQUENCY: INTERMITTENT

## 2024-11-30 ASSESSMENT — PAIN DESCRIPTION - PAIN TYPE
TYPE: ACUTE PAIN

## 2024-11-30 NOTE — PLAN OF CARE
Problem: Safety - Adult  Goal: Free from fall injury  Outcome: Progressing  Flowsheets (Taken 11/30/2024 1519 by Shayy Meza LPN)  Free From Fall Injury:   Based on caregiver fall risk screen, instruct family/caregiver to ask for assistance with transferring infant if caregiver noted to have fall risk factors   Instruct family/caregiver on patient safety     Problem: Chronic Conditions and Co-morbidities  Goal: Patient's chronic conditions and co-morbidity symptoms are monitored and maintained or improved  Outcome: Progressing     Problem: Pain  Goal: Verbalizes/displays adequate comfort level or baseline comfort level  Outcome: Progressing     Problem: Skin/Tissue Integrity  Goal: Absence of new skin breakdown  Description: 1.  Monitor for areas of redness and/or skin breakdown  2.  Assess vascular access sites hourly  3.  Every 4-6 hours minimum:  Change oxygen saturation probe site  4.  Every 4-6 hours:  If on nasal continuous positive airway pressure, respiratory therapy assess nares and determine need for appliance change or resting period.  Outcome: Progressing     Problem: ABCDS Injury Assessment  Goal: Absence of physical injury  Outcome: Progressing  Flowsheets (Taken 11/30/2024 1519 by Shayy Meza LPN)  Absence of Physical Injury: Implement safety measures based on patient assessment

## 2024-11-30 NOTE — PLAN OF CARE
Problem: Safety - Adult  Goal: Free from fall injury  11/29/2024 2209 by Rafia Sanchez RN  Outcome: Progressing     Problem: Chronic Conditions and Co-morbidities  Goal: Patient's chronic conditions and co-morbidity symptoms are monitored and maintained or improved  11/29/2024 2209 by Rafia Sanchez RN  Outcome: Progressing     Problem: Pain  Goal: Verbalizes/displays adequate comfort level or baseline comfort level  11/29/2024 2209 by Rafia Sanchez RN  Outcome: Progressing     Problem: Skin/Tissue Integrity  Goal: Absence of new skin breakdown  Description: 1.  Monitor for areas of redness and/or skin breakdown  2.  Assess vascular access sites hourly  3.  Every 4-6 hours minimum:  Change oxygen saturation probe site  4.  Every 4-6 hours:  If on nasal continuous positive airway pressure, respiratory therapy assess nares and determine need for appliance change or resting period.  11/29/2024 2209 by Rafia Sanchez RN  Outcome: Progressing     Problem: ABCDS Injury Assessment  Goal: Absence of physical injury  11/29/2024 2209 by Rafia Sanchez RN  Outcome: Progressing

## 2024-12-01 LAB
ALBUMIN SERPL-MCNC: 2.6 G/DL (ref 3.5–5.2)
ALP SERPL-CCNC: 94 U/L (ref 35–104)
ALT SERPL-CCNC: 7 U/L (ref 0–32)
ANION GAP SERPL CALCULATED.3IONS-SCNC: 7 MMOL/L (ref 7–16)
AST SERPL-CCNC: 10 U/L (ref 0–31)
BASOPHILS # BLD: 0.04 K/UL (ref 0–0.2)
BASOPHILS NFR BLD: 0 % (ref 0–2)
BILIRUB SERPL-MCNC: <0.2 MG/DL (ref 0–1.2)
BUN SERPL-MCNC: 15 MG/DL (ref 6–23)
CALCIUM SERPL-MCNC: 8.7 MG/DL (ref 8.6–10.2)
CHLORIDE SERPL-SCNC: 100 MMOL/L (ref 98–107)
CO2 SERPL-SCNC: 33 MMOL/L (ref 22–29)
CREAT SERPL-MCNC: 0.7 MG/DL (ref 0.5–1)
EOSINOPHIL # BLD: 0.39 K/UL (ref 0.05–0.5)
EOSINOPHILS RELATIVE PERCENT: 4 % (ref 0–6)
ERYTHROCYTE [DISTWIDTH] IN BLOOD BY AUTOMATED COUNT: 13.5 % (ref 11.5–15)
GFR, ESTIMATED: >90 ML/MIN/1.73M2
GLUCOSE SERPL-MCNC: 138 MG/DL (ref 74–99)
HCT VFR BLD AUTO: 24.5 % (ref 34–48)
HGB BLD-MCNC: 7.5 G/DL (ref 11.5–15.5)
IMM GRANULOCYTES # BLD AUTO: 0.18 K/UL (ref 0–0.58)
IMM GRANULOCYTES NFR BLD: 2 % (ref 0–5)
LYMPHOCYTES NFR BLD: 2.02 K/UL (ref 1.5–4)
LYMPHOCYTES RELATIVE PERCENT: 22 % (ref 20–42)
MCH RBC QN AUTO: 28.8 PG (ref 26–35)
MCHC RBC AUTO-ENTMCNC: 30.6 G/DL (ref 32–34.5)
MCV RBC AUTO: 94.2 FL (ref 80–99.9)
MONOCYTES NFR BLD: 0.7 K/UL (ref 0.1–0.95)
MONOCYTES NFR BLD: 8 % (ref 2–12)
NEUTROPHILS NFR BLD: 64 % (ref 43–80)
NEUTS SEG NFR BLD: 5.92 K/UL (ref 1.8–7.3)
PLATELET # BLD AUTO: 486 K/UL (ref 130–450)
PMV BLD AUTO: 8.9 FL (ref 7–12)
POTASSIUM SERPL-SCNC: 3.9 MMOL/L (ref 3.5–5)
PROT SERPL-MCNC: 5.4 G/DL (ref 6.4–8.3)
RBC # BLD AUTO: 2.6 M/UL (ref 3.5–5.5)
SODIUM SERPL-SCNC: 140 MMOL/L (ref 132–146)
WBC OTHER # BLD: 9.3 K/UL (ref 4.5–11.5)

## 2024-12-01 PROCEDURE — 94660 CPAP INITIATION&MGMT: CPT

## 2024-12-01 PROCEDURE — 80053 COMPREHEN METABOLIC PANEL: CPT

## 2024-12-01 PROCEDURE — 2700000000 HC OXYGEN THERAPY PER DAY

## 2024-12-01 PROCEDURE — 36415 COLL VENOUS BLD VENIPUNCTURE: CPT

## 2024-12-01 PROCEDURE — 6370000000 HC RX 637 (ALT 250 FOR IP): Performed by: PHYSICIAN ASSISTANT

## 2024-12-01 PROCEDURE — 6370000000 HC RX 637 (ALT 250 FOR IP): Performed by: STUDENT IN AN ORGANIZED HEALTH CARE EDUCATION/TRAINING PROGRAM

## 2024-12-01 PROCEDURE — 6360000002 HC RX W HCPCS: Performed by: STUDENT IN AN ORGANIZED HEALTH CARE EDUCATION/TRAINING PROGRAM

## 2024-12-01 PROCEDURE — 6370000000 HC RX 637 (ALT 250 FOR IP): Performed by: PSYCHIATRY & NEUROLOGY

## 2024-12-01 PROCEDURE — 85025 COMPLETE CBC W/AUTO DIFF WBC: CPT

## 2024-12-01 PROCEDURE — 6370000000 HC RX 637 (ALT 250 FOR IP)

## 2024-12-01 PROCEDURE — 6370000000 HC RX 637 (ALT 250 FOR IP): Performed by: NEUROLOGICAL SURGERY

## 2024-12-01 PROCEDURE — 1200000000 HC SEMI PRIVATE

## 2024-12-01 PROCEDURE — 2580000003 HC RX 258: Performed by: STUDENT IN AN ORGANIZED HEALTH CARE EDUCATION/TRAINING PROGRAM

## 2024-12-01 RX ADMIN — ROSUVASTATIN 20 MG: 20 TABLET, FILM COATED ORAL at 08:42

## 2024-12-01 RX ADMIN — DULOXETINE HYDROCHLORIDE 60 MG: 60 CAPSULE, DELAYED RELEASE ORAL at 08:43

## 2024-12-01 RX ADMIN — METOPROLOL SUCCINATE 25 MG: 25 TABLET, EXTENDED RELEASE ORAL at 20:26

## 2024-12-01 RX ADMIN — OXYCODONE HYDROCHLORIDE 5 MG: 5 TABLET ORAL at 21:33

## 2024-12-01 RX ADMIN — MAGNESIUM HYDROXIDE 30 ML: 400 SUSPENSION ORAL at 08:42

## 2024-12-01 RX ADMIN — OXYCODONE HYDROCHLORIDE 5 MG: 5 TABLET ORAL at 03:25

## 2024-12-01 RX ADMIN — GABAPENTIN 300 MG: 300 CAPSULE ORAL at 08:43

## 2024-12-01 RX ADMIN — SUCRALFATE 1 G: 1 TABLET ORAL at 11:18

## 2024-12-01 RX ADMIN — GABAPENTIN 300 MG: 300 CAPSULE ORAL at 20:26

## 2024-12-01 RX ADMIN — GABAPENTIN 300 MG: 300 CAPSULE ORAL at 13:04

## 2024-12-01 RX ADMIN — SODIUM CHLORIDE, PRESERVATIVE FREE 40 MG: 5 INJECTION INTRAVENOUS at 20:27

## 2024-12-01 RX ADMIN — OXYCODONE HYDROCHLORIDE 5 MG: 5 TABLET ORAL at 13:04

## 2024-12-01 RX ADMIN — OXYCODONE HYDROCHLORIDE 5 MG: 5 TABLET ORAL at 08:42

## 2024-12-01 RX ADMIN — DIVALPROEX SODIUM 125 MG: 125 CAPSULE, COATED PELLETS ORAL at 08:43

## 2024-12-01 RX ADMIN — SUCRALFATE 1 G: 1 TABLET ORAL at 16:26

## 2024-12-01 RX ADMIN — SODIUM CHLORIDE, PRESERVATIVE FREE 40 MG: 5 INJECTION INTRAVENOUS at 08:42

## 2024-12-01 RX ADMIN — ACETAMINOPHEN 650 MG: 325 TABLET ORAL at 03:25

## 2024-12-01 RX ADMIN — FOLIC ACID 1 MG: 1 TABLET ORAL at 08:43

## 2024-12-01 RX ADMIN — LOSARTAN POTASSIUM 25 MG: 25 TABLET, FILM COATED ORAL at 08:43

## 2024-12-01 RX ADMIN — DIVALPROEX SODIUM 125 MG: 125 CAPSULE, COATED PELLETS ORAL at 20:26

## 2024-12-01 RX ADMIN — BISACODYL 5 MG: 5 TABLET, COATED ORAL at 08:42

## 2024-12-01 RX ADMIN — CALCIUM 500 MG: 500 TABLET ORAL at 11:18

## 2024-12-01 RX ADMIN — OXYCODONE HYDROCHLORIDE 5 MG: 5 TABLET ORAL at 17:28

## 2024-12-01 RX ADMIN — CYANOCOBALAMIN TAB 1000 MCG 1000 MCG: 1000 TAB at 08:43

## 2024-12-01 RX ADMIN — SODIUM CHLORIDE, PRESERVATIVE FREE 10 ML: 5 INJECTION INTRAVENOUS at 08:43

## 2024-12-01 RX ADMIN — BACLOFEN 10 MG: 10 TABLET ORAL at 20:26

## 2024-12-01 RX ADMIN — SODIUM CHLORIDE, PRESERVATIVE FREE 10 ML: 5 INJECTION INTRAVENOUS at 20:27

## 2024-12-01 RX ADMIN — SUCRALFATE 1 G: 1 TABLET ORAL at 20:26

## 2024-12-01 ASSESSMENT — PAIN DESCRIPTION - LOCATION
LOCATION: BACK;NECK
LOCATION: BACK
LOCATION: ABDOMEN
LOCATION: BACK

## 2024-12-01 ASSESSMENT — PAIN DESCRIPTION - DESCRIPTORS
DESCRIPTORS: ACHING;SORE;TENDER
DESCRIPTORS: ACHING;DISCOMFORT;SORE
DESCRIPTORS: ACHING;DULL;DISCOMFORT;SORE
DESCRIPTORS: ACHING;DISCOMFORT;SORE

## 2024-12-01 ASSESSMENT — PAIN SCALES - WONG BAKER
WONGBAKER_NUMERICALRESPONSE: NO HURT

## 2024-12-01 ASSESSMENT — PAIN DESCRIPTION - ORIENTATION
ORIENTATION: MID;LOWER
ORIENTATION: POSTERIOR;UPPER

## 2024-12-01 ASSESSMENT — PAIN SCALES - GENERAL
PAINLEVEL_OUTOF10: 9
PAINLEVEL_OUTOF10: 7
PAINLEVEL_OUTOF10: 9
PAINLEVEL_OUTOF10: 7
PAINLEVEL_OUTOF10: 6
PAINLEVEL_OUTOF10: 9
PAINLEVEL_OUTOF10: 6
PAINLEVEL_OUTOF10: 3

## 2024-12-01 ASSESSMENT — PAIN DESCRIPTION - ONSET: ONSET: GRADUAL

## 2024-12-01 ASSESSMENT — PAIN DESCRIPTION - PAIN TYPE: TYPE: ACUTE PAIN;SURGICAL PAIN;CHRONIC PAIN

## 2024-12-01 ASSESSMENT — PAIN DESCRIPTION - FREQUENCY: FREQUENCY: INTERMITTENT

## 2024-12-01 NOTE — PLAN OF CARE
Problem: Safety - Adult  Goal: Free from fall injury  11/30/2024 2034 by Kerri Villasenor, RN  Outcome: Progressing  11/30/2024 1658 by Teresa Seay RN  Outcome: Progressing  Flowsheets (Taken 11/30/2024 1519 by Shayy Meza LPN)  Free From Fall Injury:   Based on caregiver fall risk screen, instruct family/caregiver to ask for assistance with transferring infant if caregiver noted to have fall risk factors   Instruct family/caregiver on patient safety     Problem: Chronic Conditions and Co-morbidities  Goal: Patient's chronic conditions and co-morbidity symptoms are monitored and maintained or improved  11/30/2024 2034 by Kerri Villasenor RN  Outcome: Progressing  11/30/2024 1658 by Teresa Seay RN  Outcome: Progressing     Problem: Pain  Goal: Verbalizes/displays adequate comfort level or baseline comfort level  11/30/2024 1658 by Teresa Seay RN  Outcome: Progressing     Problem: Skin/Tissue Integrity  Goal: Absence of new skin breakdown  Description: 1.  Monitor for areas of redness and/or skin breakdown  2.  Assess vascular access sites hourly  3.  Every 4-6 hours minimum:  Change oxygen saturation probe site  4.  Every 4-6 hours:  If on nasal continuous positive airway pressure, respiratory therapy assess nares and determine need for appliance change or resting period.  11/30/2024 1658 by Teresa Seay RN  Outcome: Progressing     Problem: ABCDS Injury Assessment  Goal: Absence of physical injury  11/30/2024 1658 by Teresa Seay RN  Outcome: Progressing  Flowsheets (Taken 11/30/2024 1519 by Shayy Meza LPN)  Absence of Physical Injury: Implement safety measures based on patient assessment

## 2024-12-02 ENCOUNTER — APPOINTMENT (OUTPATIENT)
Dept: GENERAL RADIOLOGY | Age: 69
DRG: 811 | End: 2024-12-02
Payer: MEDICARE

## 2024-12-02 LAB
ALBUMIN SERPL-MCNC: 2.6 G/DL (ref 3.5–5.2)
ALP SERPL-CCNC: 97 U/L (ref 35–104)
ALT SERPL-CCNC: 6 U/L (ref 0–32)
ANION GAP SERPL CALCULATED.3IONS-SCNC: 8 MMOL/L (ref 7–16)
AST SERPL-CCNC: 10 U/L (ref 0–31)
BASOPHILS # BLD: 0.04 K/UL (ref 0–0.2)
BASOPHILS NFR BLD: 0 % (ref 0–2)
BILIRUB SERPL-MCNC: <0.2 MG/DL (ref 0–1.2)
BNP SERPL-MCNC: 71 PG/ML (ref 0–125)
BUN SERPL-MCNC: 11 MG/DL (ref 6–23)
CALCIUM SERPL-MCNC: 8.6 MG/DL (ref 8.6–10.2)
CHLORIDE SERPL-SCNC: 99 MMOL/L (ref 98–107)
CO2 SERPL-SCNC: 30 MMOL/L (ref 22–29)
CREAT SERPL-MCNC: 0.7 MG/DL (ref 0.5–1)
EOSINOPHIL # BLD: 0.33 K/UL (ref 0.05–0.5)
EOSINOPHILS RELATIVE PERCENT: 4 % (ref 0–6)
ERYTHROCYTE [DISTWIDTH] IN BLOOD BY AUTOMATED COUNT: 13.5 % (ref 11.5–15)
GFR, ESTIMATED: >90 ML/MIN/1.73M2
GLUCOSE SERPL-MCNC: 138 MG/DL (ref 74–99)
HCT VFR BLD AUTO: 25.2 % (ref 34–48)
HGB BLD-MCNC: 7.9 G/DL (ref 11.5–15.5)
IMM GRANULOCYTES # BLD AUTO: 0.1 K/UL (ref 0–0.58)
IMM GRANULOCYTES NFR BLD: 1 % (ref 0–5)
LYMPHOCYTES NFR BLD: 2.37 K/UL (ref 1.5–4)
LYMPHOCYTES RELATIVE PERCENT: 27 % (ref 20–42)
MCH RBC QN AUTO: 29.4 PG (ref 26–35)
MCHC RBC AUTO-ENTMCNC: 31.3 G/DL (ref 32–34.5)
MCV RBC AUTO: 93.7 FL (ref 80–99.9)
MONOCYTES NFR BLD: 0.69 K/UL (ref 0.1–0.95)
MONOCYTES NFR BLD: 8 % (ref 2–12)
NEUTROPHILS NFR BLD: 60 % (ref 43–80)
NEUTS SEG NFR BLD: 5.36 K/UL (ref 1.8–7.3)
PATH REV BLD -IMP: NORMAL
PLATELET # BLD AUTO: 517 K/UL (ref 130–450)
PMV BLD AUTO: 8.8 FL (ref 7–12)
POTASSIUM SERPL-SCNC: 4 MMOL/L (ref 3.5–5)
PROCALCITONIN SERPL-MCNC: 0.06 NG/ML (ref 0–0.08)
PROT SERPL-MCNC: 5.7 G/DL (ref 6.4–8.3)
RBC # BLD AUTO: 2.69 M/UL (ref 3.5–5.5)
SODIUM SERPL-SCNC: 137 MMOL/L (ref 132–146)
WBC OTHER # BLD: 8.9 K/UL (ref 4.5–11.5)

## 2024-12-02 PROCEDURE — 85025 COMPLETE CBC W/AUTO DIFF WBC: CPT

## 2024-12-02 PROCEDURE — 83880 ASSAY OF NATRIURETIC PEPTIDE: CPT

## 2024-12-02 PROCEDURE — 84145 PROCALCITONIN (PCT): CPT

## 2024-12-02 PROCEDURE — 97530 THERAPEUTIC ACTIVITIES: CPT

## 2024-12-02 PROCEDURE — 6370000000 HC RX 637 (ALT 250 FOR IP): Performed by: PHYSICIAN ASSISTANT

## 2024-12-02 PROCEDURE — 74018 RADEX ABDOMEN 1 VIEW: CPT

## 2024-12-02 PROCEDURE — 36415 COLL VENOUS BLD VENIPUNCTURE: CPT

## 2024-12-02 PROCEDURE — 97535 SELF CARE MNGMENT TRAINING: CPT

## 2024-12-02 PROCEDURE — 71045 X-RAY EXAM CHEST 1 VIEW: CPT

## 2024-12-02 PROCEDURE — 6370000000 HC RX 637 (ALT 250 FOR IP)

## 2024-12-02 PROCEDURE — 6370000000 HC RX 637 (ALT 250 FOR IP): Performed by: STUDENT IN AN ORGANIZED HEALTH CARE EDUCATION/TRAINING PROGRAM

## 2024-12-02 PROCEDURE — 94660 CPAP INITIATION&MGMT: CPT

## 2024-12-02 PROCEDURE — 80053 COMPREHEN METABOLIC PANEL: CPT

## 2024-12-02 PROCEDURE — 1200000000 HC SEMI PRIVATE

## 2024-12-02 PROCEDURE — 97112 NEUROMUSCULAR REEDUCATION: CPT

## 2024-12-02 PROCEDURE — 6370000000 HC RX 637 (ALT 250 FOR IP): Performed by: PSYCHIATRY & NEUROLOGY

## 2024-12-02 PROCEDURE — 2580000003 HC RX 258: Performed by: STUDENT IN AN ORGANIZED HEALTH CARE EDUCATION/TRAINING PROGRAM

## 2024-12-02 PROCEDURE — 6360000002 HC RX W HCPCS: Performed by: STUDENT IN AN ORGANIZED HEALTH CARE EDUCATION/TRAINING PROGRAM

## 2024-12-02 RX ORDER — PREGABALIN 75 MG/1
75 CAPSULE ORAL NIGHTLY
Status: DISCONTINUED | OUTPATIENT
Start: 2024-12-03 | End: 2024-12-09 | Stop reason: HOSPADM

## 2024-12-02 RX ORDER — DIAZEPAM 2 MG/1
2 TABLET ORAL EVERY 6 HOURS PRN
Status: DISCONTINUED | OUTPATIENT
Start: 2024-12-02 | End: 2024-12-03

## 2024-12-02 RX ORDER — PREGABALIN 75 MG/1
75 CAPSULE ORAL 2 TIMES DAILY
Status: DISCONTINUED | OUTPATIENT
Start: 2024-12-02 | End: 2024-12-02

## 2024-12-02 RX ORDER — BACLOFEN 10 MG/1
5 TABLET ORAL 3 TIMES DAILY PRN
Status: DISCONTINUED | OUTPATIENT
Start: 2024-12-02 | End: 2024-12-03

## 2024-12-02 RX ORDER — SENNOSIDES A AND B 8.6 MG/1
1 TABLET, FILM COATED ORAL 2 TIMES DAILY
Status: DISCONTINUED | OUTPATIENT
Start: 2024-12-02 | End: 2024-12-09 | Stop reason: HOSPADM

## 2024-12-02 RX ORDER — LACTULOSE 10 G/15ML
30 SOLUTION ORAL DAILY
Status: DISCONTINUED | OUTPATIENT
Start: 2024-12-02 | End: 2024-12-09 | Stop reason: HOSPADM

## 2024-12-02 RX ORDER — PANTOPRAZOLE SODIUM 40 MG/1
40 TABLET, DELAYED RELEASE ORAL
Status: DISCONTINUED | OUTPATIENT
Start: 2024-12-03 | End: 2024-12-09 | Stop reason: HOSPADM

## 2024-12-02 RX ORDER — POLYETHYLENE GLYCOL 3350 17 G/17G
17 POWDER, FOR SOLUTION ORAL DAILY
Status: DISCONTINUED | OUTPATIENT
Start: 2024-12-02 | End: 2024-12-09 | Stop reason: HOSPADM

## 2024-12-02 RX ADMIN — OXYCODONE HYDROCHLORIDE 5 MG: 5 TABLET ORAL at 22:11

## 2024-12-02 RX ADMIN — SENNOSIDES 8.6 MG: 8.6 TABLET, FILM COATED ORAL at 17:06

## 2024-12-02 RX ADMIN — BISACODYL 5 MG: 5 TABLET, COATED ORAL at 08:59

## 2024-12-02 RX ADMIN — ACETAMINOPHEN 650 MG: 325 TABLET ORAL at 10:17

## 2024-12-02 RX ADMIN — OXYCODONE HYDROCHLORIDE 5 MG: 5 TABLET ORAL at 01:45

## 2024-12-02 RX ADMIN — METOPROLOL SUCCINATE 25 MG: 25 TABLET, EXTENDED RELEASE ORAL at 21:17

## 2024-12-02 RX ADMIN — ACETAMINOPHEN 650 MG: 325 TABLET ORAL at 16:41

## 2024-12-02 RX ADMIN — DIVALPROEX SODIUM 125 MG: 125 CAPSULE, COATED PELLETS ORAL at 08:59

## 2024-12-02 RX ADMIN — SODIUM CHLORIDE, PRESERVATIVE FREE 40 MG: 5 INJECTION INTRAVENOUS at 08:58

## 2024-12-02 RX ADMIN — POLYETHYLENE GLYCOL 3350 17 G: 17 POWDER, FOR SOLUTION ORAL at 17:06

## 2024-12-02 RX ADMIN — SODIUM CHLORIDE, PRESERVATIVE FREE 10 ML: 5 INJECTION INTRAVENOUS at 21:17

## 2024-12-02 RX ADMIN — GABAPENTIN 300 MG: 300 CAPSULE ORAL at 08:59

## 2024-12-02 RX ADMIN — SUCRALFATE 1 G: 1 TABLET ORAL at 21:17

## 2024-12-02 RX ADMIN — MAGNESIUM HYDROXIDE 30 ML: 400 SUSPENSION ORAL at 09:00

## 2024-12-02 RX ADMIN — SUCRALFATE 1 G: 1 TABLET ORAL at 09:00

## 2024-12-02 RX ADMIN — BACLOFEN 10 MG: 10 TABLET ORAL at 10:21

## 2024-12-02 RX ADMIN — SODIUM CHLORIDE, PRESERVATIVE FREE 10 ML: 5 INJECTION INTRAVENOUS at 08:59

## 2024-12-02 RX ADMIN — OXYCODONE HYDROCHLORIDE 5 MG: 5 TABLET ORAL at 11:55

## 2024-12-02 RX ADMIN — CALCIUM 500 MG: 500 TABLET ORAL at 09:00

## 2024-12-02 RX ADMIN — LACTULOSE 30 G: 20 SOLUTION ORAL at 17:05

## 2024-12-02 RX ADMIN — DULOXETINE HYDROCHLORIDE 60 MG: 60 CAPSULE, DELAYED RELEASE ORAL at 09:00

## 2024-12-02 RX ADMIN — FOLIC ACID 1 MG: 1 TABLET ORAL at 08:59

## 2024-12-02 RX ADMIN — SUCRALFATE 1 G: 1 TABLET ORAL at 16:41

## 2024-12-02 RX ADMIN — CYANOCOBALAMIN TAB 1000 MCG 1000 MCG: 1000 TAB at 09:00

## 2024-12-02 RX ADMIN — ROSUVASTATIN 20 MG: 20 TABLET, FILM COATED ORAL at 09:00

## 2024-12-02 RX ADMIN — GABAPENTIN 300 MG: 300 CAPSULE ORAL at 13:44

## 2024-12-02 ASSESSMENT — PAIN DESCRIPTION - LOCATION
LOCATION: BACK

## 2024-12-02 ASSESSMENT — PAIN SCALES - GENERAL
PAINLEVEL_OUTOF10: 6
PAINLEVEL_OUTOF10: 2
PAINLEVEL_OUTOF10: 8
PAINLEVEL_OUTOF10: 0
PAINLEVEL_OUTOF10: 4
PAINLEVEL_OUTOF10: 3
PAINLEVEL_OUTOF10: 10
PAINLEVEL_OUTOF10: 10

## 2024-12-02 ASSESSMENT — PAIN DESCRIPTION - ORIENTATION
ORIENTATION: LOWER
ORIENTATION: MID;LOWER
ORIENTATION: LOWER
ORIENTATION: LOWER
ORIENTATION: MID;LOWER

## 2024-12-02 ASSESSMENT — PAIN DESCRIPTION - DESCRIPTORS
DESCRIPTORS: ACHING;DULL;DISCOMFORT;SORE
DESCRIPTORS: DULL;DISCOMFORT;ACHING
DESCRIPTORS: ACHING;DISCOMFORT;TENDER;DULL
DESCRIPTORS: DISCOMFORT;DULL;ACHING

## 2024-12-02 NOTE — PLAN OF CARE
Problem: Safety - Adult  Goal: Free from fall injury  12/1/2024 2304 by Zoya Carrasco RN  Outcome: Progressing  12/1/2024 1416 by Teresa Seay RN  Outcome: Progressing     Problem: Chronic Conditions and Co-morbidities  Goal: Patient's chronic conditions and co-morbidity symptoms are monitored and maintained or improved  12/1/2024 2304 by Zoya Carrasco RN  Outcome: Progressing  12/1/2024 1416 by Teresa Seay RN  Outcome: Progressing     Problem: Pain  Goal: Verbalizes/displays adequate comfort level or baseline comfort level  12/1/2024 2304 by Zoya Carrasco RN  Outcome: Progressing  12/1/2024 1416 by Teresa Seay RN  Outcome: Progressing     Problem: Skin/Tissue Integrity  Goal: Absence of new skin breakdown  Description: 1.  Monitor for areas of redness and/or skin breakdown  2.  Assess vascular access sites hourly  3.  Every 4-6 hours minimum:  Change oxygen saturation probe site  4.  Every 4-6 hours:  If on nasal continuous positive airway pressure, respiratory therapy assess nares and determine need for appliance change or resting period.  12/1/2024 2304 by Zoya Crarasco RN  Outcome: Progressing  12/1/2024 1416 by Teresa Seay RN  Outcome: Progressing     Problem: ABCDS Injury Assessment  Goal: Absence of physical injury  12/1/2024 2304 by Zoya Carrasco RN  Outcome: Progressing  12/1/2024 1416 by Teresa Seay RN  Outcome: Progressing

## 2024-12-03 PROCEDURE — 1200000000 HC SEMI PRIVATE

## 2024-12-03 PROCEDURE — 6370000000 HC RX 637 (ALT 250 FOR IP): Performed by: NEUROLOGICAL SURGERY

## 2024-12-03 PROCEDURE — 2700000000 HC OXYGEN THERAPY PER DAY

## 2024-12-03 PROCEDURE — 97110 THERAPEUTIC EXERCISES: CPT

## 2024-12-03 PROCEDURE — 2580000003 HC RX 258: Performed by: STUDENT IN AN ORGANIZED HEALTH CARE EDUCATION/TRAINING PROGRAM

## 2024-12-03 PROCEDURE — 6370000000 HC RX 637 (ALT 250 FOR IP): Performed by: STUDENT IN AN ORGANIZED HEALTH CARE EDUCATION/TRAINING PROGRAM

## 2024-12-03 PROCEDURE — 6360000002 HC RX W HCPCS: Performed by: STUDENT IN AN ORGANIZED HEALTH CARE EDUCATION/TRAINING PROGRAM

## 2024-12-03 PROCEDURE — 97530 THERAPEUTIC ACTIVITIES: CPT

## 2024-12-03 PROCEDURE — 6370000000 HC RX 637 (ALT 250 FOR IP): Performed by: PSYCHIATRY & NEUROLOGY

## 2024-12-03 PROCEDURE — 6370000000 HC RX 637 (ALT 250 FOR IP)

## 2024-12-03 PROCEDURE — 97112 NEUROMUSCULAR REEDUCATION: CPT

## 2024-12-03 PROCEDURE — 94660 CPAP INITIATION&MGMT: CPT

## 2024-12-03 RX ORDER — PREGABALIN 75 MG/1
75 CAPSULE ORAL NIGHTLY
Status: SHIPPED | DISCHARGE
Start: 2024-12-03 | End: 2025-01-02

## 2024-12-03 RX ORDER — POLYETHYLENE GLYCOL 3350 17 G/17G
17 POWDER, FOR SOLUTION ORAL DAILY
DISCHARGE
Start: 2024-12-03 | End: 2025-01-02

## 2024-12-03 RX ORDER — SENNOSIDES A AND B 8.6 MG/1
1 TABLET, FILM COATED ORAL 2 TIMES DAILY
DISCHARGE
Start: 2024-12-03 | End: 2025-01-02

## 2024-12-03 RX ORDER — OXYCODONE HYDROCHLORIDE 5 MG/1
5 TABLET ORAL EVERY 8 HOURS PRN
Qty: 12 TABLET | Status: SHIPPED | DISCHARGE
Start: 2024-12-03 | End: 2024-12-06

## 2024-12-03 RX ORDER — DIAZEPAM 2 MG/1
2 TABLET ORAL EVERY 12 HOURS PRN
Status: DISCONTINUED | OUTPATIENT
Start: 2024-12-03 | End: 2024-12-06

## 2024-12-03 RX ORDER — BACLOFEN 10 MG/1
5 TABLET ORAL 2 TIMES DAILY PRN
Status: DISCONTINUED | OUTPATIENT
Start: 2024-12-03 | End: 2024-12-09 | Stop reason: HOSPADM

## 2024-12-03 RX ADMIN — SUCRALFATE 1 G: 1 TABLET ORAL at 19:36

## 2024-12-03 RX ADMIN — PREGABALIN 75 MG: 75 CAPSULE ORAL at 21:38

## 2024-12-03 RX ADMIN — SODIUM CHLORIDE, PRESERVATIVE FREE 10 ML: 5 INJECTION INTRAVENOUS at 08:18

## 2024-12-03 RX ADMIN — CALCIUM 500 MG: 500 TABLET ORAL at 08:21

## 2024-12-03 RX ADMIN — OXYCODONE HYDROCHLORIDE 5 MG: 5 TABLET ORAL at 11:12

## 2024-12-03 RX ADMIN — BACLOFEN 5 MG: 10 TABLET ORAL at 00:38

## 2024-12-03 RX ADMIN — CYANOCOBALAMIN TAB 1000 MCG 1000 MCG: 1000 TAB at 08:17

## 2024-12-03 RX ADMIN — SENNOSIDES 8.6 MG: 8.6 TABLET, FILM COATED ORAL at 08:18

## 2024-12-03 RX ADMIN — FOLIC ACID 1 MG: 1 TABLET ORAL at 08:17

## 2024-12-03 RX ADMIN — DULOXETINE HYDROCHLORIDE 60 MG: 60 CAPSULE, DELAYED RELEASE ORAL at 08:18

## 2024-12-03 RX ADMIN — PANTOPRAZOLE SODIUM 40 MG: 40 TABLET, DELAYED RELEASE ORAL at 06:08

## 2024-12-03 RX ADMIN — LACTULOSE 30 G: 20 SOLUTION ORAL at 08:16

## 2024-12-03 RX ADMIN — OXYCODONE HYDROCHLORIDE 5 MG: 5 TABLET ORAL at 17:13

## 2024-12-03 RX ADMIN — SUCRALFATE 1 G: 1 TABLET ORAL at 11:12

## 2024-12-03 RX ADMIN — SUCRALFATE 1 G: 1 TABLET ORAL at 17:13

## 2024-12-03 RX ADMIN — ONDANSETRON 4 MG: 2 INJECTION INTRAMUSCULAR; INTRAVENOUS at 11:43

## 2024-12-03 RX ADMIN — POLYETHYLENE GLYCOL 3350 17 G: 17 POWDER, FOR SOLUTION ORAL at 08:17

## 2024-12-03 RX ADMIN — SENNOSIDES 8.6 MG: 8.6 TABLET, FILM COATED ORAL at 19:36

## 2024-12-03 RX ADMIN — SUCRALFATE 1 G: 1 TABLET ORAL at 06:08

## 2024-12-03 RX ADMIN — BACLOFEN 5 MG: 10 TABLET ORAL at 19:36

## 2024-12-03 RX ADMIN — ACETAMINOPHEN 650 MG: 325 TABLET ORAL at 19:38

## 2024-12-03 RX ADMIN — ROSUVASTATIN 20 MG: 20 TABLET, FILM COATED ORAL at 08:17

## 2024-12-03 RX ADMIN — SODIUM CHLORIDE, PRESERVATIVE FREE 10 ML: 5 INJECTION INTRAVENOUS at 19:39

## 2024-12-03 ASSESSMENT — PAIN DESCRIPTION - ORIENTATION
ORIENTATION: LOWER
ORIENTATION: LOWER;LEFT
ORIENTATION: MID;LOWER
ORIENTATION: LOWER

## 2024-12-03 ASSESSMENT — PAIN DESCRIPTION - LOCATION
LOCATION: BACK;ABDOMEN
LOCATION: BACK
LOCATION: BACK

## 2024-12-03 ASSESSMENT — PAIN DESCRIPTION - DESCRIPTORS
DESCRIPTORS: DULL;DISCOMFORT;ACHING
DESCRIPTORS: ACHING;DISCOMFORT;DULL
DESCRIPTORS: DISCOMFORT;SHARP;STABBING
DESCRIPTORS: DISCOMFORT

## 2024-12-03 ASSESSMENT — PAIN SCALES - GENERAL
PAINLEVEL_OUTOF10: 0
PAINLEVEL_OUTOF10: 10
PAINLEVEL_OUTOF10: 9
PAINLEVEL_OUTOF10: 8

## 2024-12-03 ASSESSMENT — PAIN - FUNCTIONAL ASSESSMENT: PAIN_FUNCTIONAL_ASSESSMENT: PREVENTS OR INTERFERES SOME ACTIVE ACTIVITIES AND ADLS

## 2024-12-04 ENCOUNTER — APPOINTMENT (OUTPATIENT)
Dept: GENERAL RADIOLOGY | Age: 69
DRG: 811 | End: 2024-12-04
Payer: MEDICARE

## 2024-12-04 PROCEDURE — 6370000000 HC RX 637 (ALT 250 FOR IP)

## 2024-12-04 PROCEDURE — 1200000000 HC SEMI PRIVATE

## 2024-12-04 PROCEDURE — 2700000000 HC OXYGEN THERAPY PER DAY

## 2024-12-04 PROCEDURE — 94660 CPAP INITIATION&MGMT: CPT

## 2024-12-04 PROCEDURE — 97535 SELF CARE MNGMENT TRAINING: CPT

## 2024-12-04 PROCEDURE — 97110 THERAPEUTIC EXERCISES: CPT

## 2024-12-04 PROCEDURE — 97530 THERAPEUTIC ACTIVITIES: CPT

## 2024-12-04 PROCEDURE — 74018 RADEX ABDOMEN 1 VIEW: CPT

## 2024-12-04 PROCEDURE — 6370000000 HC RX 637 (ALT 250 FOR IP): Performed by: STUDENT IN AN ORGANIZED HEALTH CARE EDUCATION/TRAINING PROGRAM

## 2024-12-04 PROCEDURE — 97112 NEUROMUSCULAR REEDUCATION: CPT

## 2024-12-04 PROCEDURE — 51702 INSERT TEMP BLADDER CATH: CPT

## 2024-12-04 PROCEDURE — 6370000000 HC RX 637 (ALT 250 FOR IP): Performed by: NEUROLOGICAL SURGERY

## 2024-12-04 PROCEDURE — 2580000003 HC RX 258: Performed by: STUDENT IN AN ORGANIZED HEALTH CARE EDUCATION/TRAINING PROGRAM

## 2024-12-04 PROCEDURE — 6370000000 HC RX 637 (ALT 250 FOR IP): Performed by: PSYCHIATRY & NEUROLOGY

## 2024-12-04 PROCEDURE — 6360000002 HC RX W HCPCS: Performed by: STUDENT IN AN ORGANIZED HEALTH CARE EDUCATION/TRAINING PROGRAM

## 2024-12-04 RX ORDER — MIRTAZAPINE 15 MG/1
15 TABLET, ORALLY DISINTEGRATING ORAL NIGHTLY
Status: DISCONTINUED | OUTPATIENT
Start: 2024-12-04 | End: 2024-12-06

## 2024-12-04 RX ORDER — HYDROXYZINE HYDROCHLORIDE 10 MG/1
10 TABLET, FILM COATED ORAL 3 TIMES DAILY PRN
Status: DISCONTINUED | OUTPATIENT
Start: 2024-12-04 | End: 2024-12-06

## 2024-12-04 RX ADMIN — LACTULOSE 30 G: 20 SOLUTION ORAL at 08:45

## 2024-12-04 RX ADMIN — SODIUM CHLORIDE, PRESERVATIVE FREE 10 ML: 5 INJECTION INTRAVENOUS at 08:47

## 2024-12-04 RX ADMIN — SENNOSIDES 8.6 MG: 8.6 TABLET, FILM COATED ORAL at 08:46

## 2024-12-04 RX ADMIN — MIRTAZAPINE 15 MG: 15 TABLET, ORALLY DISINTEGRATING ORAL at 20:24

## 2024-12-04 RX ADMIN — ONDANSETRON 4 MG: 2 INJECTION INTRAMUSCULAR; INTRAVENOUS at 11:05

## 2024-12-04 RX ADMIN — CYANOCOBALAMIN TAB 1000 MCG 1000 MCG: 1000 TAB at 08:46

## 2024-12-04 RX ADMIN — OXYCODONE HYDROCHLORIDE 5 MG: 5 TABLET ORAL at 08:52

## 2024-12-04 RX ADMIN — SENNOSIDES 8.6 MG: 8.6 TABLET, FILM COATED ORAL at 20:19

## 2024-12-04 RX ADMIN — ROSUVASTATIN 20 MG: 20 TABLET, FILM COATED ORAL at 08:46

## 2024-12-04 RX ADMIN — FOLIC ACID 1 MG: 1 TABLET ORAL at 08:46

## 2024-12-04 RX ADMIN — OXYCODONE HYDROCHLORIDE 5 MG: 5 TABLET ORAL at 13:19

## 2024-12-04 RX ADMIN — PANTOPRAZOLE SODIUM 40 MG: 40 TABLET, DELAYED RELEASE ORAL at 05:56

## 2024-12-04 RX ADMIN — SUCRALFATE 1 G: 1 TABLET ORAL at 11:05

## 2024-12-04 RX ADMIN — DULOXETINE HYDROCHLORIDE 60 MG: 60 CAPSULE, DELAYED RELEASE ORAL at 08:46

## 2024-12-04 RX ADMIN — SUCRALFATE 1 G: 1 TABLET ORAL at 20:19

## 2024-12-04 RX ADMIN — CALCIUM 500 MG: 500 TABLET ORAL at 08:46

## 2024-12-04 RX ADMIN — HYDROXYZINE HYDROCHLORIDE 10 MG: 10 TABLET ORAL at 13:19

## 2024-12-04 RX ADMIN — POLYETHYLENE GLYCOL 3350 17 G: 17 POWDER, FOR SOLUTION ORAL at 08:46

## 2024-12-04 RX ADMIN — LOSARTAN POTASSIUM 25 MG: 25 TABLET, FILM COATED ORAL at 08:45

## 2024-12-04 RX ADMIN — SODIUM CHLORIDE, PRESERVATIVE FREE 10 ML: 5 INJECTION INTRAVENOUS at 20:19

## 2024-12-04 RX ADMIN — OXYCODONE HYDROCHLORIDE 5 MG: 5 TABLET ORAL at 01:00

## 2024-12-04 RX ADMIN — SUCRALFATE 1 G: 1 TABLET ORAL at 17:05

## 2024-12-04 RX ADMIN — METOPROLOL SUCCINATE 25 MG: 25 TABLET, EXTENDED RELEASE ORAL at 20:19

## 2024-12-04 RX ADMIN — PREGABALIN 75 MG: 75 CAPSULE ORAL at 20:19

## 2024-12-04 RX ADMIN — BACLOFEN 5 MG: 10 TABLET ORAL at 11:05

## 2024-12-04 RX ADMIN — SUCRALFATE 1 G: 1 TABLET ORAL at 05:57

## 2024-12-04 ASSESSMENT — PAIN DESCRIPTION - FREQUENCY
FREQUENCY: CONTINUOUS

## 2024-12-04 ASSESSMENT — PAIN DESCRIPTION - DESCRIPTORS
DESCRIPTORS: ACHING;DISCOMFORT;DULL
DESCRIPTORS: ACHING;DISCOMFORT;SORE
DESCRIPTORS: ACHING;DISCOMFORT;DULL
DESCRIPTORS: ACHING;DISCOMFORT;SORE

## 2024-12-04 ASSESSMENT — PAIN DESCRIPTION - LOCATION
LOCATION: LEG;BACK
LOCATION: BACK
LOCATION: KNEE
LOCATION: KNEE

## 2024-12-04 ASSESSMENT — PAIN DESCRIPTION - ONSET
ONSET: ON-GOING

## 2024-12-04 ASSESSMENT — PAIN DESCRIPTION - ORIENTATION
ORIENTATION: LEFT
ORIENTATION: LOWER
ORIENTATION: LOWER;LEFT
ORIENTATION: LEFT

## 2024-12-04 ASSESSMENT — PAIN SCALES - GENERAL
PAINLEVEL_OUTOF10: 4
PAINLEVEL_OUTOF10: 10
PAINLEVEL_OUTOF10: 8
PAINLEVEL_OUTOF10: 7
PAINLEVEL_OUTOF10: 6
PAINLEVEL_OUTOF10: 7

## 2024-12-04 ASSESSMENT — PAIN - FUNCTIONAL ASSESSMENT
PAIN_FUNCTIONAL_ASSESSMENT: PREVENTS OR INTERFERES SOME ACTIVE ACTIVITIES AND ADLS
PAIN_FUNCTIONAL_ASSESSMENT: PREVENTS OR INTERFERES SOME ACTIVE ACTIVITIES AND ADLS
PAIN_FUNCTIONAL_ASSESSMENT: ACTIVITIES ARE NOT PREVENTED
PAIN_FUNCTIONAL_ASSESSMENT: PREVENTS OR INTERFERES SOME ACTIVE ACTIVITIES AND ADLS

## 2024-12-04 ASSESSMENT — PAIN DESCRIPTION - PAIN TYPE
TYPE: ACUTE PAIN

## 2024-12-04 NOTE — PLAN OF CARE
Problem: Safety - Adult  Goal: Free from fall injury  12/4/2024 1246 by Dacia Kirk RN  Outcome: Progressing  12/4/2024 0133 by Brandi Sanchez RN  Outcome: Progressing  12/3/2024 2307 by Patricia Salazar RN  Outcome: Progressing     Problem: Chronic Conditions and Co-morbidities  Goal: Patient's chronic conditions and co-morbidity symptoms are monitored and maintained or improved  12/4/2024 1246 by Dacia Kirk RN  Outcome: Progressing  12/4/2024 0133 by Brandi Sanchez RN  Outcome: Progressing  12/3/2024 2307 by Patricia Salazar RN  Outcome: Progressing     Problem: Pain  Goal: Verbalizes/displays adequate comfort level or baseline comfort level  12/4/2024 1246 by Dacia Kirk RN  Outcome: Progressing  12/4/2024 0133 by Brandi Sanchez RN  Outcome: Progressing  12/3/2024 2307 by Patricia Salazar RN  Outcome: Progressing     Problem: Skin/Tissue Integrity  Goal: Absence of new skin breakdown  Description: 1.  Monitor for areas of redness and/or skin breakdown  2.  Assess vascular access sites hourly  3.  Every 4-6 hours minimum:  Change oxygen saturation probe site  4.  Every 4-6 hours:  If on nasal continuous positive airway pressure, respiratory therapy assess nares and determine need for appliance change or resting period.  12/4/2024 1246 by Dacia Kirk RN  Outcome: Progressing  12/4/2024 0133 by Brandi Sanchez RN  Outcome: Progressing  12/3/2024 2307 by Patricia Salazar RN  Outcome: Progressing     Problem: ABCDS Injury Assessment  Goal: Absence of physical injury  12/4/2024 1246 by Dacia Kirk RN  Outcome: Progressing  12/4/2024 0133 by Brandi Sanchez RN  Outcome: Progressing  12/3/2024 2307 by Patricia Salazar RN  Outcome: Progressing

## 2024-12-05 PROCEDURE — 6370000000 HC RX 637 (ALT 250 FOR IP)

## 2024-12-05 PROCEDURE — 97530 THERAPEUTIC ACTIVITIES: CPT

## 2024-12-05 PROCEDURE — 6370000000 HC RX 637 (ALT 250 FOR IP): Performed by: STUDENT IN AN ORGANIZED HEALTH CARE EDUCATION/TRAINING PROGRAM

## 2024-12-05 PROCEDURE — 6370000000 HC RX 637 (ALT 250 FOR IP): Performed by: PSYCHIATRY & NEUROLOGY

## 2024-12-05 PROCEDURE — 51702 INSERT TEMP BLADDER CATH: CPT

## 2024-12-05 PROCEDURE — 94660 CPAP INITIATION&MGMT: CPT

## 2024-12-05 PROCEDURE — 1200000000 HC SEMI PRIVATE

## 2024-12-05 PROCEDURE — 2580000003 HC RX 258: Performed by: STUDENT IN AN ORGANIZED HEALTH CARE EDUCATION/TRAINING PROGRAM

## 2024-12-05 PROCEDURE — 6370000000 HC RX 637 (ALT 250 FOR IP): Performed by: NEUROLOGICAL SURGERY

## 2024-12-05 PROCEDURE — 97535 SELF CARE MNGMENT TRAINING: CPT

## 2024-12-05 PROCEDURE — 97112 NEUROMUSCULAR REEDUCATION: CPT

## 2024-12-05 RX ORDER — OXYCODONE HYDROCHLORIDE 5 MG/1
2.5 TABLET ORAL EVERY 4 HOURS PRN
Status: DISCONTINUED | OUTPATIENT
Start: 2024-12-05 | End: 2024-12-09 | Stop reason: HOSPADM

## 2024-12-05 RX ADMIN — SODIUM CHLORIDE, PRESERVATIVE FREE 10 ML: 5 INJECTION INTRAVENOUS at 09:39

## 2024-12-05 RX ADMIN — SENNOSIDES 8.6 MG: 8.6 TABLET, FILM COATED ORAL at 09:40

## 2024-12-05 RX ADMIN — ROSUVASTATIN 20 MG: 20 TABLET, FILM COATED ORAL at 09:40

## 2024-12-05 RX ADMIN — SUCRALFATE 1 G: 1 TABLET ORAL at 21:13

## 2024-12-05 RX ADMIN — POLYETHYLENE GLYCOL 3350 17 G: 17 POWDER, FOR SOLUTION ORAL at 09:39

## 2024-12-05 RX ADMIN — LACTULOSE 30 G: 20 SOLUTION ORAL at 09:48

## 2024-12-05 RX ADMIN — PREGABALIN 75 MG: 75 CAPSULE ORAL at 21:14

## 2024-12-05 RX ADMIN — DULOXETINE HYDROCHLORIDE 60 MG: 60 CAPSULE, DELAYED RELEASE ORAL at 09:40

## 2024-12-05 RX ADMIN — OXYCODONE HYDROCHLORIDE 5 MG: 5 TABLET ORAL at 04:34

## 2024-12-05 RX ADMIN — HYDROXYZINE HYDROCHLORIDE 10 MG: 10 TABLET ORAL at 21:13

## 2024-12-05 RX ADMIN — LOSARTAN POTASSIUM 25 MG: 25 TABLET, FILM COATED ORAL at 09:40

## 2024-12-05 RX ADMIN — CYANOCOBALAMIN TAB 1000 MCG 1000 MCG: 1000 TAB at 09:40

## 2024-12-05 RX ADMIN — SUCRALFATE 1 G: 1 TABLET ORAL at 16:36

## 2024-12-05 RX ADMIN — MIRTAZAPINE 15 MG: 15 TABLET, ORALLY DISINTEGRATING ORAL at 21:13

## 2024-12-05 RX ADMIN — FOLIC ACID 1 MG: 1 TABLET ORAL at 09:40

## 2024-12-05 RX ADMIN — SUCRALFATE 1 G: 1 TABLET ORAL at 10:08

## 2024-12-05 RX ADMIN — CALCIUM 500 MG: 500 TABLET ORAL at 09:41

## 2024-12-05 RX ADMIN — OXYCODONE HYDROCHLORIDE 2.5 MG: 5 TABLET ORAL at 10:09

## 2024-12-05 RX ADMIN — SENNOSIDES 8.6 MG: 8.6 TABLET, FILM COATED ORAL at 21:14

## 2024-12-05 RX ADMIN — BACLOFEN 5 MG: 10 TABLET ORAL at 04:35

## 2024-12-05 RX ADMIN — OXYCODONE HYDROCHLORIDE 2.5 MG: 5 TABLET ORAL at 19:34

## 2024-12-05 RX ADMIN — SODIUM CHLORIDE, PRESERVATIVE FREE 10 ML: 5 INJECTION INTRAVENOUS at 21:14

## 2024-12-05 RX ADMIN — METOPROLOL SUCCINATE 25 MG: 25 TABLET, EXTENDED RELEASE ORAL at 21:13

## 2024-12-05 RX ADMIN — PANTOPRAZOLE SODIUM 40 MG: 40 TABLET, DELAYED RELEASE ORAL at 05:33

## 2024-12-05 RX ADMIN — SUCRALFATE 1 G: 1 TABLET ORAL at 05:34

## 2024-12-05 ASSESSMENT — PAIN DESCRIPTION - ONSET
ONSET: ON-GOING

## 2024-12-05 ASSESSMENT — PAIN SCALES - WONG BAKER
WONGBAKER_NUMERICALRESPONSE: NO HURT

## 2024-12-05 ASSESSMENT — PAIN DESCRIPTION - LOCATION
LOCATION: LEG
LOCATION: BACK
LOCATION: ABDOMEN
LOCATION: LEG
LOCATION: BACK

## 2024-12-05 ASSESSMENT — PAIN - FUNCTIONAL ASSESSMENT
PAIN_FUNCTIONAL_ASSESSMENT: PREVENTS OR INTERFERES SOME ACTIVE ACTIVITIES AND ADLS

## 2024-12-05 ASSESSMENT — PAIN DESCRIPTION - FREQUENCY
FREQUENCY: CONTINUOUS

## 2024-12-05 ASSESSMENT — PAIN SCALES - GENERAL
PAINLEVEL_OUTOF10: 6
PAINLEVEL_OUTOF10: 6
PAINLEVEL_OUTOF10: 4
PAINLEVEL_OUTOF10: 3
PAINLEVEL_OUTOF10: 8
PAINLEVEL_OUTOF10: 8
PAINLEVEL_OUTOF10: 4

## 2024-12-05 ASSESSMENT — PAIN DESCRIPTION - DESCRIPTORS
DESCRIPTORS: ACHING;DISCOMFORT;SORE
DESCRIPTORS: ACHING

## 2024-12-05 ASSESSMENT — PAIN DESCRIPTION - ORIENTATION
ORIENTATION: LOWER
ORIENTATION: LEFT
ORIENTATION: LEFT
ORIENTATION: LOWER
ORIENTATION: LEFT
ORIENTATION: MID

## 2024-12-05 ASSESSMENT — PAIN DESCRIPTION - PAIN TYPE
TYPE: ACUTE PAIN

## 2024-12-05 NOTE — PLAN OF CARE
Problem: Safety - Adult  Goal: Free from fall injury  Outcome: Progressing  Flowsheets (Taken 12/5/2024 0949 by Shayy Meza LPN)  Free From Fall Injury: Instruct family/caregiver on patient safety     Problem: Chronic Conditions and Co-morbidities  Goal: Patient's chronic conditions and co-morbidity symptoms are monitored and maintained or improved  Outcome: Progressing     Problem: Pain  Goal: Verbalizes/displays adequate comfort level or baseline comfort level  Outcome: Progressing     Problem: Skin/Tissue Integrity  Goal: Absence of new skin breakdown  Description: 1.  Monitor for areas of redness and/or skin breakdown  2.  Assess vascular access sites hourly  3.  Every 4-6 hours minimum:  Change oxygen saturation probe site  4.  Every 4-6 hours:  If on nasal continuous positive airway pressure, respiratory therapy assess nares and determine need for appliance change or resting period.  Outcome: Progressing     Problem: ABCDS Injury Assessment  Goal: Absence of physical injury  Outcome: Progressing  Flowsheets (Taken 12/5/2024 0949 by Shayy Meza LPN)  Absence of Physical Injury: Implement safety measures based on patient assessment

## 2024-12-06 PROCEDURE — 1200000000 HC SEMI PRIVATE

## 2024-12-06 PROCEDURE — 94660 CPAP INITIATION&MGMT: CPT

## 2024-12-06 PROCEDURE — 6370000000 HC RX 637 (ALT 250 FOR IP): Performed by: STUDENT IN AN ORGANIZED HEALTH CARE EDUCATION/TRAINING PROGRAM

## 2024-12-06 PROCEDURE — 6370000000 HC RX 637 (ALT 250 FOR IP): Performed by: NEUROLOGICAL SURGERY

## 2024-12-06 PROCEDURE — 6370000000 HC RX 637 (ALT 250 FOR IP): Performed by: PSYCHIATRY & NEUROLOGY

## 2024-12-06 PROCEDURE — 2580000003 HC RX 258: Performed by: STUDENT IN AN ORGANIZED HEALTH CARE EDUCATION/TRAINING PROGRAM

## 2024-12-06 PROCEDURE — 6370000000 HC RX 637 (ALT 250 FOR IP)

## 2024-12-06 RX ORDER — DIAZEPAM 2 MG/1
2 TABLET ORAL DAILY PRN
Status: DISCONTINUED | OUTPATIENT
Start: 2024-12-06 | End: 2024-12-09 | Stop reason: HOSPADM

## 2024-12-06 RX ORDER — HYDROXYZINE HYDROCHLORIDE 10 MG/1
10 TABLET, FILM COATED ORAL 2 TIMES DAILY PRN
Status: DISCONTINUED | OUTPATIENT
Start: 2024-12-06 | End: 2024-12-09 | Stop reason: HOSPADM

## 2024-12-06 RX ADMIN — SENNOSIDES 8.6 MG: 8.6 TABLET, FILM COATED ORAL at 09:09

## 2024-12-06 RX ADMIN — LOSARTAN POTASSIUM 25 MG: 25 TABLET, FILM COATED ORAL at 09:09

## 2024-12-06 RX ADMIN — CALCIUM 500 MG: 500 TABLET ORAL at 09:10

## 2024-12-06 RX ADMIN — CYANOCOBALAMIN TAB 1000 MCG 1000 MCG: 1000 TAB at 09:09

## 2024-12-06 RX ADMIN — PANTOPRAZOLE SODIUM 40 MG: 40 TABLET, DELAYED RELEASE ORAL at 05:34

## 2024-12-06 RX ADMIN — SUCRALFATE 1 G: 1 TABLET ORAL at 21:08

## 2024-12-06 RX ADMIN — SUCRALFATE 1 G: 1 TABLET ORAL at 11:08

## 2024-12-06 RX ADMIN — DULOXETINE HYDROCHLORIDE 60 MG: 60 CAPSULE, DELAYED RELEASE ORAL at 09:09

## 2024-12-06 RX ADMIN — SENNOSIDES 8.6 MG: 8.6 TABLET, FILM COATED ORAL at 21:08

## 2024-12-06 RX ADMIN — ROSUVASTATIN 20 MG: 20 TABLET, FILM COATED ORAL at 09:09

## 2024-12-06 RX ADMIN — SODIUM CHLORIDE, PRESERVATIVE FREE 10 ML: 5 INJECTION INTRAVENOUS at 09:11

## 2024-12-06 RX ADMIN — FOLIC ACID 1 MG: 1 TABLET ORAL at 09:09

## 2024-12-06 RX ADMIN — OXYCODONE HYDROCHLORIDE 2.5 MG: 5 TABLET ORAL at 11:08

## 2024-12-06 RX ADMIN — PREGABALIN 75 MG: 75 CAPSULE ORAL at 21:08

## 2024-12-06 RX ADMIN — OXYCODONE HYDROCHLORIDE 2.5 MG: 5 TABLET ORAL at 15:30

## 2024-12-06 RX ADMIN — POLYETHYLENE GLYCOL 3350 17 G: 17 POWDER, FOR SOLUTION ORAL at 09:11

## 2024-12-06 RX ADMIN — SUCRALFATE 1 G: 1 TABLET ORAL at 05:34

## 2024-12-06 RX ADMIN — LACTULOSE 30 G: 20 SOLUTION ORAL at 09:11

## 2024-12-06 RX ADMIN — METOPROLOL SUCCINATE 25 MG: 25 TABLET, EXTENDED RELEASE ORAL at 21:08

## 2024-12-06 RX ADMIN — SODIUM CHLORIDE, PRESERVATIVE FREE 10 ML: 5 INJECTION INTRAVENOUS at 21:10

## 2024-12-06 ASSESSMENT — PAIN DESCRIPTION - LOCATION
LOCATION: BACK
LOCATION: BACK

## 2024-12-06 ASSESSMENT — PAIN SCALES - GENERAL
PAINLEVEL_OUTOF10: 3
PAINLEVEL_OUTOF10: 7
PAINLEVEL_OUTOF10: 7

## 2024-12-06 ASSESSMENT — PAIN SCALES - WONG BAKER: WONGBAKER_NUMERICALRESPONSE: NO HURT

## 2024-12-06 ASSESSMENT — PAIN DESCRIPTION - DESCRIPTORS: DESCRIPTORS: ACHING;CRUSHING;DISCOMFORT

## 2024-12-06 ASSESSMENT — PAIN DESCRIPTION - ORIENTATION
ORIENTATION: MID;LOWER
ORIENTATION: MID

## 2024-12-06 NOTE — PLAN OF CARE
Problem: Safety - Adult  Goal: Free from fall injury  12/6/2024 0005 by Kimmy Borges RN  Outcome: Progressing  12/5/2024 1419 by Dacia Kirk RN  Outcome: Progressing  Flowsheets (Taken 12/5/2024 0949 by Shayy Meza LPN)  Free From Fall Injury: Instruct family/caregiver on patient safety     Problem: Chronic Conditions and Co-morbidities  Goal: Patient's chronic conditions and co-morbidity symptoms are monitored and maintained or improved  12/5/2024 1419 by Dacia Kirk RN  Outcome: Progressing     Problem: Pain  Goal: Verbalizes/displays adequate comfort level or baseline comfort level  12/5/2024 1419 by Dacia Kirk RN  Outcome: Progressing     Problem: Skin/Tissue Integrity  Goal: Absence of new skin breakdown  Description: 1.  Monitor for areas of redness and/or skin breakdown  2.  Assess vascular access sites hourly  3.  Every 4-6 hours minimum:  Change oxygen saturation probe site  4.  Every 4-6 hours:  If on nasal continuous positive airway pressure, respiratory therapy assess nares and determine need for appliance change or resting period.  12/5/2024 1419 by Dacia Kirk RN  Outcome: Progressing     Problem: ABCDS Injury Assessment  Goal: Absence of physical injury  12/5/2024 1419 by Dacia Kirk RN  Outcome: Progressing  Flowsheets (Taken 12/5/2024 0949 by Shayy Meza LPN)  Absence of Physical Injury: Implement safety measures based on patient assessment

## 2024-12-07 PROCEDURE — 6370000000 HC RX 637 (ALT 250 FOR IP): Performed by: PSYCHIATRY & NEUROLOGY

## 2024-12-07 PROCEDURE — 6370000000 HC RX 637 (ALT 250 FOR IP): Performed by: STUDENT IN AN ORGANIZED HEALTH CARE EDUCATION/TRAINING PROGRAM

## 2024-12-07 PROCEDURE — 94660 CPAP INITIATION&MGMT: CPT

## 2024-12-07 PROCEDURE — 2580000003 HC RX 258: Performed by: STUDENT IN AN ORGANIZED HEALTH CARE EDUCATION/TRAINING PROGRAM

## 2024-12-07 PROCEDURE — 6370000000 HC RX 637 (ALT 250 FOR IP)

## 2024-12-07 PROCEDURE — 1200000000 HC SEMI PRIVATE

## 2024-12-07 PROCEDURE — 6370000000 HC RX 637 (ALT 250 FOR IP): Performed by: NEUROLOGICAL SURGERY

## 2024-12-07 RX ADMIN — SUCRALFATE 1 G: 1 TABLET ORAL at 18:19

## 2024-12-07 RX ADMIN — LACTULOSE 30 G: 20 SOLUTION ORAL at 08:39

## 2024-12-07 RX ADMIN — OXYCODONE HYDROCHLORIDE 2.5 MG: 5 TABLET ORAL at 22:49

## 2024-12-07 RX ADMIN — SODIUM CHLORIDE, PRESERVATIVE FREE 10 ML: 5 INJECTION INTRAVENOUS at 22:00

## 2024-12-07 RX ADMIN — LOSARTAN POTASSIUM 25 MG: 25 TABLET, FILM COATED ORAL at 08:39

## 2024-12-07 RX ADMIN — SUCRALFATE 1 G: 1 TABLET ORAL at 05:40

## 2024-12-07 RX ADMIN — SENNOSIDES 8.6 MG: 8.6 TABLET, FILM COATED ORAL at 08:39

## 2024-12-07 RX ADMIN — ACETAMINOPHEN 650 MG: 325 TABLET ORAL at 01:26

## 2024-12-07 RX ADMIN — SUCRALFATE 1 G: 1 TABLET ORAL at 12:17

## 2024-12-07 RX ADMIN — BACLOFEN 5 MG: 10 TABLET ORAL at 18:19

## 2024-12-07 RX ADMIN — SODIUM CHLORIDE, PRESERVATIVE FREE 10 ML: 5 INJECTION INTRAVENOUS at 08:40

## 2024-12-07 RX ADMIN — ROSUVASTATIN 20 MG: 20 TABLET, FILM COATED ORAL at 08:39

## 2024-12-07 RX ADMIN — METOPROLOL SUCCINATE 25 MG: 25 TABLET, EXTENDED RELEASE ORAL at 21:59

## 2024-12-07 RX ADMIN — POLYETHYLENE GLYCOL 3350 17 G: 17 POWDER, FOR SOLUTION ORAL at 08:39

## 2024-12-07 RX ADMIN — PANTOPRAZOLE SODIUM 40 MG: 40 TABLET, DELAYED RELEASE ORAL at 05:40

## 2024-12-07 RX ADMIN — CALCIUM 500 MG: 500 TABLET ORAL at 08:40

## 2024-12-07 RX ADMIN — PREGABALIN 75 MG: 75 CAPSULE ORAL at 21:59

## 2024-12-07 RX ADMIN — OXYCODONE HYDROCHLORIDE 2.5 MG: 5 TABLET ORAL at 06:44

## 2024-12-07 RX ADMIN — CYANOCOBALAMIN TAB 1000 MCG 1000 MCG: 1000 TAB at 08:39

## 2024-12-07 RX ADMIN — FOLIC ACID 1 MG: 1 TABLET ORAL at 08:40

## 2024-12-07 RX ADMIN — OXYCODONE HYDROCHLORIDE 2.5 MG: 5 TABLET ORAL at 18:19

## 2024-12-07 RX ADMIN — SENNOSIDES 8.6 MG: 8.6 TABLET, FILM COATED ORAL at 21:59

## 2024-12-07 RX ADMIN — OXYCODONE HYDROCHLORIDE 2.5 MG: 5 TABLET ORAL at 12:17

## 2024-12-07 RX ADMIN — DIAZEPAM 2 MG: 2 TABLET ORAL at 12:17

## 2024-12-07 RX ADMIN — DULOXETINE HYDROCHLORIDE 60 MG: 60 CAPSULE, DELAYED RELEASE ORAL at 08:39

## 2024-12-07 RX ADMIN — SUCRALFATE 1 G: 1 TABLET ORAL at 21:59

## 2024-12-07 ASSESSMENT — PAIN DESCRIPTION - LOCATION
LOCATION: BACK
LOCATION: HEAD

## 2024-12-07 ASSESSMENT — PAIN DESCRIPTION - ORIENTATION
ORIENTATION: LOWER
ORIENTATION: LOWER
ORIENTATION: LOWER;MID
ORIENTATION: LEFT;LOWER

## 2024-12-07 ASSESSMENT — PAIN - FUNCTIONAL ASSESSMENT: PAIN_FUNCTIONAL_ASSESSMENT: PREVENTS OR INTERFERES SOME ACTIVE ACTIVITIES AND ADLS

## 2024-12-07 ASSESSMENT — PAIN SCALES - GENERAL
PAINLEVEL_OUTOF10: 10
PAINLEVEL_OUTOF10: 9
PAINLEVEL_OUTOF10: 10
PAINLEVEL_OUTOF10: 8
PAINLEVEL_OUTOF10: 8
PAINLEVEL_OUTOF10: 9
PAINLEVEL_OUTOF10: 10
PAINLEVEL_OUTOF10: 8
PAINLEVEL_OUTOF10: 9

## 2024-12-07 ASSESSMENT — PAIN DESCRIPTION - DESCRIPTORS
DESCRIPTORS: ACHING;DISCOMFORT;STABBING
DESCRIPTORS: ACHING;DISCOMFORT;SORE
DESCRIPTORS: ACHING;DISCOMFORT;SORE
DESCRIPTORS: SHARP;SHOOTING;DISCOMFORT

## 2024-12-07 ASSESSMENT — PAIN SCALES - WONG BAKER
WONGBAKER_NUMERICALRESPONSE: HURTS WHOLE LOT

## 2024-12-07 NOTE — PLAN OF CARE
Problem: Safety - Adult  Goal: Free from fall injury  12/7/2024 0846 by Niya Alvarez RN  Outcome: Progressing  12/6/2024 2343 by Shubham Delacruz RN  Outcome: Progressing     Problem: Chronic Conditions and Co-morbidities  Goal: Patient's chronic conditions and co-morbidity symptoms are monitored and maintained or improved  12/7/2024 0846 by Niya Alvarez RN  Outcome: Progressing  12/6/2024 2343 by Shubham Delacruz RN  Outcome: Progressing     Problem: Pain  Goal: Verbalizes/displays adequate comfort level or baseline comfort level  12/7/2024 0846 by Niya Alvarez RN  Outcome: Progressing  12/6/2024 2343 by Shubham Delacruz RN  Outcome: Progressing     Problem: Skin/Tissue Integrity  Goal: Absence of new skin breakdown  Description: 1.  Monitor for areas of redness and/or skin breakdown  2.  Assess vascular access sites hourly  3.  Every 4-6 hours minimum:  Change oxygen saturation probe site  4.  Every 4-6 hours:  If on nasal continuous positive airway pressure, respiratory therapy assess nares and determine need for appliance change or resting period.  12/7/2024 0846 by Niya Alvarez RN  Outcome: Progressing  12/6/2024 2343 by Shubham Delacruz RN  Outcome: Progressing     Problem: ABCDS Injury Assessment  Goal: Absence of physical injury  12/7/2024 0846 by Niya Alvarez RN  Outcome: Progressing  12/6/2024 2343 by Shubham Delacruz RN  Outcome: Progressing

## 2024-12-07 NOTE — PLAN OF CARE
Problem: Safety - Adult  Goal: Free from fall injury  12/6/2024 2343 by Shubham Delacruz RN  Outcome: Progressing  12/6/2024 1726 by Dacia Kirk RN  Outcome: Progressing     Problem: Chronic Conditions and Co-morbidities  Goal: Patient's chronic conditions and co-morbidity symptoms are monitored and maintained or improved  12/6/2024 2343 by Shubham Delacruz RN  Outcome: Progressing  12/6/2024 1726 by Dacia Kirk RN  Outcome: Progressing     Problem: Pain  Goal: Verbalizes/displays adequate comfort level or baseline comfort level  12/6/2024 2343 by Shubham Delacruz RN  Outcome: Progressing  12/6/2024 1726 by Dacia Kirk RN  Outcome: Progressing     Problem: Skin/Tissue Integrity  Goal: Absence of new skin breakdown  Description: 1.  Monitor for areas of redness and/or skin breakdown  2.  Assess vascular access sites hourly  3.  Every 4-6 hours minimum:  Change oxygen saturation probe site  4.  Every 4-6 hours:  If on nasal continuous positive airway pressure, respiratory therapy assess nares and determine need for appliance change or resting period.  12/6/2024 2343 by Shubham Delacruz RN  Outcome: Progressing  12/6/2024 1726 by Dacia Krik RN  Outcome: Progressing     Problem: ABCDS Injury Assessment  Goal: Absence of physical injury  12/6/2024 2343 by Shubham Delacruz RN  Outcome: Progressing  12/6/2024 1726 by Dacia Kirk RN  Outcome: Progressing

## 2024-12-08 PROCEDURE — 1200000000 HC SEMI PRIVATE

## 2024-12-08 PROCEDURE — 6370000000 HC RX 637 (ALT 250 FOR IP): Performed by: STUDENT IN AN ORGANIZED HEALTH CARE EDUCATION/TRAINING PROGRAM

## 2024-12-08 PROCEDURE — 6370000000 HC RX 637 (ALT 250 FOR IP): Performed by: NEUROLOGICAL SURGERY

## 2024-12-08 PROCEDURE — 6370000000 HC RX 637 (ALT 250 FOR IP): Performed by: PSYCHIATRY & NEUROLOGY

## 2024-12-08 PROCEDURE — 6370000000 HC RX 637 (ALT 250 FOR IP)

## 2024-12-08 PROCEDURE — 2700000000 HC OXYGEN THERAPY PER DAY

## 2024-12-08 PROCEDURE — 94660 CPAP INITIATION&MGMT: CPT

## 2024-12-08 PROCEDURE — 2580000003 HC RX 258: Performed by: STUDENT IN AN ORGANIZED HEALTH CARE EDUCATION/TRAINING PROGRAM

## 2024-12-08 RX ADMIN — SUCRALFATE 1 G: 1 TABLET ORAL at 09:32

## 2024-12-08 RX ADMIN — PANTOPRAZOLE SODIUM 40 MG: 40 TABLET, DELAYED RELEASE ORAL at 06:11

## 2024-12-08 RX ADMIN — BACLOFEN 5 MG: 10 TABLET ORAL at 09:30

## 2024-12-08 RX ADMIN — METOPROLOL SUCCINATE 25 MG: 25 TABLET, EXTENDED RELEASE ORAL at 20:14

## 2024-12-08 RX ADMIN — SENNOSIDES 8.6 MG: 8.6 TABLET, FILM COATED ORAL at 09:32

## 2024-12-08 RX ADMIN — SUCRALFATE 1 G: 1 TABLET ORAL at 06:11

## 2024-12-08 RX ADMIN — SUCRALFATE 1 G: 1 TABLET ORAL at 17:35

## 2024-12-08 RX ADMIN — DULOXETINE HYDROCHLORIDE 60 MG: 60 CAPSULE, DELAYED RELEASE ORAL at 09:32

## 2024-12-08 RX ADMIN — OXYCODONE HYDROCHLORIDE 2.5 MG: 5 TABLET ORAL at 17:35

## 2024-12-08 RX ADMIN — OXYCODONE HYDROCHLORIDE 2.5 MG: 5 TABLET ORAL at 21:56

## 2024-12-08 RX ADMIN — ACETAMINOPHEN 650 MG: 325 TABLET ORAL at 20:13

## 2024-12-08 RX ADMIN — OXYCODONE HYDROCHLORIDE 2.5 MG: 5 TABLET ORAL at 09:30

## 2024-12-08 RX ADMIN — FOLIC ACID 1 MG: 1 TABLET ORAL at 09:32

## 2024-12-08 RX ADMIN — CALCIUM 500 MG: 500 TABLET ORAL at 09:33

## 2024-12-08 RX ADMIN — ROSUVASTATIN 20 MG: 20 TABLET, FILM COATED ORAL at 09:32

## 2024-12-08 RX ADMIN — LOSARTAN POTASSIUM 25 MG: 25 TABLET, FILM COATED ORAL at 09:32

## 2024-12-08 RX ADMIN — LACTULOSE 30 G: 20 SOLUTION ORAL at 09:33

## 2024-12-08 RX ADMIN — POLYETHYLENE GLYCOL 3350 17 G: 17 POWDER, FOR SOLUTION ORAL at 09:33

## 2024-12-08 RX ADMIN — PREGABALIN 75 MG: 75 CAPSULE ORAL at 20:14

## 2024-12-08 RX ADMIN — CYANOCOBALAMIN TAB 1000 MCG 1000 MCG: 1000 TAB at 09:33

## 2024-12-08 RX ADMIN — SODIUM CHLORIDE, PRESERVATIVE FREE 10 ML: 5 INJECTION INTRAVENOUS at 09:49

## 2024-12-08 RX ADMIN — OXYCODONE HYDROCHLORIDE 2.5 MG: 5 TABLET ORAL at 13:40

## 2024-12-08 RX ADMIN — SODIUM CHLORIDE, PRESERVATIVE FREE 10 ML: 5 INJECTION INTRAVENOUS at 20:18

## 2024-12-08 RX ADMIN — SUCRALFATE 1 G: 1 TABLET ORAL at 20:14

## 2024-12-08 ASSESSMENT — PAIN DESCRIPTION - LOCATION
LOCATION: BACK

## 2024-12-08 ASSESSMENT — PAIN SCALES - GENERAL
PAINLEVEL_OUTOF10: 9
PAINLEVEL_OUTOF10: 8
PAINLEVEL_OUTOF10: 8
PAINLEVEL_OUTOF10: 7
PAINLEVEL_OUTOF10: 8

## 2024-12-08 ASSESSMENT — PAIN DESCRIPTION - ORIENTATION
ORIENTATION: LOWER
ORIENTATION: LOWER;RIGHT
ORIENTATION: LOWER;RIGHT
ORIENTATION: LOWER

## 2024-12-08 ASSESSMENT — PAIN DESCRIPTION - DESCRIPTORS
DESCRIPTORS: ACHING;SORE
DESCRIPTORS: ACHING;SORE;SHARP
DESCRIPTORS: ACHING;SORE
DESCRIPTORS: ACHING;DISCOMFORT;GNAWING

## 2024-12-09 VITALS
DIASTOLIC BLOOD PRESSURE: 60 MMHG | WEIGHT: 226 LBS | SYSTOLIC BLOOD PRESSURE: 94 MMHG | BODY MASS INDEX: 40.04 KG/M2 | HEART RATE: 90 BPM | TEMPERATURE: 98.1 F | HEIGHT: 63 IN | OXYGEN SATURATION: 94 % | RESPIRATION RATE: 20 BRPM

## 2024-12-09 PROCEDURE — 6370000000 HC RX 637 (ALT 250 FOR IP)

## 2024-12-09 PROCEDURE — 6370000000 HC RX 637 (ALT 250 FOR IP): Performed by: STUDENT IN AN ORGANIZED HEALTH CARE EDUCATION/TRAINING PROGRAM

## 2024-12-09 PROCEDURE — 2580000003 HC RX 258: Performed by: STUDENT IN AN ORGANIZED HEALTH CARE EDUCATION/TRAINING PROGRAM

## 2024-12-09 PROCEDURE — 6370000000 HC RX 637 (ALT 250 FOR IP): Performed by: PSYCHIATRY & NEUROLOGY

## 2024-12-09 PROCEDURE — 2700000000 HC OXYGEN THERAPY PER DAY

## 2024-12-09 PROCEDURE — 94660 CPAP INITIATION&MGMT: CPT

## 2024-12-09 RX ADMIN — DULOXETINE HYDROCHLORIDE 60 MG: 60 CAPSULE, DELAYED RELEASE ORAL at 08:17

## 2024-12-09 RX ADMIN — SENNOSIDES 8.6 MG: 8.6 TABLET, FILM COATED ORAL at 08:17

## 2024-12-09 RX ADMIN — SODIUM CHLORIDE, PRESERVATIVE FREE 10 ML: 5 INJECTION INTRAVENOUS at 08:18

## 2024-12-09 RX ADMIN — OXYCODONE HYDROCHLORIDE 2.5 MG: 5 TABLET ORAL at 07:09

## 2024-12-09 RX ADMIN — CALCIUM 500 MG: 500 TABLET ORAL at 08:18

## 2024-12-09 RX ADMIN — CYANOCOBALAMIN TAB 1000 MCG 1000 MCG: 1000 TAB at 08:17

## 2024-12-09 RX ADMIN — POLYETHYLENE GLYCOL 3350 17 G: 17 POWDER, FOR SOLUTION ORAL at 08:18

## 2024-12-09 RX ADMIN — ROSUVASTATIN 20 MG: 20 TABLET, FILM COATED ORAL at 08:17

## 2024-12-09 RX ADMIN — SUCRALFATE 1 G: 1 TABLET ORAL at 05:47

## 2024-12-09 RX ADMIN — FOLIC ACID 1 MG: 1 TABLET ORAL at 08:17

## 2024-12-09 RX ADMIN — LACTULOSE 30 G: 20 SOLUTION ORAL at 08:17

## 2024-12-09 RX ADMIN — PANTOPRAZOLE SODIUM 40 MG: 40 TABLET, DELAYED RELEASE ORAL at 05:47

## 2024-12-09 ASSESSMENT — PAIN DESCRIPTION - DESCRIPTORS: DESCRIPTORS: ACHING;SORE

## 2024-12-09 ASSESSMENT — PAIN - FUNCTIONAL ASSESSMENT: PAIN_FUNCTIONAL_ASSESSMENT: PREVENTS OR INTERFERES SOME ACTIVE ACTIVITIES AND ADLS

## 2024-12-09 ASSESSMENT — PAIN DESCRIPTION - ORIENTATION: ORIENTATION: MID;LOWER

## 2024-12-09 ASSESSMENT — PAIN DESCRIPTION - LOCATION: LOCATION: BACK

## 2024-12-09 ASSESSMENT — PAIN SCALES - GENERAL: PAINLEVEL_OUTOF10: 7

## 2024-12-09 NOTE — PLAN OF CARE
Problem: Safety - Adult  Goal: Free from fall injury  12/9/2024 1046 by Guerline Cole RN  Outcome: Adequate for Discharge     Problem: Chronic Conditions and Co-morbidities  Goal: Patient's chronic conditions and co-morbidity symptoms are monitored and maintained or improved  12/9/2024 1046 by Guerline Cole RN  Outcome: Adequate for Discharge     Problem: Pain  Goal: Verbalizes/displays adequate comfort level or baseline comfort level  12/9/2024 1046 by Guerline Cole RN  Outcome: Adequate for Discharge     Problem: Skin/Tissue Integrity  Goal: Absence of new skin breakdown  Description: 1.  Monitor for areas of redness and/or skin breakdown  2.  Assess vascular access sites hourly  3.  Every 4-6 hours minimum:  Change oxygen saturation probe site  4.  Every 4-6 hours:  If on nasal continuous positive airway pressure, respiratory therapy assess nares and determine need for appliance change or resting period.  12/9/2024 1046 by Guerline Cole RN  Outcome: Adequate for Discharge     Problem: ABCDS Injury Assessment  Goal: Absence of physical injury  12/9/2024 1046 by Guerline Cole RN  Outcome: Adequate for Discharge

## 2024-12-09 NOTE — PLAN OF CARE
Problem: Safety - Adult  Goal: Free from fall injury  Outcome: Progressing  Flowsheets (Taken 12/8/2024 2002)  Free From Fall Injury: Instruct family/caregiver on patient safety     Problem: Chronic Conditions and Co-morbidities  Goal: Patient's chronic conditions and co-morbidity symptoms are monitored and maintained or improved  Outcome: Progressing     Problem: Pain  Goal: Verbalizes/displays adequate comfort level or baseline comfort level  Outcome: Progressing     Problem: Skin/Tissue Integrity  Goal: Absence of new skin breakdown  Description: 1.  Monitor for areas of redness and/or skin breakdown  2.  Assess vascular access sites hourly  3.  Every 4-6 hours minimum:  Change oxygen saturation probe site  4.  Every 4-6 hours:  If on nasal continuous positive airway pressure, respiratory therapy assess nares and determine need for appliance change or resting period.  Outcome: Progressing     Problem: ABCDS Injury Assessment  Goal: Absence of physical injury  Outcome: Progressing  Flowsheets (Taken 12/8/2024 2002)  Absence of Physical Injury: Implement safety measures based on patient assessment

## 2024-12-09 NOTE — DISCHARGE SUMMARY
Internal Medicine Discharge Summary    NAME: Mignon Fischer :  1955  MRN:  [id number] PCP:Sandy Leiva MD    ADMITTED: 2024   DISCHARGED: 2024 11:07 AM    ADMITTING PHYSICIAN: Lilliam att. providers found    PCP: Sandy Leiva MD    CONSULTANT(S):   IP CONSULT TO SOCIAL WORK  IP CONSULT TO INTERNAL MEDICINE  IP CONSULT TO GENERAL SURGERY  IP CONSULT TO NEUROSURGERY     ADMITTING DIAGNOSIS:   Anemia, unspecified type [D64.9]  Acute hypoxic respiratory failure [J96.01]     Please see H&P for further details    DISCHARGE DIAGNOSES:   Active Hospital Problems    Diagnosis     Acute hypoxic respiratory failure [J96.01]        BRIEF HISTORY OF PRESENT ILLNESS: Mignon Fischer is a 69 y.o. female patient of Sandy Leiva MD who  has a past medical history of Brain concussion, Diabetes mellitus (HCC), Double vision with both eyes open, Dyspnea on exertion, Hx of blood clots, Hyperlipemia, Hypertension, Knee pain, Lymphedema, Pancreatitis, and Vitreous floaters of both eyes. who originally had concerns including Altered Mental Status. at presentation on 2024, and was found to have Anemia, unspecified type [D64.9]  Acute hypoxic respiratory failure [J96.01] after workup.    Please see H&P for further details.    HOSPITAL COURSE:   The patient presented to the hospital with the chief complaint of Altered Mental Status  . The patient was admitted to the hospital for concerns of AMS. AMS was likely due to oversedation from pain medications. Neurology and neurosurgery consulted for regimen adjustment. After adjustment, patient's mentation improved tremendously. AAOx3. Patient's regimen continues to be titrated and has been discharged on below regimen      Plan  Altered mental status likely 2/2 multifactorial; hypoxia, anemia, pain medications, ?LIBRA-much improved  Cont Oxycodone at  2.5 mg as needed   Cont  Baclofen and Valium decreased doses and frequency   Off Gabapentin and on lyrica nightly

## 2024-12-09 NOTE — CARE COORDINATION
reached out to patients PCP office Dr Sandy Leiva at [phone] to schedule follow up D/C appointment.  spoke to office staff and scheduled an appointment on 12/17 at 2:30 PM in office.     Please bring your discharge paperwork, new medications, ID, insurance card and co-pay if you have one.    Information added to D/C summary.     
12/07/24 Discharge order noted Family has appealed denial with [facility] for [facility] No correspondence has been received in  office Text to Irene from [facility] to see if she has any new additional information Electronically signed by Eduardo Mukherjee RN on 12/7/2024 at 7:50 AM     8:12 Rec'd notice from Irene at [facility] they have not heard about the appeal decision Electronically signed by Eduardo Mukherjee RN on 12/7/2024 at 8:12 AM     
Apparently coming in for weakness of all extremities from the facility. Also noted that she did not like the staff at the facility. Found to be anemic in the ED with a positive FOBT.   
CM Update: Discharge order noted. Appeal pending for [facility]. Left message for Irene to check on status. (TF)        JONATHAN ShearerN,RN  Case Management  [phone]    
CM Update: Discharge order noted. Peer to Peer pending for [facility]. Attending completed and insurance requested updated therapy evals. Those were submitted to insurance by Breanna in CM dept. Pt will need to remain in hospital until auth is obtained (TF)        Alfred Rosenthal, JONATHANN,RN  Case Management  [phone]    
CM Update: Discharge order noted. Pt unable to dc until auth is obtained for [facility]. Updated therapy evals sent Fri to insurance as requested after Peer to peer. VINCE/Alexander to follow up on Mon (TF)        LESLIE Shearer,RN  Case Management  [phone]    
CM Update: Precert pending to [facility], initiated yesterday. Left message for Irene to check status. GLADIS/destination completed. Transport envelope on soft chart. FOBT ordered, results pending. If neg and auth obtained will plan for dc this afternoon. (TF)            Alfred Rosenthal, JONATHANN,RN  Case Management  [phone]    
CM update.  Peer to Peer requested by insurance.  Must by done by 1200 today.  Please call [phone] option [phone]. Perfect serve message sent to attending. VINCE/SW to follow.  Christiano Moore RN  [phone].    0930:  Attending called peer to peer.  They requested updated therapy as last notes showed patient was in severe pain.  The updated therapy noted need to be  submitted no later than 1430 today.  Called for stat evals.  Per Irene with [facility], pt can admit when approved.      1155:  updated pt/ot notes were submitted by Breanna with Case Management.  Await outcome.  Christiano Moore RN  [phone].    
Discharge order noted. Patient and her family have appealed denial for [facility]. Await determination from the appeal. Lian from [facility] to checking on status of appeal. Patient's brother Ferny updated. I discussed plan B if patient still denied skilled to [facility]. He said he and family has a list of facilities to review choices for pending Medicaid possible long term care and will follow up with Cm/Sw with those choices. GLADIS/destination completed. Discharge envelope with ambulance form is in the soft chart. HENS is not needed since patient is from this facility.    Deepthi Hoover RN   [phone]    
Social Work/ Transition of Care:    Pt presents to the ED secondary to altered mental status from [facility] for rehab.  Pt discharged from this hospital yesterday 11/21.     SW to follow.    
Social Work/Case Management Transition of Care Planning (Misty Raphael -[phone]): Discharge order noted.  Still awaiting determination of appeal.  Met with patient and brother, Ferny, at bedside.  Discharge plan is to return to [facility].  GLADIS/destination completed.  Discharge envelope with ambulance form is in the soft chart.  HENS is not needed.  CM/SW will follow. Misty Raphael, GOPI  12/5/2024    
Social Work/Case Management Transition of Care Planning (Misty Raphael -[phone]):  Discharge order noted.  Patient and her family have appealed denial for [facility].  Await determination from the appeal.  Met with patient and her brother, Ferny, at bedside.  Discharge plan is to return to [facility] if there is a bed available.  GLADIS/destination completed.  Discharge envelope with ambulance form is in the soft chart.  HENS is not needed.  CM/SW will follow.  Misty Raphael, GOPI  12/4/2024     
Social Work/Case Management Transition of Care Planning (Misty Raphael -[phone]):  Discharge order noted.  Pre-cert has been approved.  [facility] via [facility] with a  time of 10:30-12:30.  Notified patient, her brother, Lian Isaacs of [facility] and floor nurse.  Discharge envelope with ambulance form is in the soft chart.  Misty Raphael, GOPI  12/9/2024    
Social Work/Case Management Transition of Care Planning (Misty Raphael [facility] [phone]): Per report and chart review, Neurology was consulted for evaluation of AMS.  They indicated it is likely due to hypoxia, anemia, and pain medication.  They have signed off.  Patient is on 2L NC when off CPAP.  PT/OT scores noted to be 7/10.  Patient was noted to be limited due to sever back pain.Discharge order noted.  Spoke with Ashtyn of [facility].  Patient can return to [facility].  Pre-cert was started today. Met with patient at bedside.  She is much more alert today. Discharge plan is to return to [facility] if there is a bed available.  GLADIS/destination completed.  Discharge envelope with ambulance form is in the soft chart.  HENS is not needed.  CM/SW will follow.  GOPI Garcia  11/26/2024    Update:  Discharge order noted.  Pre-cert to [facility] remains pending.  CM/SW will follow.  GOPI Garcia  11/26/2024    
Social Work/Case Management Transition of Care Planning (Misty Raphael [facility] [phone]):  Discharge order noted.  Discharge plan is to return to [facility].  Pre-cert was started on 11/26 and remains pending.  Updated therapy evals sent to insurance on 11/29.  PT/OT updated scores noted to be 7/10.  GLADIS/destination completed.  Met with patient and her brother, Ferny, at bedside. Discharge envelope with ambulance form is in the soft chart.  HENS is not needed.  CM/SW will follow.  GOPI Garcia  12/2/2024    Update:  Per Breanna, the peer to peer has been denied.  Patient can appeal by calling [phone].  Phone call to the therapy department to request updated evals for appeal.  Met with patient and her brother at bedside.  Gave them the number to call for the appeal and explained patient will need to show progress when therapy re-evaluates her.  Patient expressed understanding of the need to work through the pain.  CM/SW will follow.  GOPI Garcia  12/2/2024    Update:  Updated PT/OT scores noted to be 10/11.  Patient ambulated 2' with max x2.  Patient and her brother filed the appeal.  Breanna will send updated therapy evals.  CM/SW will follow.  GOPI Garcia  12/2/2024    
Social Work/Case Management Transition of Care Planning (Misty Raphael W [phone]):  Discharge order noted.  Patient is in the appeal process of insurance denial for placement at [facility].  This was initiated on 12/2.  Updated PT/OT scores noted to be 10/11.  Updates faxed to insurance.  Await determination.  Met with patient and her brother, Ferny, at bedside. Patient was up sitting in the chair.  She reports feeling better.  CM/SW will follow.    Misty Raphael, GOPI  12/3/2024    
provided with a choice of provider and agrees with the discharge plan. Freedom of choice list with basic dialogue that supports the patient's individualized plan of care/goals and shares the quality data associated with the providers was provided to:     Patient Representative Name:       The Patient and/or Patient Representative Agree with the Discharge Plan      GOPI Garcia  Case Management Department  Ph: [phone]

## 2024-12-09 NOTE — PROGRESS NOTES
11/26/24 2234   NIV Type   NIV Started/Stopped On   Equipment Type v60   Mode CPAP   Mask Type Full face mask   Mask Size Medium   Assessment   Respirations 16   Comfort Level Good   Using Accessory Muscles No   Mask Compliance Good   Skin Assessment Clean, dry, & intact   Skin Protection for O2 Device Yes   Orientation Middle   Location Nose   Settings/Measurements   PIP Observed 14 cm H20   CPAP/EPAP 14 cmH2O   Vt (Measured) 397 mL   FiO2  40 %   Minute Volume (L/min) 6.2 Liters   Mask Leak (lpm) 66 lpm   Patient's Home Machine No   Alarm Settings   Alarms On Y       
   11/30/24 0000   NIV Type   $NIV $Daily Charge   Ventilator ID [id number]   NIV Started/Stopped On   Equipment Type v60   Mode CPAP   Mask Type Full face mask   Mask Size Medium   Assessment   SpO2 98 %   Level of Consciousness 0   Comfort Level Good   Using Accessory Muscles No   Mask Compliance Good   Skin Assessment Clean, dry, & intact   Skin Protection for O2 Device Yes   Orientation Middle   Location Nose   Intervention(s) Skin Barrier   Breath Sounds   Breath Sounds Bilateral Crackles    Right Upper Lobe Crackles   Right Middle Lobe Crackles   Right Lower Lobe Crackles   Left Upper Lobe Crackles   Left Lower Lobe Crackles   Settings/Measurements   PIP Observed 14 cm H20  (found on 14)   CPAP/EPAP 14 cmH2O   Vt (Measured) 349 mL   FiO2  40 %   Minute Volume (L/min) 7 Liters   Mask Leak (lpm) 84 lpm   Patient's Home Machine No   Alarm Settings   Alarms On Y     Date: 11/30/2024    Time: 2:19 AM    Patient Placed On BIPAP/CPAP/ Non-Invasive Ventilation?  Yes    If no must comment.  Facial area red/color change? No           If YES are Blister/Lesion present?No   If yes must notify nursing staff  BIPAP/CPAP skin barrier?  Yes    Skin barrier type:mepilexlite       Comments:        Liliana Jansen RCP  
   12/04/24 2010   NIV Type   NIV Started/Stopped Off  (pt refusing says it dries out her throat too much)       
   12/07/24 2213   NIV Type   NIV Started/Stopped Off  (Patient refused BiPAP.)       
4 Eyes Skin Assessment     NAME:  Mignon Fischer  YOB: 1955  MEDICAL RECORD NUMBER:  [id number]    The patient is being assessed for  Admission    I agree that at least one RN has performed a thorough Head to Toe Skin Assessment on the patient. ALL assessment sites listed below have been assessed.      Areas assessed by both nurses:    Head, Face, Ears, Shoulders, Back, Chest, Arms, Elbows, Hands, Sacrum. Buttock, Coccyx, Ischium, Legs. Feet and Heels, and Under Medical Devices         Does the Patient have a Wound? No noted wound(s)       -generalized swelling  -S/P laminectomy and fusion incisions and dressing intact, no drainage noted    Braxton Prevention initiated by RN: Yes  Wound Care Orders initiated by RN: No    Pressure Injury (Stage 3,4, Unstageable, DTI, NWPT, and Complex wounds) if present, place Wound referral order by RN under : No    New Ostomies, if present place, Ostomy referral order under : No     Nurse 1 eSignature: Electronically signed by Renetta Beck RN on 11/22/24 at 7:18 PM EST    **SHARE this note so that the co-signing nurse can place an eSignature**    Nurse 2 eSignature: Electronically signed by Patricia Salazar RN on 11/22/24 at 7:24 PM EST  
Attending messaged for constipation issues per patient   
[facility]  Neurology follow up     Date:  11/25/2024  Patient Name:  Mignon Fischer  YOB: 1955  MRN: [id number]     PCP:  Sandy Leiva MD   Referring:  No ref. provider found      Chief Complaint: AMS    History obtained from: brother, chart     Assessment  Mignon Fischer is a 69 y.o. female with recent back surgery presenting with an AMS in the setting of hypoxia. Encephalopathy likely multifactorial related to hypoxia, anemia and pain medication side effect -- currently oriented x 3 and following commands, but remains sleepy       Plan  Agree with holding gabapentin, phenergan  Will change baclofen to prn with consideration to hold this as well  Pain meds per NSGY, though hopefully can minimize moving forward as her pain is controlled   Management of hypoxia per primary   Okay to d/c from neuro POV - will sign off- call with questions or concerns         History of Present Illness:  Mignon Fischer is a 69 y.o.  female presenting for evaluation of AMS.    Patient with recent lumbar fusion. Presented with AMS in the setting of Hgb 6.6, O2 77% and on pain medication from recent surgery.     Currently on Bipap , A&O x 3 and follows commands.     CT head was negative for acute findings.     Brother at bedside.     Allergies:      Allergies   Allergen Reactions    Lipitor [Atorvastatin] Myalgia     \"LEG CRAMPS\"    Megace [Megestrol Acetate] Other (See Comments)     UNKNOWN REACTION    Vytorin [Ezetimibe-Simvastatin] Other (See Comments)     UNKNOWN REACTION    Zocor [Simvastatin] Other (See Comments)     UNKNOWN REACTION        Physical Examination  Vitals   Vitals:    11/24/24 1945 11/24/24 2300 11/25/24 0400 11/25/24 1010   BP: 134/63 (!) 101/51 125/70 (!) 101/45   Pulse: (!) 103 100 88 81   Resp: 17 17 17 16   Temp: 98.6 °F (37 °C) 97.3 °F (36.3 °C) 97.4 °F (36.3 °C) 97 °F (36.1 °C)   TempSrc: Temporal Temporal Temporal    SpO2: 97% 95% 100%    Weight:     
Comprehensive Nutrition Assessment    Type and Reason for Visit:  Initial, LOS    Nutrition Assessment:    Pt assessed per LOS protocol. Chart reviewed. Pt. tolerating diet currently eating  % at most meals this admit and appears stable from a nutritional standpoint. No nutrition intervention indicated at this time. Will follow per policy. Consult RD if needed.    Braulio Viveros RD  Contact: ext 2005     
Date: 11/25/2024    Time: 12:32 AM    Patient Placed On BIPAP/CPAP/ Non-Invasive Ventilation?  Yes    If no must comment.  Facial area red/color change? No           If YES are Blister/Lesion present?No   If yes must notify nursing staff  BIPAP/CPAP skin barrier?  Yes    Skin barrier type:mepilexlite       Comments:Patient placed on CPAP 8 cmH2O per order; Vt remains <200. Cpap increased until increased Vt achieved.  CPAP 14 cmH2O= Vt 401 ml, SaO2=99%.        Angie Enrique RCP  
Date: 11/27/2024    Time: 3:20 AM    Patient Placed On BIPAP/CPAP/ Non-Invasive Ventilation?  Patient continues on bipap    If no must comment.  Facial area red/color change? No           If YES are Blister/Lesion present?No   If yes must notify nursing staff  BIPAP/CPAP skin barrier?  Yes    Skin barrier type:mepilexlite       Comments:        Angie Enrique RCP  
Date: 11/27/2024    Time: 8:23 PM    Patient Placed On BIPAP/CPAP/ Non-Invasive Ventilation?  Yes    If no must comment.  Facial area red/color change? No           If YES are Blister/Lesion present?No   If yes must notify nursing staff  BIPAP/CPAP skin barrier?  Yes    Skin barrier type:mepilexlite       Comments:        Sangeeta Izaguirre RCP       11/27/24 2023   NIV Type   NIV Started/Stopped On   Equipment Type v60   Mode CPAP   Mask Type Full face mask   Mask Size Medium   Assessment   Level of Consciousness 0   Comfort Level Good   Using Accessory Muscles No   Mask Compliance Good   Skin Assessment Clean, dry, & intact   Settings/Measurements   PIP Observed 14 cm H20   CPAP/EPAP 14 cmH2O   Vt (Measured) 338 mL   FiO2  40 %   Minute Volume (L/min) 5.8 Liters   Mask Leak (lpm) 41 lpm   Patient's Home Machine No   Alarm Settings   Alarms On Y       
Date: 11/28/2024    Time: 8:15 AM    Patient Placed On BIPAP/CPAP/ Non-Invasive Ventilation?  No      Comments:    Pt found off her cpap at this time     Mervat Gay RCP  
Department of Neurosurgery  Progress Note    CHIEF COMPLAINT: Post operative back pain     SUBJECTIVE:  tired this AM    ROS: Constitutional: Negative for chills and fever.   Neurological: Negative for dizziness, tremors and speech change.       OBJECTIVE  Physical  VITALS:  BP (!) 103/54   Pulse 85   Temp 96.9 °F (36.1 °C) (Temporal)   Resp 16   Ht 1.6 m (5' 3\")   Wt 102.5 kg (226 lb)   SpO2 95%   BMI 40.03 kg/m²   NEUROLOGIC:  Mental Status Exam:  Level of Alertness:   awake  Orientation:   person, place  Motor Exam:  CALDERA 4/5  Sensory:  Sensory intact    Data  CBC:   Lab Results   Component Value Date/Time    WBC 9.1 11/26/2024 04:16 AM    RBC 2.54 11/26/2024 04:16 AM    HGB 7.5 11/26/2024 04:17 AM    HCT 23.7 11/26/2024 04:17 AM    MCV 92.9 11/26/2024 04:16 AM    MCH 29.1 11/26/2024 04:16 AM    MCHC 31.4 11/26/2024 04:16 AM    RDW 14.1 11/26/2024 04:16 AM     11/26/2024 04:16 AM    MPV 9.8 11/26/2024 04:16 AM     BMP:    Lab Results   Component Value Date/Time     11/26/2024 04:16 AM    K 4.3 11/26/2024 04:16 AM    K 4.3 08/07/2020 11:42 AM     11/26/2024 04:16 AM    CO2 29 11/26/2024 04:16 AM    BUN 18 11/26/2024 04:16 AM    CREATININE 0.8 11/26/2024 04:16 AM    CALCIUM 8.7 11/26/2024 04:16 AM    GFRAA >60 03/10/2021 08:47 AM    LABGLOM 78 11/26/2024 04:16 AM    LABGLOM 62 04/16/2024 01:05 PM    GLUCOSE 186 11/26/2024 04:16 AM    GLUCOSE 97 10/06/2010 04:15 PM     Current Inpatient Medications  Current Facility-Administered Medications: baclofen (LIORESAL) tablet 10 mg, 10 mg, Oral, TID PRN  magnesium hydroxide (MILK OF MAGNESIA) 400 MG/5ML suspension 30 mL, 30 mL, Oral, Daily  DULoxetine (CYMBALTA) extended release capsule 60 mg, 60 mg, Oral, Daily  gabapentin (NEURONTIN) capsule 300 mg, 300 mg, Oral, TID  ketorolac (TORADOL) injection 30 mg, 30 mg, IntraVENous, Q8H  promethazine (PHENERGAN) injection 6.25 mg, 6.25 mg, IntraMUSCular, Q8H  0.9 % sodium chloride infusion, , IntraVENous, 
Dr. Murphy aware of consult.   
Father Moi Anointed and provided communion for patient  
GENERAL SURGERY  DAILY PROGRESS NOTE    Patient's Name/Date of Birth: Mignon Fischer / 1955    Date: 2024     Chief Complaint   Patient presents with    Altered Mental Status        Subjective:  AFVS. Hgb dropped overnight from 7.2 to 6.6. Pt was ordered a unit of pRBCs. Currently complaining from back pain. No abdominal pain, n/v, no BM overnight.     Objective:  Last 24Hrs  Temp  Av.2 °F (36.8 °C)  Min: 97 °F (36.1 °C)  Max: 99.1 °F (37.3 °C)  Resp  Av.3  Min: 17  Max: 28  Pulse  Av  Min: 90  Max: 104  Systolic (24hrs), Av , Min:98 , Max:146     Diastolic (24hrs), Av, Min:42, Max:80    SpO2  Av.4 %  Min: 93 %  Max: 100 %    I/O last 3 completed shifts:  In: 480 [P.O.:480]  Out: 600 [Urine:600]      General: In no acute distress, alert and oriented x4  Cardiovascular: Warm throughout, no edema  Respiratory: no respiratory distress, equal chest rise  Abdomen: obese, soft, nontender, nondistended  Skin: warm and dry, back midline incision covered with a dressing with no palpable hematoma or surrounding ecchymosis.   Extremities: atraumatic, no focal motor deficits, no open wounds      CBC  Recent Labs     24  1223 24  0418   WBC 10.5 9.5   RBC 2.40* 2.18*   HGB 7.2* 6.6*   HCT 22.8* 20.8*   MCV 95.0 95.4   MCH 30.0 30.3   MCHC 31.6* 31.7*   RDW 13.6 13.7    221   MPV 10.1 9.7       CMP  Recent Labs     24  1223 24  0418    138   K 4.3 3.9   CL 97* 100   CO2 32* 32*   BUN 12 10   CREATININE 0.7 0.7   GLUCOSE 154* 144*   CALCIUM 8.5* 8.2*   BILITOT 0.2 0.3   ALKPHOS 80 66   AST 21 19   ALT <5 <5         Assessment/Plan:    Patient Active Problem List   Diagnosis    Abnormal gait    Diabetes mellitus type II, controlled (HCC)    Vitreous floaters of both eyes    Idiopathic localized osteoarthropathy    Pancreatic pseudocyst    Blood glucose abnormal    Obstructive sleep apnea syndrome    Adiposity    Nausea    Strabismic amblyopia    
Internal Medicine Progress Note    Patient's name: Mginon Fischer  : 1955  Chief complaints (on day of admission): Altered Mental Status  Admission date: 2024  Date of service: 2024   Room: [address] MED SURG ONC  Primary care physician: Sandy Leiva MD  Reason for visit: Follow-up for AMS    Subjective  Mignon was seen and examined at bedside   Brother at bedside when seen  Patient seen at bedside, patient is very sleepy  Patient is AAO X3 but very lethargic  Able to answer questions appropriately  when not actively asked questions  In no acute distress      Current medications being prescribed discussed and patient expresses verbal understanding       Review of Systems  There are no new complaints of chest pain, shortness of breath, abdominal pain, nausea, vomiting, diarrhea, constipation unless otherwise mentioned above.     Hospital Medications  Current Facility-Administered Medications   Medication Dose Route Frequency Provider Last Rate Last Admin    baclofen (LIORESAL) tablet 10 mg  10 mg Oral TID PRN Christina Gallardo PA   10 mg at 24    magnesium hydroxide (MILK OF MAGNESIA) 400 MG/5ML suspension 30 mL  30 mL Oral Daily Benny Moise MD   30 mL at 24 09    DULoxetine (CYMBALTA) extended release capsule 60 mg  60 mg Oral Daily Roger Meyers MD   60 mg at 24 09    gabapentin (NEURONTIN) capsule 300 mg  300 mg Oral TID Roger Meyers MD   300 mg at 24 2030    0.9 % sodium chloride infusion   IntraVENous PRN Hilda Gillette MD        oxyCODONE HCl (OXY-IR) immediate release tablet 10 mg  10 mg Oral Q4H PRN Kalyani Murphy MD   10 mg at 24    lidocaine 4 % external patch 1 patch  1 patch TransDERmal Daily Kalyani Murphy MD   1 patch at 24 09    diazePAM (VALIUM) tablet 5 mg  5 mg Oral Q6H PRN Kalyani Murphy MD   5 mg at 24    sodium chloride flush 0.9 % injection 10 mL  10 mL IntraVENous PRN Tracy Manuel MD   10 mL at 
Internal Medicine Progress Note    Patient's name: Mignon Fischer  : 1955  Chief complaints (on day of admission): Altered Mental Status  Admission date: 2024  Date of service: 2024   Room: [address] MED SURG ONC  Primary care physician: Sandy Leiva MD  Reason for visit: Follow-up for AMS    Subjective  Mignon was seen and examined at bedside   Brother at bedside when seen  Patient seen at bedside, patient is very sleepy  Patient is AAO X3 but very lethargic  Able to answer questions appropriately, but quickly falls asleep when not actively asked questions  In no acute distress      Current medications being prescribed discussed and patient expresses verbal understanding       Review of Systems  There are no new complaints of chest pain, shortness of breath, abdominal pain, nausea, vomiting, diarrhea, constipation unless otherwise mentioned above.     Hospital Medications  Current Facility-Administered Medications   Medication Dose Route Frequency Provider Last Rate Last Admin    baclofen (LIORESAL) tablet 10 mg  10 mg Oral TID Roger Meyers MD   10 mg at 24    DULoxetine (CYMBALTA) extended release capsule 60 mg  60 mg Oral Daily oRger Meyers MD        gabapentin (NEURONTIN) capsule 300 mg  300 mg Oral TID Roger Meyers MD   300 mg at 24 2328    ketorolac (TORADOL) injection 30 mg  30 mg IntraVENous Q8H Roger Meyers MD   30 mg at 24 0523    promethazine (PHENERGAN) injection 6.25 mg  6.25 mg IntraMUSCular Q8H Roger Meyers MD   6.25 mg at 24 0001    0.9 % sodium chloride infusion   IntraVENous PRN Hilda Gillette MD        oxyCODONE HCl (OXY-IR) immediate release tablet 10 mg  10 mg Oral Q4H PRN Kalyani Murphy MD   10 mg at 24    lidocaine 4 % external patch 1 patch  1 patch TransDERmal Daily Kalyani Murphy MD   1 patch at 24 0851    diazePAM (VALIUM) tablet 5 mg  5 mg Oral Q6H PRN Kalyani Murphy MD   5 mg at 24 0551    
Internal Medicine Progress Note    Patient's name: Mignon Fischer  : 1955  Chief complaints (on day of admission): Altered Mental Status  Admission date: 2024  Date of service: 2024   Room: [address] MED SURG ONC  Primary care physician: Sandy eLiva MD  Reason for visit: Follow-up for AMS    Subjective  Mignon was seen and examined at bedside   Brother at bedside when seen  Patient seen at bedside  Patient is AAO X3 more awake  Able to answer questions appropriately  when not actively asked questions  In no acute distress    Current medications being prescribed discussed and patient expresses verbal understanding       Review of Systems  There are no new complaints of chest pain, shortness of breath, abdominal pain, nausea, vomiting, diarrhea, constipation unless otherwise mentioned above.     Hospital Medications  Current Facility-Administered Medications   Medication Dose Route Frequency Provider Last Rate Last Admin    oxyCODONE (ROXICODONE) immediate release tablet 5 mg  5 mg Oral Q4H PRN Benny Moise MD   5 mg at 24 0325    folic acid (FOLVITE) tablet 1 mg  1 mg Oral Daily Benny Moise MD   1 mg at 24 0912    vitamin B-12 (CYANOCOBALAMIN) tablet 1,000 mcg  1,000 mcg Oral Daily Benny Moise MD   1,000 mcg at 24 0914    baclofen (LIORESAL) tablet 10 mg  10 mg Oral TID PRN Christina Gallardo PA   10 mg at 24 0149    magnesium hydroxide (MILK OF MAGNESIA) 400 MG/5ML suspension 30 mL  30 mL Oral Daily Benny Moise MD   30 mL at 24 0912    DULoxetine (CYMBALTA) extended release capsule 60 mg  60 mg Oral Daily Roger Meyers MD   60 mg at 24 0913    gabapentin (NEURONTIN) capsule 300 mg  300 mg Oral TID Roger Meyers MD   300 mg at 24 204    0.9 % sodium chloride infusion   IntraVENous PRN Hilda Gillette MD        lidocaine 4 % external patch 1 patch  1 patch TransDERmal Daily Kalyani Murphy MD   1 patch at 24 0859    diazePAM (VALIUM) 
Internal Medicine Progress Note    Patient's name: Mignon Fischer  : 1955  Chief complaints (on day of admission): Altered Mental Status  Admission date: 2024  Date of service: 2024   Room: [address] MED SURG ONC  Primary care physician: Sandy Leiva MD  Reason for visit: Follow-up for  BACK PAIN, HYPOXIA, AMS    Subjective  Mignon was seen and examined. LOOKS PALE. CONFUSED, TANGENTIAL  DW NURSING- PT IS GETTING AGITATED.    Review of Systems NEG x 10  There are no new complaints of chest pain, shortness of breath, abdominal pain, nausea, vomiting, diarrhea, constipation.    Hospital Medications  Current Facility-Administered Medications   Medication Dose Route Frequency Provider Last Rate Last Admin    0.9 % sodium chloride infusion   IntraVENous PRN Hilda Gillette MD        oxyCODONE HCl (OXY-IR) immediate release tablet 10 mg  10 mg Oral Q4H PRN Kalyani Murphy MD   10 mg at 24 0400    lidocaine 4 % external patch 1 patch  1 patch TransDERmal Daily Kalyani Murphy MD   1 patch at 24 0851    diazePAM (VALIUM) tablet 5 mg  5 mg Oral Q6H PRN Kalyani Murphy MD   5 mg at 24 2240    sodium chloride flush 0.9 % injection 10 mL  10 mL IntraVENous PRN Tracy Manuel MD   10 mL at 24 1503    pantoprazole (PROTONIX) injection 40 mg  40 mg IntraVENous Once Bruce Kramer MD        sodium chloride flush 0.9 % injection 10 mL  10 mL IntraVENous 2 times per day Toño Berumen MD   10 mL at 24 0851    sodium chloride flush 0.9 % injection 10 mL  10 mL IntraVENous PRN Toño Berumen MD   10 mL at 24 1625    0.9 % sodium chloride infusion   IntraVENous PRN Toño Berumen MD        ondansetron (ZOFRAN-ODT) disintegrating tablet 4 mg  4 mg Oral Q8H PRN Toño Berumen MD        Or    ondansetron (ZOFRAN) injection 4 mg  4 mg IntraVENous Q6H PRN Toño Berumen MD        senna (SENOKOT) tablet 8.6 mg  1 tablet Oral Daily PRN Toño Berumen MD        acetaminophen (TYLENOL) 
Internal Medicine Progress Note    Patient's name: Mignon Fischer  : 1955  Chief complaints (on day of admission): Altered Mental Status  Admission date: 2024  Date of service: 2024   Room: [address] MED SURG ONC  Primary care physician: Sandy Leiva MD  Reason for visit: Follow-up for AMS    Subjective  Mignon was seen and examined at bedside   Brother at bedside when seen  Patient seen at bedside, patient is much more awake  In no acute distress    Current medications being prescribed discussed and patient expresses verbal understanding       Review of Systems  There are no new complaints of chest pain, shortness of breath, abdominal pain, nausea, vomiting, diarrhea, constipation unless otherwise mentioned above.     Hospital Medications  Current Facility-Administered Medications   Medication Dose Route Frequency Provider Last Rate Last Admin    baclofen (LIORESAL) tablet 10 mg  10 mg Oral TID PRN Christina Gallardo PA   10 mg at 24 0252    magnesium hydroxide (MILK OF MAGNESIA) 400 MG/5ML suspension 30 mL  30 mL Oral Daily Benny Moise MD   30 mL at 24 0855    DULoxetine (CYMBALTA) extended release capsule 60 mg  60 mg Oral Daily Roger Meyers MD   60 mg at 24 0854    gabapentin (NEURONTIN) capsule 300 mg  300 mg Oral TID Roger Meyers MD   300 mg at 24 195    0.9 % sodium chloride infusion   IntraVENous PRN Hilda Gillette MD        oxyCODONE HCl (OXY-IR) immediate release tablet 10 mg  10 mg Oral Q4H PRN Kalyani Murphy MD   10 mg at 24 2248    lidocaine 4 % external patch 1 patch  1 patch TransDERmal Daily Kalyani Murphy MD   1 patch at 24 0856    diazePAM (VALIUM) tablet 5 mg  5 mg Oral Q6H PRN Kalyani Murphy MD   5 mg at 24    sodium chloride flush 0.9 % injection 10 mL  10 mL IntraVENous PRN Tracy Manuel MD   10 mL at 24 1503    pantoprazole (PROTONIX) injection 40 mg  40 mg IntraVENous Once Bruce Kramer MD        sodium 
Internal Medicine Progress Note    Patient's name: Mignon Fischer  : 1955  Chief complaints (on day of admission): Altered Mental Status  Admission date: 2024  Date of service: 2024   Room: [address] MED SURG ONC  Primary care physician: Sandy Leiva MD  Reason for visit: Follow-up for AMS    Subjective  Mignon was seen and examined at bedside     Doing well  A bit tired  Stop remeron   If remains somnolent tomorrow stop prn hydroxyzine   Still waiting to hear back from insurance about this appeal....    Review of Systems  There are no new complaints of chest pain, shortness of breath, abdominal pain, nausea, vomiting, diarrhea, constipation unless otherwise mentioned above.     Hospital Medications  Current Facility-Administered Medications   Medication Dose Route Frequency Provider Last Rate Last Admin    diazePAM (VALIUM) tablet 2 mg  2 mg Oral Daily PRN Toño Berumen MD        hydrOXYzine HCl (ATARAX) tablet 10 mg  10 mg Oral BID PRN Toño Berumen MD        oxyCODONE (ROXICODONE) immediate release tablet 2.5 mg  2.5 mg Oral Q4H PRN Toño Berumen MD   2.5 mg at 24 1108    baclofen (LIORESAL) tablet 5 mg  5 mg Oral BID PRN Toño Berumen MD   5 mg at 24 0435    polyethylene glycol (GLYCOLAX) packet 17 g  17 g Oral Daily Toño Berumen MD   17 g at 24 0911    senna (SENOKOT) tablet 8.6 mg  1 tablet Oral BID Toño Berumen MD   8.6 mg at 24 0909    pregabalin (LYRICA) capsule 75 mg  75 mg Oral Nightly Toño Berumen MD   75 mg at 24 2114    lactulose (CHRONULAC) 10 GM/15ML solution 30 g  30 g Oral Daily Toño Berumen MD   30 g at 24 0911    pantoprazole (PROTONIX) tablet 40 mg  40 mg Oral QAM AC Toño Berumen MD   40 mg at 24 0534    folic acid (FOLVITE) tablet 1 mg  1 mg Oral Daily Benny Moise MD   1 mg at 24 0909    vitamin B-12 (CYANOCOBALAMIN) tablet 1,000 mcg  1,000 mcg Oral Daily Benny Moise MD   1,000 mcg at 24 09    
Internal Medicine Progress Note    Patient's name: Mignon Fischer  : 1955  Chief complaints (on day of admission): Altered Mental Status  Admission date: 2024  Date of service: 2024   Room: [address] MED SURG ONC  Primary care physician: Sandy Leiva MD  Reason for visit: Follow-up for AMS    Subjective  Mignon was seen and examined at bedside     Doing better  More alert   Massive medication changes made by me   Updated her 2 brothers extensively   DC planning there are apparently issues with this   Bowel regimen   Increase her activity   Spoke with nurse manager     Review of Systems  There are no new complaints of chest pain, shortness of breath, abdominal pain, nausea, vomiting, diarrhea, constipation unless otherwise mentioned above.     Hospital Medications  Current Facility-Administered Medications   Medication Dose Route Frequency Provider Last Rate Last Admin    polyethylene glycol (GLYCOLAX) packet 17 g  17 g Oral Daily Toño Berumen MD        senna (SENOKOT) tablet 8.6 mg  1 tablet Oral BID Toño Berumen MD        [START ON 12/3/2024] pantoprazole (PROTONIX) 40 mg in sodium chloride (PF) 0.9 % 10 mL injection  40 mg IntraVENous Daily Toño Berumen MD        baclofen (LIORESAL) tablet 5 mg  5 mg Oral TID PRN Toño Berumen MD        diazePAM (VALIUM) tablet 2 mg  2 mg Oral Q6H PRN Toño Berumen MD        [START ON 12/3/2024] pregabalin (LYRICA) capsule 75 mg  75 mg Oral Nightly Toño Berumen MD        lactulose (CHRONULAC) 10 GM/15ML solution 30 g  30 g Oral Daily Toño Berumen MD        oxyCODONE (ROXICODONE) immediate release tablet 5 mg  5 mg Oral Q4H PRN Benny Moise MD   5 mg at 24 1155    folic acid (FOLVITE) tablet 1 mg  1 mg Oral Daily Benny Moise MD   1 mg at 24 0859    vitamin B-12 (CYANOCOBALAMIN) tablet 1,000 mcg  1,000 mcg Oral Daily Benny Moise MD   1,000 mcg at 24 0900    DULoxetine (CYMBALTA) extended release capsule 60 mg  60 mg Oral 
Internal Medicine Progress Note    Patient's name: Mignon Fischer  : 1955  Chief complaints (on day of admission): Altered Mental Status  Admission date: 2024  Date of service: 2024   Room: [address] MED SURG ONC  Primary care physician: Sandy Leiva MD  Reason for visit: Follow-up for AMS    Subjective  Mignon was seen and examined at bedside   Brother at bedside when seen  Patient seen at bedside, patient is very sleepy  Patient is AAO X3 but very lethargic  Able to answer questions appropriately, but quickly falls asleep when not actively asked questions  In no acute distress      Current medications being prescribed discussed and patient expresses verbal understanding       Review of Systems  There are no new complaints of chest pain, shortness of breath, abdominal pain, nausea, vomiting, diarrhea, constipation unless otherwise mentioned above.     Hospital Medications  Current Facility-Administered Medications   Medication Dose Route Frequency Provider Last Rate Last Admin    baclofen (LIORESAL) tablet 10 mg  10 mg Oral TID PRN Christina Gallardo PA   10 mg at 24 1336    magnesium hydroxide (MILK OF MAGNESIA) 400 MG/5ML suspension 30 mL  30 mL Oral Daily Benny Moise MD   30 mL at 24 1016    DULoxetine (CYMBALTA) extended release capsule 60 mg  60 mg Oral Daily Roger Meyers MD   60 mg at 24 1016    gabapentin (NEURONTIN) capsule 300 mg  300 mg Oral TID Roger Meyers MD   300 mg at 24 2118    ketorolac (TORADOL) injection 30 mg  30 mg IntraVENous Q8H Roger Meyers MD   30 mg at 24 0606    0.9 % sodium chloride infusion   IntraVENous PRN Hilda Gillette MD        oxyCODONE HCl (OXY-IR) immediate release tablet 10 mg  10 mg Oral Q4H PRN Kalyani Murphy MD   10 mg at 24 0021    lidocaine 4 % external patch 1 patch  1 patch TransDERmal Daily Kalyani Murphy MD   1 patch at 24 1017    diazePAM (VALIUM) tablet 5 mg  5 mg Oral Q6H PRN Kalyani Murphy 
Message sent to Dr. Moise that patient's brother Meliton wants him to call him once he sees patient today. Dr. Moise is aware  
More alert now- responding appropriately.  
N2N report called to [facility]   
Neurology consult called.  
Nursing instructor assigned patient to student and accessed chart  
OCCUPATIONAL THERAPY INITIAL EVALUATION    [facility] [address] [place], [place]       Date:2024                                                  Patient Name: Mignon Fischer  MRN: [id number]  : 1955  Room: [address] [place] [address]    Evaluating OT: Martin Gupta OTR/L [id number]    Referring Provider: Davidson Rivera MD      Specific Provider Orders/Date: OT evaluation and treatment 24 1045    Diagnosis:  Anemia, unspecified type [D64.9]  Acute hypoxic respiratory failure [J96.01]      Pertinent Medical History:  has a past medical history of Brain concussion, Diabetes mellitus (HCC), Double vision with both eyes open, Dyspnea on exertion, Hx of blood clots, Hyperlipemia, Hypertension, Knee pain, Lymphedema, Pancreatitis, and Vitreous floaters of both eyes.   Past Surgical History:   Procedure Laterality Date    APPENDECTOMY      CERVICAL FUSION Right 2024    C-4 -C5, C5-C6, and C6- C7 ANTERIOR CERVICAL DISCECTOMY AND  FUSION performed by Kalyani Murphy MD at [facility] OR    CHOLECYSTECTOMY      COLONOSCOPY  2016    EYE SURGERY Left     Cataract    EYE SURGERY Right     Cataract    GALLBLADDER SURGERY      KNEE ARTHROSCOPY Right     LUMBAR SPINE SURGERY N/A 2024    Posterior lumbar two to sacral one laminectomy and fusion performed by Kalyani Murphy MD at [facility] OR    TOTAL KNEE ARTHROPLASTY Right 2024    ROBOTIC LISA ASSISTED RIGHT KNEE TOTAL ARTHROPLASTY   +++ELOISA+++  ++PNB++ performed by Alberto Campbell MD at [facility] OR    UPPER GASTROINTESTINAL ENDOSCOPY  10/2016     Pt admitted to the hospital  with AMS   Pt recently dc s/p lumbar fusion     Orders received, chart reviewed, eval complete.     Precautions:  Fall Risk, cognition, LSO, spinal precautions, maintain SpO2 saturations     Assessment of current deficits    [x] Functional mobility   [x]ADLs  [x] Strength               []Cognition   [x] 
OCCUPATIONAL THERAPY TREATMENT SESSION  [facility] [address] [place], [place]       Date:2024                                                  Patient Name: Mignon Fischer  MRN: [id number]  : 1955  Room: [address] [place] [address]    Evaluating OT: Martin Gupta OTR/L [id number]    Referring Provider: Davidson Rivera MD      Specific Provider Orders/Date: OT evaluation and treatment 24 1045    Diagnosis:  Anemia, unspecified type [D64.9]  Acute hypoxic respiratory failure [J96.01]      Pertinent Medical History:  has a past medical history of Brain concussion, Diabetes mellitus (HCC), Double vision with both eyes open, Dyspnea on exertion, Hx of blood clots, Hyperlipemia, Hypertension, Knee pain, Lymphedema, Pancreatitis, and Vitreous floaters of both eyes.   Past Surgical History:   Procedure Laterality Date    APPENDECTOMY      CERVICAL FUSION Right 2024    C-4 -C5, C5-C6, and C6- C7 ANTERIOR CERVICAL DISCECTOMY AND  FUSION performed by Kalyani Murphy MD at [facility] OR    CHOLECYSTECTOMY      COLONOSCOPY  2016    EYE SURGERY Left     Cataract    EYE SURGERY Right     Cataract    GALLBLADDER SURGERY      KNEE ARTHROSCOPY Right     LUMBAR SPINE SURGERY N/A 2024    Posterior lumbar two to sacral one laminectomy and fusion performed by Kalyani Murphy MD at [facility] OR    TOTAL KNEE ARTHROPLASTY Right 2024    ROBOTIC LISA ASSISTED RIGHT KNEE TOTAL ARTHROPLASTY   +++ELOISA+++  ++PNB++ performed by Alberto Campbell MD at [facility] OR    UPPER GASTROINTESTINAL ENDOSCOPY  10/2016     Pt admitted to the hospital  with AMS   Pt recently dc s/p lumbar fusion     Orders received, chart reviewed, eval complete.     Precautions:  Fall Risk, cognition, LSO, spinal precautions, maintain SpO2 saturations     Assessment of current deficits    [x] Functional mobility   [x]ADLs  [x] Strength               []Cognition   [x] Functional 
OCCUPATIONAL THERAPY TREATMENT SESSION  [facility] [address] [place], [place]       Date:2024                                                  Patient Name: Mignon Fischer  MRN: [id number]  : 1955  Room: [address] [place] [address]    Evaluating OT: Martin Gupta OTR/L [id number]    Referring Provider: Davidson Rivera MD      Specific Provider Orders/Date: OT evaluation and treatment 24 1045    Diagnosis:  Anemia, unspecified type [D64.9]  Acute hypoxic respiratory failure [J96.01]      Pertinent Medical History:  has a past medical history of Brain concussion, Diabetes mellitus (HCC), Double vision with both eyes open, Dyspnea on exertion, Hx of blood clots, Hyperlipemia, Hypertension, Knee pain, Lymphedema, Pancreatitis, and Vitreous floaters of both eyes.   Past Surgical History:   Procedure Laterality Date    APPENDECTOMY      CERVICAL FUSION Right 2024    C-4 -C5, C5-C6, and C6- C7 ANTERIOR CERVICAL DISCECTOMY AND  FUSION performed by Kalyani Murphy MD at [facility] [place]    CHOLECYSTECTOMY      COLONOSCOPY  2016    EYE SURGERY Left     Cataract    EYE SURGERY Right     Cataract    GALLBLADDER SURGERY      KNEE ARTHROSCOPY Right     LUMBAR SPINE SURGERY N/A 2024    Posterior lumbar two to sacral one laminectomy and fusion performed by Kalyani Murphy MD at [facility] [place]    TOTAL KNEE ARTHROPLASTY Right 2024    ROBOTIC LISA ASSISTED RIGHT KNEE TOTAL ARTHROPLASTY   +++ELOISA+++  ++PNB++ performed by Alberto Campbell MD at [facility] [place]    UPPER GASTROINTESTINAL ENDOSCOPY  10/2016     Pt admitted to the hospital  with AMS   Pt recently dc s/p lumbar fusion     Orders received, chart reviewed, eval complete.     Precautions:  Fall Risk, cognition, LSO, spinal precautions, maintain SpO2 saturations     Assessment of current deficits    [x] Functional mobility   [x]ADLs  [x] Strength               []Cognition   [x] Functional 
OCCUPATIONAL THERAPY TREATMENT SESSION  [facility] [address] [place], [place]       Date:12/3/2024                                                  Patient Name: Mignon Fischer  MRN: [id number]  : 1955  Room: [address] [place] [address]    Evaluating OT: Martin Gupta OTR/L [id number]    Referring Provider: Davidson Rivera MD      Specific Provider Orders/Date: OT evaluation and treatment 24 1045    Diagnosis:  Anemia, unspecified type [D64.9]  Acute hypoxic respiratory failure [J96.01]      Pertinent Medical History:  has a past medical history of Brain concussion, Diabetes mellitus (HCC), Double vision with both eyes open, Dyspnea on exertion, Hx of blood clots, Hyperlipemia, Hypertension, Knee pain, Lymphedema, Pancreatitis, and Vitreous floaters of both eyes.   Past Surgical History:   Procedure Laterality Date    APPENDECTOMY      CERVICAL FUSION Right 2024    C-4 -C5, C5-C6, and C6- C7 ANTERIOR CERVICAL DISCECTOMY AND  FUSION performed by Kalyani Murphy MD at [facility] [place]    CHOLECYSTECTOMY      COLONOSCOPY  2016    EYE SURGERY Left     Cataract    EYE SURGERY Right     Cataract    GALLBLADDER SURGERY      KNEE ARTHROSCOPY Right     LUMBAR SPINE SURGERY N/A 2024    Posterior lumbar two to sacral one laminectomy and fusion performed by Kalyani Murphy MD at [facility] [place]    TOTAL KNEE ARTHROPLASTY Right 2024    ROBOTIC LISA ASSISTED RIGHT KNEE TOTAL ARTHROPLASTY   +++ELOISA+++  ++PNB++ performed by Alberto Campbell MD at [facility] OR    UPPER GASTROINTESTINAL ENDOSCOPY  10/2016     Pt admitted to the hospital  with AMS   Pt recently dc s/p lumbar fusion     Orders received, chart reviewed, eval complete.     Precautions:  Fall Risk, cognition, LSO, spinal precautions, maintain SpO2 saturations     Assessment of current deficits    [x] Functional mobility   [x]ADLs  [x] Strength               []Cognition   [x] Functional 
OCCUPATIONAL THERAPY TREATMENT SESSION  [facility] [address] [place], [place]       Date:2024                                                  Patient Name: Mignon Fischer  MRN: [id number]  : 1955  Room: [address] [place] [address]    Evaluating OT: Martin Gupta OTR/L [id number]    Referring Provider: Davidson Rivera MD      Specific Provider Orders/Date: OT evaluation and treatment 24 1045    Diagnosis:  Anemia, unspecified type [D64.9]  Acute hypoxic respiratory failure [J96.01]      Pertinent Medical History:  has a past medical history of Brain concussion, Diabetes mellitus (HCC), Double vision with both eyes open, Dyspnea on exertion, Hx of blood clots, Hyperlipemia, Hypertension, Knee pain, Lymphedema, Pancreatitis, and Vitreous floaters of both eyes.   Past Surgical History:   Procedure Laterality Date    APPENDECTOMY      CERVICAL FUSION Right 2024    C-4 -C5, C5-C6, and C6- C7 ANTERIOR CERVICAL DISCECTOMY AND  FUSION performed by Kalyani Murphy MD at [facility] [place]    CHOLECYSTECTOMY      COLONOSCOPY  2016    EYE SURGERY Left     Cataract    EYE SURGERY Right     Cataract    GALLBLADDER SURGERY      KNEE ARTHROSCOPY Right     LUMBAR SPINE SURGERY N/A 2024    Posterior lumbar two to sacral one laminectomy and fusion performed by Kalyani Murphy MD at [facility] [place]    TOTAL KNEE ARTHROPLASTY Right 2024    ROBOTIC LISA ASSISTED RIGHT KNEE TOTAL ARTHROPLASTY   +++ELOISA+++  ++PNB++ performed by Alberto Campbell MD at [facility] OR    UPPER GASTROINTESTINAL ENDOSCOPY  10/2016     Pt admitted to the hospital  with AMS   Pt recently dc s/p lumbar fusion     Orders received, chart reviewed, eval complete.     Precautions:  Fall Risk, cognition, LSO, spinal precautions, maintain SpO2 saturations     Assessment of current deficits    [x] Functional mobility   [x]ADLs  [x] Strength               []Cognition   [x] Functional 
OCCUPATIONAL THERAPY TREATMENT SESSION  [facility] [address] [place], [place]       Date:2024                                                  Patient Name: Mignon Fischer  MRN: [id number]  : 1955  Room: [address] [place] [address]    Evaluating OT: Martin Gupta OTR/L [id number]    Referring Provider: Davidson Rivera MD      Specific Provider Orders/Date: OT evaluation and treatment 24 1045    Diagnosis:  Anemia, unspecified type [D64.9]  Acute hypoxic respiratory failure [J96.01]      Pertinent Medical History:  has a past medical history of Brain concussion, Diabetes mellitus (HCC), Double vision with both eyes open, Dyspnea on exertion, Hx of blood clots, Hyperlipemia, Hypertension, Knee pain, Lymphedema, Pancreatitis, and Vitreous floaters of both eyes.   Past Surgical History:   Procedure Laterality Date    APPENDECTOMY      CERVICAL FUSION Right 2024    C-4 -C5, C5-C6, and C6- C7 ANTERIOR CERVICAL DISCECTOMY AND  FUSION performed by Kalyani Murphy MD at [facility] [place]    CHOLECYSTECTOMY      COLONOSCOPY  2016    EYE SURGERY Left     Cataract    EYE SURGERY Right     Cataract    GALLBLADDER SURGERY      KNEE ARTHROSCOPY Right     LUMBAR SPINE SURGERY N/A 2024    Posterior lumbar two to sacral one laminectomy and fusion performed by Kalyani Murphy MD at [facility] [place]    TOTAL KNEE ARTHROPLASTY Right 2024    ROBOTIC LISA ASSISTED RIGHT KNEE TOTAL ARTHROPLASTY   +++ELOISA+++  ++PNB++ performed by Alberto Campbell MD at [facility] OR    UPPER GASTROINTESTINAL ENDOSCOPY  10/2016     Pt admitted to the hospital  with AMS   Pt recently dc s/p lumbar fusion     Orders received, chart reviewed, eval complete.     Precautions:  Fall Risk, cognition, LSO, spinal precautions, maintain SpO2 saturations     Assessment of current deficits    [x] Functional mobility   [x]ADLs  [x] Strength               []Cognition   [x] Functional 
PRN given        11/27/24 2013   Treatment   Treatment Type HHN   $Treatment Type $Inhaled Therapy/Meds   Medications Albuterol   Pre-Tx Resps 15   Breath Sounds Pre-Tx ESDRAS Clear;Diminished   Breath Sounds Pre-Tx RUL Clear;Diminished   Breath Sounds Post-Tx ESDRAS Clear;Diminished   Breath Sounds Post-Tx RUL Clear;Diminished   Post-Tx Resps 15   Delivery Source Oxygen;Mask   Position Semi-Myers's   Treatment Tolerance Well   Oxygen Therapy/Pulse Ox   O2 Device Nasal cannula   O2 Flow Rate (L/min) 1 L/min  (found on 1L)       
Patient appears to be sleeping soundly- mouth breathing and snoring loudly. SPO2 is 95% on RA. Some difficulty arouses her but she did respond. Will continue to monitor.  
Patient currently on cpap. Machine checked and alarms on.  
Patient educated on SMI, verbalizes understanding.   
Patient educated on q2hr turn schedule. Patient refused and RN is aware.  
Patient placed back in bed maximum assistance.   
Patient placed in chair   
Patient's family wanted to know if pain medication could be lowered in dosage amount. Informed the RN and she is going to message the doctor on call.   
Physical Therapy  Initial Assessment     Name: Mignon Fischer  : 1955  MRN: [id number]      Date of Service: 2024    Evaluating PT: Adrian Ngo, PT, DPT [id number]      Room #:  8422/8422-A  Diagnosis:  Anemia, unspecified type [D64.9]  Acute hypoxic respiratory failure [J96.01]  PMHx/PSHx:   has a past medical history of Brain concussion, Diabetes mellitus (HCC), Double vision with both eyes open, Dyspnea on exertion, Hx of blood clots, Hyperlipemia, Hypertension, Knee pain, Lymphedema, Pancreatitis, and Vitreous floaters of both eyes.  Pertinent Information:  L2-S1 laminectomy and fusion    Precautions:  Fall risk, Spinal neutrality, LSO, O2, Phoenix, TAPS, Alarm    SUBJECTIVE:    Pt is a poor historian. Per chart, pt was admitted from [facility].    OBJECTIVE:   Initial Evaluation  Date: 24 Treatment Date: Short Term/ Long Term   Goals   AM-PAC 6 Clicks      Was pt agreeable to Eval/treatment? Yes     Does pt have pain? Severe back pain with movement     Bed Mobility  Rolling: MaxA  Supine to sit: NT due to pain  Sit to supine: NT  Scooting: Dependent x2 to HOB  Rolling: Ramiro  Supine to sit: ModA  Sit to supine: ModA  Scooting: ModA   Transfers Sit to stand: NT  Stand to sit: NT  Stand pivot: NT  Sit to stand: MaxA  Stand to sit: MaxA  Stand pivot: MaxA   Ambulation   NT  >5 feet with WW with MaxA   Stair negotiation: ascended and descended NT  NA   ROM BUE: Refer to OT note  BLE: WFL     Strength BUE: Refer to OT note  BLE: NT     Balance Sitting EOB: NT  Dynamic Standing: NT  Sitting EOB: Ramiro  Dynamic Standing: MaxA with WW     Pt is A & O x: Not formally assessed. Pt is confused.  Sensation: Pt denies numbness and tingling of extremities.   Edema: Unremarkable    Patient education  Pt educated on PT role in acute care setting.    Patient response to education:   Pt verbalized understanding Pt demonstrated skill Pt requires further education in this area   Yes NA No 
Physical Therapy  Physical Therapy Missed Visit    Name: Mignon Fischer  : 1955  MRN: [id number]  Room #: 8422/8422-A    Date of Service: 2024    Attempted PT evaluation. Pt with low Hgb this AM (6.6). Transfusion scheduled. Will attempt again when pt is more appropriate to participate.    Ulisses Boyle, PT, DPT  [id number]      
Physical Therapy  Treatment Note    Name: Mignon Fischer  : 1955  MRN: [id number]      Date of Service: 12/3/2024    Evaluating PT: Adrian Ngo, PT, DPT [id number]      Room #:  8422/8422-A  Diagnosis:  Anemia, unspecified type [D64.9]  Acute hypoxic respiratory failure [J96.01]  PMHx/PSHx:   has a past medical history of Brain concussion, Diabetes mellitus (HCC), Double vision with both eyes open, Dyspnea on exertion, Hx of blood clots, Hyperlipemia, Hypertension, Knee pain, Lymphedema, Pancreatitis, and Vitreous floaters of both eyes.  Pertinent Information:  L2-S1 laminectomy and fusion    Precautions:  Fall risk, Spinal neutrality, LSO, O2, Phoenix , Spinal stimulator    SUBJECTIVE:    Pt admitted from [facility]. Pt reports was ambulatory PTA    OBJECTIVE:   Initial Evaluation  Date: 24 Treatment Date: 12/3/24 Short Term/ Long Term   Goals   AM-PAC 6 Clicks 7/24 10/24    Was pt agreeable to Eval/treatment? Yes Yes    Does pt have pain? Severe back pain with movement Mild back pain     Bed Mobility  Rolling: MaxA  Supine to sit: NT due to pain  Sit to supine: NT  Scooting: Dependent x2 to HOB Rolling: MaxA  Supine to sit: NT  Sit to supine: MaxA of 2  Scooting: ModA Rolling: Ramiro  Supine to sit: ModA  Sit to supine: ModA  Scooting: ModA   Transfers Sit to stand: NT  Stand to sit: NT  Stand pivot: NT Sit to stand: mod A of 2  Stand to sit: mod A of 2  Stand pivot: MaxA of 2 Sit to stand: MaxA  Stand to sit: MaxA  Stand pivot: MaxA   Ambulation   NT NT >5 feet with WW with MaxA   Stair negotiation: ascended and descended NT NT NA   ROM BUE: Refer to OT note  BLE: WFL NT    Strength BUE: Refer to OT note  BLE: NT NT    Balance Sitting EOB: NT  Dynamic Standing: NT Sitting EOB: SBA  Dynamic Standing: MaxA x 2 Sitting EOB: Ramiro  Dynamic Standing: MaxA with WW         Patient education  Pt educated on log roll technique/spinal neutrality.    Patient response to education:   Pt verbalized understanding Pt 
Physical Therapy  Treatment Note    Name: Mignon Fischer  : 1955  MRN: [id number]      Date of Service: 2024    Evaluating PT: Adrian Ngo, PT, DPT [id number]      Room #:  8422/8422-A  Diagnosis:  Anemia, unspecified type [D64.9]  Acute hypoxic respiratory failure [J96.01]  PMHx/PSHx:   has a past medical history of Brain concussion, Diabetes mellitus (HCC), Double vision with both eyes open, Dyspnea on exertion, Hx of blood clots, Hyperlipemia, Hypertension, Knee pain, Lymphedema, Pancreatitis, and Vitreous floaters of both eyes.  Pertinent Information:  L2-S1 laminectomy and fusion    Precautions:  Fall risk, Spinal neutrality, LSO, O2, Phoenix , Spinal stimulator    SUBJECTIVE:    Pt admitted from [facility]. Pt reports was ambulatory PTA    OBJECTIVE:   Initial Evaluation  Date: 24 Treatment Date: 24 Short Term/ Long Term   Goals   AM-PAC 6 Clicks 7/24 10/24    Was pt agreeable to Eval/treatment? Yes Yes    Does pt have pain? Severe back pain with movement Moderate back pain    Bed Mobility  Rolling: MaxA  Supine to sit: NT due to pain  Sit to supine: NT  Scooting: Dependent x2 to HOB Rolling: MaxA  Supine to sit: MaxA   Sit to supine: NT  Scooting: ModA Rolling: Ramiro  Supine to sit: ModA  Sit to supine: ModA  Scooting: ModA   Transfers Sit to stand: NT  Stand to sit: NT  Stand pivot: NT Sit to stand: ModA x 2 (2x reps)  Stand to sit: ModA x 2  Stand pivot: MaxA x 2 Sit to stand: MaxA  Stand to sit: MaxA  Stand pivot: MaxA   Ambulation   NT 2' MaxA x 2 >5 feet with WW with MaxA   Stair negotiation: ascended and descended NT NT NA   ROM BUE: Refer to OT note  BLE: WFL NT    Strength BUE: Refer to OT note  BLE: NT NT    Balance Sitting EOB: NT  Dynamic Standing: NT Sitting EOB: SBA  Dynamic Standing: MaxA x 2 Sitting EOB: Ramiro  Dynamic Standing: MaxA with WW     Pt is A & O x: 4 to person, place, month/year, and situation.   Sensation: Pt denies numbness and tingling of extremities. 
Physical Therapy  Treatment Note    Name: Mignon Fischer  : 1955  MRN: [id number]      Date of Service: 2024    Evaluating PT: Adrian Ngo, PT, DPT [id number]      Room #:  8422/8422-A  Diagnosis:  Anemia, unspecified type [D64.9]  Acute hypoxic respiratory failure [J96.01]  PMHx/PSHx:   has a past medical history of Brain concussion, Diabetes mellitus (HCC), Double vision with both eyes open, Dyspnea on exertion, Hx of blood clots, Hyperlipemia, Hypertension, Knee pain, Lymphedema, Pancreatitis, and Vitreous floaters of both eyes.  Pertinent Information:  L2-S1 laminectomy and fusion    Precautions:  Fall risk, Spinal neutrality, LSO, O2, Phoenix, TAPS, Alarm, Spinal stimulator    SUBJECTIVE:    Pt is a poor historian. Per chart, pt was admitted from [facility].    OBJECTIVE:   Initial Evaluation  Date: 24 Treatment Date: 24 Short Term/ Long Term   Goals   AM-PAC 6 Clicks     Was pt agreeable to Eval/treatment? Yes Yes    Does pt have pain? Severe back pain with movement Back and L LE pain with movement    Bed Mobility  Rolling: MaxA  Supine to sit: NT due to pain  Sit to supine: NT  Scooting: Dependent x2 to HOB Rolling: MaxA  Supine to sit: MaxA x2  Sit to supine: MaxA x2  Scooting: Dependent x2 to HOB Rolling: Ramiro  Supine to sit: ModA  Sit to supine: ModA  Scooting: ModA   Transfers Sit to stand: NT  Stand to sit: NT  Stand pivot: NT Sit to stand: NT  Stand to sit: NT  Stand pivot: NT Sit to stand: MaxA  Stand to sit: MaxA  Stand pivot: MaxA   Ambulation   NT NT >5 feet with WW with MaxA   Stair negotiation: ascended and descended NT NT NA   ROM BUE: Refer to OT note  BLE: WFL NT    Strength BUE: Refer to OT note  BLE: NT NT    Balance Sitting EOB: NT  Dynamic Standing: NT Sitting EOB: MaxA  Dynamic Standing: NT Sitting EOB: Ramiro  Dynamic Standing: MaxA with WW     Pt is A & O x: 4 to person, place, month/year, and situation.   Sensation: Pt denies numbness and tingling of 
Physical Therapy  Treatment Note    Name: Mignon Fischer  : 1955  MRN: [id number]      Date of Service: 2024    Evaluating PT: Adrian Ngo, PT, DPT [id number]      Room #:  8422/8422-A  Diagnosis:  Anemia, unspecified type [D64.9]  Acute hypoxic respiratory failure [J96.01]  PMHx/PSHx:   has a past medical history of Brain concussion, Diabetes mellitus (HCC), Double vision with both eyes open, Dyspnea on exertion, Hx of blood clots, Hyperlipemia, Hypertension, Knee pain, Lymphedema, Pancreatitis, and Vitreous floaters of both eyes.  Pertinent Information:  L2-S1 laminectomy and fusion    Precautions:  Fall risk, Spinal neutrality, LSO, O2, [place] , Spinal stimulator    SUBJECTIVE:    Pt admitted from [facility]. Pt reports was ambulatory PTA    OBJECTIVE:   Initial Evaluation  Date: 24 Treatment Date: 24 Short Term/ Long Term   Goals   AM-PAC 6 Clicks 7/24 10/24    Was pt agreeable to Eval/treatment? Yes Yes    Does pt have pain? Severe back pain with movement Moderate back pain    Bed Mobility  Rolling: MaxA  Supine to sit: NT due to pain  Sit to supine: NT  Scooting: Dependent x2 to HOB Rolling: MaxA  Supine to sit: NT  Sit to supine: MaxA x 2  Scooting: ModA Rolling: Ramiro  Supine to sit: ModA  Sit to supine: ModA  Scooting: ModA   Transfers Sit to stand: NT  Stand to sit: NT  Stand pivot: NT Sit to stand: MaxA x 2  Stand to sit: MaxA x 2  Stand pivot: MaxA x 2 Sit to stand: MaxA  Stand to sit: MaxA  Stand pivot: MaxA   Ambulation   NT 2' MaxA x 2 >5 feet with WW with MaxA   Stair negotiation: ascended and descended NT NT NA   ROM BUE: Refer to OT note  BLE: WFL NT    Strength BUE: Refer to OT note  BLE: NT NT    Balance Sitting EOB: NT  Dynamic Standing: NT Sitting EOB: SBA  Dynamic Standing: MaxA x 2 Sitting EOB: Ramiro  Dynamic Standing: MaxA with WW     Pt is A & O x: 4 to person, place, month/year, and situation.   Sensation: Pt denies numbness and tingling of extremities.   Edema: 
Physician aware via [employer], this RN held losartan dose per later vitals of the pt.  Response: \"ok to hold\"  
Pts brother Meliton Garcia, is her POA and was not told about transfer to [facility] at last visit, With this stay he would like SW to contact him with any and all discharges or changes in pts plan.   
Spoke to JENN Bonilla pending for [facility].  
The pt is refusing to wear the bipap.  
Went in to give patient enema. Patient had already started to have BM. Bedpan placed under patient. Patient finished having large BM  
10 mL, IntraVENous, PRN  0.9 % sodium chloride infusion, , IntraVENous, PRN  ondansetron (ZOFRAN-ODT) disintegrating tablet 4 mg, 4 mg, Oral, Q8H PRN **OR** ondansetron (ZOFRAN) injection 4 mg, 4 mg, IntraVENous, Q6H PRN  senna (SENOKOT) tablet 8.6 mg, 1 tablet, Oral, Daily PRN  acetaminophen (TYLENOL) tablet 650 mg, 650 mg, Oral, Q6H PRN **OR** acetaminophen (TYLENOL) suppository 650 mg, 650 mg, Rectal, Q6H PRN  bisacodyl (DULCOLAX) EC tablet 5 mg, 5 mg, Oral, Daily  calcium elemental (OSCAL) tablet 500 mg, 500 mg, Oral, QAM  divalproex (DEPAKOTE SPRINKLE) DR capsule 125 mg, 125 mg, Oral, 2 times per day  losartan (COZAAR) tablet 25 mg, 25 mg, Oral, Daily  metoprolol succinate (TOPROL XL) extended release tablet 25 mg, 25 mg, Oral, Nightly  rosuvastatin (CRESTOR) tablet 20 mg, 20 mg, Oral, Daily  albuterol (PROVENTIL) (2.5 MG/3ML) 0.083% nebulizer solution 2.5 mg, 2.5 mg, Nebulization, Q4H PRN  pantoprazole (PROTONIX) 40 mg in sodium chloride (PF) 0.9 % 10 mL injection, 40 mg, IntraVENous, BID  sucralfate (CARAFATE) tablet 1 g, 1 g, Oral, 4x Daily AC & HS    ASSESSMENT AND PLAN:    S/p lumbar fusion.  Pain is better today.  H and H has improved.  Will follow    Kalyani Murphy MD        
patch  1 patch TransDERmal Daily Kalyani Murphy MD   1 patch at 12/01/24 0841    sodium chloride flush 0.9 % injection 10 mL  10 mL IntraVENous PRN Tracy Manuel MD   10 mL at 11/22/24 1503    pantoprazole (PROTONIX) injection 40 mg  40 mg IntraVENous Once Bruce Kramer MD        sodium chloride flush 0.9 % injection 10 mL  10 mL IntraVENous 2 times per day Toño Berumen MD   10 mL at 12/02/24 2117    sodium chloride flush 0.9 % injection 10 mL  10 mL IntraVENous PRN Toño Berumen MD   10 mL at 11/23/24 1625    0.9 % sodium chloride infusion   IntraVENous PRN Toño Berumen MD        ondansetron (ZOFRAN-ODT) disintegrating tablet 4 mg  4 mg Oral Q8H PRN Toño Berumen MD   4 mg at 11/26/24 0320    Or    ondansetron (ZOFRAN) injection 4 mg  4 mg IntraVENous Q6H PRN Toño Berumen MD        acetaminophen (TYLENOL) tablet 650 mg  650 mg Oral Q6H PRN Toño Berumen MD   650 mg at 12/02/24 1641    Or    acetaminophen (TYLENOL) suppository 650 mg  650 mg Rectal Q6H PRN Tooñ Berumen MD        calcium elemental (OSCAL) tablet 500 mg  500 mg Oral QAM Toño Berumen MD   500 mg at 12/02/24 0900    [Held by provider] divalproex (DEPAKOTE SPRINKLE) DR capsule 125 mg  125 mg Oral 2 times per day Toño Berumen MD   125 mg at 12/02/24 0859    losartan (COZAAR) tablet 25 mg  25 mg Oral Daily Toño Berumen MD   25 mg at 12/01/24 0843    metoprolol succinate (TOPROL XL) extended release tablet 25 mg  25 mg Oral Nightly Toño Berumen MD   25 mg at 12/02/24 2117    rosuvastatin (CRESTOR) tablet 20 mg  20 mg Oral Daily Toño Berumen MD   20 mg at 12/02/24 0900    albuterol (PROVENTIL) (2.5 MG/3ML) 0.083% nebulizer solution 2.5 mg  2.5 mg Nebulization Q4H PRN Toño Berumen MD   2.5 mg at 11/27/24 2013    sucralfate (CARAFATE) tablet 1 g  1 g Oral 4x Daily AC & HS Hilda Gillette MD   1 g at 12/03/24 0608       PRN Medications  baclofen, diazePAM, oxyCODONE, sodium chloride flush, sodium chloride flush, sodium chloride, 
transfers   [x] IADLs         [x] Safety Awareness   [x]Endurance   [] Fine Motor Coordination [x] Balance [] Vision/perception   []Sensation    [] Gross Motor Coordination [] ROM  [] Delirium                  [] Motor Control     OT PLAN OF CARE   OT POC based on physician orders, patient diagnosis and results of clinical assessment    Frequency/Duration 1-3 days/wk for 2 weeks PRN   Specific OT Treatment to include:   * Instruction/training on adapted ADL techniques and AE recommendations to increase functional independence within precautions       * Training on energy conservation strategies, correct breathing pattern and techniques to improve independence/tolerance for self-care routine  * Functional transfer/mobility training/DME recommendations for increased independence, safety, and fall prevention  * Patient/Family education to increase follow through with safety techniques and functional independence  * Recommendation of environmental modifications for increased safety with functional transfers/mobility and ADLs  * Cognitive retraining/development of therapeutic activities to improve problem solving, judgement, memory, and attention for increased safety/participation in ADL/IADL tasks  * Therapeutic exercise to improve motor endurance, ROM, and functional strength for ADLs/functional transfers  * Therapeutic activities to facilitate/challenge dynamic balance, stand tolerance for increased safety and independence with ADLs  * Positioning to improve skin integrity, interaction with environment and functional independence      Recommended Adaptive Equipment: TBD      Pt is a poor historian, unable to answer PLOF questions this date. Info below obtained from chart:   Home Living/PLOF : pt admitted from [facility]. Pt had assist with ADLs and functional mobility.     Pleasant and cooperative throughout session      Pain Level: Pt reported back and LLE pain with activity (did not quantify); pt was medicated prior to 
transfers   [x] IADLs         [x] Safety Awareness   [x]Endurance   [] Fine Motor Coordination [x] Balance [] Vision/perception   []Sensation    [] Gross Motor Coordination [] ROM  [] Delirium                  [] Motor Control     OT PLAN OF CARE   OT POC based on physician orders, patient diagnosis and results of clinical assessment    Frequency/Duration 1-3 days/wk for 2 weeks PRN   Specific OT Treatment to include:   * Instruction/training on adapted ADL techniques and AE recommendations to increase functional independence within precautions       * Training on energy conservation strategies, correct breathing pattern and techniques to improve independence/tolerance for self-care routine  * Functional transfer/mobility training/DME recommendations for increased independence, safety, and fall prevention  * Patient/Family education to increase follow through with safety techniques and functional independence  * Recommendation of environmental modifications for increased safety with functional transfers/mobility and ADLs  * Cognitive retraining/development of therapeutic activities to improve problem solving, judgement, memory, and attention for increased safety/participation in ADL/IADL tasks  * Therapeutic exercise to improve motor endurance, ROM, and functional strength for ADLs/functional transfers  * Therapeutic activities to facilitate/challenge dynamic balance, stand tolerance for increased safety and independence with ADLs  * Positioning to improve skin integrity, interaction with environment and functional independence      Recommended Adaptive Equipment: TBD      Pt is a poor historian, unable to answer PLOF questions this date. Info below obtained from chart:   Home Living/PLOF : pt admitted from [facility]. Pt had assist with ADLs and functional mobility.     Pleasant and cooperative throughout session      Pain Level: Pt reported back and LLE pain with activity (did not quantify); pt was medicated prior to 
training 90992 0 minutes  [] Manual therapy 50921 0 minutes  [x] Therapeutic activities 33081 30 minutes  [] Therapeutic exercises 87652 0 25 minutes  [] Neuromuscular reeducation 49048 0 minutes      Beth Lombardo [id number]    
        XR CHEST PORTABLE   Final Result   No acute process.         CT HEAD WO CONTRAST   Final Result   No acute intracranial abnormality.         CT LUMBAR SPINE W CONTRAST   Final Result   1. Posterior fusion and laminectomy from L2 through S1. No complicating   process is identified.  The contents of the spinal canal are limited in   evaluation.   2. Moderate bilateral foraminal stenosis at L5-S1.         CTA PULMONARY W CONTRAST   Final Result   1. No evidence of pulmonary embolism or acute cardiopulmonary process.   2. Subsegmental atelectasis in the lung bases.         XR CHEST PORTABLE   Final Result   1. Mild cardiomegaly.   2. No acute cardiopulmonary disease.             Assessment   Active Hospital Problems    Diagnosis     Acute hypoxic respiratory failure [J96.01]          Plan  Altered mental status likely 2/2 multifactorial; hypoxia, anemia, pain medications, much improved  Decreased dose of Oxycodone to 2.5 mg as needed   Decrease Baclofen and Valium doses and frequency   Stop Gabapentin and start lyrica nightly   Stop depakote this was prescribed 6 months ago at [place] for sundowning we will stop this now   Melatonin nightly     Chronic Anemia  Stable     Hypoxia  CTA pulm negative for PE or other abnormality   IS taught to patient and continue to encourage 10 times per hour while in patient   Procal 0.06, BNP 71, CXR clear     Diffuse colonic fecal retention with mild abdominal pain   KUB x2 reviewed and improving   Aggressive bowel regimen   Enema as needed     Thiamine deficiency   Continue replacement     Recent history of lumbar decompression with fusion on 11/18/2024  NSG signed off     Anxiety   Start remeron nightly   Prn hydroxyzine at the request of patient and brother   Advised strongly that if she becomes confused somnolent or lethargic this will be promptly stopped     HTN  Continue home Cozaar 25 mg daily    Discharge plan: DC once precert obtained remains cleared    Electronically 
  Diffuse colonic fecal retention with significant stool burden.         XR CHEST PORTABLE   Final Result   No acute process.         CT HEAD WO CONTRAST   Final Result   No acute intracranial abnormality.         CT LUMBAR SPINE W CONTRAST   Final Result   1. Posterior fusion and laminectomy from L2 through S1. No complicating   process is identified.  The contents of the spinal canal are limited in   evaluation.   2. Moderate bilateral foraminal stenosis at L5-S1.         CTA PULMONARY W CONTRAST   Final Result   1. No evidence of pulmonary embolism or acute cardiopulmonary process.   2. Subsegmental atelectasis in the lung bases.         XR CHEST PORTABLE   Final Result   1. Mild cardiomegaly.   2. No acute cardiopulmonary disease.             Assessment   Active Hospital Problems    Diagnosis     Acute hypoxic respiratory failure [J96.01]    Anemia  LBP      Plan  Altered mental status likely 2/2 multifactorial; hypoxia, anemia, pain medications, ?LIBRA-much improved  Cont Oxycodone at  2.5 mg as needed   Cont  Baclofen and Valium decreased doses and frequency   Off Gabapentin and on lyrica nightly   Off depakote- this was prescribed 6 months ago at [place] for sundowning we will stop this now   Melatonin nightly   Consider OP sleep study    Chronic Anemia  Stable, transfuse if Hb<7.0     Hypoxia  CTA pulm negative for PE or other abnormality   IS taught to patient and continue to encourage 10 times per hour while in-patient   Procal 0.06, BNP 71, CXR clear     Diffuse colonic fecal retention with mild abdominal pain   KUB x2 reviewed and improving   Aggressive bowel regimen   Enema as needed     Thiamine deficiency   Continue replacement     Recent history of lumbar decompression with fusion on 11/18/2024  May ask NSG to see again if LBP persists    Anxiety   Off remeron due to somnolence 12/6  Prn hydroxyzine at the request of patient and brother, if somnolence persists then stop this as well 
  process is identified.  The contents of the spinal canal are limited in   evaluation.   2. Moderate bilateral foraminal stenosis at L5-S1.         CTA PULMONARY W CONTRAST   Final Result   1. No evidence of pulmonary embolism or acute cardiopulmonary process.   2. Subsegmental atelectasis in the lung bases.         XR CHEST PORTABLE   Final Result   1. Mild cardiomegaly.   2. No acute cardiopulmonary disease.             Assessment   Active Hospital Problems    Diagnosis     Acute hypoxic respiratory failure [J96.01]          Plan  Altered mental status likely 2/2 multifactorial; hypoxia, anemia, pain medications, much improved  Neurology consulted appreciate recs  Plan to hold gabapentin and Phenergan  Change baclofen to as needed with considerations to hold  Neurosurgery consulted appreciate recs  Planning to coordinate pain medications  Continue to monitor patient's mentation.  Has significantly improved from yesterday per brother.  Continue to monitor    Normocytic Hypochromic Anemia  Hgb: 7.2 > 7.5 > 7.2 >7.8  Iron panel 11/22:   Ferritin: 231  Iron: 20 L  TIBC: 208 L  IronSat: 10 L  Concerning of anemia of chronic disease  B12: 1026 - Started on supplementation  Folate 4.7 - started on supplementation  FOBT ordered  Asymptomatic at this time  Continue to monitor      Recent history of lumbar decompression with fusion on 11/18/2024.  History of HTN-continue home Cozaar 25 mg daily      PT AM-PAC--   Consults neurosurgery, neurology  DVT prophylaxis PCD's  Code status full  Medications, labs and imaging reviewed   Discharge plan: Precert to [facility] pending, once precert approved okay for DC    Electronically signed by Benny Moise MD on 11/30/2024 at 7:49 AM    I can be reached through [employer].    
bases.         XR CHEST PORTABLE   Final Result   1. Mild cardiomegaly.   2. No acute cardiopulmonary disease.             Assessment   Active Hospital Problems    Diagnosis     Acute hypoxic respiratory failure [J96.01]          Plan  Altered mental status likely 2/2 multifactorial; hypoxia, anemia, pain medications  Neurology consulted appreciate recs  Plan to hold gabapentin and Phenergan  Change baclofen to as needed with considerations to hold  Neurosurgery consulted appreciate recs  Planning to coordinate pain medications  Continue to monitor patient's mentation.  Has significantly improved from yesterday per brother.  Continue to monitor    Recent history of lumbar decompression with fusion on 11/18/2024.  History of HTN-continue home Cozaar 25 mg daily      PT AM-PAC--   Consults neurosurgery, neurology  DVT prophylaxis PCD's  Code status full  Medications, labs and imaging reviewed   Discharge plan: Planned for today, precert did not go through    Electronically signed by Benny Moise MD on 11/26/2024 at 8:08 PM    I can be reached through [employer].    
significant stool burden.         XR CHEST PORTABLE   Final Result   No acute process.         CT HEAD WO CONTRAST   Final Result   No acute intracranial abnormality.         CT LUMBAR SPINE W CONTRAST   Final Result   1. Posterior fusion and laminectomy from L2 through S1. No complicating   process is identified.  The contents of the spinal canal are limited in   evaluation.   2. Moderate bilateral foraminal stenosis at L5-S1.         CTA PULMONARY W CONTRAST   Final Result   1. No evidence of pulmonary embolism or acute cardiopulmonary process.   2. Subsegmental atelectasis in the lung bases.         XR CHEST PORTABLE   Final Result   1. Mild cardiomegaly.   2. No acute cardiopulmonary disease.             Assessment   Active Hospital Problems    Diagnosis     Acute hypoxic respiratory failure [J96.01]    Anemia  LBP      Plan  Altered mental status likely 2/2 multifactorial; hypoxia, anemia, pain medications, much improved  Cont Oxycodone at  2.5 mg as needed   Cont  Baclofen and Valium decreased doses and frequency   Off Gabapentin and on lyrica nightly   Off depakote- this was prescribed 6 months ago at [place] for sundowning we will stop this now   Melatonin nightly     Chronic Anemia  Stable, transfuse if Hb<7.0     Hypoxia  CTA pulm negative for PE or other abnormality   IS taught to patient and continue to encourage 10 times per hour while in-patient   Procal 0.06, BNP 71, CXR clear     Diffuse colonic fecal retention with mild abdominal pain   KUB x2 reviewed and improving   Aggressive bowel regimen   Enema as needed     Thiamine deficiency   Continue replacement     Recent history of lumbar decompression with fusion on 11/18/2024  May ask NSG to see again if LBP persists    Anxiety   Stop remeron due to somnolence 12/6  Prn hydroxyzine at the request of patient and brother, if somnolence persists then stop this as well ...    HTN  Continue home Cozaar 25 mg daily    Discharge plan: DC once precert 
ordered encourage PO intake     Discharge plan: DC once precert obtained     Electronically signed by Toño Berumen MD on 12/4/2024 at 11:58 AM    I can be reached through [employer].

## 2024-12-16 ENCOUNTER — OFFICE VISIT (OUTPATIENT)
Dept: NEUROSURGERY | Age: 69
End: 2024-12-16

## 2024-12-16 DIAGNOSIS — Z98.1 S/P LUMBAR SPINAL FUSION: ICD-10-CM

## 2024-12-16 DIAGNOSIS — M48.062 SPINAL STENOSIS OF LUMBAR REGION WITH NEUROGENIC CLAUDICATION: Primary | ICD-10-CM

## 2024-12-16 PROCEDURE — 99024 POSTOP FOLLOW-UP VISIT: CPT | Performed by: PHYSICIAN ASSISTANT

## 2024-12-16 NOTE — PROGRESS NOTES
Post Operative Follow-up     This is a 69 year old female who presents to the office for a 1 month follow-up s/p L2-S1 fusion     Subjective: Patient presents with family from [facility] for a 1 month follow up. States she is slowly progressing and getting stronger. Admits to weakness in her right foot and feels she drags it while walking. Back pain improving with muscle relaxers. No issues with incision site. LSO compliance. Lumbar XR reviewed form an outside facility     Physical Exam:              WDWN, no apparent distress   Presents in a wheelchair              Non-labored breathing               Vitals Stable              Alert and oriented x3              CN 3-12 intact              PERRL              EOMI              DF on right 3/5              Sensation to LT intact bilaterally                  Imaging: Lumbar XR 12/13/24 from [facility]:   FINDINGS : Lumbar: AP and lateral views of the lumbar spine. No prior studies. No acute lumbar fracture or subluxation by plain radiography. Mineralization is decreased. Lumbar vertebral body heights grossly preserved. Mild multilevel degenerative disc disease and facet arthropathy. SI joints without asymmetry. Hardware L2-S1.     Assessment: This is a 69 y.o.  female presenting for a 1 month follow-up s/p L2-S1 fusion. Stable.      Plan:  -Pain control and expectations discussed  -Continue brace and restrictions   -Right AFO ordered  -OARRS report reviewed   -Follow-up in neurosurgery clinic in 2 months for 3 month follow up with repeat lumbar XR  -Call or return to neurosurgery office sooner if symptoms worsen or if new issues arise in the interim.    Electronically signed by Mary Jo Ramsey PA-C on 12/16/2024 at 10:59 AM

## 2024-12-18 PROBLEM — Z01.812 PRE-OPERATIVE LABORATORY EXAMINATION: Status: RESOLVED | Noted: 2024-11-18 | Resolved: 2024-12-18

## 2025-01-10 ENCOUNTER — TELEPHONE (OUTPATIENT)
Dept: NEUROSURGERY | Age: 70
End: 2025-01-10

## 2025-01-10 DIAGNOSIS — Z98.1 S/P LUMBAR SPINAL FUSION: Primary | ICD-10-CM

## 2025-01-10 RX ORDER — OXYCODONE HYDROCHLORIDE 5 MG/1
5 TABLET ORAL EVERY 4 HOURS PRN
Qty: 42 TABLET | Refills: 0 | Status: SHIPPED | OUTPATIENT
Start: 2025-01-10 | End: 2025-01-17

## 2025-01-10 NOTE — TELEPHONE ENCOUNTER
Patients son called asking for david or jermain to call him please his name is joann and he wants to know about the stimulator

## 2025-02-07 ENCOUNTER — TELEPHONE (OUTPATIENT)
Dept: NEUROSURGERY | Age: 70
End: 2025-02-07

## 2025-02-07 NOTE — TELEPHONE ENCOUNTER
He does not want to discuss this in front of the patient, he would like to speak with the PA prior. This is regarding her care at home and the difficulties she is having.

## 2025-02-07 NOTE — TELEPHONE ENCOUNTER
Please call Ezequiel back and ask what he wants to discuss...Likely it can be discussed at the appointment

## 2025-02-07 NOTE — TELEPHONE ENCOUNTER
Called patients brother Ezequiel to confirm her appt on Monday. He did confirm the appt, however, he is asking for a call from Mary Jo or Alethea to discuss something prior to her visit. Please contact Ezequiel at your convenience.

## 2025-02-10 ENCOUNTER — OFFICE VISIT (OUTPATIENT)
Dept: NEUROSURGERY | Age: 70
End: 2025-02-10
Payer: MEDICARE

## 2025-02-10 ENCOUNTER — HOSPITAL ENCOUNTER (OUTPATIENT)
Age: 70
Discharge: HOME | End: 2025-02-12
Payer: MEDICARE

## 2025-02-10 ENCOUNTER — HOSPITAL ENCOUNTER (OUTPATIENT)
Dept: GENERAL RADIOLOGY | Age: 70
Discharge: HOME | End: 2025-02-12
Payer: MEDICARE

## 2025-02-10 VITALS
TEMPERATURE: 97.8 F | HEIGHT: 63 IN | HEART RATE: 77 BPM | BODY MASS INDEX: 40.04 KG/M2 | DIASTOLIC BLOOD PRESSURE: 64 MMHG | OXYGEN SATURATION: 96 % | SYSTOLIC BLOOD PRESSURE: 114 MMHG

## 2025-02-10 DIAGNOSIS — M48.062 SPINAL STENOSIS OF LUMBAR REGION WITH NEUROGENIC CLAUDICATION: Primary | ICD-10-CM

## 2025-02-10 DIAGNOSIS — Z98.1 S/P LUMBAR SPINAL FUSION: ICD-10-CM

## 2025-02-10 DIAGNOSIS — M48.062 SPINAL STENOSIS OF LUMBAR REGION WITH NEUROGENIC CLAUDICATION: ICD-10-CM

## 2025-02-10 PROCEDURE — 99024 POSTOP FOLLOW-UP VISIT: CPT | Performed by: PHYSICIAN ASSISTANT

## 2025-02-10 PROCEDURE — 72100 X-RAY EXAM L-S SPINE 2/3 VWS: CPT

## 2025-02-10 RX ORDER — IPRATROPIUM BROMIDE AND ALBUTEROL SULFATE 2.5; .5 MG/3ML; MG/3ML
SOLUTION RESPIRATORY (INHALATION)
COMMUNITY
Start: 2024-12-20

## 2025-02-10 RX ORDER — OXYCODONE HYDROCHLORIDE 10 MG/1
TABLET ORAL
COMMUNITY
Start: 2024-12-09 | End: 2025-02-11 | Stop reason: DRUGHIGH

## 2025-02-10 RX ORDER — CYCLOBENZAPRINE HCL 10 MG
TABLET ORAL
COMMUNITY
Start: 2025-01-02

## 2025-02-10 NOTE — PROGRESS NOTES
Post Operative Follow-up     This is a 69 year old female who presents to the office for a 3 month follow-up s/p L2-S1 fusion     Subjective: Patient presents with family. States her pain is progressively improving but she still feels significantly weak in her legs. Participates in at home PT/OT. LSO compliance. Denies new complaints. No issues with incision site. Lumbar XR reviewed.     Physical Exam:              WDWN, no apparent distress   Presents in a wheelchair              Non-labored breathing               Vitals Stable              Alert and oriented x3              CN 3-12 intact              PERRL              EOMI              DF on right 3/5              Sensation to LT intact bilaterally                  Imaging: Lumbar XR 2/10/25: Stable alignment, stable fusion. No acute abnormalities noted. Final report pending.       Assessment: This is a 69 y.o.  female presenting for a 3 month follow-up s/p L2-S1 fusion. Stable.      Plan:  -Pain control and expectations discussed  -XR reviewed  -No restrictions. Okay to d/c LSO  -Referral given and faxed to [facility]. Okay to complete at home therapy first then start outpatient  -OARRS report reviewed   -Follow-up in neurosurgery clinic in 9 months for 1 year follow up with repeat lumbar XR  -Call or return to neurosurgery office sooner if symptoms worsen or if new issues arise in the interim.    Electronically signed by Mary Jo Ramsey PA-C on 2/10/2025 at 2:42 PM

## 2025-02-11 ENCOUNTER — TELEPHONE (OUTPATIENT)
Dept: NEUROSURGERY | Age: 70
End: 2025-02-11

## 2025-02-11 DIAGNOSIS — Z98.1 S/P LUMBAR SPINAL FUSION: ICD-10-CM

## 2025-02-11 RX ORDER — OXYCODONE HYDROCHLORIDE 5 MG/1
5 TABLET ORAL EVERY 4 HOURS PRN
Qty: 42 TABLET | Refills: 0 | Status: SHIPPED | OUTPATIENT
Start: 2025-02-11 | End: 2025-02-18

## 2025-04-02 ENCOUNTER — TELEPHONE (OUTPATIENT)
Dept: NEUROSURGERY | Age: 70
End: 2025-04-02
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
## 2025-05-12 ENCOUNTER — TELEPHONE (OUTPATIENT)
Dept: CARDIOLOGY CLINIC | Age: 70
End: 2025-05-12

## 2025-05-12 ENCOUNTER — TELEPHONE (OUTPATIENT)
Dept: ADMINISTRATIVE | Age: 70
End: 2025-05-12

## 2025-05-12 NOTE — TELEPHONE ENCOUNTER
Patient states that she's been having SOB on exertion and fatigue, she is having physical therapy for lymphedema and was instructed to contact our office soon. We scheduled a f/u in June, please advise

## 2025-05-12 NOTE — TELEPHONE ENCOUNTER
Pt has been a little winded with exertion off and on x 6 weeks and has been tired.  She was advised by therapist at [facility] to schedule with Dr. Sanz.  She is being treated for lymphedema.   Please contact pt.

## 2025-05-13 DIAGNOSIS — R06.02 SHORTNESS OF BREATH: Primary | ICD-10-CM

## 2025-05-13 NOTE — TELEPHONE ENCOUNTER
Please arrange echo Dx COLE and OV.     Timur Sanz D.O.  Cardiologist  Cardiology, [facility]

## 2025-05-27 ENCOUNTER — APPOINTMENT (OUTPATIENT)
Dept: GENERAL RADIOLOGY | Age: 70
End: 2025-05-27
Payer: MEDICARE

## 2025-05-27 ENCOUNTER — APPOINTMENT (OUTPATIENT)
Dept: ULTRASOUND IMAGING | Age: 70
End: 2025-05-27
Payer: MEDICARE

## 2025-05-27 ENCOUNTER — HOSPITAL ENCOUNTER (EMERGENCY)
Age: 70
Discharge: HOME | End: 2025-05-27
Attending: EMERGENCY MEDICINE
Payer: MEDICARE

## 2025-05-27 VITALS
SYSTOLIC BLOOD PRESSURE: 151 MMHG | WEIGHT: 226 LBS | DIASTOLIC BLOOD PRESSURE: 71 MMHG | RESPIRATION RATE: 16 BRPM | BODY MASS INDEX: 40.04 KG/M2 | TEMPERATURE: 97.5 F | HEART RATE: 62 BPM | OXYGEN SATURATION: 97 %

## 2025-05-27 DIAGNOSIS — L03.116 CELLULITIS OF LEFT LOWER EXTREMITY: Primary | ICD-10-CM

## 2025-05-27 LAB
ALBUMIN SERPL-MCNC: 4.2 G/DL (ref 3.5–5.2)
ALP SERPL-CCNC: 108 U/L (ref 35–104)
ALT SERPL-CCNC: 9 U/L (ref 0–35)
ANION GAP SERPL CALCULATED.3IONS-SCNC: 11 MMOL/L (ref 7–16)
AST SERPL-CCNC: 23 U/L (ref 0–35)
BASOPHILS # BLD: 0.02 K/UL (ref 0–0.2)
BASOPHILS NFR BLD: 0 % (ref 0–2)
BILIRUB SERPL-MCNC: 0.3 MG/DL (ref 0–1.2)
BNP SERPL-MCNC: 77 PG/ML (ref 0–125)
BUN SERPL-MCNC: 21 MG/DL (ref 8–23)
CALCIUM SERPL-MCNC: 9.8 MG/DL (ref 8.8–10.2)
CHLORIDE SERPL-SCNC: 102 MMOL/L (ref 98–107)
CO2 SERPL-SCNC: 29 MMOL/L (ref 22–29)
CREAT SERPL-MCNC: 0.8 MG/DL (ref 0.5–1)
EKG ATRIAL RATE: 128 BPM
EKG P AXIS: 26 DEGREES
EKG P-R INTERVAL: 140 MS
EKG Q-T INTERVAL: 206 MS
EKG QRS DURATION: 82 MS
EKG QTC CALCULATION (BAZETT): 300 MS
EKG R AXIS: 15 DEGREES
EKG T AXIS: 48 DEGREES
EKG VENTRICULAR RATE: 128 BPM
EOSINOPHIL # BLD: 0.1 K/UL (ref 0.05–0.5)
EOSINOPHILS RELATIVE PERCENT: 1 % (ref 0–6)
ERYTHROCYTE [DISTWIDTH] IN BLOOD BY AUTOMATED COUNT: 14.6 % (ref 11.5–15)
GFR, ESTIMATED: 81 ML/MIN/1.73M2
GLUCOSE SERPL-MCNC: 124 MG/DL (ref 74–99)
HCT VFR BLD AUTO: 35.8 % (ref 34–48)
HGB BLD-MCNC: 12.1 G/DL (ref 11.5–15.5)
IMM GRANULOCYTES # BLD AUTO: 0.03 K/UL (ref 0–0.58)
IMM GRANULOCYTES NFR BLD: 0 % (ref 0–5)
LYMPHOCYTES NFR BLD: 1.64 K/UL (ref 1.5–4)
LYMPHOCYTES RELATIVE PERCENT: 18 % (ref 20–42)
MCH RBC QN AUTO: 30.9 PG (ref 26–35)
MCHC RBC AUTO-ENTMCNC: 33.8 G/DL (ref 32–34.5)
MCV RBC AUTO: 91.6 FL (ref 80–99.9)
MONOCYTES NFR BLD: 0.66 K/UL (ref 0.1–0.95)
MONOCYTES NFR BLD: 7 % (ref 2–12)
NEUTROPHILS NFR BLD: 73 % (ref 43–80)
NEUTS SEG NFR BLD: 6.51 K/UL (ref 1.8–7.3)
PLATELET # BLD AUTO: 230 K/UL (ref 130–450)
PMV BLD AUTO: 9.2 FL (ref 7–12)
POTASSIUM SERPL-SCNC: 4.2 MMOL/L (ref 3.5–5.1)
PROT SERPL-MCNC: 7.3 G/DL (ref 6.4–8.3)
RBC # BLD AUTO: 3.91 M/UL (ref 3.5–5.5)
SODIUM SERPL-SCNC: 142 MMOL/L (ref 136–145)
TROPONIN I SERPL HS-MCNC: 18 NG/L (ref 0–14)
TROPONIN I SERPL HS-MCNC: 19 NG/L (ref 0–14)
WBC OTHER # BLD: 9 K/UL (ref 4.5–11.5)

## 2025-05-27 PROCEDURE — 93005 ELECTROCARDIOGRAM TRACING: CPT

## 2025-05-27 PROCEDURE — 99285 EMERGENCY DEPT VISIT HI MDM: CPT

## 2025-05-27 PROCEDURE — 71045 X-RAY EXAM CHEST 1 VIEW: CPT

## 2025-05-27 PROCEDURE — 93971 EXTREMITY STUDY: CPT

## 2025-05-27 PROCEDURE — 80053 COMPREHEN METABOLIC PANEL: CPT

## 2025-05-27 PROCEDURE — 84484 ASSAY OF TROPONIN QUANT: CPT

## 2025-05-27 PROCEDURE — 85025 COMPLETE CBC W/AUTO DIFF WBC: CPT

## 2025-05-27 PROCEDURE — 93010 ELECTROCARDIOGRAM REPORT: CPT | Performed by: INTERNAL MEDICINE

## 2025-05-27 PROCEDURE — 83880 ASSAY OF NATRIURETIC PEPTIDE: CPT

## 2025-05-27 PROCEDURE — 6370000000 HC RX 637 (ALT 250 FOR IP): Performed by: EMERGENCY MEDICINE

## 2025-05-27 RX ORDER — DOXYCYCLINE 100 MG/1
100 CAPSULE ORAL ONCE
Status: COMPLETED | OUTPATIENT
Start: 2025-05-27 | End: 2025-05-27

## 2025-05-27 RX ORDER — DOXYCYCLINE HYCLATE 100 MG
100 TABLET ORAL 2 TIMES DAILY
Qty: 14 TABLET | Refills: 0 | Status: SHIPPED | OUTPATIENT
Start: 2025-05-27 | End: 2025-06-03

## 2025-05-27 RX ORDER — CEPHALEXIN 500 MG/1
500 CAPSULE ORAL ONCE
Status: COMPLETED | OUTPATIENT
Start: 2025-05-27 | End: 2025-05-27

## 2025-05-27 RX ORDER — CEPHALEXIN 500 MG/1
500 CAPSULE ORAL 4 TIMES DAILY
Qty: 28 CAPSULE | Refills: 0 | Status: SHIPPED | OUTPATIENT
Start: 2025-05-27 | End: 2025-06-03

## 2025-05-27 RX ADMIN — CEPHALEXIN 500 MG: 500 CAPSULE ORAL at 14:49

## 2025-05-27 RX ADMIN — DOXYCYCLINE HYCLATE 100 MG: 100 CAPSULE ORAL at 14:49

## 2025-05-27 NOTE — ED PROVIDER NOTES
[facility] EMERGENCY DEPARTMENT  EMERGENCY DEPARTMENT ENCOUNTER        Pt Name: Mignon Fischer  MRN: [id number]  Birthdate 1955  Date of evaluation: 5/27/2025  Provider: Marcos Ford DO  PCP: Sandy Leiva MD  Note Started: 10:45 AM EDT 5/27/25    CHIEF COMPLAINT       Chief Complaint   Patient presents with    Leg Swelling     Bilateral leg swelling that started 3 weeks ago with an increase yesterday.        HISTORY OF PRESENT ILLNESS: 1 or more Elements   History From: Patient    Limitations to history : None    Mignon Fischer is a 69 y.o. female with past medical history of diabetes, vitreous floaters of both eyes, osteoarthropathy, LIBRA, esophagitis, history of blood clots, HTN, HLD, appendectomy, cholecystectomy who presents to the ED for fluid-filled blisters on her left lower extremity with erythema for the past 1 day.    Patient states she has a history of blood clots.  She states that she has a history of lymphedema.  She states that her legs are always swollen does not believe the swelling has worsened but she did state that she feels that her blisters have arisen in the past day and appear very red and painful.  Patient states she has shortness of breath at baseline but this has had no changes.  She denies any chest pain.  She denies any fevers or chills.  She denies any dysuria, hematuria, hematochezia, melena, abdominal pain, nausea, vomiting, or diarrhea.  Denies any lightheadedness.  Denies any changes in medications.  Denies any recent surgery or recent trauma.  Denies any other known inciting factors to her symptoms.    Nursing Notes were all reviewed and agreed with or any disagreements were addressed in the HPI.        REVIEW OF SYSTEMS :           Positives and Pertinent negatives as per HPI.     SURGICAL HISTORY     Past Surgical History:   Procedure Laterality Date    APPENDECTOMY      CERVICAL FUSION Right 05/24/2024    C-4 -C5, C5-C6, and

## 2025-05-30 ENCOUNTER — HOSPITAL ENCOUNTER (OUTPATIENT)
Dept: CARDIOLOGY | Age: 70
Discharge: HOME | End: 2025-05-30
Attending: INTERNAL MEDICINE
Payer: MEDICARE

## 2025-05-30 VITALS
SYSTOLIC BLOOD PRESSURE: 151 MMHG | BODY MASS INDEX: 40.04 KG/M2 | WEIGHT: 226 LBS | DIASTOLIC BLOOD PRESSURE: 71 MMHG | HEIGHT: 63 IN

## 2025-05-30 DIAGNOSIS — R06.02 SHORTNESS OF BREATH: ICD-10-CM

## 2025-05-30 LAB
ECHO AO ASC DIAM: 2.3 CM
ECHO AO ASCENDING AORTA INDEX: 1.13 CM/M2
ECHO AV AREA PEAK VELOCITY: 1.3 CM2
ECHO AV AREA VTI: 1.3 CM2
ECHO AV AREA/BSA PEAK VELOCITY: 0.6 CM2/M2
ECHO AV AREA/BSA VTI: 0.6 CM2/M2
ECHO AV CUSP MM: 1.7 CM
ECHO AV MEAN GRADIENT: 8 MMHG
ECHO AV MEAN VELOCITY: 1.4 M/S
ECHO AV PEAK GRADIENT: 14 MMHG
ECHO AV PEAK VELOCITY: 1.9 M/S
ECHO AV VELOCITY RATIO: 0.53
ECHO AV VTI: 45.3 CM
ECHO BSA: 2.13 M2
ECHO EST RA PRESSURE: 8 MMHG
ECHO LA DIAMETER INDEX: 1.86 CM/M2
ECHO LA DIAMETER: 3.8 CM
ECHO LA VOL A-L A2C: 42 ML (ref 22–52)
ECHO LA VOL A-L A4C: 44 ML (ref 22–52)
ECHO LA VOL MOD A2C: 41 ML (ref 22–52)
ECHO LA VOL MOD A4C: 42 ML (ref 22–52)
ECHO LA VOLUME AREA LENGTH: 43 ML
ECHO LA VOLUME INDEX A-L A2C: 21 ML/M2 (ref 16–34)
ECHO LA VOLUME INDEX A-L A4C: 22 ML/M2 (ref 16–34)
ECHO LA VOLUME INDEX AREA LENGTH: 21 ML/M2 (ref 16–34)
ECHO LA VOLUME INDEX MOD A2C: 20 ML/M2 (ref 16–34)
ECHO LA VOLUME INDEX MOD A4C: 21 ML/M2 (ref 16–34)
ECHO LV EF PHYSICIAN: 60 %
ECHO LV FRACTIONAL SHORTENING: 31 % (ref 28–44)
ECHO LV INTERNAL DIMENSION DIASTOLE INDEX: 2.21 CM/M2
ECHO LV INTERNAL DIMENSION DIASTOLIC: 4.5 CM (ref 3.9–5.3)
ECHO LV INTERNAL DIMENSION SYSTOLIC INDEX: 1.52 CM/M2
ECHO LV INTERNAL DIMENSION SYSTOLIC: 3.1 CM
ECHO LV ISOVOLUMETRIC RELAXATION TIME (IVRT): 55.4 MS
ECHO LV IVSD: 0.8 CM (ref 0.6–0.9)
ECHO LV IVSS: 1.2 CM
ECHO LV MASS 2D: 113.6 G (ref 67–162)
ECHO LV MASS INDEX 2D: 55.7 G/M2 (ref 43–95)
ECHO LV POSTERIOR WALL DIASTOLIC: 0.8 CM (ref 0.6–0.9)
ECHO LV POSTERIOR WALL SYSTOLIC: 1.3 CM
ECHO LV RELATIVE WALL THICKNESS RATIO: 0.36
ECHO LVOT AREA: 2.5 CM2
ECHO LVOT AV VTI INDEX: 0.54
ECHO LVOT DIAM: 1.8 CM
ECHO LVOT MEAN GRADIENT: 3 MMHG
ECHO LVOT PEAK GRADIENT: 4 MMHG
ECHO LVOT PEAK VELOCITY: 1 M/S
ECHO LVOT STROKE VOLUME INDEX: 30.4 ML/M2
ECHO LVOT SV: 62.1 ML
ECHO LVOT VTI: 24.4 CM
ECHO MV "A" WAVE DURATION: 100.4 MSEC
ECHO MV A VELOCITY: 1.1 M/S
ECHO MV AREA PHT: 3.5 CM2
ECHO MV AREA VTI: 2 CM2
ECHO MV E DECELERATION TIME (DT): 183.5 MS
ECHO MV E VELOCITY: 1.19 M/S
ECHO MV E/A RATIO: 1.08
ECHO MV LVOT VTI INDEX: 1.29
ECHO MV MAX VELOCITY: 1.1 M/S
ECHO MV MEAN GRADIENT: 3 MMHG
ECHO MV MEAN VELOCITY: 0.7 M/S
ECHO MV PEAK GRADIENT: 5 MMHG
ECHO MV PRESSURE HALF TIME (PHT): 62.9 MS
ECHO MV VTI: 31.4 CM
ECHO PV MAX VELOCITY: 1.1 M/S
ECHO PV MEAN GRADIENT: 3 MMHG
ECHO PV MEAN VELOCITY: 0.8 M/S
ECHO PV PEAK GRADIENT: 5 MMHG
ECHO PV VTI: 27.3 CM
ECHO PVEIN A DURATION: 124.6 MS
ECHO PVEIN A VELOCITY: 0.3 M/S
ECHO PVEIN PEAK D VELOCITY: 0.6 M/S
ECHO PVEIN PEAK S VELOCITY: 0.7 M/S
ECHO PVEIN S/D RATIO: 1.2
ECHO RIGHT VENTRICULAR SYSTOLIC PRESSURE (RVSP): 35 MMHG
ECHO RV INTERNAL DIMENSION: 3 CM
ECHO RV TAPSE: 2.2 CM (ref 1.7–?)
ECHO TV REGURGITANT MAX VELOCITY: 2.62 M/S
ECHO TV REGURGITANT PEAK GRADIENT: 28 MMHG

## 2025-05-30 PROCEDURE — 93306 TTE W/DOPPLER COMPLETE: CPT | Performed by: INTERNAL MEDICINE

## 2025-05-30 PROCEDURE — 93306 TTE W/DOPPLER COMPLETE: CPT

## 2025-06-26 ENCOUNTER — OFFICE VISIT (OUTPATIENT)
Dept: CARDIOLOGY CLINIC | Age: 70
End: 2025-06-26
Payer: MEDICARE

## 2025-06-26 VITALS
RESPIRATION RATE: 18 BRPM | OXYGEN SATURATION: 97 % | HEIGHT: 63 IN | HEART RATE: 77 BPM | WEIGHT: 212.4 LBS | TEMPERATURE: 96.8 F | SYSTOLIC BLOOD PRESSURE: 114 MMHG | BODY MASS INDEX: 37.63 KG/M2 | DIASTOLIC BLOOD PRESSURE: 62 MMHG

## 2025-06-26 DIAGNOSIS — R06.02 SHORTNESS OF BREATH: Primary | ICD-10-CM

## 2025-06-26 DIAGNOSIS — I50.33 ACUTE ON CHRONIC DIASTOLIC CONGESTIVE HEART FAILURE (HCC): ICD-10-CM

## 2025-06-26 PROCEDURE — 1123F ACP DISCUSS/DSCN MKR DOCD: CPT | Performed by: INTERNAL MEDICINE

## 2025-06-26 PROCEDURE — 1159F MED LIST DOCD IN RCRD: CPT | Performed by: INTERNAL MEDICINE

## 2025-06-26 PROCEDURE — 93000 ELECTROCARDIOGRAM COMPLETE: CPT | Performed by: INTERNAL MEDICINE

## 2025-06-26 PROCEDURE — G2211 COMPLEX E/M VISIT ADD ON: HCPCS | Performed by: INTERNAL MEDICINE

## 2025-06-26 PROCEDURE — 99214 OFFICE O/P EST MOD 30 MIN: CPT | Performed by: INTERNAL MEDICINE

## 2025-06-26 RX ORDER — SPIRONOLACTONE 25 MG/1
25 TABLET ORAL DAILY
Qty: 90 TABLET | Refills: 3 | Status: SHIPPED | OUTPATIENT
Start: 2025-06-26

## 2025-06-26 NOTE — PROGRESS NOTES
CHIEF COMPLAINT: COLE/Hx of CP    HISTORY OF PRESENT ILLNESS: Patient is a 69 y.o. female seen at the request of [facility], Sandy HINES MD    Hx of chest pain and abnormal stress lead to cath that was normal 9/23/17.     No CP or SOB.     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)    Vitreous floaters of both eyes        Patient Active Problem List   Diagnosis    Abnormal gait    Diabetes mellitus type II, controlled (HCC)    Vitreous floaters of both eyes    Idiopathic localized osteoarthropathy    Pancreatic pseudocyst    Blood glucose abnormal    Obstructive sleep apnea syndrome    Adiposity    Nausea    Strabismic amblyopia    Type 2 diabetes mellitus without complications (HCC)    Asthma    Vitamin D deficiency    Combined fat and carbohydrate induced hyperlipemia    Hairiness    Erosive esophagitis    Binocular vision disorder with diplopia    Diplopia    Hyperlipemia    Primary osteoarthritis of right knee    Lower extremity weakness    Bilateral leg weakness    Cervical herniated disc    Stenosis, spinal, lumbar    Synovial cyst of lumbar spine    Synovial cyst of lumbar facet joint    Spinal stenosis at L4-L5 level    Acute hypoxic respiratory failure (HCC)       Allergies   Allergen Reactions    Lipitor [Atorvastatin] Myalgia     \"LEG CRAMPS\"    Megace [Megestrol Acetate] Other (See Comments)     UNKNOWN REACTION    Vytorin [Ezetimibe-Simvastatin] Other (See Comments)     UNKNOWN REACTION    Zocor [Simvastatin] Other (See Comments)     UNKNOWN REACTION       Current Outpatient Medications   Medication Sig Dispense Refill    cyclobenzaprine (FLEXERIL) 10 MG tablet       pregabalin (LYRICA) 75 MG capsule Take 1 capsule by mouth at

## 2025-07-08 ENCOUNTER — TELEPHONE (OUTPATIENT)
Dept: NEUROSURGERY | Age: 70
End: 2025-07-08

## 2025-07-08 NOTE — TELEPHONE ENCOUNTER
Patient's brother,Ferny, called.  He wants to speak to Mary Jo.  He is concerned about  her since she has not been wearing her bone growth stimulator. She is currently in [facility] for Lipedema     [phone] phone

## 2025-07-29 ENCOUNTER — TELEPHONE (OUTPATIENT)
Dept: ADMINISTRATIVE | Age: 70
End: 2025-07-29

## 2025-07-29 NOTE — TELEPHONE ENCOUNTER
I spoke with patient and scheduled a f/u with NIKHIL bassam Escobar this week, a lot of medication changes have been made and pcp wants patient seen.   
Janet at Dr. Leiva office, Stated patient at  [facility] for MAVIS and frequent falls. Stated she had medication changes. Dr. Leiva would like f/u for review, Please advise scheduling   
Left message for Janet to call the office.    
- - -

## 2025-07-31 ENCOUNTER — OFFICE VISIT (OUTPATIENT)
Dept: CARDIOLOGY CLINIC | Age: 70
End: 2025-07-31
Payer: MEDICARE

## 2025-07-31 VITALS
BODY MASS INDEX: 39.34 KG/M2 | DIASTOLIC BLOOD PRESSURE: 86 MMHG | HEART RATE: 69 BPM | RESPIRATION RATE: 16 BRPM | WEIGHT: 222 LBS | HEIGHT: 63 IN | SYSTOLIC BLOOD PRESSURE: 130 MMHG

## 2025-07-31 DIAGNOSIS — I50.32 CHRONIC HEART FAILURE WITH PRESERVED EJECTION FRACTION (HFPEF) (HCC): Primary | ICD-10-CM

## 2025-07-31 PROCEDURE — 1159F MED LIST DOCD IN RCRD: CPT | Performed by: CLINICAL NURSE SPECIALIST

## 2025-07-31 PROCEDURE — 1123F ACP DISCUSS/DSCN MKR DOCD: CPT | Performed by: CLINICAL NURSE SPECIALIST

## 2025-07-31 PROCEDURE — 1160F RVW MEDS BY RX/DR IN RCRD: CPT | Performed by: CLINICAL NURSE SPECIALIST

## 2025-07-31 PROCEDURE — 93000 ELECTROCARDIOGRAM COMPLETE: CPT | Performed by: CLINICAL NURSE SPECIALIST

## 2025-07-31 PROCEDURE — 99214 OFFICE O/P EST MOD 30 MIN: CPT | Performed by: CLINICAL NURSE SPECIALIST

## 2025-07-31 RX ORDER — MICONAZOLE NITRATE 2 G/100G
CREAM TOPICAL
COMMUNITY
Start: 2025-06-09

## 2025-07-31 RX ORDER — TIZANIDINE 2 MG/1
2 TABLET ORAL EVERY 8 HOURS PRN
COMMUNITY
Start: 2025-07-09

## 2025-07-31 NOTE — PATIENT INSTRUCTIONS
Continue torsemide at 20 mg daily     Remain off losartan and spironolactone     Continue rosuvastatin and Toprol XL     Follow up with Dr. Sanz in three months: please call us with questions or concerns prior to then

## 2025-07-31 NOTE — PROGRESS NOTES
OUTPATIENT CARDIOLOGY FOLLOW-UP    Name: Mignon Fischer    Age: 69 y.o.    Primary Care Physician: Sandy Leiva MD    Date of Service: 7/31/2025    Chief Complaint: Chronic HFpEF    Interim History:    Ms. Fischer is a 69 year old lady who is followed here by Dr. Sanz; she was last seen on June 26, 2025. She has since had lengthy admission at [facility] from July 2 to due to recurrent falls, lower extremity cellulitis and MAVIS (presenting BUN/Cr 62/1.7, with prior of 0.75 . Mobic, metformin, torsemide, spironolactone and losartan,were held due to MAVIS, and statin was held due to concerns for rhabdomyolysis. She was seen in consultation by nephrology, was advised to NSAIDs and decrease Lyrica dose. BUN/Cr were 13 and 0.78 on discharge to rehab.  She has since been home from rehab for about two weeks, and remains under the care of home health. She ambulates with a walker, denies exertional chest discomfort and feels her breathing has been stable. She has ongoing lower extremity edema and multiple blisters and a wound on her left leg. He weight has been stable at 212 on her home scale. She is most concerned about neck pain, and imaging done at [facility] showed possible loosening of C7 screws. Lyrica was recently increased by her PCP. She is scheduled to see neurosurgery next week. Her PCP suggested addition of Jardiance but she is unsure if she wants to start after seeing a commercial for it.       Review of Systems:   Negative except as described above     Past Medical History:    [facility] 2017 for abnormal stress test:  no significant epicardial disease with LVEF 60%  Hypertension   DM type II with neuropathy  Hyperlipidemia   Morbid obesity   LIBRA : cannot tolerate mask due to claustrophobia  Asthma  Chronic normocytic anemia   Chronic dyspnea on exertion  Hx Erosive gastritis  Hx Pancreatitis  Chronic lymphedema with chronic venous stasis  Bilateral lumbosacral radiculopathy  Osteoarthritis   Cervical stenosis with

## 2025-08-05 ENCOUNTER — OFFICE VISIT (OUTPATIENT)
Age: 70
End: 2025-08-05
Payer: MEDICARE

## 2025-08-05 VITALS — DIASTOLIC BLOOD PRESSURE: 63 MMHG | HEART RATE: 67 BPM | OXYGEN SATURATION: 98 % | SYSTOLIC BLOOD PRESSURE: 123 MMHG

## 2025-08-05 DIAGNOSIS — M48.061 SPINAL STENOSIS AT L4-L5 LEVEL: Primary | ICD-10-CM

## 2025-08-05 PROCEDURE — 99214 OFFICE O/P EST MOD 30 MIN: CPT | Performed by: PHYSICIAN ASSISTANT

## 2025-08-05 PROCEDURE — 1123F ACP DISCUSS/DSCN MKR DOCD: CPT | Performed by: PHYSICIAN ASSISTANT

## 2025-08-05 PROCEDURE — 1125F AMNT PAIN NOTED PAIN PRSNT: CPT | Performed by: PHYSICIAN ASSISTANT

## 2025-08-05 PROCEDURE — 1159F MED LIST DOCD IN RCRD: CPT | Performed by: PHYSICIAN ASSISTANT

## 2025-08-05 RX ORDER — PREGABALIN 100 MG/1
100 CAPSULE ORAL DAILY
COMMUNITY
Start: 2025-07-29

## 2025-08-11 ENCOUNTER — TELEPHONE (OUTPATIENT)
Age: 70
End: 2025-08-11

## 2025-08-11 DIAGNOSIS — Z98.1 S/P LUMBAR SPINAL FUSION: ICD-10-CM

## 2025-08-11 DIAGNOSIS — Z98.1 S/P CERVICAL SPINAL FUSION: ICD-10-CM

## 2025-08-11 DIAGNOSIS — M48.061 SPINAL STENOSIS AT L4-L5 LEVEL: Primary | ICD-10-CM

## 2025-08-11 DIAGNOSIS — M54.2 NECK PAIN: ICD-10-CM

## 2025-08-11 DIAGNOSIS — M48.062 SPINAL STENOSIS OF LUMBAR REGION WITH NEUROGENIC CLAUDICATION: ICD-10-CM

## 2025-08-11 DIAGNOSIS — M54.12 CERVICAL RADICULOPATHY: ICD-10-CM

## 2025-08-25 ENCOUNTER — HOSPITAL ENCOUNTER (OUTPATIENT)
Dept: LAB | Age: 70
Discharge: HOME | End: 2025-08-25
Payer: MEDICARE

## 2025-08-25 DIAGNOSIS — M48.062 SPINAL STENOSIS OF LUMBAR REGION WITH NEUROGENIC CLAUDICATION: ICD-10-CM

## 2025-08-25 DIAGNOSIS — Z98.1 S/P CERVICAL SPINAL FUSION: ICD-10-CM

## 2025-08-25 DIAGNOSIS — M48.061 SPINAL STENOSIS AT L4-L5 LEVEL: ICD-10-CM

## 2025-08-25 DIAGNOSIS — Z98.1 S/P LUMBAR SPINAL FUSION: ICD-10-CM

## 2025-08-25 DIAGNOSIS — M54.2 NECK PAIN: ICD-10-CM

## 2025-08-25 DIAGNOSIS — M54.12 CERVICAL RADICULOPATHY: ICD-10-CM

## 2025-08-25 LAB
INR PPP: 1
PLATELET # BLD AUTO: 287 K/UL (ref 130–450)
PROTHROMBIN TIME: 11.2 SEC (ref 9.3–12.4)

## 2025-08-25 PROCEDURE — 85610 PROTHROMBIN TIME: CPT

## 2025-08-25 PROCEDURE — 36415 COLL VENOUS BLD VENIPUNCTURE: CPT

## 2025-08-25 PROCEDURE — 85049 AUTOMATED PLATELET COUNT: CPT

## 2025-08-26 ENCOUNTER — TELEPHONE (OUTPATIENT)
Age: 70
End: 2025-08-26

## 2025-08-28 ENCOUNTER — HOSPITAL ENCOUNTER (OUTPATIENT)
Dept: GENERAL RADIOLOGY | Age: 70
Discharge: HOME | End: 2025-08-30
Payer: MEDICARE

## 2025-08-28 ENCOUNTER — HOSPITAL ENCOUNTER (OUTPATIENT)
Dept: CT IMAGING | Age: 70
Discharge: HOME | End: 2025-08-30
Payer: MEDICARE

## 2025-08-28 VITALS
OXYGEN SATURATION: 98 % | HEART RATE: 74 BPM | TEMPERATURE: 97 F | SYSTOLIC BLOOD PRESSURE: 148 MMHG | RESPIRATION RATE: 16 BRPM | DIASTOLIC BLOOD PRESSURE: 66 MMHG

## 2025-08-28 DIAGNOSIS — Z98.1 S/P CERVICAL SPINAL FUSION: ICD-10-CM

## 2025-08-28 DIAGNOSIS — Z98.1 S/P LUMBAR SPINAL FUSION: ICD-10-CM

## 2025-08-28 DIAGNOSIS — M54.2 NECK PAIN: ICD-10-CM

## 2025-08-28 DIAGNOSIS — M48.061 SPINAL STENOSIS AT L4-L5 LEVEL: ICD-10-CM

## 2025-08-28 DIAGNOSIS — M54.12 CERVICAL RADICULOPATHY: ICD-10-CM

## 2025-08-28 DIAGNOSIS — M48.062 SPINAL STENOSIS OF LUMBAR REGION WITH NEUROGENIC CLAUDICATION: ICD-10-CM

## 2025-08-28 PROCEDURE — 6370000000 HC RX 637 (ALT 250 FOR IP): Performed by: PHYSICIAN ASSISTANT

## 2025-08-28 PROCEDURE — 72126 CT NECK SPINE W/DYE: CPT

## 2025-08-28 PROCEDURE — 72132 CT LUMBAR SPINE W/DYE: CPT

## 2025-08-28 PROCEDURE — 2709999900 FL MYELOGRAM 2 OR MORE REGIONS VIA LUMB INJ

## 2025-08-28 PROCEDURE — 6360000002 HC RX W HCPCS: Performed by: PHYSICIAN ASSISTANT

## 2025-08-28 PROCEDURE — 6360000004 HC RX CONTRAST MEDICATION: Performed by: RADIOLOGY

## 2025-08-28 RX ORDER — LIDOCAINE HYDROCHLORIDE 10 MG/ML
INJECTION, SOLUTION INFILTRATION; PERINEURAL PRN
Status: COMPLETED | OUTPATIENT
Start: 2025-08-28 | End: 2025-08-28

## 2025-08-28 RX ORDER — OXYCODONE HYDROCHLORIDE 5 MG/1
5 CAPSULE ORAL 3 TIMES DAILY PRN
COMMUNITY

## 2025-08-28 RX ORDER — IOPAMIDOL 612 MG/ML
12 INJECTION, SOLUTION INTRATHECAL
Status: COMPLETED | OUTPATIENT
Start: 2025-08-28 | End: 2025-08-28

## 2025-08-28 RX ORDER — ACETAMINOPHEN 325 MG/1
650 TABLET ORAL EVERY 4 HOURS PRN
Status: DISCONTINUED | OUTPATIENT
Start: 2025-08-28 | End: 2025-08-31 | Stop reason: HOSPADM

## 2025-08-28 RX ADMIN — ACETAMINOPHEN 650 MG: 325 TABLET ORAL at 12:59

## 2025-08-28 RX ADMIN — IOPAMIDOL 12 ML: 612 INJECTION, SOLUTION INTRATHECAL at 11:59

## 2025-08-28 RX ADMIN — LIDOCAINE HYDROCHLORIDE 10 ML: 10 INJECTION, SOLUTION INFILTRATION; PERINEURAL at 11:35

## 2025-08-28 ASSESSMENT — PAIN DESCRIPTION - DESCRIPTORS
DESCRIPTORS: ACHING;SHARP
DESCRIPTORS: ACHING
DESCRIPTORS: ACHING
DESCRIPTORS: ACHING;SHARP
DESCRIPTORS: ACHING
DESCRIPTORS: ACHING

## 2025-08-28 ASSESSMENT — PAIN DESCRIPTION - ORIENTATION
ORIENTATION: LOWER

## 2025-08-28 ASSESSMENT — PAIN DESCRIPTION - FREQUENCY
FREQUENCY: CONTINUOUS

## 2025-08-28 ASSESSMENT — PAIN - FUNCTIONAL ASSESSMENT
PAIN_FUNCTIONAL_ASSESSMENT: 0-10

## 2025-08-28 ASSESSMENT — PAIN DESCRIPTION - ONSET
ONSET: ON-GOING

## 2025-08-28 ASSESSMENT — PAIN DESCRIPTION - PAIN TYPE
TYPE: CHRONIC PAIN

## 2025-08-28 ASSESSMENT — PAIN SCALES - GENERAL
PAINLEVEL_OUTOF10: 7
PAINLEVEL_OUTOF10: 7
PAINLEVEL_OUTOF10: 2
PAINLEVEL_OUTOF10: 0
PAINLEVEL_OUTOF10: 5

## 2025-08-28 ASSESSMENT — PAIN DESCRIPTION - LOCATION
LOCATION: BACK

## 2025-08-29 ENCOUNTER — OFFICE VISIT (OUTPATIENT)
Age: 70
End: 2025-08-29

## 2025-08-29 VITALS — DIASTOLIC BLOOD PRESSURE: 77 MMHG | HEART RATE: 69 BPM | OXYGEN SATURATION: 98 % | SYSTOLIC BLOOD PRESSURE: 134 MMHG

## 2025-08-29 DIAGNOSIS — Z98.1 S/P LUMBAR SPINAL FUSION: Primary | ICD-10-CM

## 2025-08-29 RX ORDER — LEVOFLOXACIN 500 MG/1
500 TABLET, FILM COATED ORAL DAILY
COMMUNITY
Start: 2025-08-27

## (undated) DEVICE — KIT INT FIX FEM TIB CKPT MAKOPLASTY

## (undated) DEVICE — 3M™ IOBAN™ 2 ANTIMICROBIAL INCISE DRAPE 6650EZ: Brand: IOBAN™ 2

## (undated) DEVICE — BLADE SURG SAW S STL NAR OSC W/ SERR EDGE DISP

## (undated) DEVICE — PADDING CAST W6INXL4YD COT LO LINTING WYTEX

## (undated) DEVICE — LIQUIBAND RAPID ADHESIVE 36/CS 0.8ML: Brand: MEDLINE

## (undated) DEVICE — SUTURE STRATAFIX SYMMETRIC SZ 1 L18IN ABSRB VLT CT1 L36CM SXPP1A404

## (undated) DEVICE — PIN BNE FIX TEMP L140MM DIA4MM MAKO

## (undated) DEVICE — BLADE CLIPPER GEN PURP NS

## (undated) DEVICE — 3M™ IOBAN™ 2 ANTIMICROBIAL INCISE DRAPE 6640EZ: Brand: IOBAN™ 2

## (undated) DEVICE — GAUZE,SPONGE,4"X4",8PLY,STRL,LF,10/TRAY: Brand: MEDLINE

## (undated) DEVICE — GLOVE ORANGE PI 8   MSG9080

## (undated) DEVICE — GLOVE SURG SZ 65 THK91MIL LTX FREE SYN POLYISOPRENE

## (undated) DEVICE — TUBING, SUCTION, 9/32" X 10', STRAIGHT: Brand: MEDLINE

## (undated) DEVICE — NEPTUNE E-SEP 125MM SUCTION SLEEVE: Brand: NEPTUNE E-SEP

## (undated) DEVICE — 3M™ TEGADERM™ TRANSPARENT FILM DRESSING FRAME STYLE, 1626W, 4 IN X 4-3/4 IN (10 CM X 12 CM), 50/CT 4CT/CASE: Brand: 3M™ TEGADERM™

## (undated) DEVICE — BLADE ES L6IN ELASTOMERIC COAT EXT DURABLE BEND UPTO 90DEG

## (undated) DEVICE — PIN BNE FIX TEMP L110MM DIA4MM MAKO

## (undated) DEVICE — DRILL BIT, 12MM

## (undated) DEVICE — SOLUTION SURG PREP 26 CC PURPREP

## (undated) DEVICE — STRYKER PERFORMANCE SERIES SAGITTAL BLADE: Brand: STRYKER PERFORMANCE SERIES

## (undated) DEVICE — 3M(TM) MEDIPORE(TM) +PAD SOFT CLOTH ADHESIVE WOUND DRESSING 3569: Brand: 3M™ MEDIPORE™

## (undated) DEVICE — BLADE,STAINLESS-STEEL,10,STRL,DISPOSABLE: Brand: MEDLINE

## (undated) DEVICE — DRAPE,REIN 53X77,STERILE: Brand: MEDLINE

## (undated) DEVICE — HYPODERMIC SAFETY NEEDLE: Brand: MAGELLAN

## (undated) DEVICE — TUBING SUCT 12FR MAL ALUM SHFT FN CAP VENT UNIV CONN W/ OBT

## (undated) DEVICE — GOWN,SIRUS,POLYRNF,BRTHSLV,LG,30/CS: Brand: MEDLINE

## (undated) DEVICE — 1000 S-DRAPE TOWEL DRAPE 10/BX: Brand: STERI-DRAPE™

## (undated) DEVICE — SYRINGE MED 20ML STD CLR PLAS LUERLOCK TIP N CTRL DISP

## (undated) DEVICE — SOLUTION IRRIG 1000ML 0.9% SOD CHL USP POUR PLAS BTL

## (undated) DEVICE — SOLUTION IRRIG 3000ML 0.9% SOD CHL USP UROMATIC PLAS CONT

## (undated) DEVICE — GLOVE SURG SZ 7 CRM LTX FREE POLYISOPRENE POLYMER BEAD ANTI

## (undated) DEVICE — ELECTRODE ES AD PED L2.5IN TEF INSUL MOD NONCORDED BLDE TIP

## (undated) DEVICE — CASPAR DISTR PIN12MMSTER: Brand: AESCULAP

## (undated) DEVICE — SOLUTION IRRIG 1000ML STRL H2O USP PLAS POUR BTL

## (undated) DEVICE — STRAP POS MP 30X3 IN HK LOOP CLOSURE FOAM DISP

## (undated) DEVICE — JACKSON TABLE POSITIONER KIT: Brand: MEDLINE INDUSTRIES, INC.

## (undated) DEVICE — STRIP,CLOSURE,WOUND,MEDI-STRIP,1/2X4: Brand: MEDLINE

## (undated) DEVICE — GAUZE,SPONGE,4"X4",16PLY,XRAY,STRL,LF: Brand: MEDLINE

## (undated) DEVICE — GOWN,SIRUS,FABRNF,XL,20/CS: Brand: MEDLINE

## (undated) DEVICE — SPHERE EYE 1 MRK GUIDANCE PASS STEALTHSTATION 1PK/EA

## (undated) DEVICE — PACK PROCEDURE SURG GEN CUST

## (undated) DEVICE — KIT PLT RATIO DISPNS KT 2IN CANN TIP SPRY TIP DISP MAGELLAN

## (undated) DEVICE — GLOVE SURG SZ 7 L12IN FNGR THK79MIL GRN LTX FREE

## (undated) DEVICE — NEEDLE HYPO 18GA L1.5IN PNK POLYPR HUB S STL REG BVL STR

## (undated) DEVICE — 4-PORT MANIFOLD: Brand: NEPTUNE 2

## (undated) DEVICE — SPONGE,DRAIN,NONWVN,4"X4",6PLY,STRL,LF: Brand: MEDLINE

## (undated) DEVICE — SKIN PREP TRAY 4 COMPARTM TRAY: Brand: MEDLINE INDUSTRIES, INC.

## (undated) DEVICE — BOWL ASSY BM210 DUAL BLADE DISPOSABLE: Brand: MIDAS REX™

## (undated) DEVICE — BOWL AND CEMENT CARTRIDGE WITH BREAKAWAY FEMORAL NOZZLE: Brand: ACM

## (undated) DEVICE — TAPE ADH W3INXL10YD WHT COT WVN BK POWERFUL RUB BASE HIGHLY

## (undated) DEVICE — GOWN,SIRUS,POLYRNF,BRTHSLV,XL,30/CS: Brand: MEDLINE

## (undated) DEVICE — TUBING, SUCTION, 3/16" X 12', STRAIGHT: Brand: MEDLINE

## (undated) DEVICE — GOWN,SIRUS,FABRNF,L,20/CS: Brand: MEDLINE

## (undated) DEVICE — SYRINGE MED 10ML LUERLOCK TIP W/O SFTY DISP

## (undated) DEVICE — SPONGE,DISSECTOR,K,XRAY,9/16"X1/4",STRL: Brand: MEDLINE

## (undated) DEVICE — 3.0MM PRECISION NEURO (MATCH HEAD)

## (undated) DEVICE — TOTAL KNEE PK

## (undated) DEVICE — MARKER SURG PASS SPHR NDI

## (undated) DEVICE — BLADE, TONGUE, 6", STERILE: Brand: MEDLINE

## (undated) DEVICE — BANDAGE,GAUZE,4.5"X4.1YD,STERILE,LF: Brand: MEDLINE

## (undated) DEVICE — SPONGE LAP W18XL18IN WHT COT 4 PLY FLD STRUNG RADPQ DISP ST 2 PER PACK

## (undated) DEVICE — SHEET,DRAPE,40X58,STERILE: Brand: MEDLINE

## (undated) DEVICE — PREMIUM WET SKIN PREP TRAY: Brand: MEDLINE INDUSTRIES, INC.

## (undated) DEVICE — NEEDLE,22GX1.5",REG,BEVEL: Brand: MEDLINE

## (undated) DEVICE — BANDAGE COMPR W6INXL12FT SMOOTH FOR LIMB EXSANG ESMARCH

## (undated) DEVICE — 3M(TM) MEDIPORE(TM) +PAD SOFT CLOTH ADHESIVE WOUND DRESSING 3570: Brand: 3M™ MEDIPORE™

## (undated) DEVICE — LUMBAR LAMINECTOMY: Brand: MEDLINE INDUSTRIES, INC.

## (undated) DEVICE — TOWEL,OR,DSP,ST,BLUE,DLX,10/PK,8PK/CS: Brand: MEDLINE

## (undated) DEVICE — GLOVE SURG SZ 85 L12IN FNGR THK79MIL GRN LTX FREE

## (undated) DEVICE — DRAPE,TOP,102X53,STERILE: Brand: MEDLINE

## (undated) DEVICE — SYRINGE 20ML LL S/C 50

## (undated) DEVICE — KIT DRP FOR RIO ROBOTIC ARM ASST SYS

## (undated) DEVICE — ELECTRODE PT RET AD L9FT HI MOIST COND ADH HYDRGEL CORDED

## (undated) DEVICE — SPONGE,DISSECTOR,ROUND CHERRY,XR,ST,5/PK: Brand: MEDLINE

## (undated) DEVICE — KIT TRK KNEE PROC VIZADISC

## (undated) DEVICE — NEEDLE SPNL L3.5IN PNK HUB S STL REG WALL FIT STYL W/ QNCKE

## (undated) DEVICE — DOUBLE BASIN SET: Brand: MEDLINE INDUSTRIES, INC.

## (undated) DEVICE — HANDPIECE SET WITH COAXIAL HIGH FLOW TIP AND SUCTION TUBE: Brand: INTERPULSE

## (undated) DEVICE — GLOVE SURG 8.5 PF POLYMER WHT STRL SIGN LTX ESSENTIAL LTX

## (undated) DEVICE — TOWEL,OR,DSP,ST,BLUE,STD,6/PK,12PK/CS: Brand: MEDLINE

## (undated) DEVICE — 2108 SERIES SAGITTAL BLADE FAN, OFFSET  (34.5 X 0.8 X 64.0MM)

## (undated) DEVICE — ZIMMER® STERILE DISPOSABLE TOURNIQUET CUFF WITH PLC, DUAL PORT, SINGLE BLADDER, 34 IN. (86 CM)

## (undated) DEVICE — BNDG,ELSTC,MATRIX,STRL,6"X5YD,LF,HOOK&LP: Brand: MEDLINE

## (undated) DEVICE — 5.0MM PRECISION ROUND

## (undated) DEVICE — SWABSTCK, BENZOIN TINCTURE, 1/PK, STRL: Brand: APLICARE

## (undated) DEVICE — KIT EVAC 400CC DIA1/8IN H PAT 12.5IN 3 SPR RND SHP PVC DRN